# Patient Record
Sex: FEMALE | Race: WHITE | URBAN - METROPOLITAN AREA
[De-identification: names, ages, dates, MRNs, and addresses within clinical notes are randomized per-mention and may not be internally consistent; named-entity substitution may affect disease eponyms.]

---

## 2020-11-02 ENCOUNTER — INPATIENT (INPATIENT)
Facility: HOSPITAL | Age: 60
LOS: 20 days | Discharge: NURSING, REHAB OR LONG-TERM CARE | DRG: 710 | End: 2020-11-23
Attending: COLON & RECTAL SURGERY | Admitting: COLON & RECTAL SURGERY
Payer: MEDICAID

## 2020-11-02 ENCOUNTER — RESULT REVIEW (OUTPATIENT)
Age: 60
End: 2020-11-02

## 2020-11-02 VITALS
SYSTOLIC BLOOD PRESSURE: 130 MMHG | HEART RATE: 98 BPM | RESPIRATION RATE: 19 BRPM | OXYGEN SATURATION: 100 % | DIASTOLIC BLOOD PRESSURE: 58 MMHG | TEMPERATURE: 102 F

## 2020-11-02 DIAGNOSIS — D64.9 ANEMIA, UNSPECIFIED: ICD-10-CM

## 2020-11-02 LAB
ADD ON TEST-SPECIMEN IN LAB: SIGNIFICANT CHANGE UP
ALBUMIN SERPL ELPH-MCNC: 1.5 G/DL — LOW (ref 3.3–5)
ALBUMIN SERPL ELPH-MCNC: 2.2 G/DL — LOW (ref 3.3–5)
ALP SERPL-CCNC: 79 U/L — SIGNIFICANT CHANGE UP (ref 40–120)
ALP SERPL-CCNC: 97 U/L — SIGNIFICANT CHANGE UP (ref 40–120)
ALT FLD-CCNC: 52 U/L — SIGNIFICANT CHANGE UP (ref 12–78)
ALT FLD-CCNC: 74 U/L — SIGNIFICANT CHANGE UP (ref 12–78)
ANION GAP SERPL CALC-SCNC: 10 MMOL/L — SIGNIFICANT CHANGE UP (ref 5–17)
ANION GAP SERPL CALC-SCNC: 8 MMOL/L — SIGNIFICANT CHANGE UP (ref 5–17)
APPEARANCE UR: CLEAR — SIGNIFICANT CHANGE UP
APTT BLD: 26.2 SEC — LOW (ref 27.5–35.5)
AST SERPL-CCNC: 117 U/L — HIGH (ref 15–37)
AST SERPL-CCNC: 67 U/L — HIGH (ref 15–37)
BASOPHILS # BLD AUTO: 0 K/UL — SIGNIFICANT CHANGE UP (ref 0–0.2)
BASOPHILS NFR BLD AUTO: 0 % — SIGNIFICANT CHANGE UP (ref 0–2)
BILIRUB SERPL-MCNC: 0.4 MG/DL — SIGNIFICANT CHANGE UP (ref 0.2–1.2)
BILIRUB SERPL-MCNC: 0.7 MG/DL — SIGNIFICANT CHANGE UP (ref 0.2–1.2)
BILIRUB UR-MCNC: NEGATIVE — SIGNIFICANT CHANGE UP
BUN SERPL-MCNC: 26 MG/DL — HIGH (ref 7–23)
BUN SERPL-MCNC: 32 MG/DL — HIGH (ref 7–23)
CALCIUM SERPL-MCNC: 7.4 MG/DL — LOW (ref 8.5–10.1)
CALCIUM SERPL-MCNC: 8.8 MG/DL — SIGNIFICANT CHANGE UP (ref 8.5–10.1)
CHLORIDE SERPL-SCNC: 100 MMOL/L — SIGNIFICANT CHANGE UP (ref 96–108)
CHLORIDE SERPL-SCNC: 106 MMOL/L — SIGNIFICANT CHANGE UP (ref 96–108)
CO2 SERPL-SCNC: 22 MMOL/L — SIGNIFICANT CHANGE UP (ref 22–31)
CO2 SERPL-SCNC: 23 MMOL/L — SIGNIFICANT CHANGE UP (ref 22–31)
COLOR SPEC: YELLOW — SIGNIFICANT CHANGE UP
CREAT SERPL-MCNC: 0.84 MG/DL — SIGNIFICANT CHANGE UP (ref 0.5–1.3)
CREAT SERPL-MCNC: 1.2 MG/DL — SIGNIFICANT CHANGE UP (ref 0.5–1.3)
DIFF PNL FLD: ABNORMAL
EOSINOPHIL # BLD AUTO: 0 K/UL — SIGNIFICANT CHANGE UP (ref 0–0.5)
EOSINOPHIL NFR BLD AUTO: 0 % — SIGNIFICANT CHANGE UP (ref 0–6)
GLUCOSE SERPL-MCNC: 129 MG/DL — HIGH (ref 70–99)
GLUCOSE SERPL-MCNC: 146 MG/DL — HIGH (ref 70–99)
GLUCOSE UR QL: NEGATIVE MG/DL — SIGNIFICANT CHANGE UP
HCT VFR BLD CALC: 20.5 % — CRITICAL LOW (ref 34.5–45)
HCT VFR BLD CALC: 22.3 % — LOW (ref 34.5–45)
HGB BLD-MCNC: 6 G/DL — CRITICAL LOW (ref 11.5–15.5)
HGB BLD-MCNC: 6.3 G/DL — CRITICAL LOW (ref 11.5–15.5)
INR BLD: 1.3 RATIO — HIGH (ref 0.88–1.16)
KETONES UR-MCNC: NEGATIVE — SIGNIFICANT CHANGE UP
LACTATE SERPL-SCNC: 2.1 MMOL/L — HIGH (ref 0.7–2)
LEUKOCYTE ESTERASE UR-ACNC: NEGATIVE — SIGNIFICANT CHANGE UP
LYMPHOCYTES # BLD AUTO: 1.71 K/UL — SIGNIFICANT CHANGE UP (ref 1–3.3)
LYMPHOCYTES # BLD AUTO: 6 % — LOW (ref 13–44)
MCHC RBC-ENTMCNC: 15.4 PG — LOW (ref 27–34)
MCHC RBC-ENTMCNC: 17.6 PG — LOW (ref 27–34)
MCHC RBC-ENTMCNC: 28.3 GM/DL — LOW (ref 32–36)
MCHC RBC-ENTMCNC: 29.3 GM/DL — LOW (ref 32–36)
MCV RBC AUTO: 54.5 FL — LOW (ref 80–100)
MCV RBC AUTO: 60.1 FL — LOW (ref 80–100)
MONOCYTES # BLD AUTO: 2.28 K/UL — HIGH (ref 0–0.9)
MONOCYTES NFR BLD AUTO: 8 % — SIGNIFICANT CHANGE UP (ref 2–14)
NEUTROPHILS # BLD AUTO: 24.47 K/UL — HIGH (ref 1.8–7.4)
NEUTROPHILS NFR BLD AUTO: 81 % — HIGH (ref 43–77)
NITRITE UR-MCNC: NEGATIVE — SIGNIFICANT CHANGE UP
NRBC # BLD: SIGNIFICANT CHANGE UP /100 WBCS (ref 0–0)
PH UR: 5 — SIGNIFICANT CHANGE UP (ref 5–8)
PLATELET # BLD AUTO: 444 K/UL — HIGH (ref 150–400)
PLATELET # BLD AUTO: 639 K/UL — HIGH (ref 150–400)
POTASSIUM SERPL-MCNC: 3.1 MMOL/L — LOW (ref 3.5–5.3)
POTASSIUM SERPL-MCNC: 3.4 MMOL/L — LOW (ref 3.5–5.3)
POTASSIUM SERPL-SCNC: 3.1 MMOL/L — LOW (ref 3.5–5.3)
POTASSIUM SERPL-SCNC: 3.4 MMOL/L — LOW (ref 3.5–5.3)
PROT SERPL-MCNC: 5.5 GM/DL — LOW (ref 6–8.3)
PROT SERPL-MCNC: 7.6 GM/DL — SIGNIFICANT CHANGE UP (ref 6–8.3)
PROT UR-MCNC: 100 MG/DL
PROTHROM AB SERPL-ACNC: 14.9 SEC — HIGH (ref 10.6–13.6)
RBC # BLD: 3.41 M/UL — LOW (ref 3.8–5.2)
RBC # BLD: 4.09 M/UL — SIGNIFICANT CHANGE UP (ref 3.8–5.2)
RBC # FLD: 19.2 % — HIGH (ref 10.3–14.5)
RBC # FLD: 24.3 % — HIGH (ref 10.3–14.5)
SARS-COV-2 RNA SPEC QL NAA+PROBE: SIGNIFICANT CHANGE UP
SODIUM SERPL-SCNC: 133 MMOL/L — LOW (ref 135–145)
SODIUM SERPL-SCNC: 136 MMOL/L — SIGNIFICANT CHANGE UP (ref 135–145)
SP GR SPEC: 1.01 — SIGNIFICANT CHANGE UP (ref 1.01–1.02)
UROBILINOGEN FLD QL: NEGATIVE MG/DL — SIGNIFICANT CHANGE UP
WBC # BLD: 25.89 K/UL — HIGH (ref 3.8–10.5)
WBC # BLD: 28.45 K/UL — HIGH (ref 3.8–10.5)
WBC # FLD AUTO: 25.89 K/UL — HIGH (ref 3.8–10.5)
WBC # FLD AUTO: 28.45 K/UL — HIGH (ref 3.8–10.5)

## 2020-11-02 PROCEDURE — 83615 LACTATE (LD) (LDH) ENZYME: CPT

## 2020-11-02 PROCEDURE — 86901 BLOOD TYPING SEROLOGIC RH(D): CPT

## 2020-11-02 PROCEDURE — 46060 I&D ISCHIORECTAL/NTRMRL ABSC: CPT

## 2020-11-02 PROCEDURE — 36430 TRANSFUSION BLD/BLD COMPNT: CPT

## 2020-11-02 PROCEDURE — C1889: CPT

## 2020-11-02 PROCEDURE — 83010 ASSAY OF HAPTOGLOBIN QUANT: CPT

## 2020-11-02 PROCEDURE — 97530 THERAPEUTIC ACTIVITIES: CPT | Mod: GP

## 2020-11-02 PROCEDURE — 86850 RBC ANTIBODY SCREEN: CPT

## 2020-11-02 PROCEDURE — 83036 HEMOGLOBIN GLYCOSYLATED A1C: CPT

## 2020-11-02 PROCEDURE — 86320 SERUM IMMUNOELECTROPHORESIS: CPT

## 2020-11-02 PROCEDURE — 11044 DBRDMT BONE 1ST 20 SQ CM/<: CPT | Mod: 59

## 2020-11-02 PROCEDURE — 84134 ASSAY OF PREALBUMIN: CPT

## 2020-11-02 PROCEDURE — U0003: CPT

## 2020-11-02 PROCEDURE — P9016: CPT

## 2020-11-02 PROCEDURE — 80048 BASIC METABOLIC PNL TOTAL CA: CPT

## 2020-11-02 PROCEDURE — 86870 RBC ANTIBODY IDENTIFICATION: CPT

## 2020-11-02 PROCEDURE — 94640 AIRWAY INHALATION TREATMENT: CPT

## 2020-11-02 PROCEDURE — 74177 CT ABD & PELVIS W/CONTRAST: CPT | Mod: 26

## 2020-11-02 PROCEDURE — 82746 ASSAY OF FOLIC ACID SERUM: CPT

## 2020-11-02 PROCEDURE — 83550 IRON BINDING TEST: CPT

## 2020-11-02 PROCEDURE — 81025 URINE PREGNANCY TEST: CPT

## 2020-11-02 PROCEDURE — 88304 TISSUE EXAM BY PATHOLOGIST: CPT | Mod: 26

## 2020-11-02 PROCEDURE — 87075 CULTR BACTERIA EXCEPT BLOOD: CPT

## 2020-11-02 PROCEDURE — 83735 ASSAY OF MAGNESIUM: CPT

## 2020-11-02 PROCEDURE — 82728 ASSAY OF FERRITIN: CPT

## 2020-11-02 PROCEDURE — 93010 ELECTROCARDIOGRAM REPORT: CPT

## 2020-11-02 PROCEDURE — 21501 I&D DP ABSC/HMTMA SFT TS NCK: CPT

## 2020-11-02 PROCEDURE — 86803 HEPATITIS C AB TEST: CPT

## 2020-11-02 PROCEDURE — 88304 TISSUE EXAM BY PATHOLOGIST: CPT

## 2020-11-02 PROCEDURE — 83605 ASSAY OF LACTIC ACID: CPT

## 2020-11-02 PROCEDURE — C1765: CPT

## 2020-11-02 PROCEDURE — 85610 PROTHROMBIN TIME: CPT

## 2020-11-02 PROCEDURE — 83020 HEMOGLOBIN ELECTROPHORESIS: CPT

## 2020-11-02 PROCEDURE — 71045 X-RAY EXAM CHEST 1 VIEW: CPT | Mod: 26

## 2020-11-02 PROCEDURE — 85025 COMPLETE CBC W/AUTO DIFF WBC: CPT

## 2020-11-02 PROCEDURE — 82040 ASSAY OF SERUM ALBUMIN: CPT

## 2020-11-02 PROCEDURE — 82607 VITAMIN B-12: CPT

## 2020-11-02 PROCEDURE — 71260 CT THORAX DX C+: CPT | Mod: 26

## 2020-11-02 PROCEDURE — C9290: CPT

## 2020-11-02 PROCEDURE — 87186 SC STD MICRODIL/AGAR DIL: CPT

## 2020-11-02 PROCEDURE — 85014 HEMATOCRIT: CPT

## 2020-11-02 PROCEDURE — 85027 COMPLETE CBC AUTOMATED: CPT

## 2020-11-02 PROCEDURE — 85018 HEMOGLOBIN: CPT

## 2020-11-02 PROCEDURE — 86900 BLOOD TYPING SEROLOGIC ABO: CPT

## 2020-11-02 PROCEDURE — 87070 CULTURE OTHR SPECIMN AEROBIC: CPT

## 2020-11-02 PROCEDURE — 82962 GLUCOSE BLOOD TEST: CPT

## 2020-11-02 PROCEDURE — 86921 COMPATIBILITY TEST INCUBATE: CPT

## 2020-11-02 PROCEDURE — 84100 ASSAY OF PHOSPHORUS: CPT

## 2020-11-02 PROCEDURE — 97162 PT EVAL MOD COMPLEX 30 MIN: CPT | Mod: GP

## 2020-11-02 PROCEDURE — 36415 COLL VENOUS BLD VENIPUNCTURE: CPT

## 2020-11-02 PROCEDURE — 86880 COOMBS TEST DIRECT: CPT

## 2020-11-02 PROCEDURE — 83540 ASSAY OF IRON: CPT

## 2020-11-02 PROCEDURE — 86922 COMPATIBILITY TEST ANTIGLOB: CPT

## 2020-11-02 PROCEDURE — 97116 GAIT TRAINING THERAPY: CPT | Mod: GP

## 2020-11-02 PROCEDURE — 86920 COMPATIBILITY TEST SPIN: CPT

## 2020-11-02 PROCEDURE — 85730 THROMBOPLASTIN TIME PARTIAL: CPT

## 2020-11-02 PROCEDURE — 85045 AUTOMATED RETICULOCYTE COUNT: CPT

## 2020-11-02 PROCEDURE — 80053 COMPREHEN METABOLIC PANEL: CPT

## 2020-11-02 RX ORDER — VANCOMYCIN HCL 1 G
1000 VIAL (EA) INTRAVENOUS ONCE
Refills: 0 | Status: DISCONTINUED | OUTPATIENT
Start: 2020-11-02 | End: 2020-11-02

## 2020-11-02 RX ORDER — SODIUM CHLORIDE 9 MG/ML
1000 INJECTION, SOLUTION INTRAVENOUS
Refills: 0 | Status: DISCONTINUED | OUTPATIENT
Start: 2020-11-02 | End: 2020-11-05

## 2020-11-02 RX ORDER — SODIUM CHLORIDE 9 MG/ML
1000 INJECTION, SOLUTION INTRAVENOUS
Refills: 0 | Status: DISCONTINUED | OUTPATIENT
Start: 2020-11-02 | End: 2020-11-02

## 2020-11-02 RX ORDER — KETOROLAC TROMETHAMINE 30 MG/ML
15 SYRINGE (ML) INJECTION EVERY 6 HOURS
Refills: 0 | Status: DISCONTINUED | OUTPATIENT
Start: 2020-11-02 | End: 2020-11-02

## 2020-11-02 RX ORDER — SODIUM CHLORIDE 9 MG/ML
1000 INJECTION INTRAMUSCULAR; INTRAVENOUS; SUBCUTANEOUS ONCE
Refills: 0 | Status: COMPLETED | OUTPATIENT
Start: 2020-11-02 | End: 2020-11-02

## 2020-11-02 RX ORDER — HYDROMORPHONE HYDROCHLORIDE 2 MG/ML
0.5 INJECTION INTRAMUSCULAR; INTRAVENOUS; SUBCUTANEOUS
Refills: 0 | Status: DISCONTINUED | OUTPATIENT
Start: 2020-11-02 | End: 2020-11-02

## 2020-11-02 RX ORDER — FENTANYL CITRATE 50 UG/ML
50 INJECTION INTRAVENOUS
Refills: 0 | Status: DISCONTINUED | OUTPATIENT
Start: 2020-11-02 | End: 2020-11-02

## 2020-11-02 RX ORDER — ONDANSETRON 8 MG/1
4 TABLET, FILM COATED ORAL EVERY 6 HOURS
Refills: 0 | Status: DISCONTINUED | OUTPATIENT
Start: 2020-11-02 | End: 2020-11-02

## 2020-11-02 RX ORDER — PIPERACILLIN AND TAZOBACTAM 4; .5 G/20ML; G/20ML
3.38 INJECTION, POWDER, LYOPHILIZED, FOR SOLUTION INTRAVENOUS ONCE
Refills: 0 | Status: COMPLETED | OUTPATIENT
Start: 2020-11-02 | End: 2020-11-02

## 2020-11-02 RX ORDER — ONDANSETRON 8 MG/1
4 TABLET, FILM COATED ORAL ONCE
Refills: 0 | Status: DISCONTINUED | OUTPATIENT
Start: 2020-11-13 | End: 2020-11-16

## 2020-11-02 RX ORDER — VANCOMYCIN HCL 1 G
2000 VIAL (EA) INTRAVENOUS ONCE
Refills: 0 | Status: COMPLETED | OUTPATIENT
Start: 2020-11-02 | End: 2020-11-02

## 2020-11-02 RX ORDER — VANCOMYCIN HCL 1 G
1000 VIAL (EA) INTRAVENOUS EVERY 12 HOURS
Refills: 0 | Status: DISCONTINUED | OUTPATIENT
Start: 2020-11-02 | End: 2020-11-03

## 2020-11-02 RX ORDER — ACETAMINOPHEN 500 MG
1000 TABLET ORAL EVERY 6 HOURS
Refills: 0 | Status: COMPLETED | OUTPATIENT
Start: 2020-11-02 | End: 2020-11-05

## 2020-11-02 RX ORDER — ONDANSETRON 8 MG/1
4 TABLET, FILM COATED ORAL ONCE
Refills: 0 | Status: DISCONTINUED | OUTPATIENT
Start: 2020-11-02 | End: 2020-11-02

## 2020-11-02 RX ORDER — OXYCODONE HYDROCHLORIDE 5 MG/1
10 TABLET ORAL ONCE
Refills: 0 | Status: DISCONTINUED | OUTPATIENT
Start: 2020-11-02 | End: 2020-11-02

## 2020-11-02 RX ORDER — ACETAMINOPHEN 500 MG
1000 TABLET ORAL ONCE
Refills: 0 | Status: DISCONTINUED | OUTPATIENT
Start: 2020-11-02 | End: 2020-11-02

## 2020-11-02 RX ORDER — SODIUM CHLORIDE 9 MG/ML
1000 INJECTION, SOLUTION INTRAVENOUS ONCE
Refills: 0 | Status: COMPLETED | OUTPATIENT
Start: 2020-11-02 | End: 2020-11-02

## 2020-11-02 RX ORDER — POTASSIUM CHLORIDE 20 MEQ
10 PACKET (EA) ORAL
Refills: 0 | Status: COMPLETED | OUTPATIENT
Start: 2020-11-02 | End: 2020-11-02

## 2020-11-02 RX ORDER — OXYCODONE AND ACETAMINOPHEN 5; 325 MG/1; MG/1
2 TABLET ORAL EVERY 6 HOURS
Refills: 0 | Status: DISCONTINUED | OUTPATIENT
Start: 2020-11-02 | End: 2020-11-02

## 2020-11-02 RX ORDER — ACETAMINOPHEN 500 MG
1000 TABLET ORAL ONCE
Refills: 0 | Status: COMPLETED | OUTPATIENT
Start: 2020-11-02 | End: 2020-11-02

## 2020-11-02 RX ADMIN — Medication 100 MILLIEQUIVALENT(S): at 16:00

## 2020-11-02 RX ADMIN — Medication 100 MILLIGRAM(S): at 18:45

## 2020-11-02 RX ADMIN — PIPERACILLIN AND TAZOBACTAM 3.38 GRAM(S): 4; .5 INJECTION, POWDER, LYOPHILIZED, FOR SOLUTION INTRAVENOUS at 17:03

## 2020-11-02 RX ADMIN — Medication 100 MILLIEQUIVALENT(S): at 17:00

## 2020-11-02 RX ADMIN — Medication 250 MILLIGRAM(S): at 16:30

## 2020-11-02 RX ADMIN — PIPERACILLIN AND TAZOBACTAM 200 GRAM(S): 4; .5 INJECTION, POWDER, LYOPHILIZED, FOR SOLUTION INTRAVENOUS at 14:33

## 2020-11-02 RX ADMIN — Medication 10 MILLIEQUIVALENT(S): at 17:00

## 2020-11-02 RX ADMIN — SODIUM CHLORIDE 1000 MILLILITER(S): 9 INJECTION INTRAMUSCULAR; INTRAVENOUS; SUBCUTANEOUS at 14:33

## 2020-11-02 RX ADMIN — SODIUM CHLORIDE 1000 MILLILITER(S): 9 INJECTION INTRAMUSCULAR; INTRAVENOUS; SUBCUTANEOUS at 16:00

## 2020-11-02 RX ADMIN — SODIUM CHLORIDE 1000 MILLILITER(S): 9 INJECTION INTRAMUSCULAR; INTRAVENOUS; SUBCUTANEOUS at 15:33

## 2020-11-02 RX ADMIN — SODIUM CHLORIDE 1000 MILLILITER(S): 9 INJECTION INTRAMUSCULAR; INTRAVENOUS; SUBCUTANEOUS at 15:10

## 2020-11-02 RX ADMIN — SODIUM CHLORIDE 1000 MILLILITER(S): 9 INJECTION INTRAMUSCULAR; INTRAVENOUS; SUBCUTANEOUS at 17:00

## 2020-11-02 RX ADMIN — SODIUM CHLORIDE 1000 MILLILITER(S): 9 INJECTION INTRAMUSCULAR; INTRAVENOUS; SUBCUTANEOUS at 14:10

## 2020-11-02 RX ADMIN — Medication 100 MILLIEQUIVALENT(S): at 18:00

## 2020-11-02 RX ADMIN — SODIUM CHLORIDE 150 MILLILITER(S): 9 INJECTION, SOLUTION INTRAVENOUS at 22:32

## 2020-11-02 RX ADMIN — SODIUM CHLORIDE 1000 MILLILITER(S): 9 INJECTION, SOLUTION INTRAVENOUS at 22:05

## 2020-11-02 RX ADMIN — Medication 1000 MILLIGRAM(S): at 14:34

## 2020-11-02 RX ADMIN — Medication 2000 MILLIGRAM(S): at 18:30

## 2020-11-02 NOTE — ED ADULT TRIAGE NOTE - CHIEF COMPLAINT QUOTE
complains of "aches and pains" some SOB noted when walking, mucous membranes extremely dry, complains of hemorrhoids

## 2020-11-02 NOTE — ED PROVIDER NOTE - PROGRESS NOTE DETAILS
CXR neg. Elevated WBC, fever - ?urinary source. Broadly covered w/Zosyn  Also with HgB ~6.5 likely due to bleeding hemorrhoids.  Will transfuse PRBC x 2u.   TBA CXR neg. Elevated WBC, fever - ?urinary source. Broadly covered w/Zosyn/Vanco  Also with HgB ~6.5 ?etiology (guaiac neg, liquid brown stool).  Will transfuse PRBC x 2u.   CT chest/abd/pelvis ordered.  Fluids. UA  TBA Received call from radiologist. Pt has gas in the perianal soft tissue suspicious for necrotizing fasciitis. Consult with Dr. Gonzalez called. Clindamycin ordered. Mohit PIERCE Spoke to Dr. Gonzalez and reviewed case/results. He will come right down to see patient. Mohit PIERCE

## 2020-11-02 NOTE — ED PROVIDER NOTE - CONSTITUTIONAL, MLM
normal... Well appearing, awake, alert, oriented to person, place, time/situation and in no apparent distress. Ill appearing, awake, alert, oriented to person, place, time/situation and in mild-mod distress

## 2020-11-02 NOTE — ED ADULT NURSE NOTE - OBJECTIVE STATEMENT
pt is 59 yo female presents to ED for SOB, and generalized weakness since her  passed away.  +abd distension noted, +fever.

## 2020-11-02 NOTE — PROGRESS NOTE ADULT - SUBJECTIVE AND OBJECTIVE BOX
called b [facility]ia doctor around 6:30 that patient had presented with genralize malaise and found to have sepsis, elevated cbc >20K acidotic and ct findings of necrotizing faciaitis of the perineum perirectal area. patietn given braod spectrum antibiotic. IVF and emergently brought to OR for planned debridment. risk and benefits fully explained to patient including, bleeding, infections, worsening of  sepsis, dvt, pe, mi, need for serial surgeries and debridments, possible ostomy, and death

## 2020-11-02 NOTE — PROGRESS NOTE ADULT - SUBJECTIVE AND OBJECTIVE BOX
surgery showed massive amounts of ischemic/necrotic/infected soft tissue infections of both buttocks/perianal and pernieum area. extensive debridment was performed, irrigated with 3 liters of abx irrigation cultures sent. both open wounds left and right ischiorectal /perianal areas left opened and packed with kerlex and betadine.  spoke to daughter diane armando and let her know that her mother is very sick, critical and this infection has a 50%-70% mortality.will get stat ID consult for abx treatment. will need subsequent debridments. may require a colostomy for fecal diversion. i also made attempts to contact patients mother (aphrodite) but no answer at home, i left a voice mail .

## 2020-11-02 NOTE — H&P ADULT - HISTORY OF PRESENT ILLNESS
59 y/o F with PMHx of hemorrhoids presents to the ED c/o diffuse +myalgias and +SOB x2 weeks. Notes associated +weakness and +decreased PO intake as well since her  passed 10/15.  Reports inability to perform ADLs 2/2 symptoms so called EMS today. Non-drinker. Never a smoker. Allergic to cefazolin and sulfa drugs. Denies hx of DM, denies fever, chills, chest pain, n/v. Endorses diarrhea x 2weeks

## 2020-11-02 NOTE — H&P ADULT - NSHPPHYSICALEXAM_GEN_ALL_CORE
General: AAOx3, in mild distress   Cardiac: RRR   Respiratory: equal chest rise b/l, saturated well on 2L NC   Abdomen: soft, diffusely tender, distended   Skin: left gluteal necrotic ulcer with induration, erythema and warmth extended throughout gluteus.

## 2020-11-02 NOTE — ED PROVIDER NOTE - PRINCIPAL DIAGNOSIS
Returned call, she states that pt has no other symptoms other than cloudy urine. Advised that we should get a urine sample from him. She will have someone come out on Monday to collect. Order will be placed.   
visiting nurse calling stating the urine is cloudy looking. No blood. Concerned that the urine is cloudy.  Please call Salma back at [phone]        
Anemia

## 2020-11-02 NOTE — ED PROVIDER NOTE - CARE PLAN
Principal Discharge DX:	Anemia  Secondary Diagnosis:	Fever   Principal Discharge DX:	Anemia  Secondary Diagnosis:	Fever  Secondary Diagnosis:	Necrotizing fasciitis

## 2020-11-02 NOTE — H&P ADULT - NSHPLABSRESULTS_GEN_ALL_CORE
Vital Signs Last 24 Hrs  T(C): 37.1 (02 Nov 2020 17:10), Max: 38.7 (02 Nov 2020 13:38)  T(F): 98.8 (02 Nov 2020 17:10), Max: 101.6 (02 Nov 2020 13:38)  HR: 88 (02 Nov 2020 18:00) (87 - 98)  BP: 117/51 (02 Nov 2020 18:00) (98/58 - 130/58)  BP(mean): 68 (02 Nov 2020 18:00) (67 - 93)  RR: 20 (02 Nov 2020 18:00) (18 - 20)  SpO2: 100% (02 Nov 2020 18:00) (99% - 100%)                          6.3    28.45 )-----------( 639      ( 02 Nov 2020 14:14 )             22.3   11-02    133<L>  |  100  |  32<H>  ----------------------------<  129<H>  3.1<L>   |  23  |  1.20    Ca    8.8      02 Nov 2020 14:14    TPro  7.6  /  Alb  2.2<L>  /  TBili  0.4  /  DBili  x   /  AST  117<H>  /  ALT  74  /  AlkPhos  97  11-02      Imaging:   CT abd/pelvis: Extensive subcutaneous gas within the left ischioanal fossa tracking into the pelvis and retroperitoneum worrisome for gas forming infection.

## 2020-11-02 NOTE — H&P ADULT - ASSESSMENT
61 y/o female without significant medical history px to ED with signs of sepsis likely 2/2 to necrotizing fasciitis of left gluteal region.     Plan:  - Admit to surgery under Dr. Gordillo   - Patient for OR asap for excisional debridement of necrotic tissue   - IVF   - IV abx   - NPO   - c/w transfusion of pRBC      Case discussed with Dr. Gordillo

## 2020-11-02 NOTE — ED PROVIDER NOTE - GASTROINTESTINAL, MLM
Abdomen soft, non-tender, no guarding. Abdomen soft, non-tender, no guarding.  Rectal with few external hemorroids, guaiac neg Abdomen soft, non-tender, no guarding.  Rectal with few external hemorroids, guaiac neg (Lot 175, Exp Date 2/28/2021, QC pos)

## 2020-11-03 LAB
A1C WITH ESTIMATED AVERAGE GLUCOSE RESULT: 5.8 % — HIGH (ref 4–5.6)
ALBUMIN SERPL ELPH-MCNC: 1.5 G/DL — LOW (ref 3.3–5)
ALP SERPL-CCNC: 85 U/L — SIGNIFICANT CHANGE UP (ref 40–120)
ALT FLD-CCNC: 48 U/L — SIGNIFICANT CHANGE UP (ref 12–78)
ANION GAP SERPL CALC-SCNC: 9 MMOL/L — SIGNIFICANT CHANGE UP (ref 5–17)
AST SERPL-CCNC: 49 U/L — HIGH (ref 15–37)
BILIRUB SERPL-MCNC: 0.5 MG/DL — SIGNIFICANT CHANGE UP (ref 0.2–1.2)
BUN SERPL-MCNC: 25 MG/DL — HIGH (ref 7–23)
CALCIUM SERPL-MCNC: 7.6 MG/DL — LOW (ref 8.5–10.1)
CHLORIDE SERPL-SCNC: 105 MMOL/L — SIGNIFICANT CHANGE UP (ref 96–108)
CO2 SERPL-SCNC: 21 MMOL/L — LOW (ref 22–31)
CREAT SERPL-MCNC: 0.7 MG/DL — SIGNIFICANT CHANGE UP (ref 0.5–1.3)
CULTURE RESULTS: NO GROWTH — SIGNIFICANT CHANGE UP
ESTIMATED AVERAGE GLUCOSE: 120 MG/DL — HIGH (ref 68–114)
GLUCOSE SERPL-MCNC: 159 MG/DL — HIGH (ref 70–99)
HCG UR QL: NEGATIVE — SIGNIFICANT CHANGE UP
HCT VFR BLD CALC: 22.7 % — LOW (ref 34.5–45)
HCV AB S/CO SERPL IA: 0.07 S/CO — SIGNIFICANT CHANGE UP (ref 0–0.99)
HCV AB SERPL-IMP: SIGNIFICANT CHANGE UP
HGB BLD-MCNC: 6.9 G/DL — CRITICAL LOW (ref 11.5–15.5)
LACTATE SERPL-SCNC: 1.4 MMOL/L — SIGNIFICANT CHANGE UP (ref 0.7–2)
MAGNESIUM SERPL-MCNC: 2.2 MG/DL — SIGNIFICANT CHANGE UP (ref 1.6–2.6)
MCHC RBC-ENTMCNC: 19.7 PG — LOW (ref 27–34)
MCHC RBC-ENTMCNC: 30.4 GM/DL — LOW (ref 32–36)
MCV RBC AUTO: 64.7 FL — LOW (ref 80–100)
PHOSPHATE SERPL-MCNC: 3.7 MG/DL — SIGNIFICANT CHANGE UP (ref 2.5–4.5)
PLATELET # BLD AUTO: 392 K/UL — SIGNIFICANT CHANGE UP (ref 150–400)
POTASSIUM SERPL-MCNC: 3.6 MMOL/L — SIGNIFICANT CHANGE UP (ref 3.5–5.3)
POTASSIUM SERPL-SCNC: 3.6 MMOL/L — SIGNIFICANT CHANGE UP (ref 3.5–5.3)
PROT SERPL-MCNC: 5.9 GM/DL — LOW (ref 6–8.3)
RBC # BLD: 3.51 M/UL — LOW (ref 3.8–5.2)
RBC # FLD: 29.9 % — HIGH (ref 10.3–14.5)
SARS-COV-2 IGG SERPL QL IA: POSITIVE
SARS-COV-2 IGM SERPL IA-ACNC: 67.8 INDEX — HIGH
SODIUM SERPL-SCNC: 135 MMOL/L — SIGNIFICANT CHANGE UP (ref 135–145)
SPECIMEN SOURCE: SIGNIFICANT CHANGE UP
WBC # BLD: 26.69 K/UL — HIGH (ref 3.8–10.5)
WBC # FLD AUTO: 26.69 K/UL — HIGH (ref 3.8–10.5)

## 2020-11-03 PROCEDURE — 99252 IP/OBS CONSLTJ NEW/EST SF 35: CPT

## 2020-11-03 RX ORDER — ENOXAPARIN SODIUM 100 MG/ML
30 INJECTION SUBCUTANEOUS
Refills: 0 | Status: DISCONTINUED | OUTPATIENT
Start: 2020-11-03 | End: 2020-11-03

## 2020-11-03 RX ORDER — FENTANYL CITRATE 50 UG/ML
50 INJECTION INTRAVENOUS
Refills: 0 | Status: DISCONTINUED | OUTPATIENT
Start: 2020-11-03 | End: 2020-11-03

## 2020-11-03 RX ORDER — ALBUTEROL 90 UG/1
2.5 AEROSOL, METERED ORAL EVERY 6 HOURS
Refills: 0 | Status: DISCONTINUED | OUTPATIENT
Start: 2020-11-03 | End: 2020-11-03

## 2020-11-03 RX ORDER — ONDANSETRON 8 MG/1
4 TABLET, FILM COATED ORAL ONCE
Refills: 0 | Status: DISCONTINUED | OUTPATIENT
Start: 2020-11-03 | End: 2020-11-03

## 2020-11-03 RX ORDER — FONDAPARINUX SODIUM 2.5 MG/.5ML
2.5 INJECTION, SOLUTION SUBCUTANEOUS DAILY
Refills: 0 | Status: DISCONTINUED | OUTPATIENT
Start: 2020-11-03 | End: 2020-11-05

## 2020-11-03 RX ORDER — ALBUTEROL 90 UG/1
2 AEROSOL, METERED ORAL EVERY 6 HOURS
Refills: 0 | Status: DISCONTINUED | OUTPATIENT
Start: 2020-11-03 | End: 2020-11-03

## 2020-11-03 RX ORDER — PIPERACILLIN AND TAZOBACTAM 4; .5 G/20ML; G/20ML
3.38 INJECTION, POWDER, LYOPHILIZED, FOR SOLUTION INTRAVENOUS EVERY 8 HOURS
Refills: 0 | Status: DISCONTINUED | OUTPATIENT
Start: 2020-11-03 | End: 2020-11-05

## 2020-11-03 RX ORDER — ACETAMINOPHEN 500 MG
1000 TABLET ORAL ONCE
Refills: 0 | Status: DISCONTINUED | OUTPATIENT
Start: 2020-11-03 | End: 2020-11-03

## 2020-11-03 RX ORDER — SODIUM CHLORIDE 9 MG/ML
1000 INJECTION, SOLUTION INTRAVENOUS
Refills: 0 | Status: DISCONTINUED | OUTPATIENT
Start: 2020-11-03 | End: 2020-11-03

## 2020-11-03 RX ORDER — FENTANYL CITRATE 50 UG/ML
25 INJECTION INTRAVENOUS
Refills: 0 | Status: DISCONTINUED | OUTPATIENT
Start: 2020-11-03 | End: 2020-11-03

## 2020-11-03 RX ADMIN — Medication 250 MILLIGRAM(S): at 05:09

## 2020-11-03 RX ADMIN — Medication 400 MILLIGRAM(S): at 05:10

## 2020-11-03 RX ADMIN — Medication 400 MILLIGRAM(S): at 13:38

## 2020-11-03 RX ADMIN — SODIUM CHLORIDE 150 MILLILITER(S): 9 INJECTION, SOLUTION INTRAVENOUS at 05:24

## 2020-11-03 RX ADMIN — SODIUM CHLORIDE 150 MILLILITER(S): 9 INJECTION, SOLUTION INTRAVENOUS at 11:52

## 2020-11-03 RX ADMIN — SODIUM CHLORIDE 75 MILLILITER(S): 9 INJECTION, SOLUTION INTRAVENOUS at 21:55

## 2020-11-03 RX ADMIN — PIPERACILLIN AND TAZOBACTAM 25 GRAM(S): 4; .5 INJECTION, POWDER, LYOPHILIZED, FOR SOLUTION INTRAVENOUS at 14:02

## 2020-11-03 RX ADMIN — Medication 1000 MILLIGRAM(S): at 05:40

## 2020-11-03 RX ADMIN — Medication 1000 MILLIGRAM(S): at 14:08

## 2020-11-03 RX ADMIN — PIPERACILLIN AND TAZOBACTAM 25 GRAM(S): 4; .5 INJECTION, POWDER, LYOPHILIZED, FOR SOLUTION INTRAVENOUS at 23:17

## 2020-11-03 RX ADMIN — SODIUM CHLORIDE 150 MILLILITER(S): 9 INJECTION, SOLUTION INTRAVENOUS at 18:53

## 2020-11-03 RX ADMIN — Medication 100 MILLIGRAM(S): at 23:17

## 2020-11-03 RX ADMIN — Medication 100 MILLIGRAM(S): at 13:07

## 2020-11-03 RX ADMIN — Medication 100 MILLIGRAM(S): at 05:09

## 2020-11-03 NOTE — DIETITIAN INITIAL EVALUATION ADULT. - NAME AND PHONE
Helen Sanford MA, RDN, CDN, [facility]  [phone] (office number)  [phone] (pager number)

## 2020-11-03 NOTE — CHART NOTE - TREATMENT: THE FOLLOWING DIET HAS BEEN RECOMMENDED
Diet, NPO (11-02-20 @ 21:51) [Active]    severe malnutrition in social/environmental issue r/t decreased ability to consume sufficient energy/protein 2/2 depression s/p loss of loved one AEB meeting <50% of ENN x 1 mo and 8% wt loss x 1 mo    RECOMMENDATIONS:  1) as medically feasible, advance diet as tolerated to regular with ensure enlive once daily and gelatein BID to optimize PO intake   2) monitor length NPO, advancement/tolerance nutr source   3) consider checking vitamin D level   4) daily wt checks to track/trend changes   5) add MVI with minerals daily to ensure 100% RDI met

## 2020-11-03 NOTE — PRE-OP CHECKLIST - 1.
necrotizing fascitis, fornier's gangrene day1 excisional debridement of necrotic tissue buttock/ perianal

## 2020-11-03 NOTE — DIETITIAN INITIAL EVALUATION ADULT. - ORAL INTAKE PTA/DIET HISTORY
pt reports not eating since boyfriend  (10/15/20); when attempted, she would vomit, even with coffee. pt reports not eating since boyfriend  (10/15/20); when attempted, she would vomit, even with coffee.  *spoke with [facility]; pt is on list to be seen for services.

## 2020-11-03 NOTE — DIETITIAN INITIAL EVALUATION ADULT. - PERTINENT MEDS FT
MEDICATIONS  (STANDING):  clindamycin IVPB 600 milliGRAM(s) IV Intermittent every 8 hours  fondaparinux Injectable 2.5 milliGRAM(s) SubCutaneous daily  lactated ringers. 1000 milliLiter(s) (150 mL/Hr) IV Continuous <Continuous>  piperacillin/tazobactam IVPB.. 3.375 Gram(s) IV Intermittent every 8 hours    MEDICATIONS  (PRN):  acetaminophen  IVPB .. 1000 milliGRAM(s) IV Intermittent every 6 hours PRN Mild Pain (1 - 3)

## 2020-11-03 NOTE — CONSULT NOTE ADULT - SUBJECTIVE AND OBJECTIVE BOX
59 y/o F with PMHx of hemorrhoids admitted on 11/2 for evaluation of shortness of breath, weakness and muscle aches; had loose stools; is poor historian and history per medical record; the patient had imaging upon admission and was found to have bilateral buttock infection with necrotizing fasciitis and went to OR for debridement on 11/2. She cannot recall any details about the infection or how she came about having such a severe infection. She denies any allergy to penicillin or cephalosporins.     POD#1; pt denies discomfort, and recalls allergy to UFH: diagnosed when she was hospitalized for DVT years back; thinks she had difficulty breathing with UFH    PMHx: DVT  PSHx denies  Soc hx neg  Fam hx neg  Denies taking any meds at home    ROS all 10 systems negative    Vital Signs Last 24 Hrs  T(C): 36.9 (03 Nov 2020 12:08), Max: 38.7 (02 Nov 2020 13:38)  T(F): 98.5 (03 Nov 2020 12:08), Max: 101.6 (02 Nov 2020 13:38)  HR: 88 (03 Nov 2020 12:08) (75 - 98)  BP: 110/95 (03 Nov 2020 12:08) (86/64 - 130/71)  BP(mean): 99 (03 Nov 2020 12:08) (56 - 99)  RR: 22 (03 Nov 2020 12:08) (18 - 37)  SpO2: 100% (03 Nov 2020 12:08) (92% - 100%)    heent nc at [facility]  chest cta  cvs s1s2 reg no m/g/r  abd soft nt nd +bs  buttocks with dressing   extr no e/c/c  neuro non focal                          6.9    26.69 )-----------( 392      ( 03 Nov 2020 06:50 )             22.7   11-03    135  |  105  |  25<H>  ----------------------------<  159<H>  3.6   |  21<L>  |  0.70    Ca    7.6<L>      03 Nov 2020 06:50  Phos  3.7     11-03  Mg     2.2     11-03    TPro  5.9<L>  /  Alb  1.5<L>  /  TBili  0.5  /  DBili  x   /  AST  49<H>  /  ALT  48  /  AlkPhos  85  11-03      * Necrotizing fasciatis  iv abx  will return to or  prn analgesia    * h/o VTE  b/o UFH allergy start arixtra    * Anemia sec to acute blood loss  case d/w sx they would prefer AC to be started asap  transfuse

## 2020-11-03 NOTE — PROGRESS NOTE ADULT - ASSESSMENT
61 y/o female px with necrotizing fasciitis s/p excisional debridement POD 1     Plan:   - pain control prn   - Monitor VS   - transfuse additional 2 units pRBC, f/u H/H   - Diet   - IV abx: ID recommendations appreciated   - Hospitalist consult  - DVT ppx   - Possible return to OR Thursday     Discussed with colorectal surgery team

## 2020-11-03 NOTE — PROGRESS NOTE ADULT - SUBJECTIVE AND OBJECTIVE BOX
Seen and examined at bedside this morning. Feeling well, complains of pain at surgical site but alleviated by medication. VS wnl.  Patient received 2units pRBC overnight. Denies fever, chills, chest pain, sob, abdominal pain, n/v/d.     Vital Signs Last 24 Hrs  T(C): 36.4 (03 Nov 2020 10:20), Max: 38.7 (02 Nov 2020 13:38)  T(F): 97.6 (03 Nov 2020 10:20), Max: 101.6 (02 Nov 2020 13:38)  HR: 82 (03 Nov 2020 10:20) (75 - 98)  BP: 114/78 (03 Nov 2020 10:20) (86/64 - 130/71)  BP(mean): 87 (03 Nov 2020 10:20) (56 - 93)  RR: 27 (03 Nov 2020 10:20) (18 - 37)  SpO2: 99% (03 Nov 2020 10:20) (92% - 100%)    Physical exam:   Gen: AAOx3, NAD   Cardiac: RRR  Respiratory: equal chest rise b/l   Abdomen: soft, ND, NT   Surgical site: dressing c/d/i, iodine strikethrough on dressing                 6.9<LL>                135  | 21<L>| 25<H>        26.69<H> >-----------< 392     ------------------------< 159<H>                 22.7<L>                3.6  | 105  | 0.70                                                                      Ca 7.6<L> Mg 2.2   Ph 3.7      ,             6.0<LL>                136  | 22   | 26<H>        25.89<H> >-----------< 444<H>   ------------------------< 146<H>                 20.5<LL>                3.4<L>| 106  | 0.84                                                                      Ca 7.4<L> Mg x     Ph x

## 2020-11-03 NOTE — DIETITIAN INITIAL EVALUATION ADULT. - PERTINENT LABORATORY DATA
11-03    135  |  105  |  25<H>  ----------------------------<  159<H>  3.6   |  21<L>  |  0.70    Ca    7.6<L>      03 Nov 2020 06:50  Phos  3.7     11-03  Mg     2.2     11-03    TPro  5.9<L>  /  Alb  1.5<L>  /  TBili  0.5  /  DBili  x   /  AST  49<H>  /  ALT  48  /  AlkPhos  85  11-03

## 2020-11-03 NOTE — DIETITIAN INITIAL EVALUATION ADULT. - ADD RECOMMEND
1) as medically feasible, advance diet as tolerated to regular with ensure enlive once daily and gelatein BID to optimize PO intake 2) monitor length NPO, advancement/tolerance nutr source 3) consider checking vitamin D level 4) daily wt checks to track/trend changes 5) add MVI with minerals daily to ensure 100% RDI met

## 2020-11-03 NOTE — DIETITIAN INITIAL EVALUATION ADULT. - OTHER INFO
59yo female with PMH significant for hemorrhoids and DVT p/w  SOB, weakness, muscle aches, loose stools.  Pt found to have sepsis with necrotizing fascitis of Lt gluteal region.  Pt now s/p debridement of necrotizing fascitis of both buttocks on 11/2, may need colostomy per surgery.

## 2020-11-03 NOTE — CONSULT NOTE ADULT - SUBJECTIVE AND OBJECTIVE BOX
Patient is a 60y old  Female who presents with a chief complaint of Necrotizing fasciitis/dayana wallace (2020 21:09)    HPI:  59 y/o F with PMHx of hemorrhoids admitted on  for evaluation of shortness of breath, weakness and muscle aches; had loose stools; is poor historian and history per medical record; the patient had imaging upon admission and was found to have bilateral buttock infection with necrotizing fasciitis and went to OR for debridement on . She cannot recall any details about the infection or how she came about having such a severe infection. She denies any allergy to penicillin or cephalosporins.         PMH: as above  PSH: as above  Meds: per reconciliation sheet, noted below  MEDICATIONS  (STANDING):  clindamycin IVPB 600 milliGRAM(s) IV Intermittent every 8 hours  lactated ringers. 1000 milliLiter(s) (150 mL/Hr) IV Continuous <Continuous>  piperacillin/tazobactam IVPB.. 3.375 Gram(s) IV Intermittent every 8 hours    MEDICATIONS  (PRN):  acetaminophen  IVPB .. 1000 milliGRAM(s) IV Intermittent every 6 hours PRN Mild Pain (1 - 3)    Allergies    cefazolin (Other (Moderate))----- she denies this  heparin (Unknown)  sulfa drugs (Unknown)    Intolerances      Social: no smoking, no alcohol, no illegal drugs; no recent travel, no exposure to TB  FAMILY HISTORY:     ROS unable to obtain secondary to patient medical condition     Vital Signs Last 24 Hrs  T(C): 36.4 (2020 09:36), Max: 38.7 (2020 13:38)  T(F): 97.6 (2020 09:36), Max: 101.6 (2020 13:38)  HR: 81 (2020 09:00) (75 - 98)  BP: 86/64 (2020 09:00) (86/64 - 130/71)  BP(mean): 69 (2020 09:00) (56 - 93)  RR: 26 (2020 09:00) (18 - 37)  SpO2: 100% (2020 09:00) (92% - 100%)  Daily     Daily     PE:    Constitutional: frail looking  HEENT: NC/AT, EOMI, PERRLA, conjunctivae clear; ears and nose atraumatic; pharynx clear  Neck: supple; thyroid not palpable  Back: no tenderness  Respiratory: respiratory effort normal; clear to auscultation  Cardiovascular: S1S2 regular, no murmurs  Abdomen: soft, not tender, not distended, positive BS; no liver or spleen organomegaly  Genitourinary: no suprapubic tenderness  Musculoskeletal: no muscle tenderness, no joint swelling or tenderness  Neurological/ Psychiatric: AxOx3, judgement and insight normal;  moving all extremities  Skin: dressings on buttocks    Labs: all available labs reviewed                        6.9    26.69 )-----------( 392      ( 2020 06:50 )             22.7     11-    135  |  105  |  25<H>  ----------------------------<  159<H>  3.6   |  21<L>  |  0.70    Ca    7.6<L>      2020 06:50  Phos  3.7     -  Mg     2.2         TPro  5.9<L>  /  Alb  1.5<L>  /  TBili  0.5  /  DBili  x   /  AST  49<H>  /  ALT  48  /  AlkPhos  85  03     LIVER FUNCTIONS - ( 2020 06:50 )  Alb: 1.5 g/dL / Pro: 5.9 gm/dL / ALK PHOS: 85 U/L / ALT: 48 U/L / AST: 49 U/L / GGT: x           Urinalysis Basic - ( 2020 16:36 )    Color: Yellow / Appearance: Clear / S.015 / pH: x  Gluc: x / Ketone: Negative  / Bili: Negative / Urobili: Negative mg/dL   Blood: x / Protein: 100 mg/dL / Nitrite: Negative   Leuk Esterase: Negative / RBC: 3-5 /HPF / WBC 0-2   Sq Epi: x / Non Sq Epi: Occasional / Bacteria: Occasional      < from: CT Abdomen and Pelvis w/ IV Cont (20 @ 17:46) >    EXAM:  CT ABDOMEN AND PELVIS IC                          EXAM:  CT CHEST IC                            PROCEDURE DATE:  2020          INTERPRETATION:  CLINICAL INFORMATION: sepsis    COMPARISON: None.    PROCEDURE:  CT of the Chest, Abdomen and Pelvis was performed with intravenous contrast.  Intravenous contrast: 90 ml Omnipaque 350. 10 ml discarded.  Oral contrast: None.  Sagittal and coronal reformats were performed.    FINDINGS:    CHEST:    LUNGS AND LARGE AIRWAYS: Patent central airways. Azygos fissure. A few less than 5 mm nodules in the left lower lobe.  PLEURA: No pleural effusion.  VESSELS: Within normal limits.  HEART: Heart size is normal.  No pericardial effusion.  MEDIASTINUM AND BLANK: No lymphadenopathy.  CHEST WALL ANDLOWER NECK: Within normal limits.    ABDOMEN AND PELVIS:    LIVER: Within normal limits.  BILE DUCTS: Normal caliber.  GALLBLADDER: Cholelithiasis.  SPLEEN: Within normal limits.  PANCREAS: Within normal limits.  ADRENALS: Within normal limits.  KIDNEYS/URETERS: Hypodense left renal lesion too small to characterize.    BLADDER: Within normal limits.  REPRODUCTIVE ORGANS: Fibroid uterus.    BOWEL: No bowel obstruction. The appendix is normal.  PERITONEUM: No ascites.  VESSELS:  Within normal limits.  RETROPERITONEUM/LYMPH NODES: No lymphadenopathy.  ABDOMINAL WALL: There is within the visualized left ischioanal fossa and around the left gluteal muscles. Air tracks along the deep left pelvis and presacral space into the retroperitoneum. There is air along the left psoas muscle.  BONES: Within normal limits.    IMPRESSION: Extensive subcutaneous gas within the left ischioanal fossa tracking into the pelvis and retroperitoneum worrisome for gas forming infection.      < end of copied text >      Radiology: all available radiological tests reviewed    Advanced directives addressed: full resuscitation

## 2020-11-03 NOTE — DIETITIAN INITIAL EVALUATION ADULT. - MALNUTRITION
severe malnutrition in social/environmental issue severe malnutrition in social/environmental issue r/t decreased ability to consume sufficient energy/protein 2/2 depression s/p loss of loved one AEB meeting <50% of ENN x 1 mo and 8% wt loss x 1 mo

## 2020-11-04 PROBLEM — K64.9 UNSPECIFIED HEMORRHOIDS: Chronic | Status: ACTIVE | Noted: 2020-11-02

## 2020-11-04 LAB
ALBUMIN SERPL ELPH-MCNC: 1.4 G/DL — LOW (ref 3.3–5)
ALP SERPL-CCNC: 185 U/L — HIGH (ref 40–120)
ALT FLD-CCNC: 40 U/L — SIGNIFICANT CHANGE UP (ref 12–78)
ANION GAP SERPL CALC-SCNC: 7 MMOL/L — SIGNIFICANT CHANGE UP (ref 5–17)
AST SERPL-CCNC: 35 U/L — SIGNIFICANT CHANGE UP (ref 15–37)
BILIRUB SERPL-MCNC: 0.4 MG/DL — SIGNIFICANT CHANGE UP (ref 0.2–1.2)
BUN SERPL-MCNC: 19 MG/DL — SIGNIFICANT CHANGE UP (ref 7–23)
CALCIUM SERPL-MCNC: 8.1 MG/DL — LOW (ref 8.5–10.1)
CHLORIDE SERPL-SCNC: 107 MMOL/L — SIGNIFICANT CHANGE UP (ref 96–108)
CO2 SERPL-SCNC: 25 MMOL/L — SIGNIFICANT CHANGE UP (ref 22–31)
CREAT SERPL-MCNC: 0.65 MG/DL — SIGNIFICANT CHANGE UP (ref 0.5–1.3)
GLUCOSE SERPL-MCNC: 107 MG/DL — HIGH (ref 70–99)
HCT VFR BLD CALC: 27.7 % — LOW (ref 34.5–45)
HGB BLD-MCNC: 8.4 G/DL — LOW (ref 11.5–15.5)
INR BLD: 1.15 RATIO — SIGNIFICANT CHANGE UP (ref 0.88–1.16)
LACTATE SERPL-SCNC: 1.5 MMOL/L — SIGNIFICANT CHANGE UP (ref 0.7–2)
MAGNESIUM SERPL-MCNC: 2.3 MG/DL — SIGNIFICANT CHANGE UP (ref 1.6–2.6)
MCHC RBC-ENTMCNC: 21.1 PG — LOW (ref 27–34)
MCHC RBC-ENTMCNC: 30.3 GM/DL — LOW (ref 32–36)
MCV RBC AUTO: 69.4 FL — LOW (ref 80–100)
PHOSPHATE SERPL-MCNC: 4.3 MG/DL — SIGNIFICANT CHANGE UP (ref 2.5–4.5)
PLATELET # BLD AUTO: 488 K/UL — HIGH (ref 150–400)
POTASSIUM SERPL-MCNC: 3.9 MMOL/L — SIGNIFICANT CHANGE UP (ref 3.5–5.3)
POTASSIUM SERPL-SCNC: 3.9 MMOL/L — SIGNIFICANT CHANGE UP (ref 3.5–5.3)
PROT SERPL-MCNC: 5.9 GM/DL — LOW (ref 6–8.3)
PROTHROM AB SERPL-ACNC: 13.3 SEC — SIGNIFICANT CHANGE UP (ref 10.6–13.6)
RBC # BLD: 3.99 M/UL — SIGNIFICANT CHANGE UP (ref 3.8–5.2)
RBC # FLD: 30.5 % — HIGH (ref 10.3–14.5)
SODIUM SERPL-SCNC: 139 MMOL/L — SIGNIFICANT CHANGE UP (ref 135–145)
WBC # BLD: 28.55 K/UL — HIGH (ref 3.8–10.5)
WBC # FLD AUTO: 28.55 K/UL — HIGH (ref 3.8–10.5)

## 2020-11-04 PROCEDURE — 99232 SBSQ HOSP IP/OBS MODERATE 35: CPT

## 2020-11-04 RX ORDER — ALBUTEROL 90 UG/1
2 AEROSOL, METERED ORAL EVERY 6 HOURS
Refills: 0 | Status: DISCONTINUED | OUTPATIENT
Start: 2020-11-04 | End: 2020-11-05

## 2020-11-04 RX ORDER — HYDROMORPHONE HYDROCHLORIDE 2 MG/ML
2 INJECTION INTRAMUSCULAR; INTRAVENOUS; SUBCUTANEOUS ONCE
Refills: 0 | Status: DISCONTINUED | OUTPATIENT
Start: 2020-11-04 | End: 2020-11-04

## 2020-11-04 RX ORDER — NEOMYCIN SULFATE 500 MG/1
1000 TABLET ORAL
Refills: 0 | Status: COMPLETED | OUTPATIENT
Start: 2020-11-04 | End: 2020-11-04

## 2020-11-04 RX ORDER — METRONIDAZOLE 500 MG
500 TABLET ORAL
Refills: 0 | Status: COMPLETED | OUTPATIENT
Start: 2020-11-04 | End: 2020-11-04

## 2020-11-04 RX ORDER — FENTANYL CITRATE 50 UG/ML
50 INJECTION INTRAVENOUS
Refills: 0 | Status: DISCONTINUED | OUTPATIENT
Start: 2020-11-05 | End: 2020-11-05

## 2020-11-04 RX ORDER — SODIUM HYPOCHLORITE 0.125 %
1 SOLUTION, NON-ORAL MISCELLANEOUS THREE TIMES A DAY
Refills: 0 | Status: DISCONTINUED | OUTPATIENT
Start: 2020-11-04 | End: 2020-11-05

## 2020-11-04 RX ORDER — HYDROMORPHONE HYDROCHLORIDE 2 MG/ML
1 INJECTION INTRAMUSCULAR; INTRAVENOUS; SUBCUTANEOUS THREE TIMES A DAY
Refills: 0 | Status: DISCONTINUED | OUTPATIENT
Start: 2020-11-04 | End: 2020-11-05

## 2020-11-04 RX ORDER — SODIUM HYPOCHLORITE 0.125 %
1 SOLUTION, NON-ORAL MISCELLANEOUS ONCE
Refills: 0 | Status: COMPLETED | OUTPATIENT
Start: 2020-11-04 | End: 2020-11-04

## 2020-11-04 RX ORDER — ONDANSETRON 8 MG/1
4 TABLET, FILM COATED ORAL ONCE
Refills: 0 | Status: DISCONTINUED | OUTPATIENT
Start: 2020-11-05 | End: 2020-11-05

## 2020-11-04 RX ADMIN — PIPERACILLIN AND TAZOBACTAM 25 GRAM(S): 4; .5 INJECTION, POWDER, LYOPHILIZED, FOR SOLUTION INTRAVENOUS at 22:11

## 2020-11-04 RX ADMIN — Medication 100 MILLIGRAM(S): at 05:10

## 2020-11-04 RX ADMIN — ALBUTEROL 2 PUFF(S): 90 AEROSOL, METERED ORAL at 16:34

## 2020-11-04 RX ADMIN — Medication 100 MILLIGRAM(S): at 22:12

## 2020-11-04 RX ADMIN — HYDROMORPHONE HYDROCHLORIDE 2 MILLIGRAM(S): 2 INJECTION INTRAMUSCULAR; INTRAVENOUS; SUBCUTANEOUS at 10:45

## 2020-11-04 RX ADMIN — Medication 500 MILLIGRAM(S): at 15:32

## 2020-11-04 RX ADMIN — PIPERACILLIN AND TAZOBACTAM 25 GRAM(S): 4; .5 INJECTION, POWDER, LYOPHILIZED, FOR SOLUTION INTRAVENOUS at 14:21

## 2020-11-04 RX ADMIN — SODIUM CHLORIDE 150 MILLILITER(S): 9 INJECTION, SOLUTION INTRAVENOUS at 20:12

## 2020-11-04 RX ADMIN — Medication 1 APPLICATION(S): at 22:12

## 2020-11-04 RX ADMIN — Medication 500 MILLIGRAM(S): at 14:21

## 2020-11-04 RX ADMIN — NEOMYCIN SULFATE 1000 MILLIGRAM(S): 500 TABLET ORAL at 14:21

## 2020-11-04 RX ADMIN — HYDROMORPHONE HYDROCHLORIDE 2 MILLIGRAM(S): 2 INJECTION INTRAMUSCULAR; INTRAVENOUS; SUBCUTANEOUS at 22:22

## 2020-11-04 RX ADMIN — HYDROMORPHONE HYDROCHLORIDE 2 MILLIGRAM(S): 2 INJECTION INTRAMUSCULAR; INTRAVENOUS; SUBCUTANEOUS at 10:16

## 2020-11-04 RX ADMIN — Medication 1 APPLICATION(S): at 10:20

## 2020-11-04 RX ADMIN — Medication 500 MILLIGRAM(S): at 22:11

## 2020-11-04 RX ADMIN — Medication 100 MILLIGRAM(S): at 14:21

## 2020-11-04 RX ADMIN — Medication 400 MILLIGRAM(S): at 20:08

## 2020-11-04 RX ADMIN — NEOMYCIN SULFATE 1000 MILLIGRAM(S): 500 TABLET ORAL at 15:32

## 2020-11-04 RX ADMIN — NEOMYCIN SULFATE 1000 MILLIGRAM(S): 500 TABLET ORAL at 22:11

## 2020-11-04 RX ADMIN — HYDROMORPHONE HYDROCHLORIDE 2 MILLIGRAM(S): 2 INJECTION INTRAMUSCULAR; INTRAVENOUS; SUBCUTANEOUS at 20:46

## 2020-11-04 RX ADMIN — FONDAPARINUX SODIUM 2.5 MILLIGRAM(S): 2.5 INJECTION, SOLUTION SUBCUTANEOUS at 09:49

## 2020-11-04 RX ADMIN — Medication 1000 MILLIGRAM(S): at 21:22

## 2020-11-04 RX ADMIN — PIPERACILLIN AND TAZOBACTAM 25 GRAM(S): 4; .5 INJECTION, POWDER, LYOPHILIZED, FOR SOLUTION INTRAVENOUS at 05:10

## 2020-11-04 NOTE — PROGRESS NOTE ADULT - SUBJECTIVE AND OBJECTIVE BOX
61 y/o F with PMHx of hemorrhoids, recalls allergy to UFH: diagnosed when she was hospitalized for DVT years back; thinks she had difficulty breathing with UFH admitted on  for evaluation of shortness of breath, weakness and muscle aches; had loose stools; is poor historian and history per medical record; the patient had imaging upon admission and was found to have bilateral buttock infection with necrotizing fasciitis and went to OR for debridement on . She cannot recall any details about the infection or how she came about having such a severe infection. She denies any allergy to penicillin or cephalosporins.     - Hb 8.4, on IVF , s/p repeat debridement in OR 11/3    heent nc at [facility]  chest cta  cvs s1s2 reg no m/g/r  abd soft nt nd +bs  buttocks with dressing   extr no e/c/c  neuro non focal      PHYSICAL EXAM:    Daily     Daily Weight in k (2020 05:00)    ICU Vital Signs Last 24 Hrs  T(C): 36.9 (2020 05:00), Max: 37.2 (2020 12:30)  T(F): 98.4 (2020 05:00), Max: 99 (2020 16:13)  HR: 79 (2020 06:00) (79 - 103)  BP: 93/77 (2020 06:00) (86/64 - 129/66)  BP(mean): 81 (2020 06:00) (62 - 99)  ABP: --  ABP(mean): --  RR: 24 (2020 06:00) (17 - 28)  SpO2: 100% (2020 06:00) (88% - 100%)                                8.4    28.55 )-----------( 488      ( 2020 06:47 )             27.7       CBC Full  -  ( 2020 06:47 )  WBC Count : 28.55 K/uL  RBC Count : 3.99 M/uL  Hemoglobin : 8.4 g/dL  Hematocrit : 27.7 %  Platelet Count - Automated : 488 K/uL  Mean Cell Volume : 69.4 fl  Mean Cell Hemoglobin : 21.1 pg  Mean Cell Hemoglobin Concentration : 30.3 gm/dL  Auto Neutrophil # : x  Auto Lymphocyte # : x  Auto Monocyte # : x  Auto Eosinophil # : x  Auto Basophil # : x  Auto Neutrophil % : x  Auto Lymphocyte % : x  Auto Monocyte % : x  Auto Eosinophil % : x  Auto Basophil % : x          135  |  105  |  25<H>  ----------------------------<  159<H>  3.6   |  21<L>  |  0.70    Ca    7.6<L>      2020 06:50  Phos  3.7       Mg     2.2         TPro  5.9<L>  /  Alb  1.5<L>  /  TBili  0.5  /  DBili  x   /  AST  49<H>  /  ALT  48  /  AlkPhos  85        LIVER FUNCTIONS - ( 2020 06:50 )  Alb: 1.5 g/dL / Pro: 5.9 gm/dL / ALK PHOS: 85 U/L / ALT: 48 U/L / AST: 49 U/L / GGT: x             PT/INR - ( 2020 14:14 )   PT: 14.9 sec;   INR: 1.30 ratio         PTT - ( 2020 14:14 )  PTT:26.2 sec          Urinalysis Basic - ( 2020 16:36 )    Color: Yellow / Appearance: Clear / S.015 / pH: x  Gluc: x / Ketone: Negative  / Bili: Negative / Urobili: Negative mg/dL   Blood: x / Protein: 100 mg/dL / Nitrite: Negative   Leuk Esterase: Negative / RBC: 3-5 /HPF / WBC 0-2   Sq Epi: x / Non Sq Epi: Occasional / Bacteria: Occasional            MEDICATIONS  (STANDING):  clindamycin IVPB 600 milliGRAM(s) IV Intermittent every 8 hours  fondaparinux Injectable 2.5 milliGRAM(s) SubCutaneous daily  lactated ringers. 1000 milliLiter(s) (150 mL/Hr) IV Continuous <Continuous>  piperacillin/tazobactam IVPB.. 3.375 Gram(s) IV Intermittent every 8 hours

## 2020-11-04 NOTE — PROVIDER CONTACT NOTE (CRITICAL VALUE NOTIFICATION) - TEST AND RESULT REPORTED:
culture from 11/3: rare form polymorphonuclear leukocytes seen  per low power field, numerous gram postive cocci also seen per oil-power field

## 2020-11-04 NOTE — PROGRESS NOTE ADULT - ASSESSMENT
* Necrotizing fasciitis with perineal abscess s/p debridement and drainage   11/2 and 11/3  iv abx zosyn and clindamycin per ID  IVF   prn analgesia    * h/o VTE  b/o UFH allergy start arixtra    * Anemia sec to acute blood loss  case  was d/w sx they  prefered AC to be started asap  transfused

## 2020-11-04 NOTE — PROGRESS NOTE ADULT - SUBJECTIVE AND OBJECTIVE BOX
Pt was seen and examined at bedside this am. Denies any acute event overnight, s/p #2 Perineal debridement. POD#1.    Vital Signs Last 24 Hrs  T(C): 36.9 (04 Nov 2020 08:00), Max: 37.2 (03 Nov 2020 12:30)  T(F): 98.5 (04 Nov 2020 08:00), Max: 99 (03 Nov 2020 16:13)  HR: 82 (04 Nov 2020 08:00) (79 - 103)  BP: 124/98 (04 Nov 2020 08:00) (86/64 - 129/66)  BP(mean): 105 (04 Nov 2020 08:00) (62 - 105)  RR: 22 (04 Nov 2020 08:00) (17 - 28)  SpO2: 99% (04 Nov 2020 08:00) (88% - 100%)    Gen NAD, afebrile, obese  Neuro A&Ox3  Chest atraumatic  CV S1/S2  Abd soft, nontender, nondistended, large girth  Back large Medipore tape dressing mildly saturated with betadine  Ext well perfused                8.4<L>                139  | 25   | 19           28.55<H> >-----------< 488<H>   ------------------------< 107<H>                 27.7<L>                3.9  | 107  | 0.65                                                                      Ca 8.1<L> Mg 2.3   Ph 4.3      ,             9.1<L>                x    | x    | x            x     >-----------< x       ------------------------< x                     29.3<L>                x    | x    | x                                                                         Ca x     Mg x     Ph x

## 2020-11-04 NOTE — PROGRESS NOTE ADULT - SUBJECTIVE AND OBJECTIVE BOX
Date of service: 11-04-20 @ 09:43      Patient is post op second surgery for necrotizing fasciitis; is eating liquid diet  In good spirits  Afebrile, no complaints, mild achiness      ROS: no fever or chills; denies dizziness, no HA, no SOB or cough, no abdominal pain, no diarrhea or constipation; no dysuria, no urinary frequency, no legs pain, no rashes    MEDICATIONS  (STANDING):  clindamycin IVPB 600 milliGRAM(s) IV Intermittent every 8 hours  Dakins Solution - 1/4 Strength 1 Application(s) Topical once  Dakins Solution - Full Strength 1 Application(s) Topical three times a day  fondaparinux Injectable 2.5 milliGRAM(s) SubCutaneous daily  HYDROmorphone  Injectable 2 milliGRAM(s) IV Push once  lactated ringers. 1000 milliLiter(s) (150 mL/Hr) IV Continuous <Continuous>  metroNIDAZOLE    Tablet 500 milliGRAM(s) Oral <User Schedule>  neomycin 1000 milliGRAM(s) Oral <User Schedule>  piperacillin/tazobactam IVPB.. 3.375 Gram(s) IV Intermittent every 8 hours    MEDICATIONS  (PRN):  acetaminophen  IVPB .. 1000 milliGRAM(s) IV Intermittent every 6 hours PRN Mild Pain (1 - 3)  ALBUTerol    90 MICROgram(s) HFA Inhaler 2 Puff(s) Inhalation every 6 hours PRN Shortness of Breath and/or Wheezing  HYDROmorphone  Injectable 1 milliGRAM(s) IV Push three times a day PRN Moderate Pain (4 - 6)      Vital Signs Last 24 Hrs  T(C): 36.9 (04 Nov 2020 08:00), Max: 37.2 (03 Nov 2020 12:30)  T(F): 98.5 (04 Nov 2020 08:00), Max: 99 (03 Nov 2020 16:13)  HR: 82 (04 Nov 2020 08:00) (79 - 103)  BP: 124/98 (04 Nov 2020 08:00) (93/77 - 129/66)  BP(mean): 105 (04 Nov 2020 08:00) (62 - 105)  RR: 22 (04 Nov 2020 08:00) (17 - 28)  SpO2: 99% (04 Nov 2020 08:00) (88% - 100%)        Physical Exam:          Constitutional: frail looking  HEENT: NC/AT, EOMI, PERRLA, conjunctivae clear; ears and nose atraumatic; pharynx clear  Neck: supple; thyroid not palpable  Back: no tenderness  Respiratory: respiratory effort normal; clear to auscultation  Cardiovascular: S1S2 regular, no murmurs  Abdomen: soft, not tender, not distended, positive BS; no liver or spleen organomegaly  Genitourinary: no suprapubic tenderness  Musculoskeletal: no muscle tenderness, no joint swelling or tenderness  Neurological/ Psychiatric: AxOx3, judgement and insight normal;  moving all extremities  Skin: dressings on buttocks    Labs: all available labs reviewed                      Labs:                        8.4    28.55 )-----------( 488      ( 04 Nov 2020 06:47 )             27.7     11-04    139  |  107  |  19  ----------------------------<  107<H>  3.9   |  25  |  0.65    Ca    8.1<L>      04 Nov 2020 06:47  Phos  4.3     11-04  Mg     2.3     11-04    TPro  5.9<L>  /  Alb  1.4<L>  /  TBili  0.4  /  DBili  x   /  AST  35  /  ALT  40  /  AlkPhos  185<H>  11-04           Cultures:       Culture - Tissue with Gram Stain (collected 11-03-20 @ 19:37)  Source: .Tissue necrotic tissue left buttock  Gram Stain (11-04-20 @ 05:39):    Rare polymorphonuclear leukocytes seen per low power field    Numerous Gram positive cocci in pairs seen per oil power field    Moderate Gram Variable Rods seen per oil power field    Culture - Surgical Swab (collected 11-02-20 @ 19:33)  Source: .Surgical Swab 3#RAINA-RECTAL PERINEAL INFECTION LEFT #2  Preliminary Report (11-03-20 @ 23:01):    Moderate Escherichia coli    Culture - Surgical Swab (collected 11-02-20 @ 19:33)  Source: .Surgical Swab 2#RAINA-RECTAL PERINEAL INFECTION#1  Preliminary Report (11-03-20 @ 23:26):    Moderate Escherichia coli    Rare Staphylococcus aureus    Rare Streptococcus mitis/oralis group "Susceptibilities not performed"    Culture - Tissue with Gram Stain (collected 11-02-20 @ 19:33)  Source: .Tissue 1#LT RAINA-RECTAL PERINEAL TIS INFECTION  Gram Stain (11-03-20 @ 03:48):    No polymorphonuclear leukocytes seen per low power field    Numerous Gram positive cocci in pairs seen per oil power field    Numerous Gram Variable Rods seen per oil power field  Preliminary Report (11-03-20 @ 22:51):    Moderate Escherichia coli    Few Enterococcus faecalis    Rare Staphylococcus aureus    Few Streptococcus mitis/oralis group "Susceptibilities not performed"    Culture - Urine (collected 11-02-20 @ 16:36)  Source: .Urine None  Final Report (11-03-20 @ 14:18):    No growth    Culture - Blood (collected 11-02-20 @ 14:14)  Source: .Blood None  Preliminary Report (11-03-20 @ 19:01):    No growth to date.    Culture - Blood (collected 11-02-20 @ 13:57)  Source: .Blood None  Preliminary Report (11-03-20 @ 19:01):    No growth to date.      C-Reactive Protein, Serum: 38.74 mg/dL (11-02-20 @ 14:14)      < from: CT Abdomen and Pelvis w/ IV Cont (11.02.20 @ 17:46) >    EXAM:  CT ABDOMEN AND PELVIS IC                          EXAM:  CT CHEST IC                            PROCEDURE DATE:  11/02/2020          INTERPRETATION:  CLINICAL INFORMATION: sepsis    COMPARISON: None.    PROCEDURE:  CT of the Chest, Abdomen and Pelvis was performed with intravenous contrast.  Intravenous contrast: 90 ml Omnipaque 350. 10 ml discarded.  Oral contrast: None.  Sagittal and coronal reformats were performed.    FINDINGS:    CHEST:    LUNGS AND LARGE AIRWAYS: Patent central airways. Azygos fissure. A few less than 5 mm nodules in the left lower lobe.  PLEURA: No pleural effusion.  VESSELS: Within normal limits.  HEART: Heart size is normal.  No pericardial effusion.  MEDIASTINUM AND BLANK: No lymphadenopathy.  CHEST WALL ANDLOWER NECK: Within normal limits.    ABDOMEN AND PELVIS:    LIVER: Within normal limits.  BILE DUCTS: Normal caliber.  GALLBLADDER: Cholelithiasis.  SPLEEN: Within normal limits.  PANCREAS: Within normal limits.  ADRENALS: Within normal limits.  KIDNEYS/URETERS: Hypodense left renal lesion too small to characterize.    BLADDER: Within normal limits.  REPRODUCTIVE ORGANS: Fibroid uterus.    BOWEL: No bowel obstruction. The appendix is normal.  PERITONEUM: No ascites.  VESSELS:  Within normal limits.  RETROPERITONEUM/LYMPH NODES: No lymphadenopathy.  ABDOMINAL WALL: There is within the visualized left ischioanal fossa and around the left gluteal muscles. Air tracks along the deep left pelvis and presacral space into the retroperitoneum. There is air along the left psoas muscle.  BONES: Within normal limits.    IMPRESSION: Extensive subcutaneous gas within the left ischioanal fossa tracking into the pelvis and retroperitoneum worrisome for gas forming infection.      < end of copied text >      Radiology: all available radiological tests reviewed    Advanced directives addressed: full resuscitation

## 2020-11-04 NOTE — PROGRESS NOTE ADULT - ASSESSMENT
59 y/o F with PMHx of hemorrhoids admitted on 11/2 for evaluation of shortness of breath, weakness and muscle aches; had loose stools; is poor historian and history per medical record; the patient had imaging upon admission and was found to have bilateral buttock infection with necrotizing fasciitis and went to OR for debridement on 11/2. She cannot recall any details about the infection or how she came about having such a severe infection. She denies any allergy to penicillin or cephalosporins.     1. Patient admitted with necrotizing fasciitis of the buttocks; unclear origin; also noted with leukocytosis most likely reactive to infection  - follow up cultures   - iv hydration and supportive care   - wound care per surgery  - will need further debridement  - probably will need diverting colostomy for full wound care and healing  - serial cbc and monitor temperature   - reviewed prior medical records to evaluate for resistant or atypical pathogens   - day #2 zosyn and clindamycin, the latter for toxin production  - tolerating antibiotics without rashes or side effects   - will stop vancomycin given potential nephrotoxicity with zosyn; should MRSA grow in culture will reconsider  Will follow

## 2020-11-04 NOTE — PROGRESS NOTE ADULT - ASSESSMENT
60 F presents with necrotizing fasciitis of perineal area, s/p extensive perineal debridement x 2. POD#1/2. Hospital course complicated with acute anemia    monitor vitals  trend H/H  monitor dressing, will be changed in OR tomorrow  diet CLD then NPO @ [facility]  bowel prep today  IV abx per ID recs  appreciated hospitalist recs  preop tonight for colostomy creation am  stoma markings done  DVT ppx/SCD    Plan discussed with colorectal group

## 2020-11-05 ENCOUNTER — APPOINTMENT (OUTPATIENT)
Dept: COLORECTAL SURGERY | Facility: HOSPITAL | Age: 60
End: 2020-11-05
Payer: MEDICAID

## 2020-11-05 LAB
ALBUMIN SERPL ELPH-MCNC: 1.5 G/DL — LOW (ref 3.3–5)
ALP SERPL-CCNC: 100 U/L — SIGNIFICANT CHANGE UP (ref 40–120)
ALT FLD-CCNC: 34 U/L — SIGNIFICANT CHANGE UP (ref 12–78)
ANION GAP SERPL CALC-SCNC: 7 MMOL/L — SIGNIFICANT CHANGE UP (ref 5–17)
AST SERPL-CCNC: 20 U/L — SIGNIFICANT CHANGE UP (ref 15–37)
BASOPHILS # BLD AUTO: 0 K/UL — SIGNIFICANT CHANGE UP (ref 0–0.2)
BASOPHILS NFR BLD AUTO: 0 % — SIGNIFICANT CHANGE UP (ref 0–2)
BILIRUB SERPL-MCNC: 0.4 MG/DL — SIGNIFICANT CHANGE UP (ref 0.2–1.2)
BUN SERPL-MCNC: 14 MG/DL — SIGNIFICANT CHANGE UP (ref 7–23)
CALCIUM SERPL-MCNC: 8.2 MG/DL — LOW (ref 8.5–10.1)
CHLORIDE SERPL-SCNC: 105 MMOL/L — SIGNIFICANT CHANGE UP (ref 96–108)
CO2 SERPL-SCNC: 25 MMOL/L — SIGNIFICANT CHANGE UP (ref 22–31)
CREAT SERPL-MCNC: 0.69 MG/DL — SIGNIFICANT CHANGE UP (ref 0.5–1.3)
EOSINOPHIL # BLD AUTO: 0.25 K/UL — SIGNIFICANT CHANGE UP (ref 0–0.5)
EOSINOPHIL NFR BLD AUTO: 1 % — SIGNIFICANT CHANGE UP (ref 0–6)
GLUCOSE SERPL-MCNC: 107 MG/DL — HIGH (ref 70–99)
HCT VFR BLD CALC: 28 % — LOW (ref 34.5–45)
HGB BLD-MCNC: 8.4 G/DL — LOW (ref 11.5–15.5)
LYMPHOCYTES # BLD AUTO: 1.77 K/UL — SIGNIFICANT CHANGE UP (ref 1–3.3)
LYMPHOCYTES # BLD AUTO: 7 % — LOW (ref 13–44)
MCHC RBC-ENTMCNC: 20.9 PG — LOW (ref 27–34)
MCHC RBC-ENTMCNC: 30 GM/DL — LOW (ref 32–36)
MCV RBC AUTO: 69.8 FL — LOW (ref 80–100)
MONOCYTES # BLD AUTO: 1.51 K/UL — HIGH (ref 0–0.9)
MONOCYTES NFR BLD AUTO: 6 % — SIGNIFICANT CHANGE UP (ref 2–14)
NEUTROPHILS # BLD AUTO: 21.71 K/UL — HIGH (ref 1.8–7.4)
NEUTROPHILS NFR BLD AUTO: 86 % — HIGH (ref 43–77)
NRBC # BLD: SIGNIFICANT CHANGE UP /100 WBCS (ref 0–0)
PLATELET # BLD AUTO: 567 K/UL — HIGH (ref 150–400)
POTASSIUM SERPL-MCNC: 3.6 MMOL/L — SIGNIFICANT CHANGE UP (ref 3.5–5.3)
POTASSIUM SERPL-SCNC: 3.6 MMOL/L — SIGNIFICANT CHANGE UP (ref 3.5–5.3)
PROT SERPL-MCNC: 6.2 GM/DL — SIGNIFICANT CHANGE UP (ref 6–8.3)
RBC # BLD: 4.01 M/UL — SIGNIFICANT CHANGE UP (ref 3.8–5.2)
RBC # FLD: 31.1 % — HIGH (ref 10.3–14.5)
SODIUM SERPL-SCNC: 137 MMOL/L — SIGNIFICANT CHANGE UP (ref 135–145)
WBC # BLD: 25.24 K/UL — HIGH (ref 3.8–10.5)
WBC # FLD AUTO: 25.24 K/UL — HIGH (ref 3.8–10.5)

## 2020-11-05 PROCEDURE — 99233 SBSQ HOSP IP/OBS HIGH 50: CPT

## 2020-11-05 PROCEDURE — 58740 ADHESIOLYSIS TUBE OVARY: CPT | Mod: AS

## 2020-11-05 PROCEDURE — 58740 ADHESIOLYSIS TUBE OVARY: CPT | Mod: 80

## 2020-11-05 PROCEDURE — 44143 PARTIAL REMOVAL OF COLON: CPT | Mod: 78

## 2020-11-05 PROCEDURE — 58740 ADHESIOLYSIS TUBE OVARY: CPT | Mod: 78

## 2020-11-05 PROCEDURE — 44143 PARTIAL REMOVAL OF COLON: CPT | Mod: AS

## 2020-11-05 PROCEDURE — 44143 PARTIAL REMOVAL OF COLON: CPT | Mod: 80

## 2020-11-05 PROCEDURE — 11042 DBRDMT SUBQ TIS 1ST 20SQCM/<: CPT | Mod: 59,78

## 2020-11-05 RX ORDER — OXYCODONE HYDROCHLORIDE 5 MG/1
10 TABLET ORAL EVERY 4 HOURS
Refills: 0 | Status: DISCONTINUED | OUTPATIENT
Start: 2020-11-05 | End: 2020-11-12

## 2020-11-05 RX ORDER — DEXTROSE MONOHYDRATE, SODIUM CHLORIDE, AND POTASSIUM CHLORIDE 50; .745; 4.5 G/1000ML; G/1000ML; G/1000ML
1000 INJECTION, SOLUTION INTRAVENOUS
Refills: 0 | Status: DISCONTINUED | OUTPATIENT
Start: 2020-11-05 | End: 2020-11-16

## 2020-11-05 RX ORDER — SODIUM CHLORIDE 9 MG/ML
1000 INJECTION, SOLUTION INTRAVENOUS
Refills: 0 | Status: DISCONTINUED | OUTPATIENT
Start: 2020-11-05 | End: 2020-11-05

## 2020-11-05 RX ORDER — ALBUTEROL 90 UG/1
2 AEROSOL, METERED ORAL EVERY 6 HOURS
Refills: 0 | Status: DISCONTINUED | OUTPATIENT
Start: 2020-11-05 | End: 2020-11-18

## 2020-11-05 RX ORDER — SODIUM HYPOCHLORITE 0.125 %
1 SOLUTION, NON-ORAL MISCELLANEOUS THREE TIMES A DAY
Refills: 0 | Status: DISCONTINUED | OUTPATIENT
Start: 2020-11-05 | End: 2020-11-06

## 2020-11-05 RX ORDER — PIPERACILLIN AND TAZOBACTAM 4; .5 G/20ML; G/20ML
3.38 INJECTION, POWDER, LYOPHILIZED, FOR SOLUTION INTRAVENOUS EVERY 8 HOURS
Refills: 0 | Status: DISCONTINUED | OUTPATIENT
Start: 2020-11-05 | End: 2020-11-18

## 2020-11-05 RX ORDER — HYDROMORPHONE HYDROCHLORIDE 2 MG/ML
0.5 INJECTION INTRAMUSCULAR; INTRAVENOUS; SUBCUTANEOUS
Refills: 0 | Status: DISCONTINUED | OUTPATIENT
Start: 2020-11-05 | End: 2020-11-05

## 2020-11-05 RX ORDER — HYDROMORPHONE HYDROCHLORIDE 2 MG/ML
1 INJECTION INTRAMUSCULAR; INTRAVENOUS; SUBCUTANEOUS THREE TIMES A DAY
Refills: 0 | Status: DISCONTINUED | OUTPATIENT
Start: 2020-11-05 | End: 2020-11-12

## 2020-11-05 RX ORDER — OXYCODONE HYDROCHLORIDE 5 MG/1
5 TABLET ORAL EVERY 4 HOURS
Refills: 0 | Status: DISCONTINUED | OUTPATIENT
Start: 2020-11-05 | End: 2020-11-08

## 2020-11-05 RX ORDER — OXYCODONE HYDROCHLORIDE 5 MG/1
10 TABLET ORAL ONCE
Refills: 0 | Status: DISCONTINUED | OUTPATIENT
Start: 2020-11-05 | End: 2020-11-05

## 2020-11-05 RX ORDER — ACETAMINOPHEN 500 MG
1000 TABLET ORAL EVERY 6 HOURS
Refills: 0 | Status: COMPLETED | OUTPATIENT
Start: 2020-11-05 | End: 2020-11-09

## 2020-11-05 RX ORDER — FONDAPARINUX SODIUM 2.5 MG/.5ML
2.5 INJECTION, SOLUTION SUBCUTANEOUS DAILY
Refills: 0 | Status: DISCONTINUED | OUTPATIENT
Start: 2020-11-05 | End: 2020-11-18

## 2020-11-05 RX ADMIN — HYDROMORPHONE HYDROCHLORIDE 0.5 MILLIGRAM(S): 2 INJECTION INTRAMUSCULAR; INTRAVENOUS; SUBCUTANEOUS at 13:21

## 2020-11-05 RX ADMIN — PIPERACILLIN AND TAZOBACTAM 25 GRAM(S): 4; .5 INJECTION, POWDER, LYOPHILIZED, FOR SOLUTION INTRAVENOUS at 06:12

## 2020-11-05 RX ADMIN — Medication 400 MILLIGRAM(S): at 09:06

## 2020-11-05 RX ADMIN — HYDROMORPHONE HYDROCHLORIDE 0.5 MILLIGRAM(S): 2 INJECTION INTRAMUSCULAR; INTRAVENOUS; SUBCUTANEOUS at 13:38

## 2020-11-05 RX ADMIN — HYDROMORPHONE HYDROCHLORIDE 0.5 MILLIGRAM(S): 2 INJECTION INTRAMUSCULAR; INTRAVENOUS; SUBCUTANEOUS at 13:25

## 2020-11-05 RX ADMIN — DEXTROSE MONOHYDRATE, SODIUM CHLORIDE, AND POTASSIUM CHLORIDE 100 MILLILITER(S): 50; .745; 4.5 INJECTION, SOLUTION INTRAVENOUS at 14:41

## 2020-11-05 RX ADMIN — FENTANYL CITRATE 50 MICROGRAM(S): 50 INJECTION INTRAVENOUS at 13:04

## 2020-11-05 RX ADMIN — HYDROMORPHONE HYDROCHLORIDE 0.5 MILLIGRAM(S): 2 INJECTION INTRAMUSCULAR; INTRAVENOUS; SUBCUTANEOUS at 13:30

## 2020-11-05 RX ADMIN — HYDROMORPHONE HYDROCHLORIDE 0.5 MILLIGRAM(S): 2 INJECTION INTRAMUSCULAR; INTRAVENOUS; SUBCUTANEOUS at 13:40

## 2020-11-05 RX ADMIN — PIPERACILLIN AND TAZOBACTAM 25 GRAM(S): 4; .5 INJECTION, POWDER, LYOPHILIZED, FOR SOLUTION INTRAVENOUS at 22:19

## 2020-11-05 RX ADMIN — FONDAPARINUX SODIUM 2.5 MILLIGRAM(S): 2.5 INJECTION, SOLUTION SUBCUTANEOUS at 14:43

## 2020-11-05 RX ADMIN — Medication 100 MILLIGRAM(S): at 22:19

## 2020-11-05 RX ADMIN — Medication 100 MILLIGRAM(S): at 14:41

## 2020-11-05 RX ADMIN — HYDROMORPHONE HYDROCHLORIDE 0.5 MILLIGRAM(S): 2 INJECTION INTRAMUSCULAR; INTRAVENOUS; SUBCUTANEOUS at 13:20

## 2020-11-05 RX ADMIN — FENTANYL CITRATE 50 MICROGRAM(S): 50 INJECTION INTRAVENOUS at 12:58

## 2020-11-05 RX ADMIN — OXYCODONE HYDROCHLORIDE 10 MILLIGRAM(S): 5 TABLET ORAL at 12:58

## 2020-11-05 RX ADMIN — Medication 100 MILLIGRAM(S): at 06:13

## 2020-11-05 RX ADMIN — Medication 1 APPLICATION(S): at 22:19

## 2020-11-05 RX ADMIN — HYDROMORPHONE HYDROCHLORIDE 0.5 MILLIGRAM(S): 2 INJECTION INTRAMUSCULAR; INTRAVENOUS; SUBCUTANEOUS at 13:41

## 2020-11-05 RX ADMIN — PIPERACILLIN AND TAZOBACTAM 25 GRAM(S): 4; .5 INJECTION, POWDER, LYOPHILIZED, FOR SOLUTION INTRAVENOUS at 15:42

## 2020-11-05 NOTE — PROGRESS NOTE ADULT - ASSESSMENT
60 F presents with necrotizing fasciitis of perineal area, s/p extensive perineal debridement x 2. POD#2/3. Hospital course complicated with acute anemia    monitor vitals  monitor H/H  monitor dressing, will be changed in OR today; c/w dressing changes TID  IV abx per ID recs  appreciated hospitalist recs  stoma markings done  DVT ppx/SCD  Pt for diverting ostomy today  POC tonight    Plan discussed with colorectal group 60 F presents with necrotizing fasciitis of perineal area, s/p extensive perineal debridement x 2. POD#2/3. Hospital course complicated with acute anemia    monitor vitals  monitor H/H  monitor dressing, will be changed in OR today; c/w dressing changes TID  IV abx per ID recs  f/u final surgical cx results  appreciated hospitalist recs  stoma markings done  DVT ppx/SCD  Pt for diverting ostomy today  POC tonight    Plan discussed with colorectal group

## 2020-11-05 NOTE — PROGRESS NOTE ADULT - SUBJECTIVE AND OBJECTIVE BOX
Pt was seen and examined at bedside this am. Denies any acute event overnight, c/o of pain at surgical site, otherwise offers no new complaints. Pt NPO for OR today.     Vital Signs Last 24 Hrs  T(C): 36.8 (05 Nov 2020 05:00), Max: 38.1 (04 Nov 2020 20:58)  T(F): 98.2 (05 Nov 2020 05:00), Max: 100.5 (04 Nov 2020 20:58)  HR: 81 (05 Nov 2020 06:00) (79 - 98)  BP: 112/85 (05 Nov 2020 06:00) (93/38 - 126/48)  BP(mean): 92 (05 Nov 2020 06:00) (48 - 105)  RR: 21 (05 Nov 2020 06:00) (12 - 26)  SpO2: 99% (05 Nov 2020 06:00) (92% - 100%)    Gen NAD, afebrile, obese  Neuro A&Ox3  Chest atraumatic  CV S1/S2  Abd soft, nontender, nondistended, large girth  Dressing c/d/i  Ext well perfused                8.4<L>                x    | x    | x            25.24<H> >-----------< 567<H>   ------------------------< x                     28.0<L>                x    | x    | x                                                                         Ca x     Mg x     Ph x        ,             8.4<L>                139  | 25   | 19           28.55<H> >-----------< 488<H>   ------------------------< 107<H>                 27.7<L>                3.9  | 107  | 0.65                                                                      Ca 8.1<L> Mg 2.3   Ph 4.3

## 2020-11-05 NOTE — PROGRESS NOTE ADULT - SUBJECTIVE AND OBJECTIVE BOX
Date of service: 11-05-20 @ 09:54      Patient lying in bed; low grade fever, no complaints  Awaiting surgery for diverting colostomy      ROS: no fever or chills; denies dizziness, no HA, no SOB or cough, no abdominal pain, no diarrhea or constipation; no dysuria, no urinary frequency, no legs pain, no rashes    MEDICATIONS  (STANDING):  clindamycin IVPB 600 milliGRAM(s) IV Intermittent every 8 hours  Dakins Solution - Full Strength 1 Application(s) Topical three times a day  fondaparinux Injectable 2.5 milliGRAM(s) SubCutaneous daily  lactated ringers. 1000 milliLiter(s) (150 mL/Hr) IV Continuous <Continuous>  piperacillin/tazobactam IVPB.. 3.375 Gram(s) IV Intermittent every 8 hours    MEDICATIONS  (PRN):  ALBUTerol    90 MICROgram(s) HFA Inhaler 2 Puff(s) Inhalation every 6 hours PRN Shortness of Breath and/or Wheezing  HYDROmorphone  Injectable 1 milliGRAM(s) IV Push three times a day PRN Moderate Pain (4 - 6)      Vital Signs Last 24 Hrs  T(C): 37.1 (05 Nov 2020 09:32), Max: 38.1 (04 Nov 2020 20:58)  T(F): 98.7 (05 Nov 2020 09:32), Max: 100.5 (04 Nov 2020 20:58)  HR: 89 (05 Nov 2020 08:40) (79 - 98)  BP: 143/77 (05 Nov 2020 08:40) (93/38 - 172/66)  BP(mean): 92 (05 Nov 2020 08:40) (48 - 92)  RR: 26 (05 Nov 2020 08:40) (12 - 26)  SpO2: 95% (05 Nov 2020 08:40) (92% - 100%)        Physical Exam:          Constitutional: frail looking  HEENT: NC/AT, EOMI, PERRLA, conjunctivae clear; ears and nose atraumatic; pharynx clear  Neck: supple; thyroid not palpable  Back: no tenderness  Respiratory: respiratory effort normal; clear to auscultation  Cardiovascular: S1S2 regular, no murmurs  Abdomen: soft, not tender, not distended, positive BS; no liver or spleen organomegaly  Genitourinary: no suprapubic tenderness  Musculoskeletal: no muscle tenderness, no joint swelling or tenderness  Neurological/ Psychiatric: AxOx3, judgement and insight normal;  moving all extremities  Skin: dressings on buttocks    Labs: all available labs reviewed                  Labs:                        8.4    25.24 )-----------( 567      ( 05 Nov 2020 06:28 )             28.0     11-05    137  |  105  |  14  ----------------------------<  107<H>  3.6   |  25  |  0.69    Ca    8.2<L>      05 Nov 2020 06:28  Phos  4.3     11-04  Mg     2.3     11-04    TPro  6.2  /  Alb  1.5<L>  /  TBili  0.4  /  DBili  x   /  AST  20  /  ALT  34  /  AlkPhos  100  11-05           Cultures:       Culture - Tissue with Gram Stain (collected 11-03-20 @ 19:37)  Source: .Tissue necrotic tissue left buttock  Gram Stain (11-04-20 @ 05:39):    Rare polymorphonuclear leukocytes seen per low power field    Numerous Gram positive cocci in pairs seen per oil power field    Moderate Gram Variable Rods seen per oil power field  Preliminary Report (11-04-20 @ 19:37):    Moderate Escherichia coli    Culture - Surgical Swab (collected 11-02-20 @ 19:33)  Source: .Surgical Swab 3#RAINA-RECTAL PERINEAL INFECTION LEFT #2  Final Report (11-04-20 @ 23:24):    Moderate Escherichia coli    Rare Staphylococcus aureus    Few Alpha hemolytic strep "Susceptibilities not performed"    Numerous Peptostreptococcus anaerobius "Susceptibilities not performed"  Organism: Escherichia coli (11-04-20 @ 23:24)  Organism: Escherichia coli (11-04-20 @ 23:24)      -  Amikacin: S <=16      -  Amoxicillin/Clavulanic Acid: S <=8/4      -  Ampicillin: R >16 These ampicillin results predict results for amoxicillin      -  Ampicillin/Sulbactam: I 16/8 Enterobacter, Citrobacter, and Serratia may develop resistance during prolonged therapy (3-4 days)      -  Aztreonam: S <=4      -  Cefazolin: I 4 Enterobacter, Citrobacter, and Serratia may develop resistance during prolonged therapy (3-4 days)      -  Cefepime: S <=2      -  Cefoxitin: S <=8      -  Ceftriaxone: S <=1 Enterobacter, Citrobacter, and Serratia may develop resistance during prolonged therapy      -  Ciprofloxacin: I 0.5      -  Ertapenem: S <=0.5      -  Gentamicin: S <=2      -  Imipenem: S <=1      -  Levofloxacin: S <=0.5      -  Meropenem: S <=1      -  Piperacillin/Tazobactam: S <=8      -  Tobramycin: S <=2      -  Trimethoprim/Sulfamethoxazole: S <=0.5/9.5      Method Type: JEFE    Culture - Surgical Swab (collected 11-02-20 @ 19:33)  Source: .Surgical Swab 2#RAINA-RECTAL PERINEAL INFECTION#1  Final Report (11-04-20 @ 23:08):    Moderate Escherichia coli See previous culture  # [id number]    Rare Staphylococcus aureus    Rare Streptococcus mitis/oralis group "Susceptibilities not performed"    Moderate Peptostreptococcus anaerobius "Susceptibilities not performed"  Organism: Staphylococcus aureus (11-04-20 @ 23:08)  Organism: Staphylococcus aureus (11-04-20 @ 23:08)      -  Ampicillin/Sulbactam: S <=8/4      -  Cefazolin: S <=4      -  Clindamycin: S <=0.25      -  Erythromycin: S <=0.25      -  Gentamicin: S 2 Should not be used as monotherapy      -  Oxacillin: S <=0.25      -  RIF- Rifampin: S <=1 Should not be used as monotherapy      -  Tetra/Doxy: S <=1      -  Trimethoprim/Sulfamethoxazole: S <=0.5/9.5      -  Vancomycin: S 2      Method Type: JEFE    Culture - Tissue with Gram Stain (collected 11-02-20 @ 19:33)  Source: .Tissue 1#LT RAINA-RECTAL PERINEAL TIS INFECTION  Gram Stain (11-03-20 @ 03:48):    No polymorphonuclear leukocytes seen per low power field    Numerous Gram positive cocci in pairs seen per oil power field    Numerous Gram Variable Rods seen per oil power field  Final Report (11-04-20 @ 23:11):    Moderate Escherichia coli    Few Enterococcus faecalis    Rare Staphylococcus aureus    Few Streptococcus mitis/oralis group "Susceptibilities not performed"    Numerous Peptostreptococcus anaerobius "Susceptibilities not performed"  Organism: Escherichia coli  Enterococcus faecalis  Staphylococcus aureus (11-04-20 @ 23:11)  Organism: Staphylococcus aureus (11-04-20 @ 23:11)      -  Ampicillin/Sulbactam: S <=8/4      -  Cefazolin: S <=4      -  Clindamycin: S <=0.25      -  Erythromycin: S <=0.25      -  Gentamicin: S <=1 Should not be used as monotherapy      -  Oxacillin: S <=0.25      -  RIF- Rifampin: S <=1 Should not be used as monotherapy      -  Tetra/Doxy: S <=1      -  Trimethoprim/Sulfamethoxazole: S <=0.5/9.5      -  Vancomycin: S 2      Method Type: JEFE  Organism: Enterococcus faecalis (11-04-20 @ 23:11)      -  Ampicillin: S <=2 Predicts results to ampicillin/sulbactam, amoxacillin-clavulanate and  piperacillin-tazobactam.      -  Tetra/Doxy: R >8      -  Vancomycin: S 2      Method Type: JEFE  Organism: Escherichia coli (11-04-20 @ 23:11)      -  Amikacin: S <=16      -  Amoxicillin/Clavulanic Acid: S <=8/4      -  Ampicillin: R >16 These ampicillin results predict results for amoxicillin      -  Ampicillin/Sulbactam: I 16/8 Enterobacter, Citrobacter, and Serratia may develop resistance during prolonged therapy (3-4 days)      -  Aztreonam: S <=4      -  Cefazolin: I 4 Enterobacter, Citrobacter, and Serratia may develop resistance during prolonged therapy (3-4 days)      -  Cefepime: S <=2      -  Cefoxitin: S <=8      -  Ceftriaxone: S <=1 Enterobacter, Citrobacter, and Serratia may develop resistance during prolonged therapy      -  Ciprofloxacin: S <=0.25      -  Ertapenem: S <=0.5      -  Gentamicin: S <=2      -  Imipenem: S <=1      -  Levofloxacin: S <=0.5      -  Meropenem: S <=1      -  Piperacillin/Tazobactam: S <=8      -  Tobramycin: S <=2      -  Trimethoprim/Sulfamethoxazole: S <=0.5/9.5      Method Type: JEFE    Culture - Urine (collected 11-02-20 @ 16:36)  Source: .Urine None  Final Report (11-03-20 @ 14:18):    No growth    Culture - Blood (collected 11-02-20 @ 14:14)  Source: .Blood None  Preliminary Report (11-03-20 @ 19:01):    No growth to date.    Culture - Blood (collected 11-02-20 @ 13:57)  Source: .Blood None  Preliminary Report (11-03-20 @ 19:01):    No growth to date.      C-Reactive Protein, Serum: 38.74 mg/dL (11-02-20 @ 14:14)      C-Reactive Protein, Serum: 38.74 mg/dL (11-02-20 @ 14:14)      < from: CT Abdomen and Pelvis w/ IV Cont (11.02.20 @ 17:46) >    EXAM:  CT ABDOMEN AND PELVIS IC                          EXAM:  CT CHEST IC                            PROCEDURE DATE:  11/02/2020          INTERPRETATION:  CLINICAL INFORMATION: sepsis    COMPARISON: None.    PROCEDURE:  CT of the Chest, Abdomen and Pelvis was performed with intravenous contrast.  Intravenous contrast: 90 ml Omnipaque 350. 10 ml discarded.  Oral contrast: None.  Sagittal and coronal reformats were performed.    FINDINGS:    CHEST:    LUNGS AND LARGE AIRWAYS: Patent central airways. Azygos fissure. A few less than 5 mm nodules in the left lower lobe.  PLEURA: No pleural effusion.  VESSELS: Within normal limits.  HEART: Heart size is normal.  No pericardial effusion.  MEDIASTINUM AND BLANK: No lymphadenopathy.  CHEST WALL ANDLOWER NECK: Within normal limits.    ABDOMEN AND PELVIS:    LIVER: Within normal limits.  BILE DUCTS: Normal caliber.  GALLBLADDER: Cholelithiasis.  SPLEEN: Within normal limits.  PANCREAS: Within normal limits.  ADRENALS: Within normal limits.  KIDNEYS/URETERS: Hypodense left renal lesion too small to characterize.    BLADDER: Within normal limits.  REPRODUCTIVE ORGANS: Fibroid uterus.    BOWEL: No bowel obstruction. The appendix is normal.  PERITONEUM: No ascites.  VESSELS:  Within normal limits.  RETROPERITONEUM/LYMPH NODES: No lymphadenopathy.  ABDOMINAL WALL: There is within the visualized left ischioanal fossa and around the left gluteal muscles. Air tracks along the deep left pelvis and presacral space into the retroperitoneum. There is air along the left psoas muscle.  BONES: Within normal limits.    IMPRESSION: Extensive subcutaneous gas within the left ischioanal fossa tracking into the pelvis and retroperitoneum worrisome for gas forming infection.      < end of copied text >      Radiology: all available radiological tests reviewed    Advanced directives addressed: full resuscitation

## 2020-11-05 NOTE — PROGRESS NOTE ADULT - ASSESSMENT
59 y/o F with PMHx of hemorrhoids admitted on 11/2 for evaluation of shortness of breath, weakness and muscle aches; had loose stools; is poor historian and history per medical record; the patient had imaging upon admission and was found to have bilateral buttock infection with necrotizing fasciitis and went to OR for debridement on 11/2. She cannot recall any details about the infection or how she came about having such a severe infection. She denies any allergy to penicillin or cephalosporins.     1. Patient admitted with necrotizing fasciitis of the buttocks; unclear origin; also noted with leukocytosis most likely reactive to infection  - follow up cultures   - iv hydration and supportive care   - wound care per surgery  - will need further debridement  - probably will need diverting colostomy for full wound care and healing  - serial cbc and monitor temperature   - reviewed prior medical records to evaluate for resistant or atypical pathogens   - day #3 zosyn and clindamycin, the latter for toxin production  - tolerating antibiotics without rashes or side effects   - all organisms are sensitive; NO ISOLATION IS NEEDED  - okay from id standpoint to proceed with planned surgery  Will follow

## 2020-11-05 NOTE — PROGRESS NOTE ADULT - SUBJECTIVE AND OBJECTIVE BOX
59 y/o F with PMHx of hemorrhoids, recalls allergy to UFH: diagnosed when she was hospitalized for DVT years back; thinks she had difficulty breathing with UFH admitted on 11/2 for evaluation of shortness of breath, weakness and muscle aches; had loose stools; is poor historian and history per medical record; the patient had imaging upon admission and was found to have bilateral buttock infection with necrotizing fasciitis and went to OR for debridement on 11/2. She cannot recall any details about the infection or how she came about having such a severe infection. She denies any allergy to penicillin or cephalosporins.     11/4- Hb 8.4, on IVF , s/p repeat debridement in OR 11/3    heent nc at [facility]  chest cta  cvs s1s2 reg no m/g/r  abd soft nt nd +bs  buttocks with dressing   extr no e/c/c  neuro non focal

## 2020-11-05 NOTE — PROGRESS NOTE ADULT - ASSESSMENT
* Necrotizing fasciitis with perineal abscess s/p debridement and drainage   11/2 and 11/3  iv abx zosyn and clindamycin per ID  IVF   prn analgesia  diverting colostomy today    * h/o VTE  b/o UFH allergy start arixtra    * Anemia sec to acute blood loss  stable hh

## 2020-11-06 PROBLEM — Z00.00 ENCOUNTER FOR PREVENTIVE HEALTH EXAMINATION: Status: ACTIVE | Noted: 2020-11-06

## 2020-11-06 LAB
ANION GAP SERPL CALC-SCNC: 6 MMOL/L — SIGNIFICANT CHANGE UP (ref 5–17)
BUN SERPL-MCNC: 14 MG/DL — SIGNIFICANT CHANGE UP (ref 7–23)
CALCIUM SERPL-MCNC: 8.3 MG/DL — LOW (ref 8.5–10.1)
CHLORIDE SERPL-SCNC: 106 MMOL/L — SIGNIFICANT CHANGE UP (ref 96–108)
CO2 SERPL-SCNC: 26 MMOL/L — SIGNIFICANT CHANGE UP (ref 22–31)
CREAT SERPL-MCNC: 0.61 MG/DL — SIGNIFICANT CHANGE UP (ref 0.5–1.3)
GLUCOSE SERPL-MCNC: 123 MG/DL — HIGH (ref 70–99)
HCT VFR BLD CALC: 28.1 % — LOW (ref 34.5–45)
HGB BLD-MCNC: 8.4 G/DL — LOW (ref 11.5–15.5)
MAGNESIUM SERPL-MCNC: 2.2 MG/DL — SIGNIFICANT CHANGE UP (ref 1.6–2.6)
MCHC RBC-ENTMCNC: 21.3 PG — LOW (ref 27–34)
MCHC RBC-ENTMCNC: 29.9 GM/DL — LOW (ref 32–36)
MCV RBC AUTO: 71.3 FL — LOW (ref 80–100)
PHOSPHATE SERPL-MCNC: 3.2 MG/DL — SIGNIFICANT CHANGE UP (ref 2.5–4.5)
PLATELET # BLD AUTO: 682 K/UL — HIGH (ref 150–400)
POTASSIUM SERPL-MCNC: 3.9 MMOL/L — SIGNIFICANT CHANGE UP (ref 3.5–5.3)
POTASSIUM SERPL-SCNC: 3.9 MMOL/L — SIGNIFICANT CHANGE UP (ref 3.5–5.3)
RBC # BLD: 3.94 M/UL — SIGNIFICANT CHANGE UP (ref 3.8–5.2)
RBC # FLD: SIGNIFICANT CHANGE UP (ref 10.3–14.5)
SODIUM SERPL-SCNC: 138 MMOL/L — SIGNIFICANT CHANGE UP (ref 135–145)
WBC # BLD: 21.66 K/UL — HIGH (ref 3.8–10.5)
WBC # FLD AUTO: 21.66 K/UL — HIGH (ref 3.8–10.5)

## 2020-11-06 PROCEDURE — 99221 1ST HOSP IP/OBS SF/LOW 40: CPT

## 2020-11-06 PROCEDURE — 99233 SBSQ HOSP IP/OBS HIGH 50: CPT

## 2020-11-06 RX ORDER — SODIUM HYPOCHLORITE 0.125 %
1 SOLUTION, NON-ORAL MISCELLANEOUS
Refills: 0 | Status: DISCONTINUED | OUTPATIENT
Start: 2020-11-06 | End: 2020-11-13

## 2020-11-06 RX ADMIN — FONDAPARINUX SODIUM 2.5 MILLIGRAM(S): 2.5 INJECTION, SOLUTION SUBCUTANEOUS at 08:18

## 2020-11-06 RX ADMIN — Medication 100 MILLIGRAM(S): at 21:23

## 2020-11-06 RX ADMIN — Medication 1000 MILLIGRAM(S): at 12:30

## 2020-11-06 RX ADMIN — Medication 100 MILLIGRAM(S): at 13:48

## 2020-11-06 RX ADMIN — Medication 1 APPLICATION(S): at 12:09

## 2020-11-06 RX ADMIN — OXYCODONE HYDROCHLORIDE 5 MILLIGRAM(S): 5 TABLET ORAL at 09:00

## 2020-11-06 RX ADMIN — PIPERACILLIN AND TAZOBACTAM 25 GRAM(S): 4; .5 INJECTION, POWDER, LYOPHILIZED, FOR SOLUTION INTRAVENOUS at 14:35

## 2020-11-06 RX ADMIN — Medication 400 MILLIGRAM(S): at 12:09

## 2020-11-06 RX ADMIN — DEXTROSE MONOHYDRATE, SODIUM CHLORIDE, AND POTASSIUM CHLORIDE 100 MILLILITER(S): 50; .745; 4.5 INJECTION, SOLUTION INTRAVENOUS at 02:00

## 2020-11-06 RX ADMIN — PIPERACILLIN AND TAZOBACTAM 25 GRAM(S): 4; .5 INJECTION, POWDER, LYOPHILIZED, FOR SOLUTION INTRAVENOUS at 21:24

## 2020-11-06 RX ADMIN — Medication 400 MILLIGRAM(S): at 18:56

## 2020-11-06 RX ADMIN — PIPERACILLIN AND TAZOBACTAM 25 GRAM(S): 4; .5 INJECTION, POWDER, LYOPHILIZED, FOR SOLUTION INTRAVENOUS at 05:16

## 2020-11-06 RX ADMIN — Medication 100 MILLIGRAM(S): at 05:16

## 2020-11-06 RX ADMIN — Medication 1 APPLICATION(S): at 21:23

## 2020-11-06 RX ADMIN — DEXTROSE MONOHYDRATE, SODIUM CHLORIDE, AND POTASSIUM CHLORIDE 100 MILLILITER(S): 50; .745; 4.5 INJECTION, SOLUTION INTRAVENOUS at 13:47

## 2020-11-06 RX ADMIN — OXYCODONE HYDROCHLORIDE 5 MILLIGRAM(S): 5 TABLET ORAL at 08:17

## 2020-11-06 NOTE — PROGRESS NOTE ADULT - SUBJECTIVE AND OBJECTIVE BOX
SURGERY DAILY PROGRESS NOTE:     Subjective:  Patient seen and examined at bedside during am rounds. Vitals reviewed and AVSS. Denies any fevers, chills, n/v/d, chest pain or shortness of breath. Tolerating full liquid. Voiding via trujillo. Minimal sweat and gas in the ostomy bag.      Objective:    MEDICATIONS  (STANDING):  clindamycin IVPB 600 milliGRAM(s) IV Intermittent every 8 hours  Dakins Solution - Full Strength 1 Application(s) Topical three times a day  dextrose 5% + sodium chloride 0.9% with potassium chloride 20 mEq/L 1000 milliLiter(s) (100 mL/Hr) IV Continuous <Continuous>  fondaparinux Injectable 2.5 milliGRAM(s) SubCutaneous daily  piperacillin/tazobactam IVPB.. 3.375 Gram(s) IV Intermittent every 8 hours    MEDICATIONS  (PRN):  acetaminophen  IVPB .. 1000 milliGRAM(s) IV Intermittent every 6 hours PRN Mild Pain (1 - 3)  ALBUTerol    90 MICROgram(s) HFA Inhaler 2 Puff(s) Inhalation every 6 hours PRN Shortness of Breath and/or Wheezing  HYDROmorphone  Injectable 1 milliGRAM(s) IV Push three times a day PRN Moderate Pain (4 - 6)  oxyCODONE    IR 5 milliGRAM(s) Oral every 4 hours PRN Moderate Pain (4 - 6)  oxyCODONE    IR 10 milliGRAM(s) Oral every 4 hours PRN Severe Pain (7 - 10)      Vital Signs Last 24 Hrs  T(C): 36.4 (2020 05:17), Max: 36.7 (2020 16:45)  T(F): 97.5 (2020 05:17), Max: 98 (2020 16:45)  HR: 82 (2020 08:00) (75 - 90)  BP: 149/62 (2020 06:00) (113/59 - 153/76)  BP(mean): 82 (2020 06:00) (73 - 113)  RR: 16 (2020 08:00) (11 - 27)  SpO2: 98% (2020 06:00) (72% - 100%)      PHYSICAL EXAM   Gen: well-appearing, in no acute distress  CV: pulse regularly present   Resp: airway patent, non-labored breathing  Abd: soft, NTND; no rebound or guarding. Incision c/d/i' ostomy pink and patent with gas and sweat in bag.      I&O's Detail    2020 07:01  -  2020 07:00  --------------------------------------------------------  IN:    Other (mL): 1000 mL  Total IN: 1000 mL    OUT:    Colostomy (mL): 50 mL    Indwelling Catheter - Urethral (mL): 1400 mL  Total OUT: 1450 mL    Total NET: -450 mL      Daily Weight in k.4 (2020 21:04)    LABS:                        8.4    21.66 )-----------( 682      ( 2020 06:26 )             28.1     11-    138  |  106  |  14  ----------------------------<  123<H>  3.9   |  26  |  0.61    Ca    8.3<L>      2020 06:26  Phos  3.2     -  Mg     2.2     -    TPro  6.2  /  Alb  1.5<L>  /  TBili  0.4  /  DBili  x   /  AST  20  /  ALT  34  /  AlkPhos  100  11-05

## 2020-11-06 NOTE — PROGRESS NOTE ADULT - ASSESSMENT
59 y/o F with PMHx of hemorrhoids admitted on 11/2 for evaluation of shortness of breath, weakness and muscle aches; had loose stools; is poor historian and history per medical record; the patient had imaging upon admission and was found to have bilateral buttock infection with necrotizing fasciitis and went to OR for debridement on 11/2. She cannot recall any details about the infection or how she came about having such a severe infection. She denies any allergy to penicillin or cephalosporins.     1. Patient admitted with necrotizing fasciitis of the buttocks; unclear origin; also noted with leukocytosis most likely reactive to infection  - follow up cultures   - iv hydration and supportive care   - wound care per surgery  - will need further debridement  - probably will need diverting colostomy for full wound care and healing---- and this surgery was done on 11/5  - serial cbc and monitor temperature   - reviewed prior medical records to evaluate for resistant or atypical pathogens   - day #4 zosyn and clindamycin, the latter for toxin production  - tolerating antibiotics without rashes or side effects   - all organisms are sensitive; NO ISOLATION IS NEEDED  - diet per surgery  Will follow

## 2020-11-06 NOTE — PROGRESS NOTE ADULT - SUBJECTIVE AND OBJECTIVE BOX
61 y/o F with PMHx of hemorrhoids, recalls allergy to UFH: diagnosed when she was hospitalized for DVT years back; thinks she had difficulty breathing with UFH admitted on 11/2 for evaluation of shortness of breath, weakness and muscle aches; had loose stools; is poor historian and history per medical record; the patient had imaging upon admission and was found to have bilateral buttock infection with necrotizing fasciitis and went to OR for debridement on 11/2. She cannot recall any details about the infection or how she came about having such a severe infection. She denies any allergy to penicillin or cephalosporins.      s/p repeat debridement in OR 11/3 and diverting colostomy 11/5; feels sore and she is sitting in the chair    Vital Signs Last 24 Hrs  T(C): 36.4 (06 Nov 2020 05:17), Max: 36.7 (05 Nov 2020 16:45)  T(F): 97.5 (06 Nov 2020 05:17), Max: 98 (05 Nov 2020 16:45)  HR: 82 (06 Nov 2020 08:00) (75 - 90)  BP: 149/62 (06 Nov 2020 06:00) (113/59 - 153/76)  BP(mean): 82 (06 Nov 2020 06:00) (73 - 113)  RR: 16 (06 Nov 2020 08:00) (11 - 27)  SpO2: 98% (06 Nov 2020 06:00) (72% - 100%)    heent nc at [facility]  chest cta  cvs s1s2 reg no m/g/r  abd soft distended, small amount of liquid in ostomy  buttocks with dressing   extr no e/c/c  neuro non focal                          8.4    21.66 )-----------( 682      ( 06 Nov 2020 06:26 )             28.1   11-06    138  |  106  |  14  ----------------------------<  123<H>  3.9   |  26  |  0.61    Ca    8.3<L>      06 Nov 2020 06:26  Phos  3.2     11-06  Mg     2.2     11-06    TPro  6.2  /  Alb  1.5<L>  /  TBili  0.4  /  DBili  x   /  AST  20  /  ALT  34  /  AlkPhos  100  11-05

## 2020-11-06 NOTE — PROGRESS NOTE ADULT - ASSESSMENT
* Necrotizing fasciitis with perineal abscess s/p debridement and drainage   11/2 and 11/3, s/p diverting colostomy  iv abx zosyn and clindamycin per ID  IVF   prn analgesia      * h/o VTE  b/o UFH allergy started on arixtra    * Anemia sec to acute blood loss  stable hh

## 2020-11-06 NOTE — PROGRESS NOTE ADULT - SUBJECTIVE AND OBJECTIVE BOX
Date of service: 11-06-20 @ 10:11      Patient sitting in chair; s/p ostomy  Afebrile, in good spirits      ROS: no fever or chills; denies dizziness, no HA, no SOB or cough, no abdominal pain, no diarrhea or constipation; no dysuria, no urinary frequency, no legs pain, no rashes    MEDICATIONS  (STANDING):  clindamycin IVPB 600 milliGRAM(s) IV Intermittent every 8 hours  Dakins Solution - Full Strength 1 Application(s) Topical three times a day  dextrose 5% + sodium chloride 0.9% with potassium chloride 20 mEq/L 1000 milliLiter(s) (100 mL/Hr) IV Continuous <Continuous>  fondaparinux Injectable 2.5 milliGRAM(s) SubCutaneous daily  piperacillin/tazobactam IVPB.. 3.375 Gram(s) IV Intermittent every 8 hours    MEDICATIONS  (PRN):  acetaminophen  IVPB .. 1000 milliGRAM(s) IV Intermittent every 6 hours PRN Mild Pain (1 - 3)  ALBUTerol    90 MICROgram(s) HFA Inhaler 2 Puff(s) Inhalation every 6 hours PRN Shortness of Breath and/or Wheezing  HYDROmorphone  Injectable 1 milliGRAM(s) IV Push three times a day PRN Moderate Pain (4 - 6)  oxyCODONE    IR 5 milliGRAM(s) Oral every 4 hours PRN Moderate Pain (4 - 6)  oxyCODONE    IR 10 milliGRAM(s) Oral every 4 hours PRN Severe Pain (7 - 10)      Vital Signs Last 24 Hrs  T(C): 36.4 (06 Nov 2020 05:17), Max: 36.7 (05 Nov 2020 16:45)  T(F): 97.5 (06 Nov 2020 05:17), Max: 98 (05 Nov 2020 16:45)  HR: 82 (06 Nov 2020 08:00) (75 - 90)  BP: 149/62 (06 Nov 2020 06:00) (113/59 - 153/76)  BP(mean): 82 (06 Nov 2020 06:00) (73 - 113)  RR: 16 (06 Nov 2020 08:00) (11 - 27)  SpO2: 98% (06 Nov 2020 06:00) (72% - 100%)        Physical Exam:        Constitutional: frail looking  HEENT: NC/AT, EOMI, PERRLA, conjunctivae clear; ears and nose atraumatic; pharynx clear  Neck: supple; thyroid not palpable  Back: no tenderness  Respiratory: respiratory effort normal; clear to auscultation  Cardiovascular: S1S2 regular, no murmurs  Abdomen: soft, not tender, not distended, positive BS; no liver or spleen organomegaly; ostomy  Genitourinary: no suprapubic tenderness  Musculoskeletal: no muscle tenderness, no joint swelling or tenderness  Neurological/ Psychiatric: AxOx3, judgement and insight normal;  moving all extremities  Skin: dressings on buttocks    Labs: all available labs reviewed                  Labs:               Labs:                        8.4    21.66 )-----------( 682      ( 06 Nov 2020 06:26 )             28.1     11-06    138  |  106  |  14  ----------------------------<  123<H>  3.9   |  26  |  0.61    Ca    8.3<L>      06 Nov 2020 06:26  Phos  3.2     11-06  Mg     2.2     11-06    TPro  6.2  /  Alb  1.5<L>  /  TBili  0.4  /  DBili  x   /  AST  20  /  ALT  34  /  AlkPhos  100  11-05           Cultures:       Culture - Tissue with Gram Stain (collected 11-03-20 @ 19:37)  Source: .Tissue necrotic tissue left buttock  Gram Stain (11-04-20 @ 05:39):    Rare polymorphonuclear leukocytes seen per low power field    Numerous Gram positive cocci in pairs seen per oil power field    Moderate Gram Variable Rods seen per oil power field  Final Report (11-05-20 @ 19:25):    Moderate Escherichia coli    Numerous Peptostreptococcus anaerobius "Susceptibilities not performed"    Rare Alpha hemolytic strep "Susceptibilities not performed"  Organism: Escherichia coli (11-05-20 @ 19:25)  Organism: Escherichia coli (11-05-20 @ 19:25)      -  Amikacin: S <=16      -  Amoxicillin/Clavulanic Acid: S <=8/4      -  Ampicillin: R >16 These ampicillin results predict results for amoxicillin      -  Ampicillin/Sulbactam: S 8/4 Enterobacter, Citrobacter, and Serratia may develop resistance during prolonged therapy (3-4 days)      -  Aztreonam: S <=4      -  Cefazolin: S <=2 Enterobacter, Citrobacter, and Serratia may develop resistance during prolonged therapy (3-4 days)      -  Cefepime: S <=2      -  Cefoxitin: S <=8      -  Ceftriaxone: S <=1 Enterobacter, Citrobacter, and Serratia may develop resistance during prolonged therapy      -  Ciprofloxacin: I 0.5      -  Ertapenem: S <=0.5      -  Gentamicin: S <=2      -  Imipenem: S <=1      -  Levofloxacin: S <=0.5      -  Meropenem: S <=1      -  Piperacillin/Tazobactam: S <=8      -  Tobramycin: S <=2      -  Trimethoprim/Sulfamethoxazole: S <=0.5/9.5      Method Type: JEFE    Culture - Surgical Swab (collected 11-02-20 @ 19:33)  Source: .Surgical Swab 3#RAINA-RECTAL PERINEAL INFECTION LEFT #2  Final Report (11-04-20 @ 23:24):    Moderate Escherichia coli    Rare Staphylococcus aureus    Few Alpha hemolytic strep "Susceptibilities not performed"    Numerous Peptostreptococcus anaerobius "Susceptibilities not performed"  Organism: Escherichia coli (11-04-20 @ 23:24)  Organism: Escherichia coli (11-04-20 @ 23:24)      -  Amikacin: S <=16      -  Amoxicillin/Clavulanic Acid: S <=8/4      -  Ampicillin: R >16 These ampicillin results predict results for amoxicillin      -  Ampicillin/Sulbactam: I 16/8 Enterobacter, Citrobacter, and Serratia may develop resistance during prolonged therapy (3-4 days)      -  Aztreonam: S <=4      -  Cefazolin: I 4 Enterobacter, Citrobacter, and Serratia may develop resistance during prolonged therapy (3-4 days)      -  Cefepime: S <=2      -  Cefoxitin: S <=8      -  Ceftriaxone: S <=1 Enterobacter, Citrobacter, and Serratia may develop resistance during prolonged therapy      -  Ciprofloxacin: I 0.5      -  Ertapenem: S <=0.5      -  Gentamicin: S <=2      -  Imipenem: S <=1      -  Levofloxacin: S <=0.5      -  Meropenem: S <=1      -  Piperacillin/Tazobactam: S <=8      -  Tobramycin: S <=2      -  Trimethoprim/Sulfamethoxazole: S <=0.5/9.5      Method Type: JEFE    Culture - Surgical Swab (collected 11-02-20 @ 19:33)  Source: .Surgical Swab 2#RAINA-RECTAL PERINEAL INFECTION#1  Final Report (11-04-20 @ 23:08):    Moderate Escherichia coli See previous culture  # [id number]    Rare Staphylococcus aureus    Rare Streptococcus mitis/oralis group "Susceptibilities not performed"    Moderate Peptostreptococcus anaerobius "Susceptibilities not performed"  Organism: Staphylococcus aureus (11-04-20 @ 23:08)  Organism: Staphylococcus aureus (11-04-20 @ 23:08)      -  Ampicillin/Sulbactam: S <=8/4      -  Cefazolin: S <=4      -  Clindamycin: S <=0.25      -  Erythromycin: S <=0.25      -  Gentamicin: S 2 Should not be used as monotherapy      -  Oxacillin: S <=0.25      -  RIF- Rifampin: S <=1 Should not be used as monotherapy      -  Tetra/Doxy: S <=1      -  Trimethoprim/Sulfamethoxazole: S <=0.5/9.5      -  Vancomycin: S 2      Method Type: JEFE    Culture - Tissue with Gram Stain (collected 11-02-20 @ 19:33)  Source: .Tissue 1#LT RAINA-RECTAL PERINEAL TIS INFECTION  Gram Stain (11-03-20 @ 03:48):    No polymorphonuclear leukocytes seen per low power field    Numerous Gram positive cocci in pairs seen per oil power field    Numerous Gram Variable Rods seen per oil power field  Final Report (11-04-20 @ 23:11):    Moderate Escherichia coli    Few Enterococcus faecalis    Rare Staphylococcus aureus    Few Streptococcus mitis/oralis group "Susceptibilities not performed"    Numerous Peptostreptococcus anaerobius "Susceptibilities not performed"  Organism: Escherichia coli  Enterococcus faecalis  Staphylococcus aureus (11-04-20 @ 23:11)  Organism: Staphylococcus aureus (11-04-20 @ 23:11)      -  Ampicillin/Sulbactam: S <=8/4      -  Cefazolin: S <=4      -  Clindamycin: S <=0.25      -  Erythromycin: S <=0.25      -  Gentamicin: S <=1 Should not be used as monotherapy      -  Oxacillin: S <=0.25      -  RIF- Rifampin: S <=1 Should not be used as monotherapy      -  Tetra/Doxy: S <=1      -  Trimethoprim/Sulfamethoxazole: S <=0.5/9.5      -  Vancomycin: S 2      Method Type: JEFE  Organism: Enterococcus faecalis (11-04-20 @ 23:11)      -  Ampicillin: S <=2 Predicts results to ampicillin/sulbactam, amoxacillin-clavulanate and  piperacillin-tazobactam.      -  Tetra/Doxy: R >8      -  Vancomycin: S 2      Method Type: JEFE  Organism: Escherichia coli (11-04-20 @ 23:11)      -  Amikacin: S <=16      -  Amoxicillin/Clavulanic Acid: S <=8/4      -  Ampicillin: R >16 These ampicillin results predict results for amoxicillin      -  Ampicillin/Sulbactam: I 16/8 Enterobacter, Citrobacter, and Serratia may develop resistance during prolonged therapy (3-4 days)      -  Aztreonam: S <=4      -  Cefazolin: I 4 Enterobacter, Citrobacter, and Serratia may develop resistance during prolonged therapy (3-4 days)      -  Cefepime: S <=2      -  Cefoxitin: S <=8      -  Ceftriaxone: S <=1 Enterobacter, Citrobacter, and Serratia may develop resistance during prolonged therapy      -  Ciprofloxacin: S <=0.25      -  Ertapenem: S <=0.5      -  Gentamicin: S <=2      -  Imipenem: S <=1      -  Levofloxacin: S <=0.5      -  Meropenem: S <=1      -  Piperacillin/Tazobactam: S <=8      -  Tobramycin: S <=2      -  Trimethoprim/Sulfamethoxazole: S <=0.5/9.5      Method Type: JEFE    Culture - Urine (collected 11-02-20 @ 16:36)  Source: .Urine None  Final Report (11-03-20 @ 14:18):    No growth    Culture - Blood (collected 11-02-20 @ 14:14)  Source: .Blood None  Preliminary Report (11-03-20 @ 19:01):    No growth to date.    Culture - Blood (collected 11-02-20 @ 13:57)  Source: .Blood None  Preliminary Report (11-03-20 @ 19:01):    No growth to date.      C-Reactive Protein, Serum: 38.74 mg/dL (11-02-20 @ 14:14)            < from: CT Abdomen and Pelvis w/ IV Cont (11.02.20 @ 17:46) >    EXAM:  CT ABDOMEN AND PELVIS IC                          EXAM:  CT CHEST IC                            PROCEDURE DATE:  11/02/2020          INTERPRETATION:  CLINICAL INFORMATION: sepsis    COMPARISON: None.    PROCEDURE:  CT of the Chest, Abdomen and Pelvis was performed with intravenous contrast.  Intravenous contrast: 90 ml Omnipaque 350. 10 ml discarded.  Oral contrast: None.  Sagittal and coronal reformats were performed.    FINDINGS:    CHEST:    LUNGS AND LARGE AIRWAYS: Patent central airways. Azygos fissure. A few less than 5 mm nodules in the left lower lobe.  PLEURA: No pleural effusion.  VESSELS: Within normal limits.  HEART: Heart size is normal.  No pericardial effusion.  MEDIASTINUM AND BLANK: No lymphadenopathy.  CHEST WALL ANDLOWER NECK: Within normal limits.    ABDOMEN AND PELVIS:    LIVER: Within normal limits.  BILE DUCTS: Normal caliber.  GALLBLADDER: Cholelithiasis.  SPLEEN: Within normal limits.  PANCREAS: Within normal limits.  ADRENALS: Within normal limits.  KIDNEYS/URETERS: Hypodense left renal lesion too small to characterize.    BLADDER: Within normal limits.  REPRODUCTIVE ORGANS: Fibroid uterus.    BOWEL: No bowel obstruction. The appendix is normal.  PERITONEUM: No ascites.  VESSELS:  Within normal limits.  RETROPERITONEUM/LYMPH NODES: No lymphadenopathy.  ABDOMINAL WALL: There is within the visualized left ischioanal fossa and around the left gluteal muscles. Air tracks along the deep left pelvis and presacral space into the retroperitoneum. There is air along the left psoas muscle.  BONES: Within normal limits.    IMPRESSION: Extensive subcutaneous gas within the left ischioanal fossa tracking into the pelvis and retroperitoneum worrisome for gas forming infection.      < end of copied text >      Radiology: all available radiological tests reviewed    Advanced directives addressed: full resuscitation

## 2020-11-06 NOTE — PROGRESS NOTE ADULT - ASSESSMENT
ASSESSMENT/PLAN: 59 y/o F presents with necrotizing fasciitis of perineal area, s/p extensive perineal debridement x 2, and end colostomy creation POD#3/4/1. Hospital course complicated with acute anemia    Adv diet low residue diet; NPO at [facility]  monitor vitals  monitor H/H  C/w dressing changes BID; OR in AM for dressing change  IV abx per ID recs  Surgical cx results: E.coli  Appreciated hospitalist recs  Monitor stoma and ostomy output  DVT ppx/SCD      Plan discussed with colorectal group and Dr. Carroll ASSESSMENT/PLAN: 59 y/o F presents with necrotizing fasciitis of perineal area, s/p extensive perineal debridement x 2, and end colostomy creation POD#3/4/1. Hospital course complicated with acute anemia    Adv diet low residue diet; NPO at [facility]  monitor vitals  monitor H/H  C/w dressing changes BID; OR in AM for dressing change  IV abx per ID recs  Surgical cx results: E.coli  Appreciated hospitalist recs  Monitor stoma and ostomy output  Plastic surgery consult  DVT ppx/SCD      Plan discussed with colorectal group and Dr. Carroll

## 2020-11-07 LAB
ANION GAP SERPL CALC-SCNC: 5 MMOL/L — SIGNIFICANT CHANGE UP (ref 5–17)
APTT BLD: 26.8 SEC — LOW (ref 27.5–35.5)
BLD GP AB SCN SERPL QL: SIGNIFICANT CHANGE UP
BUN SERPL-MCNC: 9 MG/DL — SIGNIFICANT CHANGE UP (ref 7–23)
CALCIUM SERPL-MCNC: 8 MG/DL — LOW (ref 8.5–10.1)
CHLORIDE SERPL-SCNC: 105 MMOL/L — SIGNIFICANT CHANGE UP (ref 96–108)
CO2 SERPL-SCNC: 27 MMOL/L — SIGNIFICANT CHANGE UP (ref 22–31)
CREAT SERPL-MCNC: 0.54 MG/DL — SIGNIFICANT CHANGE UP (ref 0.5–1.3)
CULTURE RESULTS: SIGNIFICANT CHANGE UP
CULTURE RESULTS: SIGNIFICANT CHANGE UP
GLUCOSE SERPL-MCNC: 100 MG/DL — HIGH (ref 70–99)
HCT VFR BLD CALC: 28.5 % — LOW (ref 34.5–45)
HGB BLD-MCNC: 8.5 G/DL — LOW (ref 11.5–15.5)
INR BLD: 1.21 RATIO — HIGH (ref 0.88–1.16)
MAGNESIUM SERPL-MCNC: 2 MG/DL — SIGNIFICANT CHANGE UP (ref 1.6–2.6)
MCHC RBC-ENTMCNC: 21.1 PG — LOW (ref 27–34)
MCHC RBC-ENTMCNC: 29.8 GM/DL — LOW (ref 32–36)
MCV RBC AUTO: 70.7 FL — LOW (ref 80–100)
PHOSPHATE SERPL-MCNC: 2.8 MG/DL — SIGNIFICANT CHANGE UP (ref 2.5–4.5)
PLATELET # BLD AUTO: 665 K/UL — HIGH (ref 150–400)
POTASSIUM SERPL-MCNC: 3.9 MMOL/L — SIGNIFICANT CHANGE UP (ref 3.5–5.3)
POTASSIUM SERPL-SCNC: 3.9 MMOL/L — SIGNIFICANT CHANGE UP (ref 3.5–5.3)
PROTHROM AB SERPL-ACNC: 14 SEC — HIGH (ref 10.6–13.6)
RBC # BLD: 4.03 M/UL — SIGNIFICANT CHANGE UP (ref 3.8–5.2)
RBC # FLD: SIGNIFICANT CHANGE UP (ref 10.3–14.5)
SODIUM SERPL-SCNC: 137 MMOL/L — SIGNIFICANT CHANGE UP (ref 135–145)
SPECIMEN SOURCE: SIGNIFICANT CHANGE UP
SPECIMEN SOURCE: SIGNIFICANT CHANGE UP
WBC # BLD: 18.76 K/UL — HIGH (ref 3.8–10.5)
WBC # FLD AUTO: 18.76 K/UL — HIGH (ref 3.8–10.5)

## 2020-11-07 PROCEDURE — 99233 SBSQ HOSP IP/OBS HIGH 50: CPT

## 2020-11-07 RX ORDER — OXYCODONE HYDROCHLORIDE 5 MG/1
5 TABLET ORAL ONCE
Refills: 0 | Status: DISCONTINUED | OUTPATIENT
Start: 2020-11-07 | End: 2020-11-07

## 2020-11-07 RX ORDER — FENTANYL CITRATE 50 UG/ML
25 INJECTION INTRAVENOUS
Refills: 0 | Status: DISCONTINUED | OUTPATIENT
Start: 2020-11-07 | End: 2020-11-07

## 2020-11-07 RX ORDER — FENTANYL CITRATE 50 UG/ML
50 INJECTION INTRAVENOUS
Refills: 0 | Status: DISCONTINUED | OUTPATIENT
Start: 2020-11-07 | End: 2020-11-07

## 2020-11-07 RX ORDER — SODIUM CHLORIDE 9 MG/ML
1000 INJECTION, SOLUTION INTRAVENOUS
Refills: 0 | Status: DISCONTINUED | OUTPATIENT
Start: 2020-11-07 | End: 2020-11-07

## 2020-11-07 RX ORDER — ALPRAZOLAM 0.25 MG
0.25 TABLET ORAL ONCE
Refills: 0 | Status: DISCONTINUED | OUTPATIENT
Start: 2020-11-07 | End: 2020-11-07

## 2020-11-07 RX ORDER — AMLODIPINE BESYLATE 2.5 MG/1
5 TABLET ORAL DAILY
Refills: 0 | Status: DISCONTINUED | OUTPATIENT
Start: 2020-11-07 | End: 2020-11-18

## 2020-11-07 RX ORDER — ONDANSETRON 8 MG/1
4 TABLET, FILM COATED ORAL ONCE
Refills: 0 | Status: DISCONTINUED | OUTPATIENT
Start: 2020-11-07 | End: 2020-11-07

## 2020-11-07 RX ADMIN — Medication 0.25 MILLIGRAM(S): at 01:03

## 2020-11-07 RX ADMIN — FENTANYL CITRATE 50 MICROGRAM(S): 50 INJECTION INTRAVENOUS at 09:55

## 2020-11-07 RX ADMIN — PIPERACILLIN AND TAZOBACTAM 25 GRAM(S): 4; .5 INJECTION, POWDER, LYOPHILIZED, FOR SOLUTION INTRAVENOUS at 05:04

## 2020-11-07 RX ADMIN — AMLODIPINE BESYLATE 5 MILLIGRAM(S): 2.5 TABLET ORAL at 12:07

## 2020-11-07 RX ADMIN — FENTANYL CITRATE 50 MICROGRAM(S): 50 INJECTION INTRAVENOUS at 10:15

## 2020-11-07 RX ADMIN — FONDAPARINUX SODIUM 2.5 MILLIGRAM(S): 2.5 INJECTION, SOLUTION SUBCUTANEOUS at 12:07

## 2020-11-07 RX ADMIN — DEXTROSE MONOHYDRATE, SODIUM CHLORIDE, AND POTASSIUM CHLORIDE 100 MILLILITER(S): 50; .745; 4.5 INJECTION, SOLUTION INTRAVENOUS at 22:41

## 2020-11-07 RX ADMIN — Medication 100 MILLIGRAM(S): at 13:34

## 2020-11-07 RX ADMIN — DEXTROSE MONOHYDRATE, SODIUM CHLORIDE, AND POTASSIUM CHLORIDE 100 MILLILITER(S): 50; .745; 4.5 INJECTION, SOLUTION INTRAVENOUS at 00:24

## 2020-11-07 RX ADMIN — PIPERACILLIN AND TAZOBACTAM 25 GRAM(S): 4; .5 INJECTION, POWDER, LYOPHILIZED, FOR SOLUTION INTRAVENOUS at 13:34

## 2020-11-07 RX ADMIN — OXYCODONE HYDROCHLORIDE 10 MILLIGRAM(S): 5 TABLET ORAL at 23:10

## 2020-11-07 RX ADMIN — OXYCODONE HYDROCHLORIDE 10 MILLIGRAM(S): 5 TABLET ORAL at 17:06

## 2020-11-07 RX ADMIN — Medication 100 MILLIGRAM(S): at 05:04

## 2020-11-07 RX ADMIN — OXYCODONE HYDROCHLORIDE 10 MILLIGRAM(S): 5 TABLET ORAL at 17:48

## 2020-11-07 RX ADMIN — PIPERACILLIN AND TAZOBACTAM 25 GRAM(S): 4; .5 INJECTION, POWDER, LYOPHILIZED, FOR SOLUTION INTRAVENOUS at 22:41

## 2020-11-07 RX ADMIN — Medication 100 MILLIGRAM(S): at 22:41

## 2020-11-07 RX ADMIN — OXYCODONE HYDROCHLORIDE 10 MILLIGRAM(S): 5 TABLET ORAL at 22:40

## 2020-11-07 RX ADMIN — DEXTROSE MONOHYDRATE, SODIUM CHLORIDE, AND POTASSIUM CHLORIDE 100 MILLILITER(S): 50; .745; 4.5 INJECTION, SOLUTION INTRAVENOUS at 12:25

## 2020-11-07 NOTE — PROGRESS NOTE ADULT - SUBJECTIVE AND OBJECTIVE BOX
59 y/o F with PMHx of hemorrhoids, recalls allergy to UFH: diagnosed when she was hospitalized for DVT years back; thinks she had difficulty breathing with UFH admitted on 11/2 for evaluation of shortness of breath, weakness and muscle aches; had loose stools; is poor historian and history per medical record; the patient had imaging upon admission and was found to have bilateral buttock infection with necrotizing fasciitis and went to OR for debridement on 11/2. She cannot recall any details about the infection or how she came about having such a severe infection. She denies any allergy to penicillin or cephalosporins.      s/p repeat debridement in OR 11/3 and diverting colostomy 11/5; or today for further debridement    Vital Signs Last 24 Hrs  T(C): 36.2 (07 Nov 2020 10:25), Max: 37.2 (06 Nov 2020 20:00)  T(F): 97.1 (07 Nov 2020 10:25), Max: 98.9 (06 Nov 2020 20:00)  HR: 85 (07 Nov 2020 13:00) (71 - 95)  BP: 117/99 (07 Nov 2020 13:00) (103/43 - 179/82)  BP(mean): 104 (07 Nov 2020 13:00) (74 - 105)  RR: 20 (07 Nov 2020 13:00) (15 - 29)  SpO2: 100% (07 Nov 2020 12:00) (92% - 100%)    heent nc at [facility]  chest cta  cvs s1s2 reg no m/g/r  abd soft distended, small amount of liquid in ostomy  buttocks with dressing   extr no e/c/c  neuro non focal                          8.4    21.66 )-----------( 682      ( 06 Nov 2020 06:26 )             28.1   11-06    138  |  106  |  14  ----------------------------<  123<H>  3.9   |  26  |  0.61    Ca    8.3<L>      06 Nov 2020 06:26  Phos  3.2     11-06  Mg     2.2     11-06    TPro  6.2  /  Alb  1.5<L>  /  TBili  0.4  /  DBili  x   /  AST  20  /  ALT  34  /  AlkPhos  100  11-05

## 2020-11-07 NOTE — PROGRESS NOTE ADULT - ASSESSMENT
59 y/o F presents with necrotizing fasciitis of perineal area, s/p extensive perineal debridement x 2, and end colostomy creation POD#4/3/2/0. Hospital course complicated with acute anemia    Adv diet low residue diet  monitor vitals  monitor H/H  C/w dressing changes BID;   IV abx per ID recs  Surgical cx results: E.coli  Appreciated hospitalist recs  Monitor stoma and ostomy output  Plastic surgery consult  DVT ppx/SCD    Case discussed with Dr. Carroll    59 y/o F presents with necrotizing fasciitis of perineal area, s/p extensive perineal debridement x 2, and end colostomy creation POD#5/4/2/0. Hospital course complicated with acute anemia    Adv diet low residue diet  monitor vitals  monitor H/H  C/w dressing changes BID;   IV abx per ID recs  Surgical cx results: E.coli  Appreciated hospitalist recs  Monitor stoma and ostomy output  Plastic surgery consult  DVT ppx/SCD    Case discussed with Dr. Carroll

## 2020-11-07 NOTE — PROGRESS NOTE ADULT - SUBJECTIVE AND OBJECTIVE BOX
SURGERY DAILY PROGRESS NOTE:     Subjective:  Patient seen and examined at bedside during am rounds. Vitals reviewed and AVSS. Denies any fevers, chills, n/v/d, chest pain or shortness of breath. Tolerating full liquid. Voiding via trujillo. stool in ostomy bag.  Patient underwent wash out and packing in OR this am.       Objective:    Vital Signs Last 24 Hrs  T(C): 36.3 (07 Nov 2020 09:20), Max: 37.2 (06 Nov 2020 20:00)  T(F): 97.4 (07 Nov 2020 09:20), Max: 98.9 (06 Nov 2020 20:00)  HR: 79 (07 Nov 2020 10:15) (71 - 95)  BP: 103/43 (07 Nov 2020 10:15) (103/43 - 179/82)  BP(mean): 94 (07 Nov 2020 07:19) (81 - 105)  RR: 18 (07 Nov 2020 10:15) (12 - 31)  SpO2: 99% (07 Nov 2020 10:15) (92% - 100%)    PHYSICAL EXAM   Gen: well-appearing, in no acute distress  CV: pulse regularly present   Resp: airway patent, non-labored breathing  Abd: soft, NTND; no rebound or guarding. Incision c/d/i' ostomy pink and patent with stool in bag. Proveena in place midline                 8.5<L>                137  | 27   | 9            18.76<H> >-----------< 665<H>   ------------------------< 100<H>                 28.5<L>                3.9  | 105  | 0.54                                                                      Ca 8.0<L> Mg 2.0   Ph 2.8      ,             8.4<L>                138  | 26   | 14           21.66<H> >-----------< 682<H>   ------------------------< 123<H>                 28.1<L>                3.9  | 106  | 0.61                                                                      Ca 8.3<L> Mg 2.2   Ph 3.2

## 2020-11-08 LAB
ALBUMIN SERPL ELPH-MCNC: 1.5 G/DL — LOW (ref 3.3–5)
ALP SERPL-CCNC: 81 U/L — SIGNIFICANT CHANGE UP (ref 40–120)
ALT FLD-CCNC: 17 U/L — SIGNIFICANT CHANGE UP (ref 12–78)
ANION GAP SERPL CALC-SCNC: 4 MMOL/L — LOW (ref 5–17)
AST SERPL-CCNC: 12 U/L — LOW (ref 15–37)
BILIRUB SERPL-MCNC: 0.2 MG/DL — SIGNIFICANT CHANGE UP (ref 0.2–1.2)
BUN SERPL-MCNC: 6 MG/DL — LOW (ref 7–23)
CALCIUM SERPL-MCNC: 8.2 MG/DL — LOW (ref 8.5–10.1)
CHLORIDE SERPL-SCNC: 102 MMOL/L — SIGNIFICANT CHANGE UP (ref 96–108)
CO2 SERPL-SCNC: 29 MMOL/L — SIGNIFICANT CHANGE UP (ref 22–31)
CREAT SERPL-MCNC: 0.57 MG/DL — SIGNIFICANT CHANGE UP (ref 0.5–1.3)
GLUCOSE SERPL-MCNC: 108 MG/DL — HIGH (ref 70–99)
HCT VFR BLD CALC: 29 % — LOW (ref 34.5–45)
HGB BLD-MCNC: 8.5 G/DL — LOW (ref 11.5–15.5)
MAGNESIUM SERPL-MCNC: 1.9 MG/DL — SIGNIFICANT CHANGE UP (ref 1.6–2.6)
MCHC RBC-ENTMCNC: 20.9 PG — LOW (ref 27–34)
MCHC RBC-ENTMCNC: 29.3 GM/DL — LOW (ref 32–36)
MCV RBC AUTO: 71.4 FL — LOW (ref 80–100)
PHOSPHATE SERPL-MCNC: 3.1 MG/DL — SIGNIFICANT CHANGE UP (ref 2.5–4.5)
PLATELET # BLD AUTO: 673 K/UL — HIGH (ref 150–400)
POTASSIUM SERPL-MCNC: 4 MMOL/L — SIGNIFICANT CHANGE UP (ref 3.5–5.3)
POTASSIUM SERPL-SCNC: 4 MMOL/L — SIGNIFICANT CHANGE UP (ref 3.5–5.3)
PROT SERPL-MCNC: 6.3 GM/DL — SIGNIFICANT CHANGE UP (ref 6–8.3)
RBC # BLD: 4.06 M/UL — SIGNIFICANT CHANGE UP (ref 3.8–5.2)
RBC # FLD: SIGNIFICANT CHANGE UP (ref 10.3–14.5)
SODIUM SERPL-SCNC: 135 MMOL/L — SIGNIFICANT CHANGE UP (ref 135–145)
WBC # BLD: 17.91 K/UL — HIGH (ref 3.8–10.5)
WBC # FLD AUTO: 17.91 K/UL — HIGH (ref 3.8–10.5)

## 2020-11-08 PROCEDURE — 99233 SBSQ HOSP IP/OBS HIGH 50: CPT

## 2020-11-08 RX ADMIN — OXYCODONE HYDROCHLORIDE 10 MILLIGRAM(S): 5 TABLET ORAL at 06:33

## 2020-11-08 RX ADMIN — PIPERACILLIN AND TAZOBACTAM 25 GRAM(S): 4; .5 INJECTION, POWDER, LYOPHILIZED, FOR SOLUTION INTRAVENOUS at 21:20

## 2020-11-08 RX ADMIN — OXYCODONE HYDROCHLORIDE 10 MILLIGRAM(S): 5 TABLET ORAL at 07:00

## 2020-11-08 RX ADMIN — PIPERACILLIN AND TAZOBACTAM 25 GRAM(S): 4; .5 INJECTION, POWDER, LYOPHILIZED, FOR SOLUTION INTRAVENOUS at 14:52

## 2020-11-08 RX ADMIN — OXYCODONE HYDROCHLORIDE 5 MILLIGRAM(S): 5 TABLET ORAL at 21:45

## 2020-11-08 RX ADMIN — DEXTROSE MONOHYDRATE, SODIUM CHLORIDE, AND POTASSIUM CHLORIDE 100 MILLILITER(S): 50; .745; 4.5 INJECTION, SOLUTION INTRAVENOUS at 19:54

## 2020-11-08 RX ADMIN — Medication 100 MILLIGRAM(S): at 13:50

## 2020-11-08 RX ADMIN — Medication 1 APPLICATION(S): at 10:31

## 2020-11-08 RX ADMIN — Medication 100 MILLIGRAM(S): at 06:05

## 2020-11-08 RX ADMIN — OXYCODONE HYDROCHLORIDE 10 MILLIGRAM(S): 5 TABLET ORAL at 11:32

## 2020-11-08 RX ADMIN — PIPERACILLIN AND TAZOBACTAM 25 GRAM(S): 4; .5 INJECTION, POWDER, LYOPHILIZED, FOR SOLUTION INTRAVENOUS at 06:05

## 2020-11-08 RX ADMIN — OXYCODONE HYDROCHLORIDE 5 MILLIGRAM(S): 5 TABLET ORAL at 21:19

## 2020-11-08 RX ADMIN — OXYCODONE HYDROCHLORIDE 10 MILLIGRAM(S): 5 TABLET ORAL at 18:55

## 2020-11-08 RX ADMIN — OXYCODONE HYDROCHLORIDE 10 MILLIGRAM(S): 5 TABLET ORAL at 10:32

## 2020-11-08 RX ADMIN — Medication 100 MILLIGRAM(S): at 21:20

## 2020-11-08 RX ADMIN — OXYCODONE HYDROCHLORIDE 10 MILLIGRAM(S): 5 TABLET ORAL at 18:18

## 2020-11-08 RX ADMIN — AMLODIPINE BESYLATE 5 MILLIGRAM(S): 2.5 TABLET ORAL at 10:32

## 2020-11-08 RX ADMIN — FONDAPARINUX SODIUM 2.5 MILLIGRAM(S): 2.5 INJECTION, SOLUTION SUBCUTANEOUS at 10:31

## 2020-11-08 NOTE — PROGRESS NOTE ADULT - SUBJECTIVE AND OBJECTIVE BOX
SURGERY DAILY PROGRESS NOTE:     Subjective:  Patient seen and examined at bedside during am rounds. Vitals reviewed and AVSS. Denies any fevers, chills, n/v/d, chest pain or shortness of breath. Tolerating low fiber liquid. Voiding via trujillo. Liquid stool in ostomy bag.  Patient underwent wash out and packing in OR yesterday.       Objective:    Vital Signs Last 24 Hrs  T(C): 37.3 (08 Nov 2020 06:00), Max: 37.3 (08 Nov 2020 06:00)  T(F): 99.2 (08 Nov 2020 06:00), Max: 99.2 (08 Nov 2020 06:00)  HR: 79 (08 Nov 2020 08:00) (73 - 93)  BP: 130/62 (08 Nov 2020 08:00) (103/43 - 163/73)  BP(mean): 77 (08 Nov 2020 08:00) (68 - 104)  RR: 17 (08 Nov 2020 08:00) (11 - 29)  SpO2: 95% (08 Nov 2020 08:00) (95% - 100%)    PHYSICAL EXAM   Gen: well-appearing, in no acute distress  CV: pulse regularly present   Resp: airway patent, non-labored breathing  Abd: soft, NTND; no rebound or guarding. Incision c/d/i' ostomy pink and patent with stool in bag. Proveena in place midline                 8.5<L>                135  | 29   | 6<L>         17.91<H> >-----------< 673<H>   ------------------------< 108<H>                 29.0<L>                4.0  | 102  | 0.57                                                                      Ca 8.2<L> Mg 1.9   Ph 3.1      ,             8.5<L>                137  | 27   | 9            18.76<H> >-----------< 665<H>   ------------------------< 100<H>                 28.5<L>                3.9  | 105  | 0.54                                                                      Ca 8.0<L> Mg 2.0   Ph 2.8        ,             8.4<L>                138  | 26   | 14           21.66<H> >-----------< 682<H>   ------------------------< 123<H>                 28.1<L>                3.9  | 106  | 0.61                                                                      Ca 8.3<L> Mg 2.2   Ph 3.2

## 2020-11-08 NOTE — PROGRESS NOTE ADULT - SUBJECTIVE AND OBJECTIVE BOX
59 y/o F with PMHx of hemorrhoids, recalls allergy to UFH: diagnosed when she was hospitalized for DVT years back; thinks she had difficulty breathing with UFH admitted on 11/2 for evaluation of shortness of breath, weakness and muscle aches; had loose stools; is poor historian and history per medical record; the patient had imaging upon admission and was found to have bilateral buttock infection with necrotizing fasciitis and went to OR for debridement on 11/2. She cannot recall any details about the infection or how she came about having such a severe infection. She denies any allergy to penicillin or cephalosporins.      s/p repeat debridement in OR 11/3,11/8 and diverting colostomy 11/5;  no events overnight     Vital Signs Last 24 Hrs  T(C): 37 (08 Nov 2020 08:51), Max: 37.3 (08 Nov 2020 06:00)  T(F): 98.6 (08 Nov 2020 08:51), Max: 99.2 (08 Nov 2020 06:00)  HR: 82 (08 Nov 2020 11:00) (77 - 93)  BP: 160/69 (08 Nov 2020 11:00) (117/99 - 160/69)  BP(mean): 89 (08 Nov 2020 11:00) (68 - 105)  RR: 20 (08 Nov 2020 11:00) (11 - 29)  SpO2: 95% (08 Nov 2020 11:00) (95% - 100%)    heent nc at [facility]  chest cta  cvs s1s2 reg no m/g/r  abd soft distended, small amount of liquid in ostomy  buttocks with dressing   extr no e/c/c  neuro non focal                                         8.5    17.91 )-----------( 673      ( 08 Nov 2020 07:41 )             29.0   11-08    135  |  102  |  6<L>  ----------------------------<  108<H>  4.0   |  29  |  0.57    Ca    8.2<L>      08 Nov 2020 07:41  Phos  3.1     11-08  Mg     1.9     11-08    TPro  6.3  /  Alb  1.5<L>  /  TBili  0.2  /  DBili  x   /  AST  12<L>  /  ALT  17  /  AlkPhos  81  11-08

## 2020-11-08 NOTE — PROGRESS NOTE ADULT - ASSESSMENT
* Necrotizing fasciitis with perineal abscess s/p debridement and drainage   11/2 and 11/3, s/p diverting colostomy  iv abx zosyn and clindamycin per ID  IVF   prn analgesia  plastic consult pending      * h/o VTE  b/o UFH allergy started on arixtra    * Anemia sec to acute blood loss  stable hh

## 2020-11-08 NOTE — PROGRESS NOTE ADULT - ASSESSMENT
61 y/o F presents with necrotizing fasciitis of perineal area, s/p extensive perineal debridement x 2, and end colostomy creation POD#6/5/3/1. Hospital course complicated with acute anemia    On low residue diet  monitor vitals  monitor H/H  C/w dressing changes BID;   IV abx per ID recs, Zosyn, Clinda  Surgical cx results: E.coli  Appreciated hospitalist recs  Monitor stoma and ostomy output  Plastic surgery consult pending  Wound care consult pending  DVT ppx/SCD    Case discussed with Dr. Carroll

## 2020-11-09 LAB
ALBUMIN SERPL ELPH-MCNC: 1.6 G/DL — LOW (ref 3.3–5)
ALP SERPL-CCNC: 85 U/L — SIGNIFICANT CHANGE UP (ref 40–120)
ALT FLD-CCNC: 14 U/L — SIGNIFICANT CHANGE UP (ref 12–78)
ANION GAP SERPL CALC-SCNC: 6 MMOL/L — SIGNIFICANT CHANGE UP (ref 5–17)
AST SERPL-CCNC: 10 U/L — LOW (ref 15–37)
BILIRUB SERPL-MCNC: 0.3 MG/DL — SIGNIFICANT CHANGE UP (ref 0.2–1.2)
BUN SERPL-MCNC: 5 MG/DL — LOW (ref 7–23)
CALCIUM SERPL-MCNC: 8.4 MG/DL — LOW (ref 8.5–10.1)
CHLORIDE SERPL-SCNC: 101 MMOL/L — SIGNIFICANT CHANGE UP (ref 96–108)
CO2 SERPL-SCNC: 29 MMOL/L — SIGNIFICANT CHANGE UP (ref 22–31)
CREAT SERPL-MCNC: 0.56 MG/DL — SIGNIFICANT CHANGE UP (ref 0.5–1.3)
GLUCOSE SERPL-MCNC: 110 MG/DL — HIGH (ref 70–99)
HCT VFR BLD CALC: 30.6 % — LOW (ref 34.5–45)
HGB BLD-MCNC: 9.1 G/DL — LOW (ref 11.5–15.5)
MAGNESIUM SERPL-MCNC: 2 MG/DL — SIGNIFICANT CHANGE UP (ref 1.6–2.6)
MCHC RBC-ENTMCNC: 21 PG — LOW (ref 27–34)
MCHC RBC-ENTMCNC: 29.7 GM/DL — LOW (ref 32–36)
MCV RBC AUTO: 70.5 FL — LOW (ref 80–100)
PHOSPHATE SERPL-MCNC: 3.3 MG/DL — SIGNIFICANT CHANGE UP (ref 2.5–4.5)
PLATELET # BLD AUTO: 708 K/UL — HIGH (ref 150–400)
POTASSIUM SERPL-MCNC: 4 MMOL/L — SIGNIFICANT CHANGE UP (ref 3.5–5.3)
POTASSIUM SERPL-SCNC: 4 MMOL/L — SIGNIFICANT CHANGE UP (ref 3.5–5.3)
PROT SERPL-MCNC: 6.8 GM/DL — SIGNIFICANT CHANGE UP (ref 6–8.3)
RBC # BLD: 4.34 M/UL — SIGNIFICANT CHANGE UP (ref 3.8–5.2)
RBC # FLD: SIGNIFICANT CHANGE UP (ref 10.3–14.5)
SODIUM SERPL-SCNC: 136 MMOL/L — SIGNIFICANT CHANGE UP (ref 135–145)
WBC # BLD: 17.46 K/UL — HIGH (ref 3.8–10.5)
WBC # FLD AUTO: 17.46 K/UL — HIGH (ref 3.8–10.5)

## 2020-11-09 PROCEDURE — 99233 SBSQ HOSP IP/OBS HIGH 50: CPT

## 2020-11-09 RX ADMIN — Medication 400 MILLIGRAM(S): at 00:01

## 2020-11-09 RX ADMIN — OXYCODONE HYDROCHLORIDE 10 MILLIGRAM(S): 5 TABLET ORAL at 09:41

## 2020-11-09 RX ADMIN — OXYCODONE HYDROCHLORIDE 10 MILLIGRAM(S): 5 TABLET ORAL at 14:30

## 2020-11-09 RX ADMIN — PIPERACILLIN AND TAZOBACTAM 25 GRAM(S): 4; .5 INJECTION, POWDER, LYOPHILIZED, FOR SOLUTION INTRAVENOUS at 21:01

## 2020-11-09 RX ADMIN — PIPERACILLIN AND TAZOBACTAM 25 GRAM(S): 4; .5 INJECTION, POWDER, LYOPHILIZED, FOR SOLUTION INTRAVENOUS at 05:16

## 2020-11-09 RX ADMIN — Medication 1 APPLICATION(S): at 00:00

## 2020-11-09 RX ADMIN — Medication 1 APPLICATION(S): at 21:01

## 2020-11-09 RX ADMIN — FONDAPARINUX SODIUM 2.5 MILLIGRAM(S): 2.5 INJECTION, SOLUTION SUBCUTANEOUS at 09:41

## 2020-11-09 RX ADMIN — Medication 1 APPLICATION(S): at 09:41

## 2020-11-09 RX ADMIN — PIPERACILLIN AND TAZOBACTAM 25 GRAM(S): 4; .5 INJECTION, POWDER, LYOPHILIZED, FOR SOLUTION INTRAVENOUS at 15:06

## 2020-11-09 RX ADMIN — OXYCODONE HYDROCHLORIDE 10 MILLIGRAM(S): 5 TABLET ORAL at 22:00

## 2020-11-09 RX ADMIN — Medication 100 MILLIGRAM(S): at 05:16

## 2020-11-09 RX ADMIN — AMLODIPINE BESYLATE 5 MILLIGRAM(S): 2.5 TABLET ORAL at 09:41

## 2020-11-09 RX ADMIN — Medication 400 MILLIGRAM(S): at 16:48

## 2020-11-09 RX ADMIN — OXYCODONE HYDROCHLORIDE 10 MILLIGRAM(S): 5 TABLET ORAL at 13:36

## 2020-11-09 RX ADMIN — OXYCODONE HYDROCHLORIDE 10 MILLIGRAM(S): 5 TABLET ORAL at 21:04

## 2020-11-09 RX ADMIN — Medication 1000 MILLIGRAM(S): at 00:37

## 2020-11-09 RX ADMIN — OXYCODONE HYDROCHLORIDE 10 MILLIGRAM(S): 5 TABLET ORAL at 10:40

## 2020-11-09 NOTE — PROGRESS NOTE ADULT - ASSESSMENT
59 y/o F with PMHx of hemorrhoids admitted on 11/2 for evaluation of shortness of breath, weakness and muscle aches; had loose stools; is poor historian and history per medical record; the patient had imaging upon admission and was found to have bilateral buttock infection with necrotizing fasciitis and went to OR for debridement on 11/2. She cannot recall any details about the infection or how she came about having such a severe infection. She denies any allergy to penicillin or cephalosporins.     1. Patient admitted with necrotizing fasciitis of the buttocks; unclear origin; also noted with leukocytosis most likely reactive to infection  - follow up cultures   - iv hydration and supportive care   - wound care per surgery  - will need further debridement  - probably will need diverting colostomy for full wound care and healing---- and this surgery was done on 11/5  - serial cbc and monitor temperature   - reviewed prior medical records to evaluate for resistant or atypical pathogens   - day #7 zosyn and clindamycin, the latter for toxin production  - will stop clindamycin today  - tolerating antibiotics without rashes or side effects   - all organisms are sensitive; NO ISOLATION IS NEEDED  - diet per surgery  Will follow

## 2020-11-09 NOTE — PROGRESS NOTE ADULT - ASSESSMENT
* Necrotizing fasciitis with perineal abscess s/p debridement and drainage on 11/2 and 11/3 + s/p diverting colostomy   - continue IV abx zosyn and clindamycin per ID   - IVF    - prn analgesia   - plastic consult pending      * h/o VTE with UFH allergy - started on arixtra prophylactic dose    * Anemia due to acute blood loss   - monitor HH    * Elevated BP - amlodipine

## 2020-11-09 NOTE — PROGRESS NOTE ADULT - SUBJECTIVE AND OBJECTIVE BOX
61 y/o Female with PMHx of hemorrhoids, recalls allergy to UFH: diagnosed when she was hospitalized for DVT years back; thinks she had difficulty breathing with UFH admitted on 11/2 for evaluation of shortness of breath, weakness and muscle aches; had loose stools; is poor historian and history per medical record; the patient had imaging upon admission and was found to have bilateral buttock infection with necrotizing fasciitis and went to OR for debridement on 11/2. She cannot recall any details about the infection or how she came about having such a severe infection. She denies any allergy to penicillin or cephalosporins.     INTERVAL HPI: s/p repeat debridement in OR 11/3,11/8 and diverting colostomy 11/5  Pt c/o pain - controlled with current meds, awaiting plastic evaluation   Other ROS reviewed and neg     Vital Signs Last 24 Hrs  T(C): 37.1 (09 Nov 2020 08:49), Max: 37.3 (09 Nov 2020 05:00)  T(F): 98.7 (09 Nov 2020 08:49), Max: 99.2 (09 Nov 2020 05:00)  HR: 79 (09 Nov 2020 07:00) (74 - 88)  BP: 159/76 (09 Nov 2020 07:00) (135/68 - 160/69)  BP(mean): 97 (09 Nov 2020 07:00) (80 - 97)  RR: 19 (09 Nov 2020 07:00) (15 - 22)  SpO2: 98% (09 Nov 2020 07:00) (93% - 99%)  I&O's Detail    08 Nov 2020 07:01  -  09 Nov 2020 07:00  --------------------------------------------------------  IN:  Total IN: 0 mL    OUT:    Colostomy (mL): 120 mL    Indwelling Catheter - Urethral (mL): 3500 mL  Total OUT: 3620 mL    Total NET: -3620 mL                       9.1    17.46 )-----------( 708      ( 09 Nov 2020 06:44 )             30.6     09 Nov 2020 06:44    136    |  101    |  5      ----------------------------<  110    4.0     |  29     |  0.56     Ca    8.4        09 Nov 2020 06:44  Phos  3.3       09 Nov 2020 06:44  Mg     2.0       09 Nov 2020 06:44    TPro  6.8    /  Alb  1.6    /  TBili  0.3    /  DBili  x      /  AST  10     /  ALT  14     /  AlkPhos  85     09 Nov 2020 06:44    LIVER FUNCTIONS - ( 09 Nov 2020 06:44 )  Alb: 1.6 g/dL / Pro: 6.8 gm/dL / ALK PHOS: 85 U/L / ALT: 14 U/L / AST: 10 U/L / GGT: x           MEDICATIONS  (STANDING):  amLODIPine   Tablet 5 milliGRAM(s) Oral daily  clindamycin IVPB 600 milliGRAM(s) IV Intermittent every 8 hours  Dakins Solution - Full Strength 1 Application(s) Topical two times a day  dextrose 5% + sodium chloride 0.9% with potassium chloride 20 mEq/L 1000 milliLiter(s) (100 mL/Hr) IV Continuous <Continuous>  fondaparinux Injectable 2.5 milliGRAM(s) SubCutaneous daily  piperacillin/tazobactam IVPB.. 3.375 Gram(s) IV Intermittent every 8 hours    RADIOLOGY: personally visualized    PHYSICAL EXAM:    General: obese female in no acute visible distress  Eyes: PERRLA, EOMI; conjunctiva and sclera clear  Head: Normocephalic; atraumatic  ENMT: No nasal discharge; airway clear  Neck: Supple; non tender; no masses  Respiratory: No wheezes, rales or rhonchi  Cardiovascular: S1, S2 reg  Gastrointestinal: Soft mildly distended abdomen with LLQ colostomy in place and perveena woundvac with + bowel sounds, rectal packing  Genitourinary: No costovertebral angle tenderness  Extremities: No clubbing, cyanosis or edema  Vascular: Peripheral pulses palpable 2+ bilaterally  Neurological: Alert and oriented x4  Skin: Warm and dry. Pale.  Musculoskeletal: Normal tone, without deformities  Psychiatric: Cooperative and appropriate

## 2020-11-09 NOTE — PROGRESS NOTE ADULT - SUBJECTIVE AND OBJECTIVE BOX
SURGERY DAILY PROGRESS NOTE:     Subjective:  Patient seen and examined at bedside during am rounds. Vitals reviewed and AVSS. Denies any fevers, chills, n/v/d, chest pain or shortness of breath. Tolerating low fiber liquid. Voiding via trujillo. Liquid stool in ostomy bag.  Patient underwent wash out and packing in OR on 11/7. POD#2. No complaint at this time.      Objective:    Vital Signs Last 24 Hrs  T(C): 37.3 (09 Nov 2020 05:00), Max: 37.3 (09 Nov 2020 05:00)  T(F): 99.2 (09 Nov 2020 05:00), Max: 99.2 (09 Nov 2020 05:00)  HR: 79 (09 Nov 2020 07:00) (74 - 88)  BP: 159/76 (09 Nov 2020 07:00) (130/62 - 160/69)  BP(mean): 97 (09 Nov 2020 07:00) (77 - 105)  RR: 19 (09 Nov 2020 07:00) (15 - 22)  SpO2: 98% (09 Nov 2020 07:00) (93% - 99%)    PHYSICAL EXAM   Gen: well-appearing, in no acute distress  CV: pulse regularly present   Resp: airway patent, non-labored breathing  Abd: soft, NTND; no rebound or guarding. Incision c/d/i' ostomy pink and patent with stool in bag. Provena in place midline                 9.1<L>                136  | 29   | 5<L>         17.46<H> >-----------< 708<H>   ------------------------< 110<H>                 30.6<L>                4.0  | 101  | 0.56                                                                      Ca 8.4<L> Mg x     Ph x        ,             8.5<L>                135  | 29   | 6<L>         17.91<H> >-----------< 673<H>   ------------------------< 108<H>                 29.0<L>                4.0  | 102  | 0.57                                                                      Ca 8.2<L> Mg 1.9   Ph 3.1                    8.5<L>                135  | 29   | 6<L>         17.91<H> >-----------< 673<H>   ------------------------< 108<H>                 29.0<L>                4.0  | 102  | 0.57                                                                      Ca 8.2<L> Mg 1.9   Ph 3.1      ,             8.5<L>                137  | 27   | 9            18.76<H> >-----------< 665<H>   ------------------------< 100<H>                 28.5<L>                3.9  | 105  | 0.54                                                                      Ca 8.0<L> Mg 2.0   Ph 2.8        ,             8.4<L>                138  | 26   | 14           21.66<H> >-----------< 682<H>   ------------------------< 123<H>                 28.1<L>                3.9  | 106  | 0.61                                                                      Ca 8.3<L> Mg 2.2   Ph 3.2            08 Nov 2020 07:01  -  09 Nov 2020 07:00  --------------------------------------------------------  IN:  Total IN: 0 mL    OUT:    Colostomy (mL): 120 mL    Indwelling Catheter - Urethral (mL): 3500 mL  Total OUT: 3620 mL    Total NET: -3620 mL

## 2020-11-09 NOTE — PROGRESS NOTE ADULT - ASSESSMENT
59 y/o F presents with necrotizing fasciitis of perineal area, s/p extensive perineal debridement x 2, and end colostomy creation POD#7/6/4/2. Hospital course complicated with acute anemia    On low residue diet  monitor vitals  monitor H/H  C/w dressing changes BID;   IV abx per ID recs, Zosyn, Clinda  Surgical cx results: E.coli  Appreciated hospitalist recs  Monitor stoma and ostomy output  Plastic surgery consult pending  Wound care consult pending  DVT ppx/SCD    Case discussed with CRS team and Dr Gordillo

## 2020-11-09 NOTE — PROGRESS NOTE ADULT - SUBJECTIVE AND OBJECTIVE BOX
Date of service: 11-09-20 @ 12:01    Patient lying in bed; has abdominal cramping        ROS: no fever or chills; denies dizziness, no HA, no SOB or cough, no abdominal pain, no diarrhea or constipation; no dysuria, no urinary frequency, no legs pain, no rashes    MEDICATIONS  (STANDING):  amLODIPine   Tablet 5 milliGRAM(s) Oral daily  Dakins Solution - Full Strength 1 Application(s) Topical two times a day  dextrose 5% + sodium chloride 0.9% with potassium chloride 20 mEq/L 1000 milliLiter(s) (100 mL/Hr) IV Continuous <Continuous>  fondaparinux Injectable 2.5 milliGRAM(s) SubCutaneous daily  piperacillin/tazobactam IVPB.. 3.375 Gram(s) IV Intermittent every 8 hours    MEDICATIONS  (PRN):  acetaminophen  IVPB .. 1000 milliGRAM(s) IV Intermittent every 6 hours PRN Mild Pain (1 - 3)  ALBUTerol    90 MICROgram(s) HFA Inhaler 2 Puff(s) Inhalation every 6 hours PRN Shortness of Breath and/or Wheezing  HYDROmorphone  Injectable 1 milliGRAM(s) IV Push three times a day PRN Moderate Pain (4 - 6)  oxyCODONE    IR 5 milliGRAM(s) Oral every 4 hours PRN Moderate Pain (4 - 6)  oxyCODONE    IR 10 milliGRAM(s) Oral every 4 hours PRN Severe Pain (7 - 10)      Vital Signs Last 24 Hrs  T(C): 37.1 (09 Nov 2020 08:49), Max: 37.3 (09 Nov 2020 05:00)  T(F): 98.7 (09 Nov 2020 08:49), Max: 99.2 (09 Nov 2020 05:00)  HR: 79 (09 Nov 2020 11:00) (74 - 88)  BP: 164/63 (09 Nov 2020 11:00) (135/68 - 167/74)  BP(mean): 91 (09 Nov 2020 11:00) (80 - 97)  RR: 17 (09 Nov 2020 11:00) (15 - 22)  SpO2: 98% (09 Nov 2020 11:00) (93% - 99%)        Physical Exam:        Constitutional: frail looking  HEENT: NC/AT, EOMI, PERRLA, conjunctivae clear; ears and nose atraumatic; pharynx clear  Neck: supple; thyroid not palpable  Back: no tenderness  Respiratory: respiratory effort normal; clear to auscultation  Cardiovascular: S1S2 regular, no murmurs  Abdomen: soft, not tender, not distended, positive BS; no liver or spleen organomegaly; ostomy  Genitourinary: no suprapubic tenderness  Musculoskeletal: no muscle tenderness, no joint swelling or tenderness  Neurological/ Psychiatric: AxOx3, judgement and insight normal;  moving all extremities  Skin: dressings on buttocks    Labs: all available labs reviewed                  Labs:               Labs:           Labs:                        9.1    17.46 )-----------( 708      ( 09 Nov 2020 06:44 )             30.6     11-09    136  |  101  |  5<L>  ----------------------------<  110<H>  4.0   |  29  |  0.56    Ca    8.4<L>      09 Nov 2020 06:44  Phos  3.3     11-09  Mg     2.0     11-09    TPro  6.8  /  Alb  1.6<L>  /  TBili  0.3  /  DBili  x   /  AST  10<L>  /  ALT  14  /  AlkPhos  85  11-09           Cultures:       Culture - Tissue with Gram Stain (collected 11-03-20 @ 19:37)  Source: .Tissue necrotic tissue left buttock  Gram Stain (11-04-20 @ 05:39):    Rare polymorphonuclear leukocytes seen per low power field    Numerous Gram positive cocci in pairs seen per oil power field    Moderate Gram Variable Rods seen per oil power field  Final Report (11-05-20 @ 19:25):    Moderate Escherichia coli    Numerous Peptostreptococcus anaerobius "Susceptibilities not performed"    Rare Alpha hemolytic strep "Susceptibilities not performed"  Organism: Escherichia coli (11-05-20 @ 19:25)  Organism: Escherichia coli (11-05-20 @ 19:25)      -  Amikacin: S <=16      -  Amoxicillin/Clavulanic Acid: S <=8/4      -  Ampicillin: R >16 These ampicillin results predict results for amoxicillin      -  Ampicillin/Sulbactam: S 8/4 Enterobacter, Citrobacter, and Serratia may develop resistance during prolonged therapy (3-4 days)      -  Aztreonam: S <=4      -  Cefazolin: S <=2 Enterobacter, Citrobacter, and Serratia may develop resistance during prolonged therapy (3-4 days)      -  Cefepime: S <=2      -  Cefoxitin: S <=8      -  Ceftriaxone: S <=1 Enterobacter, Citrobacter, and Serratia may develop resistance during prolonged therapy      -  Ciprofloxacin: I 0.5      -  Ertapenem: S <=0.5      -  Gentamicin: S <=2      -  Imipenem: S <=1      -  Levofloxacin: S <=0.5      -  Meropenem: S <=1      -  Piperacillin/Tazobactam: S <=8      -  Tobramycin: S <=2      -  Trimethoprim/Sulfamethoxazole: S <=0.5/9.5      Method Type: JEFE    Culture - Surgical Swab (collected 11-02-20 @ 19:33)  Source: .Surgical Swab 3#RAINA-RECTAL PERINEAL INFECTION LEFT #2  Final Report (11-04-20 @ 23:24):    Moderate Escherichia coli    Rare Staphylococcus aureus    Few Alpha hemolytic strep "Susceptibilities not performed"    Numerous Peptostreptococcus anaerobius "Susceptibilities not performed"  Organism: Escherichia coli (11-04-20 @ 23:24)  Organism: Escherichia coli (11-04-20 @ 23:24)      -  Amikacin: S <=16      -  Amoxicillin/Clavulanic Acid: S <=8/4      -  Ampicillin: R >16 These ampicillin results predict results for amoxicillin      -  Ampicillin/Sulbactam: I 16/8 Enterobacter, Citrobacter, and Serratia may develop resistance during prolonged therapy (3-4 days)      -  Aztreonam: S <=4      -  Cefazolin: I 4 Enterobacter, Citrobacter, and Serratia may develop resistance during prolonged therapy (3-4 days)      -  Cefepime: S <=2      -  Cefoxitin: S <=8      -  Ceftriaxone: S <=1 Enterobacter, Citrobacter, and Serratia may develop resistance during prolonged therapy      -  Ciprofloxacin: I 0.5      -  Ertapenem: S <=0.5      -  Gentamicin: S <=2      -  Imipenem: S <=1      -  Levofloxacin: S <=0.5      -  Meropenem: S <=1      -  Piperacillin/Tazobactam: S <=8      -  Tobramycin: S <=2      -  Trimethoprim/Sulfamethoxazole: S <=0.5/9.5      Method Type: JEFE    Culture - Surgical Swab (collected 11-02-20 @ 19:33)  Source: .Surgical Swab 2#RAINA-RECTAL PERINEAL INFECTION#1  Final Report (11-04-20 @ 23:08):    Moderate Escherichia coli See previous culture  # [id number]    Rare Staphylococcus aureus    Rare Streptococcus mitis/oralis group "Susceptibilities not performed"    Moderate Peptostreptococcus anaerobius "Susceptibilities not performed"  Organism: Staphylococcus aureus (11-04-20 @ 23:08)  Organism: Staphylococcus aureus (11-04-20 @ 23:08)      -  Ampicillin/Sulbactam: S <=8/4      -  Cefazolin: S <=4      -  Clindamycin: S <=0.25      -  Erythromycin: S <=0.25      -  Gentamicin: S 2 Should not be used as monotherapy      -  Oxacillin: S <=0.25      -  RIF- Rifampin: S <=1 Should not be used as monotherapy      -  Tetra/Doxy: S <=1      -  Trimethoprim/Sulfamethoxazole: S <=0.5/9.5      -  Vancomycin: S 2      Method Type: JEFE    Culture - Tissue with Gram Stain (collected 11-02-20 @ 19:33)  Source: .Tissue 1#LT RAINA-RECTAL PERINEAL TIS INFECTION  Gram Stain (11-03-20 @ 03:48):    No polymorphonuclear leukocytes seen per low power field    Numerous Gram positive cocci in pairs seen per oil power field    Numerous Gram Variable Rods seen per oil power field  Final Report (11-04-20 @ 23:11):    Moderate Escherichia coli    Few Enterococcus faecalis    Rare Staphylococcus aureus    Few Streptococcus mitis/oralis group "Susceptibilities not performed"    Numerous Peptostreptococcus anaerobius "Susceptibilities not performed"  Organism: Escherichia coli  Enterococcus faecalis  Staphylococcus aureus (11-04-20 @ 23:11)  Organism: Staphylococcus aureus (11-04-20 @ 23:11)      -  Ampicillin/Sulbactam: S <=8/4      -  Cefazolin: S <=4      -  Clindamycin: S <=0.25      -  Erythromycin: S <=0.25      -  Gentamicin: S <=1 Should not be used as monotherapy      -  Oxacillin: S <=0.25      -  RIF- Rifampin: S <=1 Should not be used as monotherapy      -  Tetra/Doxy: S <=1      -  Trimethoprim/Sulfamethoxazole: S <=0.5/9.5      -  Vancomycin: S 2      Method Type: JEFE  Organism: Enterococcus faecalis (11-04-20 @ 23:11)      -  Ampicillin: S <=2 Predicts results to ampicillin/sulbactam, amoxacillin-clavulanate and  piperacillin-tazobactam.      -  Tetra/Doxy: R >8      -  Vancomycin: S 2      Method Type: JEFE  Organism: Escherichia coli (11-04-20 @ 23:11)      -  Amikacin: S <=16      -  Amoxicillin/Clavulanic Acid: S <=8/4      -  Ampicillin: R >16 These ampicillin results predict results for amoxicillin      -  Ampicillin/Sulbactam: I 16/8 Enterobacter, Citrobacter, and Serratia may develop resistance during prolonged therapy (3-4 days)      -  Aztreonam: S <=4      -  Cefazolin: I 4 Enterobacter, Citrobacter, and Serratia may develop resistance during prolonged therapy (3-4 days)      -  Cefepime: S <=2      -  Cefoxitin: S <=8      -  Ceftriaxone: S <=1 Enterobacter, Citrobacter, and Serratia may develop resistance during prolonged therapy      -  Ciprofloxacin: S <=0.25      -  Ertapenem: S <=0.5      -  Gentamicin: S <=2      -  Imipenem: S <=1      -  Levofloxacin: S <=0.5      -  Meropenem: S <=1      -  Piperacillin/Tazobactam: S <=8      -  Tobramycin: S <=2      -  Trimethoprim/Sulfamethoxazole: S <=0.5/9.5      Method Type: JEFE    Culture - Urine (collected 11-02-20 @ 16:36)  Source: .Urine None  Final Report (11-03-20 @ 14:18):    No growth    Culture - Blood (collected 11-02-20 @ 14:14)  Source: .Blood None  Final Report (11-07-20 @ 19:00):    No Growth Final    Culture - Blood (collected 11-02-20 @ 13:57)  Source: .Blood None  Final Report (11-07-20 @ 19:00):    No Growth Final      C-Reactive Protein, Serum: 38.74 mg/dL (11-02-20 @ 14:14)          < from: CT Abdomen and Pelvis w/ IV Cont (11.02.20 @ 17:46) >    EXAM:  CT ABDOMEN AND PELVIS IC                          EXAM:  CT CHEST IC                            PROCEDURE DATE:  11/02/2020          INTERPRETATION:  CLINICAL INFORMATION: sepsis    COMPARISON: None.    PROCEDURE:  CT of the Chest, Abdomen and Pelvis was performed with intravenous contrast.  Intravenous contrast: 90 ml Omnipaque 350. 10 ml discarded.  Oral contrast: None.  Sagittal and coronal reformats were performed.    FINDINGS:    CHEST:    LUNGS AND LARGE AIRWAYS: Patent central airways. Azygos fissure. A few less than 5 mm nodules in the left lower lobe.  PLEURA: No pleural effusion.  VESSELS: Within normal limits.  HEART: Heart size is normal.  No pericardial effusion.  MEDIASTINUM AND BLANK: No lymphadenopathy.  CHEST WALL ANDLOWER NECK: Within normal limits.    ABDOMEN AND PELVIS:    LIVER: Within normal limits.  BILE DUCTS: Normal caliber.  GALLBLADDER: Cholelithiasis.  SPLEEN: Within normal limits.  PANCREAS: Within normal limits.  ADRENALS: Within normal limits.  KIDNEYS/URETERS: Hypodense left renal lesion too small to characterize.    BLADDER: Within normal limits.  REPRODUCTIVE ORGANS: Fibroid uterus.    BOWEL: No bowel obstruction. The appendix is normal.  PERITONEUM: No ascites.  VESSELS:  Within normal limits.  RETROPERITONEUM/LYMPH NODES: No lymphadenopathy.  ABDOMINAL WALL: There is within the visualized left ischioanal fossa and around the left gluteal muscles. Air tracks along the deep left pelvis and presacral space into the retroperitoneum. There is air along the left psoas muscle.  BONES: Within normal limits.    IMPRESSION: Extensive subcutaneous gas within the left ischioanal fossa tracking into the pelvis and retroperitoneum worrisome for gas forming infection.      < end of copied text >      Radiology: all available radiological tests reviewed    Advanced directives addressed: full resuscitation

## 2020-11-10 DIAGNOSIS — M72.6 NECROTIZING FASCIITIS: ICD-10-CM

## 2020-11-10 LAB
ANION GAP SERPL CALC-SCNC: 7 MMOL/L — SIGNIFICANT CHANGE UP (ref 5–17)
BUN SERPL-MCNC: 6 MG/DL — LOW (ref 7–23)
CALCIUM SERPL-MCNC: 8.4 MG/DL — LOW (ref 8.5–10.1)
CHLORIDE SERPL-SCNC: 102 MMOL/L — SIGNIFICANT CHANGE UP (ref 96–108)
CO2 SERPL-SCNC: 27 MMOL/L — SIGNIFICANT CHANGE UP (ref 22–31)
CREAT SERPL-MCNC: 0.59 MG/DL — SIGNIFICANT CHANGE UP (ref 0.5–1.3)
GLUCOSE SERPL-MCNC: 111 MG/DL — HIGH (ref 70–99)
HCT VFR BLD CALC: 29.2 % — LOW (ref 34.5–45)
HGB BLD-MCNC: 8.8 G/DL — LOW (ref 11.5–15.5)
MAGNESIUM SERPL-MCNC: 2.2 MG/DL — SIGNIFICANT CHANGE UP (ref 1.6–2.6)
MCHC RBC-ENTMCNC: 21.3 PG — LOW (ref 27–34)
MCHC RBC-ENTMCNC: 30.1 GM/DL — LOW (ref 32–36)
MCV RBC AUTO: 70.7 FL — LOW (ref 80–100)
PHOSPHATE SERPL-MCNC: 3.6 MG/DL — SIGNIFICANT CHANGE UP (ref 2.5–4.5)
PLATELET # BLD AUTO: 685 K/UL — HIGH (ref 150–400)
POTASSIUM SERPL-MCNC: 4.1 MMOL/L — SIGNIFICANT CHANGE UP (ref 3.5–5.3)
POTASSIUM SERPL-SCNC: 4.1 MMOL/L — SIGNIFICANT CHANGE UP (ref 3.5–5.3)
PREALB SERPL-MCNC: 17 MG/DL — LOW (ref 20–40)
RBC # BLD: 4.13 M/UL — SIGNIFICANT CHANGE UP (ref 3.8–5.2)
RBC # FLD: SIGNIFICANT CHANGE UP (ref 10.3–14.5)
SODIUM SERPL-SCNC: 136 MMOL/L — SIGNIFICANT CHANGE UP (ref 135–145)
WBC # BLD: 18.03 K/UL — HIGH (ref 3.8–10.5)
WBC # FLD AUTO: 18.03 K/UL — HIGH (ref 3.8–10.5)

## 2020-11-10 PROCEDURE — 99233 SBSQ HOSP IP/OBS HIGH 50: CPT

## 2020-11-10 RX ORDER — ALPRAZOLAM 0.25 MG
0.25 TABLET ORAL EVERY 8 HOURS
Refills: 0 | Status: DISCONTINUED | OUTPATIENT
Start: 2020-11-10 | End: 2020-11-13

## 2020-11-10 RX ADMIN — DEXTROSE MONOHYDRATE, SODIUM CHLORIDE, AND POTASSIUM CHLORIDE 100 MILLILITER(S): 50; .745; 4.5 INJECTION, SOLUTION INTRAVENOUS at 08:00

## 2020-11-10 RX ADMIN — Medication 1 APPLICATION(S): at 12:49

## 2020-11-10 RX ADMIN — DEXTROSE MONOHYDRATE, SODIUM CHLORIDE, AND POTASSIUM CHLORIDE 100 MILLILITER(S): 50; .745; 4.5 INJECTION, SOLUTION INTRAVENOUS at 18:34

## 2020-11-10 RX ADMIN — PIPERACILLIN AND TAZOBACTAM 25 GRAM(S): 4; .5 INJECTION, POWDER, LYOPHILIZED, FOR SOLUTION INTRAVENOUS at 14:53

## 2020-11-10 RX ADMIN — FONDAPARINUX SODIUM 2.5 MILLIGRAM(S): 2.5 INJECTION, SOLUTION SUBCUTANEOUS at 12:49

## 2020-11-10 RX ADMIN — OXYCODONE HYDROCHLORIDE 10 MILLIGRAM(S): 5 TABLET ORAL at 21:43

## 2020-11-10 RX ADMIN — OXYCODONE HYDROCHLORIDE 10 MILLIGRAM(S): 5 TABLET ORAL at 05:30

## 2020-11-10 RX ADMIN — OXYCODONE HYDROCHLORIDE 10 MILLIGRAM(S): 5 TABLET ORAL at 04:40

## 2020-11-10 RX ADMIN — PIPERACILLIN AND TAZOBACTAM 25 GRAM(S): 4; .5 INJECTION, POWDER, LYOPHILIZED, FOR SOLUTION INTRAVENOUS at 21:08

## 2020-11-10 RX ADMIN — Medication 0.25 MILLIGRAM(S): at 17:51

## 2020-11-10 RX ADMIN — Medication 1 APPLICATION(S): at 21:08

## 2020-11-10 RX ADMIN — AMLODIPINE BESYLATE 5 MILLIGRAM(S): 2.5 TABLET ORAL at 12:49

## 2020-11-10 RX ADMIN — PIPERACILLIN AND TAZOBACTAM 25 GRAM(S): 4; .5 INJECTION, POWDER, LYOPHILIZED, FOR SOLUTION INTRAVENOUS at 05:08

## 2020-11-10 RX ADMIN — OXYCODONE HYDROCHLORIDE 10 MILLIGRAM(S): 5 TABLET ORAL at 23:00

## 2020-11-10 NOTE — PROGRESS NOTE ADULT - SUBJECTIVE AND OBJECTIVE BOX
Date of service: 11-10-20 @ 09:54      Patient lying in bed; notes she is able to start eating again, tolerating diet  No further abdominal cramping        ROS: no fever or chills; denies dizziness, no HA, no SOB or cough, no abdominal pain, no diarrhea or constipation; no dysuria, no urinary frequency, no legs pain, no rashes    MEDICATIONS  (STANDING):  amLODIPine   Tablet 5 milliGRAM(s) Oral daily  Dakins Solution - Full Strength 1 Application(s) Topical two times a day  dextrose 5% + sodium chloride 0.9% with potassium chloride 20 mEq/L 1000 milliLiter(s) (100 mL/Hr) IV Continuous <Continuous>  fondaparinux Injectable 2.5 milliGRAM(s) SubCutaneous daily  piperacillin/tazobactam IVPB.. 3.375 Gram(s) IV Intermittent every 8 hours    MEDICATIONS  (PRN):  ALBUTerol    90 MICROgram(s) HFA Inhaler 2 Puff(s) Inhalation every 6 hours PRN Shortness of Breath and/or Wheezing  HYDROmorphone  Injectable 1 milliGRAM(s) IV Push three times a day PRN Moderate Pain (4 - 6)  oxyCODONE    IR 5 milliGRAM(s) Oral every 4 hours PRN Moderate Pain (4 - 6)  oxyCODONE    IR 10 milliGRAM(s) Oral every 4 hours PRN Severe Pain (7 - 10)      Vital Signs Last 24 Hrs  T(C): 37 (09 Nov 2020 21:37), Max: 37.1 (09 Nov 2020 14:26)  T(F): 98.6 (09 Nov 2020 21:37), Max: 98.8 (09 Nov 2020 14:26)  HR: 85 (10 Nov 2020 08:00) (71 - 92)  BP: 141/116 (10 Nov 2020 08:00) (141/116 - 171/70)  BP(mean): 122 (10 Nov 2020 08:00) (80 - 122)  RR: 19 (10 Nov 2020 08:00) (15 - 23)  SpO2: 98% (10 Nov 2020 08:00) (95% - 98%)        Physical Exam:        Constitutional: frail looking  HEENT: NC/AT, EOMI, PERRLA, conjunctivae clear; ears and nose atraumatic; pharynx clear  Neck: supple; thyroid not palpable  Back: no tenderness  Respiratory: respiratory effort normal; clear to auscultation  Cardiovascular: S1S2 regular, no murmurs  Abdomen: soft, not tender, not distended, positive BS; no liver or spleen organomegaly; ostomy  Genitourinary: no suprapubic tenderness  Musculoskeletal: no muscle tenderness, no joint swelling or tenderness  Neurological/ Psychiatric: AxOx3, judgement and insight normal;  moving all extremities  Skin: dressings on buttocks    Labs: all available labs reviewed                  Labs:               Labs:           Labs:             Labs:                        8.8    18.03 )-----------( 685      ( 10 Nov 2020 06:53 )             29.2     11-10    136  |  102  |  6<L>  ----------------------------<  111<H>  4.1   |  27  |  0.59    Ca    8.4<L>      10 Nov 2020 06:53  Phos  3.6     11-10  Mg     2.2     11-10    TPro  x   /  Alb  1.8<L>  /  TBili  x   /  DBili  x   /  AST  x   /  ALT  x   /  AlkPhos  x   11-10           Cultures:       Culture - Tissue with Gram Stain (collected 11-03-20 @ 19:37)  Source: .Tissue necrotic tissue left buttock  Gram Stain (11-04-20 @ 05:39):    Rare polymorphonuclear leukocytes seen per low power field    Numerous Gram positive cocci in pairs seen per oil power field    Moderate Gram Variable Rods seen per oil power field  Final Report (11-05-20 @ 19:25):    Moderate Escherichia coli    Numerous Peptostreptococcus anaerobius "Susceptibilities not performed"    Rare Alpha hemolytic strep "Susceptibilities not performed"  Organism: Escherichia coli (11-05-20 @ 19:25)  Organism: Escherichia coli (11-05-20 @ 19:25)      -  Amikacin: S <=16      -  Amoxicillin/Clavulanic Acid: S <=8/4      -  Ampicillin: R >16 These ampicillin results predict results for amoxicillin      -  Ampicillin/Sulbactam: S 8/4 Enterobacter, Citrobacter, and Serratia may develop resistance during prolonged therapy (3-4 days)      -  Aztreonam: S <=4      -  Cefazolin: S <=2 Enterobacter, Citrobacter, and Serratia may develop resistance during prolonged therapy (3-4 days)      -  Cefepime: S <=2      -  Cefoxitin: S <=8      -  Ceftriaxone: S <=1 Enterobacter, Citrobacter, and Serratia may develop resistance during prolonged therapy      -  Ciprofloxacin: I 0.5      -  Ertapenem: S <=0.5      -  Gentamicin: S <=2      -  Imipenem: S <=1      -  Levofloxacin: S <=0.5      -  Meropenem: S <=1      -  Piperacillin/Tazobactam: S <=8      -  Tobramycin: S <=2      -  Trimethoprim/Sulfamethoxazole: S <=0.5/9.5      Method Type: JEFE      C-Reactive Protein, Serum: 38.74 mg/dL (11-02-20 @ 14:14)        < from: CT Abdomen and Pelvis w/ IV Cont (11.02.20 @ 17:46) >    EXAM:  CT ABDOMEN AND PELVIS IC                          EXAM:  CT CHEST IC                            PROCEDURE DATE:  11/02/2020          INTERPRETATION:  CLINICAL INFORMATION: sepsis    COMPARISON: None.    PROCEDURE:  CT of the Chest, Abdomen and Pelvis was performed with intravenous contrast.  Intravenous contrast: 90 ml Omnipaque 350. 10 ml discarded.  Oral contrast: None.  Sagittal and coronal reformats were performed.    FINDINGS:    CHEST:    LUNGS AND LARGE AIRWAYS: Patent central airways. Azygos fissure. A few less than 5 mm nodules in the left lower lobe.  PLEURA: No pleural effusion.  VESSELS: Within normal limits.  HEART: Heart size is normal.  No pericardial effusion.  MEDIASTINUM AND BLANK: No lymphadenopathy.  CHEST WALL ANDLOWER NECK: Within normal limits.    ABDOMEN AND PELVIS:    LIVER: Within normal limits.  BILE DUCTS: Normal caliber.  GALLBLADDER: Cholelithiasis.  SPLEEN: Within normal limits.  PANCREAS: Within normal limits.  ADRENALS: Within normal limits.  KIDNEYS/URETERS: Hypodense left renal lesion too small to characterize.    BLADDER: Within normal limits.  REPRODUCTIVE ORGANS: Fibroid uterus.    BOWEL: No bowel obstruction. The appendix is normal.  PERITONEUM: No ascites.  VESSELS:  Within normal limits.  RETROPERITONEUM/LYMPH NODES: No lymphadenopathy.  ABDOMINAL WALL: There is within the visualized left ischioanal fossa and around the left gluteal muscles. Air tracks along the deep left pelvis and presacral space into the retroperitoneum. There is air along the left psoas muscle.  BONES: Within normal limits.    IMPRESSION: Extensive subcutaneous gas within the left ischioanal fossa tracking into the pelvis and retroperitoneum worrisome for gas forming infection.      < end of copied text >      Radiology: all available radiological tests reviewed    Advanced directives addressed: full resuscitation

## 2020-11-10 NOTE — CONSULT NOTE ADULT - SUBJECTIVE AND OBJECTIVE BOX
59 y/o F who is a poor historian and according to chart has PMHx of hemorrhoids. She states that she recently lost her  and has not been eating well. She says for the past week she has not been feeling well, subjective fevers , muscle aches, shortness of breath and loose stools. She had pain in her perineal area for the past week and on 11/2 she called and ambulance and was brought to the ED. The patient was worked up and on imaging was found to have bilateral buttock  necrotizing fasciitis and went to the OR for debridement on 11/2. The patient has had multiple debridements since (11/3, 11/5, 11/7) and a diverting Colostomy on 11/5. The wound is currently being dressed with Full strength Dakins BID. Cultures have grown out E.Coli.      PE: Gen- NAD, Obese  HEENT- EOMI  CVS- RRR  CHest- Equal Chest Expansion B/L  And- Soft, NT, Provena on midline incision, Diverting Colostomy, Functioning.  Ext- FROM x4    Focused Exam: Back- B/L Buttock/aric-rectal wounds, Left [id number], Right [id number], Both tracking superiorly,         Labs              8.8    18.03 )-----------( 685      ( 10 Nov 2020 06:53 )             29.2     10 Nov 2020 06:53    136    |  102    |  6      ----------------------------<  111    4.1     |  27     |  0.59     Ca    8.4        10 Nov 2020 06:53  Phos  3.6       10 Nov 2020 06:53  Mg     2.2       10 Nov 2020 06:53    TPro  x      /  Alb  1.8    /  TBili  x      /  DBili  x      /  AST  x      /  ALT  x      /  AlkPhos  x      10 Nov 2020 06:53      LIVER FUNCTIONS - ( 10 Nov 2020 06:53 )  Alb: 1.8 g/dL / Pro: x     / ALK PHOS: x     / ALT: x     / AST: x     / GGT: x       Cultures:   Culture - Tissue with Gram Stain (collected 11-03-20 @ 19:37)  Source: .Tissue necrotic tissue left buttock  Gram Stain (11-04-20 @ 05:39):    Rare polymorphonuclear leukocytes seen per low power field    Numerous Gram positive cocci in pairs seen per oil power field    Moderate Gram Variable Rods seen per oil power field  Final Report (11-05-20 @ 19:25):    Moderate Escherichia coli    Numerous Peptostreptococcus anaerobius "Susceptibilities not performed"    Rare Alpha hemolytic strep "Susceptibilities not performed"  Organism: Escherichia coli (11-05-20 @ 19:25)  Organism: Escherichia coli (11-05-20 @ 19:25)      -  Amikacin: S <=16      -  Amoxicillin/Clavulanic Acid: S <=8/4      -  Ampicillin: R >16 These ampicillin results predict results for amoxicillin      -  Ampicillin/Sulbactam: S 8/4 Enterobacter, Citrobacter, and Serratia may develop resistance during prolonged therapy (3-4 days)      -  Aztreonam: S <=4      -  Cefazolin: S <=2 Enterobacter, Citrobacter, and Serratia may develop resistance during prolonged therapy (3-4 days)      -  Cefepime: S <=2      -  Cefoxitin: S <=8      -  Ceftriaxone: S <=1 Enterobacter, Citrobacter, and Serratia may develop resistance during prolonged therapy      -  Ciprofloxacin: I 0.5      -  Ertapenem: S <=0.5      -  Gentamicin: S <=2      -  Imipenem: S <=1      -  Levofloxacin: S <=0.5      -  Meropenem: S <=1      -  Piperacillin/Tazobactam: S <=8      -  Tobramycin: S <=2      -  Trimethoprim/Sulfamethoxazole: S <=0.5/9.5          CT C/A/P 11/2/2020:   IMPRESSION: Extensive subcutaneous gas within the left ischioanal fossa tracking into the pelvis and retroperitoneum worrisome for gas forming infection.   61 y/o F who is a poor historian and according to chart has PMHx of hemorrhoids. She states that she recently lost her  and has not been eating well. She says for the past week she has not been feeling well, subjective fevers , muscle aches, shortness of breath and loose stools. She had pain in her perineal area for the past week and on 11/2 she called and ambulance and was brought to the ED. The patient was worked up and on imaging was found to have bilateral buttock  necrotizing fasciitis and went to the OR for debridement on 11/2. The patient has had multiple debridements since (11/3, 11/5, 11/7) and a diverting Colostomy on 11/5. The wound is currently being dressed with Full strength Dakins BID. Cultures have grown out E.Coli.      PE: Gen- NAD, Obese  HEENT- EOMI  CVS- RRR  Chest- Equal Chest Expansion B/L  And- Soft, NT, Provena on midline incision, Diverting Colostomy, Functioning.  Ext- FROM x4    Focused Exam: Back- B/L Buttock/aric-rectal wounds, Left [id number], Right [id number], Both tracking superiorly,   Left wound-superior edge of wound with necrotic fat. some turbid fluid drainage with removal of packing from left wound.        Labs              8.8    18.03 )-----------( 685      ( 10 Nov 2020 06:53 )             29.2     10 Nov 2020 06:53    136    |  102    |  6      ----------------------------<  111    4.1     |  27     |  0.59     Ca    8.4        10 Nov 2020 06:53  Phos  3.6       10 Nov 2020 06:53  Mg     2.2       10 Nov 2020 06:53    TPro  x      /  Alb  1.8    /  TBili  x      /  DBili  x      /  AST  x      /  ALT  x      /  AlkPhos  x      10 Nov 2020 06:53      LIVER FUNCTIONS - ( 10 Nov 2020 06:53 )  Alb: 1.8 g/dL / Pro: x     / ALK PHOS: x     / ALT: x     / AST: x     / GGT: x       Cultures:   Culture - Tissue with Gram Stain (collected 11-03-20 @ 19:37)  Source: .Tissue necrotic tissue left buttock  Gram Stain (11-04-20 @ 05:39):    Rare polymorphonuclear leukocytes seen per low power field    Numerous Gram positive cocci in pairs seen per oil power field    Moderate Gram Variable Rods seen per oil power field  Final Report (11-05-20 @ 19:25):    Moderate Escherichia coli    Numerous Peptostreptococcus anaerobius "Susceptibilities not performed"    Rare Alpha hemolytic strep "Susceptibilities not performed"  Organism: Escherichia coli (11-05-20 @ 19:25)  Organism: Escherichia coli (11-05-20 @ 19:25)      -  Amikacin: S <=16      -  Amoxicillin/Clavulanic Acid: S <=8/4      -  Ampicillin: R >16 These ampicillin results predict results for amoxicillin      -  Ampicillin/Sulbactam: S 8/4 Enterobacter, Citrobacter, and Serratia may develop resistance during prolonged therapy (3-4 days)      -  Aztreonam: S <=4      -  Cefazolin: S <=2 Enterobacter, Citrobacter, and Serratia may develop resistance during prolonged therapy (3-4 days)      -  Cefepime: S <=2      -  Cefoxitin: S <=8      -  Ceftriaxone: S <=1 Enterobacter, Citrobacter, and Serratia may develop resistance during prolonged therapy      -  Ciprofloxacin: I 0.5      -  Ertapenem: S <=0.5      -  Gentamicin: S <=2      -  Imipenem: S <=1      -  Levofloxacin: S <=0.5      -  Meropenem: S <=1      -  Piperacillin/Tazobactam: S <=8      -  Tobramycin: S <=2      -  Trimethoprim/Sulfamethoxazole: S <=0.5/9.5          CT C/A/P 11/2/2020:   IMPRESSION: Extensive subcutaneous gas within the left ischioanal fossa tracking into the pelvis and retroperitoneum worrisome for gas forming infection.

## 2020-11-10 NOTE — PHYSICAL THERAPY INITIAL EVALUATION ADULT - GENERAL OBSERVATIONS, REHAB EVAL
Pt rec'd supine in bed, +IV, +trujillo, +WV, mildly apprehensive about getting OOB, but ultimately cooperative with PT, no c/o pain.

## 2020-11-10 NOTE — PROGRESS NOTE ADULT - SUBJECTIVE AND OBJECTIVE BOX
61 y/o Female with PMHx of hemorrhoids, recalls allergy to UFH: diagnosed when she was hospitalized for DVT years back; thinks she had difficulty breathing with UFH admitted on 11/2 for evaluation of shortness of breath, weakness and muscle aches; had loose stools; is poor historian and history per medical record; the patient had imaging upon admission and was found to have bilateral buttock infection with necrotizing fasciitis and went to OR for debridement on 11/2. She cannot recall any details about the infection or how she came about having such a severe infection. She denies any allergy to penicillin or cephalosporins.     INTERVAL HPI: s/p repeat debridement in OR 11/3,11/8 and diverting colostomy 11/5  Pain is controlled with current meds   Other ROS reviewed and neg     Vital Signs Last 24 Hrs  T(C): 37 (09 Nov 2020 21:37), Max: 37.1 (09 Nov 2020 14:26)  T(F): 98.6 (09 Nov 2020 21:37), Max: 98.8 (09 Nov 2020 14:26)  HR: 85 (10 Nov 2020 08:00) (71 - 92)  BP: 141/116 (10 Nov 2020 08:00) (141/116 - 171/70)  BP(mean): 122 (10 Nov 2020 08:00) (80 - 122)  RR: 19 (10 Nov 2020 08:00) (15 - 23)  SpO2: 98% (10 Nov 2020 08:00) (95% - 98%)  I&O's Detail    09 Nov 2020 07:01  -  10 Nov 2020 07:00  --------------------------------------------------------  IN:    dextrose 5% + sodium chloride 0.9% w/ Additives: 1120 mL    IV PiggyBack: 200 mL  Total IN: 1320 mL    OUT:    Colostomy (mL): 250 mL    Indwelling Catheter - Urethral (mL): 4550 mL  Total OUT: 4800 mL    Total NET: -3480 mL                       8.8    18.03 )-----------( 685      ( 10 Nov 2020 06:53 )             29.2     10 Nov 2020 06:53    136    |  102    |  6      ----------------------------<  111    4.1     |  27     |  0.59     Ca    8.4        10 Nov 2020 06:53  Phos  3.6       10 Nov 2020 06:53  Mg     2.2       10 Nov 2020 06:53    TPro  x      /  Alb  1.8    /  TBili  x      /  DBili  x      /  AST  x      /  ALT  x      /  AlkPhos  x      10 Nov 2020 06:53    CAPILLARY BLOOD GLUCOSE    LIVER FUNCTIONS - ( 10 Nov 2020 06:53 )  Alb: 1.8 g/dL / Pro: x     / ALK PHOS: x     / ALT: x     / AST: x     / GGT: x           MEDICATIONS  (STANDING):  amLODIPine   Tablet 5 milliGRAM(s) Oral daily  Dakins Solution - Full Strength 1 Application(s) Topical two times a day  dextrose 5% + sodium chloride 0.9% with potassium chloride 20 mEq/L 1000 milliLiter(s) (100 mL/Hr) IV Continuous <Continuous>  fondaparinux Injectable 2.5 milliGRAM(s) SubCutaneous daily  piperacillin/tazobactam IVPB.. 3.375 Gram(s) IV Intermittent every 8 hours    MEDICATIONS  (PRN):  ALBUTerol    90 MICROgram(s) HFA Inhaler 2 Puff(s) Inhalation every 6 hours PRN Shortness of Breath and/or Wheezing  HYDROmorphone  Injectable 1 milliGRAM(s) IV Push three times a day PRN Moderate Pain (4 - 6)  oxyCODONE    IR 5 milliGRAM(s) Oral every 4 hours PRN Moderate Pain (4 - 6)  oxyCODONE    IR 10 milliGRAM(s) Oral every 4 hours PRN Severe Pain (7 - 10)    RADIOLOGY: personally visualized    PHYSICAL EXAM:    General: obese female in no acute visible distress  Eyes: PERRLA, EOMI; conjunctiva and sclera clear  Head: Normocephalic; atraumatic  ENMT: No nasal discharge; airway clear  Neck: Supple; non tender; no masses  Respiratory: No wheezes, rales or rhonchi  Cardiovascular: S1, S2 reg  Gastrointestinal: Soft mildly distended abdomen with LLQ colostomy in place and perveena woundvac with + bowel sounds, rectal packing  Genitourinary: No costovertebral angle tenderness, FQ in place  Extremities: No clubbing, cyanosis or edema  Vascular: Peripheral pulses palpable 2+ bilaterally  Neurological: Alert and oriented x4  Skin: Warm and dry. Pale.  Musculoskeletal: Normal tone, without deformities  Psychiatric: Cooperative and appropriate

## 2020-11-10 NOTE — PROGRESS NOTE ADULT - SUBJECTIVE AND OBJECTIVE BOX
SURGERY DAILY PROGRESS NOTE:     Subjective:  Patient seen and examined at bedside during am rounds. Vitals reviewed and AVSS. Denies any fevers, chills, n/v/d, chest pain or shortness of breath. Tolerating low fiber liquid. Voiding via trujillo. Liquid stool in ostomy bag.  Patient underwent wash out and packing in OR on 11/7. POD#3. No complaint at this time.      Objective:    Vital Signs (24 Hrs):  T(C): 37.1 (11-10-20 @ 10:13), Max: 37.1 (11-09-20 @ 14:26)  HR: 88 (11-10-20 @ 12:12) (71 - 92)  BP: 152/60 (11-10-20 @ 12:12) (141/116 - 171/70)  RR: 16 (11-10-20 @ 12:12) (15 - 23)  SpO2: 98% (11-10-20 @ 12:00) (95% - 98%)  Wt(kg): --  Daily     Daily     I&O's Summary    09 Nov 2020 07:01  -  10 Nov 2020 07:00  --------------------------------------------------------  IN: 1320 mL / OUT: 4800 mL / NET: -3480 mL    10 Nov 2020 07:01  -  10 Nov 2020 13:09  --------------------------------------------------------  IN: 0 mL / OUT: 1200 mL / NET: -1200 mL        PHYSICAL EXAM   Gen: well-appearing, in no acute distress  CV: pulse regularly present   Resp: airway patent, non-labored breathing  Abd: soft, NTND; no rebound or guarding. Incision c/d/i' ostomy pink and patent with stool in bag. Provena in place midline                 8.8<L>                136  | 27   | 6<L>         18.03<H> >-----------< 685<H>   ------------------------< 111<H>                 29.2<L>                4.1  | 102  | 0.59                                                                      Ca 8.4<L> Mg 2.2   Ph 3.6      ,             9.1<L>                136  | 29   | 5<L>         17.46<H> >-----------< 708<H>   ------------------------< 110<H>                 30.6<L>                4.0  | 101  | 0.56                                                                      Ca 8.4<L> Mg 2.0   Ph 3.3          09 Nov 2020 07:01  -  10 Nov 2020 07:00  --------------------------------------------------------  IN:    dextrose 5% + sodium chloride 0.9% w/ Additives: 1120 mL    IV PiggyBack: 200 mL  Total IN: 1320 mL    OUT:    Colostomy (mL): 250 mL    Indwelling Catheter - Urethral (mL): 4550 mL  Total OUT: 4800 mL    Total NET: -3480 mL      10 Nov 2020 07:01  -  10 Nov 2020 13:09  --------------------------------------------------------  IN:  Total IN: 0 mL    OUT:    Indwelling Catheter - Urethral (mL): 1200 mL  Total OUT: 1200 mL    Total NET: -1200 mL

## 2020-11-10 NOTE — PROGRESS NOTE ADULT - ASSESSMENT
* Necrotizing fasciitis with perineal abscess s/p debridement and drainage on 11/2 and 11/3 + s/p diverting colostomy and FQ   - continue IV abx zosyn per ID   - IVF    - prn analgesia   - plastic consult pending   - medically stable for Med-surg bed    * h/o VTE with UFH allergy - started on arixtra prophylactic dose    * Anemia due to acute blood loss   - monitor HH - stable    * Elevated BP - amlodipine

## 2020-11-10 NOTE — PROGRESS NOTE ADULT - ASSESSMENT
61 y/o F presents with necrotizing fasciitis of perineal area, s/p extensive perineal debridement x 2, and end colostomy creation POD#8/7/5/3. Hospital course complicated with acute anemia    On low residue diet  monitor vitals  monitor H/H  C/w dressing changes BID;   IV abx per ID recs, Zosyn, Clinda  Surgical cx results: E.coli, peptostreptococcus, strep species   Appreciated hospitalist recs  Monitor stoma and ostomy output  Plastic surgery consult appreciated   Wound care consult  DVT ppx/SCD    Case discussed with CRS team

## 2020-11-10 NOTE — PHYSICAL THERAPY INITIAL EVALUATION ADULT - DIAGNOSIS, PT EVAL
Necrotizing fasciitis with perineal abscess s/p debridement and drainage on 11/2 and 11/3 + s/p diverting colostomy

## 2020-11-10 NOTE — CONSULT NOTE ADULT - PROBLEM SELECTOR RECOMMENDATION 9
- appreciate the consult, care as per primary team  - recommend continuing wound care, patient may need one more debridement prior to closure  - recommend Nutrition consult to optimize nutrition  -ID consulted- E.Coli growing from surgical cultures, on Zosyn, completed 7 days of Clindamycin. Patient has leukocytosis although down trending  from presentation (today WBC is 18 from 17.5 <- 21 <-28)  - will contact surgical team to discuss the plan - appreciate the consult, care as per primary team  - recommend continuing wound care  - would recommend further debridement, and improvement of leukocytosis prior to closure.   - recommend Nutrition consult to optimize nutrition, Albumin = 1.8  -ID consulted- E.Coli growing from surgical cultures, on Zosyn, completed 7 days of Clindamycin. Patient has leukocytosis although down trending  from presentation (today WBC is 18 from 17.5 <- 21 <-28)  - will contact surgical team to discuss the plan

## 2020-11-10 NOTE — CHART NOTE - NSCHARTNOTEFT_GEN_A_CORE
Assessment:   *61yo female admitted with necrotizing fascitis of perineal area s/p extensive perineal debridement x 2 (last one on 11/7) and end colostomy creation on 11/5.  Now seen by plastics.    *labs reviewed; low albumin and prealbumin, however, given pt's extensive inpatient stay with surgery albumin is not a good indicator of nutrition.    *(+2) generalized edema.  (+) liquid BM on 11/9 via colostomy (250mL).  Urine output: 4550mL.    *pt reports consuming 1/3 of meals.  d/w pt important of protein intake for wound healing and encourage protein intake during meals and high protein snacks between meals.  Will add gelatein (protein jello) to optimize protein intake.    *attempted to discuss low fiber diet with patient, patient was not open to hearing about low fiber diet.    *pt continues to meet criteria for severe malnutrition*    Recommendations:  1) add gelatein TID to optimize protein intake  2) add MVI with minerals daily to ensure 100% RDI met  3) consider checking vitamin D level  4) obtain new wt when feasible to track/trend changes      Diet Prescription: Diet, Regular:   Fiber/Residue Restricted (LOWFIBER) (11-06-20 @ 10:13)      Wt Hx:  no new wt since admission  Weight (kg): 127.006 (11-02-20 @ 15:46)      11-09-20 @ 07:01  -  11-10-20 @ 07:00  --------------------------------------------------------  IN: 1320 mL / OUT: 4800 mL / NET: -3480 mL    11-10-20 @ 07:01  -  11-10-20 @ 13:33  --------------------------------------------------------  IN: 0 mL / OUT: 1200 mL / NET: -1200 mL        Estimated Needs:    Estimated Energy Needs:  · Weight (lbs)	171.9 lb  · Weight (kg)	78 kg  · Enter From (monica/kg)	25 · Enter To (monica/kg)	30  · Calculated From (monica/kg)	1950 · Calculated To (monica/kg)	2340     Estimated Protein Needs:  · Weight (lbs)	134.4 lb  · Weight (kg)	61 kg  · Enter From (g/kg)	1.8 · Enter To (g/kg)	2  · Calculated From (g/kg)	109.8 · Calculated To (g/kg)	122     Estimated Fluid Needs:  · Weight (lbs)	171.9 lb  · Weight (kg)	78 kg  · Enter From (ml/kg)	25 · Enter To (ml/kg)	30  · Calculated From (ml/kg)	1950 · Calculated To (ml/kg)	2340          Pertinent Labs: 11-10    136  |  102  |  6<L>  ----------------------------<  111<H>  4.1   |  27  |  0.59    Ca    8.4<L>      10 Nov 2020 06:53  Phos  3.6     11-10  Mg     2.2     11-10    TPro  x   /  Alb  1.8<L>  /  TBili  x   /  DBili  x   /  AST  x   /  ALT  x   /  AlkPhos  x   11-10      Skin: yao score = 17  no PU documented    Monitoring and Evaluation:   [x] PO intake/Nutr support infusion [ x ] Tolerance to Nutr [ x ] weights [ x ] labs[ x ] follow up per protocol  [ ] other:

## 2020-11-10 NOTE — PHYSICAL THERAPY INITIAL EVALUATION ADULT - PERTINENT HX OF CURRENT PROBLEM, REHAB EVAL
59 y/o Female with PMHx of hemorrhoids, recalls allergy to UFH: diagnosed when she was hospitalized for DVT years back; thinks she had difficulty breathing with UFH admitted on 11/2 for evaluation of shortness of breath, weakness and muscle aches; had loose stools; is poor historian and history per medical record; the patient had imaging upon admission and was found to have bilateral buttock infection with necrotizing fasciitis and went to OR for debridement on 11/2

## 2020-11-10 NOTE — PROGRESS NOTE ADULT - ASSESSMENT
61 y/o F with PMHx of hemorrhoids admitted on 11/2 for evaluation of shortness of breath, weakness and muscle aches; had loose stools; is poor historian and history per medical record; the patient had imaging upon admission and was found to have bilateral buttock infection with necrotizing fasciitis and went to OR for debridement on 11/2. She cannot recall any details about the infection or how she came about having such a severe infection. She denies any allergy to penicillin or cephalosporins.     1. Patient admitted with necrotizing fasciitis of the buttocks; unclear origin; also noted with leukocytosis most likely reactive to infection  - follow up cultures   - iv hydration and supportive care   - wound care per surgery  - will need further debridement  - probably will need diverting colostomy for full wound care and healing---- and this surgery was done on 11/5  - serial cbc and monitor temperature   - reviewed prior medical records to evaluate for resistant or atypical pathogens   - day #8 zosyn  - completed 7 days clindamycin  - tolerating antibiotics without rashes or side effects   - all organisms are sensitive; NO ISOLATION IS NEEDED  - diet per surgery  - possibly to have plastic surgery; will need rehab; will have physical therapy support  Will follow

## 2020-11-11 LAB
ANION GAP SERPL CALC-SCNC: 6 MMOL/L — SIGNIFICANT CHANGE UP (ref 5–17)
BUN SERPL-MCNC: 5 MG/DL — LOW (ref 7–23)
CALCIUM SERPL-MCNC: 8.7 MG/DL — SIGNIFICANT CHANGE UP (ref 8.5–10.1)
CHLORIDE SERPL-SCNC: 103 MMOL/L — SIGNIFICANT CHANGE UP (ref 96–108)
CO2 SERPL-SCNC: 26 MMOL/L — SIGNIFICANT CHANGE UP (ref 22–31)
CREAT SERPL-MCNC: 0.64 MG/DL — SIGNIFICANT CHANGE UP (ref 0.5–1.3)
GLUCOSE SERPL-MCNC: 108 MG/DL — HIGH (ref 70–99)
HCT VFR BLD CALC: 29.4 % — LOW (ref 34.5–45)
HGB BLD-MCNC: 8.8 G/DL — LOW (ref 11.5–15.5)
MAGNESIUM SERPL-MCNC: 2.3 MG/DL — SIGNIFICANT CHANGE UP (ref 1.6–2.6)
MCHC RBC-ENTMCNC: 21 PG — LOW (ref 27–34)
MCHC RBC-ENTMCNC: 29.9 GM/DL — LOW (ref 32–36)
MCV RBC AUTO: 70 FL — LOW (ref 80–100)
PHOSPHATE SERPL-MCNC: 3.5 MG/DL — SIGNIFICANT CHANGE UP (ref 2.5–4.5)
PLATELET # BLD AUTO: 668 K/UL — HIGH (ref 150–400)
POTASSIUM SERPL-MCNC: 4 MMOL/L — SIGNIFICANT CHANGE UP (ref 3.5–5.3)
POTASSIUM SERPL-SCNC: 4 MMOL/L — SIGNIFICANT CHANGE UP (ref 3.5–5.3)
RBC # BLD: 4.2 M/UL — SIGNIFICANT CHANGE UP (ref 3.8–5.2)
RBC # FLD: SIGNIFICANT CHANGE UP (ref 10.3–14.5)
SODIUM SERPL-SCNC: 135 MMOL/L — SIGNIFICANT CHANGE UP (ref 135–145)
WBC # BLD: 16.49 K/UL — HIGH (ref 3.8–10.5)
WBC # FLD AUTO: 16.49 K/UL — HIGH (ref 3.8–10.5)

## 2020-11-11 PROCEDURE — 99231 SBSQ HOSP IP/OBS SF/LOW 25: CPT

## 2020-11-11 PROCEDURE — 99232 SBSQ HOSP IP/OBS MODERATE 35: CPT

## 2020-11-11 RX ADMIN — FONDAPARINUX SODIUM 2.5 MILLIGRAM(S): 2.5 INJECTION, SOLUTION SUBCUTANEOUS at 14:59

## 2020-11-11 RX ADMIN — AMLODIPINE BESYLATE 5 MILLIGRAM(S): 2.5 TABLET ORAL at 10:10

## 2020-11-11 RX ADMIN — PIPERACILLIN AND TAZOBACTAM 25 GRAM(S): 4; .5 INJECTION, POWDER, LYOPHILIZED, FOR SOLUTION INTRAVENOUS at 06:01

## 2020-11-11 RX ADMIN — OXYCODONE HYDROCHLORIDE 10 MILLIGRAM(S): 5 TABLET ORAL at 14:15

## 2020-11-11 RX ADMIN — Medication 1 APPLICATION(S): at 10:11

## 2020-11-11 RX ADMIN — PIPERACILLIN AND TAZOBACTAM 25 GRAM(S): 4; .5 INJECTION, POWDER, LYOPHILIZED, FOR SOLUTION INTRAVENOUS at 21:25

## 2020-11-11 RX ADMIN — DEXTROSE MONOHYDRATE, SODIUM CHLORIDE, AND POTASSIUM CHLORIDE 100 MILLILITER(S): 50; .745; 4.5 INJECTION, SOLUTION INTRAVENOUS at 06:01

## 2020-11-11 RX ADMIN — DEXTROSE MONOHYDRATE, SODIUM CHLORIDE, AND POTASSIUM CHLORIDE 100 MILLILITER(S): 50; .745; 4.5 INJECTION, SOLUTION INTRAVENOUS at 23:06

## 2020-11-11 RX ADMIN — Medication 1 APPLICATION(S): at 22:20

## 2020-11-11 RX ADMIN — OXYCODONE HYDROCHLORIDE 10 MILLIGRAM(S): 5 TABLET ORAL at 20:01

## 2020-11-11 RX ADMIN — PIPERACILLIN AND TAZOBACTAM 25 GRAM(S): 4; .5 INJECTION, POWDER, LYOPHILIZED, FOR SOLUTION INTRAVENOUS at 14:58

## 2020-11-11 RX ADMIN — OXYCODONE HYDROCHLORIDE 10 MILLIGRAM(S): 5 TABLET ORAL at 12:14

## 2020-11-11 NOTE — PROGRESS NOTE ADULT - SUBJECTIVE AND OBJECTIVE BOX
SURGERY DAILY PROGRESS NOTE:     Subjective:  Patient seen and examined at bedside during am rounds. No acute events overnight. Patient evaluated by plastic surgery team. Denies any fevers, chills, n/v/d, chest pain or shortness of breath.  +stool and gas in ostomy bag.      Objective:    MEDICATIONS  (STANDING):  amLODIPine   Tablet 5 milliGRAM(s) Oral daily  Dakins Solution - Full Strength 1 Application(s) Topical two times a day  dextrose 5% + sodium chloride 0.9% with potassium chloride 20 mEq/L 1000 milliLiter(s) (100 mL/Hr) IV Continuous <Continuous>  fondaparinux Injectable 2.5 milliGRAM(s) SubCutaneous daily  piperacillin/tazobactam IVPB.. 3.375 Gram(s) IV Intermittent every 8 hours    MEDICATIONS  (PRN):  ALBUTerol    90 MICROgram(s) HFA Inhaler 2 Puff(s) Inhalation every 6 hours PRN Shortness of Breath and/or Wheezing  ALPRAZolam 0.25 milliGRAM(s) Oral every 8 hours PRN anxiety  HYDROmorphone  Injectable 1 milliGRAM(s) IV Push three times a day PRN Moderate Pain (4 - 6)  oxyCODONE    IR 5 milliGRAM(s) Oral every 4 hours PRN Moderate Pain (4 - 6)  oxyCODONE    IR 10 milliGRAM(s) Oral every 4 hours PRN Severe Pain (7 - 10)      Vital Signs Last 24 Hrs  T(C): 37 (10 Nov 2020 23:36), Max: 37.4 (10 Nov 2020 20:15)  T(F): 98.6 (10 Nov 2020 23:36), Max: 99.3 (10 Nov 2020 20:15)  HR: 86 (10 Nov 2020 23:36) (85 - 96)  BP: 140/71 (10 Nov 2020 23:36) (123/100 - 161/68)  BP(mean): 85 (10 Nov 2020 20:00) (85 - 105)  RR: 20 (10 Nov 2020 20:15) (16 - 23)  SpO2: 97% (10 Nov 2020 23:36) (96% - 99%)      PHYSICAL EXAM   Gen: well-appearing, in no acute distress  CV: pulse regularly present   Resp: airway patent, non-labored breathing  Abd: soft, NTND; no rebound or guarding. Incision c/d/i      I&O's Detail    10 Nov 2020 07:01  -  11 Nov 2020 07:00  --------------------------------------------------------  IN:    dextrose 5% + sodium chloride 0.9% w/ Additives: 2109 mL    IV PiggyBack: 200 mL    Oral Fluid: 120 mL  Total IN: 2429 mL    OUT:    Colostomy (mL): 100 mL    Indwelling Catheter - Urethral (mL): 4800 mL  Total OUT: 4900 mL    Total NET: -2471 mL    LABS:                        8.8    18.03 )-----------( 685      ( 10 Nov 2020 06:53 )             29.2     11-10    136  |  102  |  6<L>  ----------------------------<  111<H>  4.1   |  27  |  0.59    Ca    8.4<L>      10 Nov 2020 06:53  Phos  3.6     11-10  Mg     2.2     11-10    TPro  x   /  Alb  1.8<L>  /  TBili  x   /  DBili  x   /  AST  x   /  ALT  x   /  AlkPhos  x   11-10          RADIOLOGY & ADDITIONAL STUDIES:

## 2020-11-11 NOTE — PROGRESS NOTE ADULT - ASSESSMENT
59 y/o F with PMHx of hemorrhoids admitted on 11/2 for evaluation of shortness of breath, weakness and muscle aches; had loose stools; is poor historian and history per medical record; the patient had imaging upon admission and was found to have bilateral buttock infection with necrotizing fasciitis and went to OR for debridement on 11/2. She cannot recall any details about the infection or how she came about having such a severe infection. She denies any allergy to penicillin or cephalosporins.     1. Patient admitted with necrotizing fasciitis of the buttocks; unclear origin; also noted with leukocytosis most likely reactive to infection  - follow up cultures   - iv hydration and supportive care   - wound care per surgery  - will need further debridement  - probably will need diverting colostomy for full wound care and healing---- and this surgery was done on 11/5  - serial cbc and monitor temperature   - reviewed prior medical records to evaluate for resistant or atypical pathogens   - day #9 zosyn  - completed 7 days clindamycin  - tolerating antibiotics without rashes or side effects   - all organisms are sensitive; NO ISOLATION IS NEEDED  - diet per surgery  - possibly to have plastic surgery; will need rehab; will have physical therapy support  Will follow

## 2020-11-11 NOTE — PROGRESS NOTE ADULT - ASSESSMENT
* Necrotizing fasciitis with perineal abscess s/p debridement and drainage on 11/2 and 11/3 + s/p diverting colostomy and FQ   - continue IV abx zosyn per ID   - IVF    - prn analgesia   - plastics    * h/o VTE with UFH allergy - started on arixtra prophylactic dose    * Anemia due to acute blood loss   - monitor HH - stable    * Elevated BP - amlodipine

## 2020-11-11 NOTE — ADVANCED PRACTICE NURSE CONSULT - ASSESSMENT
HPI:  HPI: This is a 60 year old that was admitted to the hospital for necrotizing fasciitis on 11/2/2020. Patient with diverting colostomy on 11/5/2020. PMH- hemorrhoids presents to the ED c/o diffuse +myalgias and +SOB x2 weeks. Notes associated +weakness and +decreased PO intake as well since her  passed 10/15.  Reports inability to perform ADLs 2/2 symptoms so called EMS today. Non-drinker. Never a smoker. Allergic to cefazolin and sulfa drugs. Denies hx of DM, denies fever, chills, chest pain, n/v. Endorses diarrhea x 2weeks (02 Nov 2020 18:26)  In to continue to educate patient on colostomy. Patient presents sitting in bed. Reviewed the creation and function of the ostomy. Reviewed diet, bathing and supplies.  Patient receptive. Stoma located on the LLQ and is red, moist, viable and nicely protruded. Wafer and pouch intact. Stool and flatus noted.   PAST MEDICAL & SURGICAL HISTORY:  Hemorrhoids  No significant past surgical history  REVIEW OF SYSTEMS  General: reports tenderness to buttock area from surgery  Skin/Breast:  Ophthalmologic:  ENMT:	  Respiratory and Thorax: Denies shortness of breath  Cardiovascular:	Denies chest pain  Gastrointestinal:	  Genitourinary:	  Musculoskeletal:	  Neurological:	  Psychiatric:	  Hematology/Lymphatics:	  Endocrine:	  Allergic/Immunologic:	  MEDICATIONS  (STANDING):  amLODIPine   Tablet 5 milliGRAM(s) Oral daily  Dakins Solution - Full Strength 1 Application(s) Topical two times a day  dextrose 5% + sodium chloride 0.9% with potassium chloride 20 mEq/L 1000 milliLiter(s) (100 mL/Hr) IV Continuous <Continuous>  fondaparinux Injectable 2.5 milliGRAM(s) SubCutaneous daily  piperacillin/tazobactam IVPB.. 3.375 Gram(s) IV Intermittent every 8 hours  Allergies  cefazolin (Other (Moderate))  heparin (Unknown)  sulfa drugs (Unknown)  Intolerances  SOCIAL HISTORY:  FAMILY HISTORY:  Vital Signs Last 24 Hrs  T(C): 37.1 (11 Nov 2020 08:12), Max: 37.4 (10 Nov 2020 20:15)  T(F): 98.7 (11 Nov 2020 08:12), Max: 99.3 (10 Nov 2020 20:15)  HR: 86 (11 Nov 2020 08:12) (85 - 96)  BP: 174/76 (11 Nov 2020 08:12) (123/100 - 174/76)  BP(mean): 85 (10 Nov 2020 20:00) (85 - 105)  RR: 20 (11 Nov 2020 08:12) (17 - 23)  SpO2: 95% (11 Nov 2020 08:12) (95% - 99%)  PHYSICAL EXAM:  Constitutional:  Eyes: conjunctiva and sclera clear  ENMT: Moist mucous membranes  Neck:  Breasts:  Back:  Respiratory: Respirations even and unlabored   Cardiovascular:  Gastrointestinal: Stoma to LLQ with stool  Genitourinary:  Rectal:  Extremities:  Vascular:  Neurological: Alert and oriented x 4  Skin: Rashid to midline abdominal incision  Lymph Nodes:  Musculoskeletal:  Psychiatric:  LABS:                        8.8    16.49 )-----------( 668      ( 11 Nov 2020 07:47 )             29.4

## 2020-11-11 NOTE — PROGRESS NOTE ADULT - SUBJECTIVE AND OBJECTIVE BOX
Date of service: 11-11-20 @ 10:04      Patient lying in bed  Is comfortable, tolerating diet  Afebrie      ROS: no fever or chills; denies dizziness, no HA, no SOB or cough, no abdominal pain, no diarrhea or constipation; no dysuria, no urinary frequency, no legs pain, no rashes    MEDICATIONS  (STANDING):  amLODIPine   Tablet 5 milliGRAM(s) Oral daily  Dakins Solution - Full Strength 1 Application(s) Topical two times a day  dextrose 5% + sodium chloride 0.9% with potassium chloride 20 mEq/L 1000 milliLiter(s) (100 mL/Hr) IV Continuous <Continuous>  fondaparinux Injectable 2.5 milliGRAM(s) SubCutaneous daily  piperacillin/tazobactam IVPB.. 3.375 Gram(s) IV Intermittent every 8 hours    MEDICATIONS  (PRN):  ALBUTerol    90 MICROgram(s) HFA Inhaler 2 Puff(s) Inhalation every 6 hours PRN Shortness of Breath and/or Wheezing  ALPRAZolam 0.25 milliGRAM(s) Oral every 8 hours PRN anxiety  HYDROmorphone  Injectable 1 milliGRAM(s) IV Push three times a day PRN Moderate Pain (4 - 6)  oxyCODONE    IR 5 milliGRAM(s) Oral every 4 hours PRN Moderate Pain (4 - 6)  oxyCODONE    IR 10 milliGRAM(s) Oral every 4 hours PRN Severe Pain (7 - 10)      Vital Signs Last 24 Hrs  T(C): 37.1 (11 Nov 2020 08:12), Max: 37.4 (10 Nov 2020 20:15)  T(F): 98.7 (11 Nov 2020 08:12), Max: 99.3 (10 Nov 2020 20:15)  HR: 86 (11 Nov 2020 08:12) (85 - 96)  BP: 174/76 (11 Nov 2020 08:12) (123/100 - 174/76)  BP(mean): 85 (10 Nov 2020 20:00) (85 - 105)  RR: 20 (11 Nov 2020 08:12) (16 - 23)  SpO2: 95% (11 Nov 2020 08:12) (95% - 99%)        Physical Exam:        Constitutional: frail looking  HEENT: NC/AT, EOMI, PERRLA, conjunctivae clear; ears and nose atraumatic; pharynx clear  Neck: supple; thyroid not palpable  Back: no tenderness  Respiratory: respiratory effort normal; clear to auscultation  Cardiovascular: S1S2 regular, no murmurs  Abdomen: soft, not tender, not distended, positive BS; no liver or spleen organomegaly; ostomy  Genitourinary: no suprapubic tenderness  Musculoskeletal: no muscle tenderness, no joint swelling or tenderness  Neurological/ Psychiatric: AxOx3, judgement and insight normal;  moving all extremities  Skin: dressings on buttocks    Labs: all available labs reviewed                  Labs:               Labs:                        8.8    16.49 )-----------( 668      ( 11 Nov 2020 07:47 )             29.4     11-11    135  |  103  |  5<L>  ----------------------------<  108<H>  4.0   |  26  |  0.64    Ca    8.7      11 Nov 2020 07:47  Phos  3.5     11-11  Mg     2.3     11-11    TPro  x   /  Alb  1.8<L>  /  TBili  x   /  DBili  x   /  AST  x   /  ALT  x   /  AlkPhos  x   11-10           Cultures:       C-Reactive Protein, Serum: 38.74 mg/dL (11-02-20 @ 14:14)            < from: CT Abdomen and Pelvis w/ IV Cont (11.02.20 @ 17:46) >    EXAM:  CT ABDOMEN AND PELVIS IC                          EXAM:  CT CHEST IC                            PROCEDURE DATE:  11/02/2020          INTERPRETATION:  CLINICAL INFORMATION: sepsis    COMPARISON: None.    PROCEDURE:  CT of the Chest, Abdomen and Pelvis was performed with intravenous contrast.  Intravenous contrast: 90 ml Omnipaque 350. 10 ml discarded.  Oral contrast: None.  Sagittal and coronal reformats were performed.    FINDINGS:    CHEST:    LUNGS AND LARGE AIRWAYS: Patent central airways. Azygos fissure. A few less than 5 mm nodules in the left lower lobe.  PLEURA: No pleural effusion.  VESSELS: Within normal limits.  HEART: Heart size is normal.  No pericardial effusion.  MEDIASTINUM AND BLANK: No lymphadenopathy.  CHEST WALL ANDLOWER NECK: Within normal limits.    ABDOMEN AND PELVIS:    LIVER: Within normal limits.  BILE DUCTS: Normal caliber.  GALLBLADDER: Cholelithiasis.  SPLEEN: Within normal limits.  PANCREAS: Within normal limits.  ADRENALS: Within normal limits.  KIDNEYS/URETERS: Hypodense left renal lesion too small to characterize.    BLADDER: Within normal limits.  REPRODUCTIVE ORGANS: Fibroid uterus.    BOWEL: No bowel obstruction. The appendix is normal.  PERITONEUM: No ascites.  VESSELS:  Within normal limits.  RETROPERITONEUM/LYMPH NODES: No lymphadenopathy.  ABDOMINAL WALL: There is within the visualized left ischioanal fossa and around the left gluteal muscles. Air tracks along the deep left pelvis and presacral space into the retroperitoneum. There is air along the left psoas muscle.  BONES: Within normal limits.    IMPRESSION: Extensive subcutaneous gas within the left ischioanal fossa tracking into the pelvis and retroperitoneum worrisome for gas forming infection.      < end of copied text >      Radiology: all available radiological tests reviewed    Advanced directives addressed: full resuscitation

## 2020-11-11 NOTE — PROGRESS NOTE ADULT - SUBJECTIVE AND OBJECTIVE BOX
CC:  Patient is a 60y old  Female who presents with a chief complaint of Necrotizing fasciitis (11 Nov 2020 10:04)    SUBJECTIVE:     -no new complaints or issues at current time.    ROS:  all other review of systems are negative unless indicated above.    ALBUTerol    90 MICROgram(s) HFA Inhaler 2 Puff(s) Inhalation every 6 hours PRN  ALPRAZolam 0.25 milliGRAM(s) Oral every 8 hours PRN  amLODIPine   Tablet 5 milliGRAM(s) Oral daily  Dakins Solution - Full Strength 1 Application(s) Topical two times a day  dextrose 5% + sodium chloride 0.9% with potassium chloride 20 mEq/L 1000 milliLiter(s) IV Continuous <Continuous>  fondaparinux Injectable 2.5 milliGRAM(s) SubCutaneous daily  HYDROmorphone  Injectable 1 milliGRAM(s) IV Push three times a day PRN  oxyCODONE    IR 5 milliGRAM(s) Oral every 4 hours PRN  oxyCODONE    IR 10 milliGRAM(s) Oral every 4 hours PRN  piperacillin/tazobactam IVPB.. 3.375 Gram(s) IV Intermittent every 8 hours    T(C): 37.1 (11-11-20 @ 08:12), Max: 37.4 (11-10-20 @ 20:15)  HR: 86 (11-11-20 @ 08:12) (85 - 95)  BP: 174/76 (11-11-20 @ 08:12) (140/71 - 174/76)  RR: 20 (11-11-20 @ 08:12) (17 - 23)  SpO2: 95% (11-11-20 @ 08:12) (95% - 99%)    Constitutional: NAD.   HEENT: PERRL, EOMI, MMM.  Neck: Soft and supple, No carotid bruit, No JVD  Respiratory: Breath sounds are clear bilaterally, No wheezing, rales or rhonchi  Cardiovascular: S1 and S2, regular rate and rhythm, no murmur, rub or gallop.  Gastrointestinal: Bowel Sounds present, soft, nontender, nondistended, no guarding, no rebound, no mass.  Extremities: No peripheral edema  Vascular: 2+ peripheral pulses  Neurological: A/O x 3, no focal deficits  Musculoskeletal: 5/5 strength b/l upper and lower extremities  Skin:  no visible rashes.                         8.8    16.49 )-----------( 668      ( 11 Nov 2020 07:47 )             29.4       11-11    135  |  103  |  5<L>  ----------------------------<  108<H>  4.0   |  26  |  0.64    Ca    8.7      11 Nov 2020 07:47  Phos  3.5     11-11  Mg     2.3     11-11    TPro  x   /  Alb  1.8<L>  /  TBili  x   /  DBili  x   /  AST  x   /  ALT  x   /  AlkPhos  x   11-10

## 2020-11-11 NOTE — PROGRESS NOTE ADULT - ASSESSMENT
ASSESSMENT/PLAN: 59 y/o F presents with necrotizing fasciitis of perineal area, s/p extensive perineal debridement x 2, and end colostomy creation POD#9/8/5/4. Hospital course complicated with acute anemia    C/W low residue diet  monitor vitals  monitor H/H and leukocytosis  C/w dressing changes BID  C/W IV abx per ID recs, Zosyn, Clinda  Surgical cx results: E.coli, peptostreptococcus, strep species   Appreciated hospitalist recs  Monitor stoma and ostomy output  Plastic surgery consult appreciated; patient will likely need additional debridement prior to closure  Wound care consult  DVT ppx/SCD    Case discussed with Colorectal surgery team

## 2020-11-11 NOTE — CONSULT NOTE ADULT - SUBJECTIVE AND OBJECTIVE BOX
59 yo morbidly obese woman with no significant past medical history, presented to the [facility] emergency department on 11/02/20 and found to have necrotizing fasciitis of the perineal/buttock area.  She was taken to the operating room where she was debrided.  Additionally she underwent diverting colostomy.  Per the patient she has been to the operating room for debridement four times, and she is scheduled for OR debridement 11/13/20.  I have been consulted by the primary service to evaluate for wound care/closure.

## 2020-11-12 DIAGNOSIS — F43.21 ADJUSTMENT DISORDER WITH DEPRESSED MOOD: ICD-10-CM

## 2020-11-12 LAB
ANION GAP SERPL CALC-SCNC: 9 MMOL/L — SIGNIFICANT CHANGE UP (ref 5–17)
BUN SERPL-MCNC: 8 MG/DL — SIGNIFICANT CHANGE UP (ref 7–23)
CALCIUM SERPL-MCNC: 8.7 MG/DL — SIGNIFICANT CHANGE UP (ref 8.5–10.1)
CHLORIDE SERPL-SCNC: 102 MMOL/L — SIGNIFICANT CHANGE UP (ref 96–108)
CO2 SERPL-SCNC: 25 MMOL/L — SIGNIFICANT CHANGE UP (ref 22–31)
CREAT SERPL-MCNC: 0.68 MG/DL — SIGNIFICANT CHANGE UP (ref 0.5–1.3)
GLUCOSE SERPL-MCNC: 105 MG/DL — HIGH (ref 70–99)
HCT VFR BLD CALC: 29.3 % — LOW (ref 34.5–45)
HGB BLD-MCNC: 8.8 G/DL — LOW (ref 11.5–15.5)
MAGNESIUM SERPL-MCNC: 2.4 MG/DL — SIGNIFICANT CHANGE UP (ref 1.6–2.6)
MCHC RBC-ENTMCNC: 21.2 PG — LOW (ref 27–34)
MCHC RBC-ENTMCNC: 30 GM/DL — LOW (ref 32–36)
MCV RBC AUTO: 70.6 FL — LOW (ref 80–100)
PHOSPHATE SERPL-MCNC: 3.8 MG/DL — SIGNIFICANT CHANGE UP (ref 2.5–4.5)
PLATELET # BLD AUTO: 656 K/UL — HIGH (ref 150–400)
POTASSIUM SERPL-MCNC: 4.1 MMOL/L — SIGNIFICANT CHANGE UP (ref 3.5–5.3)
POTASSIUM SERPL-SCNC: 4.1 MMOL/L — SIGNIFICANT CHANGE UP (ref 3.5–5.3)
RBC # BLD: 4.15 M/UL — SIGNIFICANT CHANGE UP (ref 3.8–5.2)
RBC # FLD: SIGNIFICANT CHANGE UP (ref 10.3–14.5)
SODIUM SERPL-SCNC: 136 MMOL/L — SIGNIFICANT CHANGE UP (ref 135–145)
WBC # BLD: 16.72 K/UL — HIGH (ref 3.8–10.5)
WBC # FLD AUTO: 16.72 K/UL — HIGH (ref 3.8–10.5)

## 2020-11-12 PROCEDURE — 99232 SBSQ HOSP IP/OBS MODERATE 35: CPT

## 2020-11-12 PROCEDURE — 99233 SBSQ HOSP IP/OBS HIGH 50: CPT

## 2020-11-12 RX ADMIN — PIPERACILLIN AND TAZOBACTAM 25 GRAM(S): 4; .5 INJECTION, POWDER, LYOPHILIZED, FOR SOLUTION INTRAVENOUS at 05:36

## 2020-11-12 RX ADMIN — PIPERACILLIN AND TAZOBACTAM 25 GRAM(S): 4; .5 INJECTION, POWDER, LYOPHILIZED, FOR SOLUTION INTRAVENOUS at 21:02

## 2020-11-12 RX ADMIN — OXYCODONE HYDROCHLORIDE 10 MILLIGRAM(S): 5 TABLET ORAL at 19:15

## 2020-11-12 RX ADMIN — OXYCODONE HYDROCHLORIDE 10 MILLIGRAM(S): 5 TABLET ORAL at 23:26

## 2020-11-12 RX ADMIN — OXYCODONE HYDROCHLORIDE 10 MILLIGRAM(S): 5 TABLET ORAL at 08:08

## 2020-11-12 RX ADMIN — FONDAPARINUX SODIUM 2.5 MILLIGRAM(S): 2.5 INJECTION, SOLUTION SUBCUTANEOUS at 10:01

## 2020-11-12 RX ADMIN — Medication 1 APPLICATION(S): at 21:02

## 2020-11-12 RX ADMIN — OXYCODONE HYDROCHLORIDE 10 MILLIGRAM(S): 5 TABLET ORAL at 18:46

## 2020-11-12 RX ADMIN — PIPERACILLIN AND TAZOBACTAM 25 GRAM(S): 4; .5 INJECTION, POWDER, LYOPHILIZED, FOR SOLUTION INTRAVENOUS at 13:02

## 2020-11-12 RX ADMIN — OXYCODONE HYDROCHLORIDE 10 MILLIGRAM(S): 5 TABLET ORAL at 00:36

## 2020-11-12 RX ADMIN — OXYCODONE HYDROCHLORIDE 10 MILLIGRAM(S): 5 TABLET ORAL at 08:38

## 2020-11-12 RX ADMIN — DEXTROSE MONOHYDRATE, SODIUM CHLORIDE, AND POTASSIUM CHLORIDE 100 MILLILITER(S): 50; .745; 4.5 INJECTION, SOLUTION INTRAVENOUS at 21:01

## 2020-11-12 RX ADMIN — Medication 1 APPLICATION(S): at 11:37

## 2020-11-12 RX ADMIN — AMLODIPINE BESYLATE 5 MILLIGRAM(S): 2.5 TABLET ORAL at 10:01

## 2020-11-12 NOTE — PROGRESS NOTE ADULT - SUBJECTIVE AND OBJECTIVE BOX
Date of service: 11-12-20 @ 09:44      Patient lying in bed; afebrile, has mild abdominal cramping      ROS unable to obtain secondary to patient medical condition     MEDICATIONS  (STANDING):  amLODIPine   Tablet 5 milliGRAM(s) Oral daily  Dakins Solution - Full Strength 1 Application(s) Topical two times a day  dextrose 5% + sodium chloride 0.9% with potassium chloride 20 mEq/L 1000 milliLiter(s) (100 mL/Hr) IV Continuous <Continuous>  fondaparinux Injectable 2.5 milliGRAM(s) SubCutaneous daily  piperacillin/tazobactam IVPB.. 3.375 Gram(s) IV Intermittent every 8 hours    MEDICATIONS  (PRN):  ALBUTerol    90 MICROgram(s) HFA Inhaler 2 Puff(s) Inhalation every 6 hours PRN Shortness of Breath and/or Wheezing  ALPRAZolam 0.25 milliGRAM(s) Oral every 8 hours PRN anxiety  HYDROmorphone  Injectable 1 milliGRAM(s) IV Push three times a day PRN Moderate Pain (4 - 6)  oxyCODONE    IR 10 milliGRAM(s) Oral every 4 hours PRN Severe Pain (7 - 10)  oxyCODONE    IR 5 milliGRAM(s) Oral every 4 hours PRN Moderate Pain (4 - 6)      Vital Signs Last 24 Hrs  T(C): 36.8 (12 Nov 2020 07:52), Max: 37.2 (11 Nov 2020 15:10)  T(F): 98.3 (12 Nov 2020 07:52), Max: 99 (11 Nov 2020 15:10)  HR: 80 (12 Nov 2020 07:52) (80 - 99)  BP: 149/73 (12 Nov 2020 07:52) (136/88 - 162/95)  BP(mean): --  RR: 18 (12 Nov 2020 07:52) (18 - 19)  SpO2: 95% (12 Nov 2020 07:52) (95% - 97%)        Physical Exam:        Constitutional: frail looking  HEENT: NC/AT, EOMI, PERRLA, conjunctivae clear; ears and nose atraumatic; pharynx clear  Neck: supple; thyroid not palpable  Back: no tenderness  Respiratory: respiratory effort normal; clear to auscultation  Cardiovascular: S1S2 regular, no murmurs  Abdomen: soft, not tender, not distended, positive BS; no liver or spleen organomegaly; ostomy  Genitourinary: no suprapubic tenderness  Musculoskeletal: no muscle tenderness, no joint swelling or tenderness  Neurological/ Psychiatric: AxOx3, judgement and insight normal;  moving all extremities  Skin: dressings on buttocks    Labs: all available labs reviewed         Labs:                        8.8    16.72 )-----------( 656      ( 12 Nov 2020 06:23 )             29.3     11-12    136  |  102  |  8   ----------------------------<  105<H>  4.1   |  25  |  0.68    Ca    8.7      12 Nov 2020 06:23  Phos  3.8     11-12  Mg     2.4     11-12             Cultures:       C-Reactive Protein, Serum: 38.74 mg/dL (11-02-20 @ 14:14)        < from: CT Abdomen and Pelvis w/ IV Cont (11.02.20 @ 17:46) >    EXAM:  CT ABDOMEN AND PELVIS IC                          EXAM:  CT CHEST IC                            PROCEDURE DATE:  11/02/2020          INTERPRETATION:  CLINICAL INFORMATION: sepsis    COMPARISON: None.    PROCEDURE:  CT of the Chest, Abdomen and Pelvis was performed with intravenous contrast.  Intravenous contrast: 90 ml Omnipaque 350. 10 ml discarded.  Oral contrast: None.  Sagittal and coronal reformats were performed.    FINDINGS:    CHEST:    LUNGS AND LARGE AIRWAYS: Patent central airways. Azygos fissure. A few less than 5 mm nodules in the left lower lobe.  PLEURA: No pleural effusion.  VESSELS: Within normal limits.  HEART: Heart size is normal.  No pericardial effusion.  MEDIASTINUM AND BLANK: No lymphadenopathy.  CHEST WALL ANDLOWER NECK: Within normal limits.    ABDOMEN AND PELVIS:    LIVER: Within normal limits.  BILE DUCTS: Normal caliber.  GALLBLADDER: Cholelithiasis.  SPLEEN: Within normal limits.  PANCREAS: Within normal limits.  ADRENALS: Within normal limits.  KIDNEYS/URETERS: Hypodense left renal lesion too small to characterize.    BLADDER: Within normal limits.  REPRODUCTIVE ORGANS: Fibroid uterus.    BOWEL: No bowel obstruction. The appendix is normal.  PERITONEUM: No ascites.  VESSELS:  Within normal limits.  RETROPERITONEUM/LYMPH NODES: No lymphadenopathy.  ABDOMINAL WALL: There is within the visualized left ischioanal fossa and around the left gluteal muscles. Air tracks along the deep left pelvis and presacral space into the retroperitoneum. There is air along the left psoas muscle.  BONES: Within normal limits.    IMPRESSION: Extensive subcutaneous gas within the left ischioanal fossa tracking into the pelvis and retroperitoneum worrisome for gas forming infection.      < end of copied text >      Radiology: all available radiological tests reviewed    Advanced directives addressed: full resuscitation

## 2020-11-12 NOTE — BEHAVIORAL HEALTH ASSESSMENT NOTE - NSBHCHARTREVIEWVS_PSY_A_CORE FT
Vital Signs Last 24 Hrs  T(C): 36.8 (12 Nov 2020 07:52), Max: 37.1 (11 Nov 2020 23:41)  T(F): 98.3 (12 Nov 2020 07:52), Max: 98.8 (11 Nov 2020 23:41)  HR: 80 (12 Nov 2020 07:52) (80 - 87)  BP: 149/73 (12 Nov 2020 07:52) (149/73 - 162/95)  BP(mean): --  RR: 18 (12 Nov 2020 07:52) (18 - 18)  SpO2: 95% (12 Nov 2020 07:52) (95% - 95%)

## 2020-11-12 NOTE — PROGRESS NOTE ADULT - SUBJECTIVE AND OBJECTIVE BOX
SURGERY DAILY PROGRESS NOTE:     Subjective:  Patient seen and examined at bedside during am rounds. Vital signs and AVSS. Patient resting comfortably and denies any fevers, chills, n/v/d, chest pain or shortness of breath.      Objective:    MEDICATIONS  (STANDING):  amLODIPine   Tablet 5 milliGRAM(s) Oral daily  Dakins Solution - Full Strength 1 Application(s) Topical two times a day  dextrose 5% + sodium chloride 0.9% with potassium chloride 20 mEq/L 1000 milliLiter(s) (100 mL/Hr) IV Continuous <Continuous>  fondaparinux Injectable 2.5 milliGRAM(s) SubCutaneous daily  piperacillin/tazobactam IVPB.. 3.375 Gram(s) IV Intermittent every 8 hours    MEDICATIONS  (PRN):  ALBUTerol    90 MICROgram(s) HFA Inhaler 2 Puff(s) Inhalation every 6 hours PRN Shortness of Breath and/or Wheezing  ALPRAZolam 0.25 milliGRAM(s) Oral every 8 hours PRN anxiety  HYDROmorphone  Injectable 1 milliGRAM(s) IV Push three times a day PRN Moderate Pain (4 - 6)  oxyCODONE    IR 10 milliGRAM(s) Oral every 4 hours PRN Severe Pain (7 - 10)  oxyCODONE    IR 5 milliGRAM(s) Oral every 4 hours PRN Moderate Pain (4 - 6)      Vital Signs Last 24 Hrs  T(C): 36.8 (12 Nov 2020 07:52), Max: 37.2 (11 Nov 2020 15:10)  T(F): 98.3 (12 Nov 2020 07:52), Max: 99 (11 Nov 2020 15:10)  HR: 80 (12 Nov 2020 07:52) (80 - 99)  BP: 149/73 (12 Nov 2020 07:52) (136/88 - 174/76)  BP(mean): --  RR: 18 (12 Nov 2020 07:52) (18 - 20)  SpO2: 95% (12 Nov 2020 07:52) (95% - 97%)      PHYSICAL EXAM   Gen: well-appearing, in no acute distress  CV: pulse regularly present   Resp: airway patent, non-labored breathing  Abd: soft, NTND; no rebound or guarding. groin incision c/d/i; ostomy site pink and patient +stool    I&O's Detail    11 Nov 2020 07:01  -  12 Nov 2020 07:00  --------------------------------------------------------  IN:  Total IN: 0 mL    OUT:    Colostomy (mL): 70 mL    Voided (mL): 2250 mL  Total OUT: 2320 mL    Total NET: -2320 mL       LABS:                        8.8    16.72 )-----------( 656      ( 12 Nov 2020 06:23 )             29.3     11-12    136  |  102  |  8   ----------------------------<  105<H>  4.1   |  25  |  0.68    Ca    8.7      12 Nov 2020 06:23  Phos  3.8     11-12  Mg     2.4     11-12

## 2020-11-12 NOTE — BEHAVIORAL HEALTH ASSESSMENT NOTE - HPI (INCLUDE ILLNESS QUALITY, SEVERITY, DURATION, TIMING, CONTEXT, MODIFYING FACTORS, ASSOCIATED SIGNS AND SYMPTOMS)
Patient is a 60 year old  female with no formal psychiatric history, no prior or current suicidal thoughts, no prior inpatient admissions, his consult it for depression in the setting of multiple psychosocial stressors including grief.    Patient presented calm and cooperative, linear thinking, with no thought order. patient presenting with euthymic affect, is appropriately reactive. Patient reports intermittent sad mood/sorrow in the setting of grief after  passed away October 18, 2020. Reports additional emotional stressors due to interpersonal conflict with husbands family in the setting of his estate. Otherwise patient denies persistent depressed mood or hopelessness or suicidal thinking denies manic or psychotic symptoms. Patient reports having social supports, and therapeutic relationships. Patient denies need for psychiatric treatment at this time. Patient remains future oriented.

## 2020-11-12 NOTE — PROGRESS NOTE ADULT - SUBJECTIVE AND OBJECTIVE BOX
CC:  Patient is a 60y old  Female who presents with a chief complaint of Necrotizing fasciitis (12 Nov 2020 09:43)    SUBJECTIVE:     -no new complaints or issues at current time.    ROS:  all other review of systems are negative unless indicated above.    ALBUTerol    90 MICROgram(s) HFA Inhaler 2 Puff(s) Inhalation every 6 hours PRN  ALPRAZolam 0.25 milliGRAM(s) Oral every 8 hours PRN  amLODIPine   Tablet 5 milliGRAM(s) Oral daily  Dakins Solution - Full Strength 1 Application(s) Topical two times a day  dextrose 5% + sodium chloride 0.9% with potassium chloride 20 mEq/L 1000 milliLiter(s) IV Continuous <Continuous>  fondaparinux Injectable 2.5 milliGRAM(s) SubCutaneous daily  HYDROmorphone  Injectable 1 milliGRAM(s) IV Push three times a day PRN  oxyCODONE    IR 5 milliGRAM(s) Oral every 4 hours PRN  oxyCODONE    IR 10 milliGRAM(s) Oral every 4 hours PRN  piperacillin/tazobactam IVPB.. 3.375 Gram(s) IV Intermittent every 8 hours    T(C): 36.8 (11-12-20 @ 07:52), Max: 37.2 (11-11-20 @ 15:10)  HR: 80 (11-12-20 @ 07:52) (80 - 99)  BP: 149/73 (11-12-20 @ 07:52) (136/88 - 162/95)  RR: 18 (11-12-20 @ 07:52) (18 - 19)  SpO2: 95% (11-12-20 @ 07:52) (95% - 97%)    Constitutional: NAD.   HEENT: PERRL, EOMI, MMM.  Neck: Soft and supple, No carotid bruit, No JVD  Respiratory: Breath sounds are clear bilaterally, No wheezing, rales or rhonchi  Cardiovascular: S1 and S2, regular rate and rhythm, no murmur, rub or gallop.  Gastrointestinal: Bowel Sounds present, soft, nontender, nondistended, no guarding, no rebound, no mass.  Extremities: No peripheral edema  Vascular: 2+ peripheral pulses  Neurological: A/O x 3, no focal deficits  Musculoskeletal: 5/5 strength b/l upper and lower extremities  Skin:  no visible rashes.                         8.8    16.72 )-----------( 656      ( 12 Nov 2020 06:23 )             29.3       11-12    136  |  102  |  8   ----------------------------<  105<H>  4.1   |  25  |  0.68    Ca    8.7      12 Nov 2020 06:23  Phos  3.8     11-12  Mg     2.4     11-12

## 2020-11-12 NOTE — PROGRESS NOTE ADULT - ASSESSMENT
59 y/o F with PMHx of hemorrhoids admitted on 11/2 for evaluation of shortness of breath, weakness and muscle aches; had loose stools; is poor historian and history per medical record; the patient had imaging upon admission and was found to have bilateral buttock infection with necrotizing fasciitis and went to OR for debridement on 11/2. She cannot recall any details about the infection or how she came about having such a severe infection. She denies any allergy to penicillin or cephalosporins.     1. Patient admitted with necrotizing fasciitis of the buttocks; unclear origin; also noted with leukocytosis most likely reactive to infection  - follow up cultures   - iv hydration and supportive care   - wound care per surgery  - will need further debridement  - probably will need diverting colostomy for full wound care and healing---- and this surgery was done on 11/5  - serial cbc and monitor temperature   - reviewed prior medical records to evaluate for resistant or atypical pathogens   - day #10 zosyn  - completed 7 days clindamycin  - tolerating antibiotics without rashes or side effects   - all organisms are sensitive; NO ISOLATION IS NEEDED  - diet per surgery  - possibly to have plastic surgery; will need rehab; will have physical therapy support  - planned for further surgery, possible wound vac on 11/13  Will follow

## 2020-11-12 NOTE — BEHAVIORAL HEALTH ASSESSMENT NOTE - SUMMARY
Patient is a 60 year old  female with no formal psychiatric history, no prior or current suicidal thoughts, no prior inpatient admissions, his consult it for depression in the setting of multiple psychosocial stressors including grief.    Patient presented calm and cooperative, linear thinking, with no thought order. patient presenting with euthymic affect, is appropriately reactive. Patient reports intermittent sad mood/sorrow in the setting of grief after  passed away October 18, 2020. Reports additional emotional stressors due to interpersonal conflict with husbands family in the setting of his estate. Otherwise patient denies persistent depressed mood or hopelessness or suicidal thinking denies manic or psychotic symptoms. Patient reports having social supports, and therapeutic relationships. Patient denies need for psychiatric treatment at this time. Patient remains future oriented.    Patient presenting with grief. Patient however does not meet criteria for MDD. Patient has no manic or psychotic symptoms. Patient has no suicidal thinking. Patient is not an imminent risk for harm to self, does not warrant inpatient psychiatric admission. Patient however to benefit from our patients with bereavement counseling.

## 2020-11-12 NOTE — PROGRESS NOTE ADULT - ASSESSMENT
60F.  admitted on 11/02/20.  presented to ED c/o weakness and muscle aches.  imaging in ED revieled b/l buttock infection w/ necrotizing fasciitis.  patient went to OR for debridement on the same day.    necrotizing fasciitis of the buttocks.  - follow cultures.     - further debridement as directed by surgery.  - 11/05 diverting colostomy performed for full wound care healing.  - Zosyn (d10).  - completed clindamycin (d7).  - plastic surgery for wound VAC on 11/13.  - pain management.  - DVT prophylaxis.    leukocytosis.  - reactive due to debridements and infection.    anemia.  - HH stable.  - Fe studies.  - B12/folic acid.  - consider starting Fe + vitamin C pending above.    hx VTE w/ UFH allerty.  - fondaparinux.    hx HTN.  - amlodipine.    DVT prophylaxis.  - fondaparinux.    disposition.  - [facility], 2S.    communication.  - RN. 60F.  admitted on 11/02/20.  presented to ED c/o weakness and muscle aches.  imaging in ED revieled b/l buttock infection w/ necrotizing fasciitis.  patient went to OR for debridement on the same day.    necrotizing fasciitis of the buttocks.  - follow cultures.     - further debridement as directed by surgery.  - 11/05 diverting colostomy performed for full wound care healing.  - Zosyn (d10).  - completed clindamycin (d7).  - plastic surgery for wound VAC on 11/13.  - pain management.  - DVT prophylaxis.    leukocytosis.  - reactive due to debridements and infection.    microcytic anemia.  - HH stable.  - Fe studies.  - B12/folic acid.  - consider starting Fe + vitamin C pending above.    hx VTE w/ UFH allerty.  - fondaparinux.    hx HTN.  - amlodipine.    DVT prophylaxis.  - fondaparinux.    disposition.  - [facility], 2S.    communication.  - RN.

## 2020-11-12 NOTE — CHART NOTE - NSCHARTNOTEFT_GEN_A_CORE
Attempted to see patient, however she was being bathed at the time. Will reattempt at a different time.

## 2020-11-12 NOTE — PROGRESS NOTE ADULT - ASSESSMENT
ASSESSMENT/PLAN:61 y/o F presents with necrotizing fasciitis of perineal area, s/p extensive perineal debridement x 2, and end colostomy creation POD#10/9/6/5. Hospital course complicated with acute anemia    C/W low residue diet  monitor vitals  Leukocytosis Trending down slowly; H/H stable  C/w dressing changes BID  C/W IV abx per ID recs, Zosyn, Clinda  Surgical cx results: E.coli, peptostreptococcus, strep species   C/W Monitor stoma and ostomy output  Appreciated hospitalist recs  Plastic surgery consult appreciated; patient will likely need additional debridement prior to closure  Plan for additional debridment in OR in the AM tomorrow  DVT ppx/SCD    Case discussed with Colorectal surgery team

## 2020-11-13 LAB
ALBUMIN SERPL ELPH-MCNC: 1.9 G/DL — LOW (ref 3.3–5)
ALP SERPL-CCNC: 104 U/L — SIGNIFICANT CHANGE UP (ref 40–120)
ALT FLD-CCNC: 16 U/L — SIGNIFICANT CHANGE UP (ref 12–78)
ANION GAP SERPL CALC-SCNC: 8 MMOL/L — SIGNIFICANT CHANGE UP (ref 5–17)
APTT BLD: 27.1 SEC — LOW (ref 27.5–35.5)
AST SERPL-CCNC: 17 U/L — SIGNIFICANT CHANGE UP (ref 15–37)
BILIRUB SERPL-MCNC: 0.3 MG/DL — SIGNIFICANT CHANGE UP (ref 0.2–1.2)
BUN SERPL-MCNC: 9 MG/DL — SIGNIFICANT CHANGE UP (ref 7–23)
CALCIUM SERPL-MCNC: 8.7 MG/DL — SIGNIFICANT CHANGE UP (ref 8.5–10.1)
CHLORIDE SERPL-SCNC: 105 MMOL/L — SIGNIFICANT CHANGE UP (ref 96–108)
CO2 SERPL-SCNC: 25 MMOL/L — SIGNIFICANT CHANGE UP (ref 22–31)
CREAT SERPL-MCNC: 0.68 MG/DL — SIGNIFICANT CHANGE UP (ref 0.5–1.3)
FERRITIN SERPL-MCNC: 57 NG/ML — SIGNIFICANT CHANGE UP (ref 15–150)
FOLATE SERPL-MCNC: 6.9 NG/ML — SIGNIFICANT CHANGE UP
GLUCOSE SERPL-MCNC: 101 MG/DL — HIGH (ref 70–99)
HCT VFR BLD CALC: 29.3 % — LOW (ref 34.5–45)
HGB BLD-MCNC: 8.4 G/DL — LOW (ref 11.5–15.5)
INR BLD: 1.09 RATIO — SIGNIFICANT CHANGE UP (ref 0.88–1.16)
IRON SATN MFR SERPL: 20 UG/DL — LOW (ref 30–160)
IRON SATN MFR SERPL: 7 % — LOW (ref 14–50)
MAGNESIUM SERPL-MCNC: 2.5 MG/DL — SIGNIFICANT CHANGE UP (ref 1.6–2.6)
MCHC RBC-ENTMCNC: 20.5 PG — LOW (ref 27–34)
MCHC RBC-ENTMCNC: 28.7 GM/DL — LOW (ref 32–36)
MCV RBC AUTO: 71.6 FL — LOW (ref 80–100)
PHOSPHATE SERPL-MCNC: 3.6 MG/DL — SIGNIFICANT CHANGE UP (ref 2.5–4.5)
PLATELET # BLD AUTO: 604 K/UL — HIGH (ref 150–400)
POTASSIUM SERPL-MCNC: 4 MMOL/L — SIGNIFICANT CHANGE UP (ref 3.5–5.3)
POTASSIUM SERPL-SCNC: 4 MMOL/L — SIGNIFICANT CHANGE UP (ref 3.5–5.3)
PROT SERPL-MCNC: 7 GM/DL — SIGNIFICANT CHANGE UP (ref 6–8.3)
PROTHROM AB SERPL-ACNC: 12.7 SEC — SIGNIFICANT CHANGE UP (ref 10.6–13.6)
RBC # BLD: 4.09 M/UL — SIGNIFICANT CHANGE UP (ref 3.8–5.2)
RBC # FLD: SIGNIFICANT CHANGE UP (ref 10.3–14.5)
SODIUM SERPL-SCNC: 138 MMOL/L — SIGNIFICANT CHANGE UP (ref 135–145)
TIBC SERPL-MCNC: 304 UG/DL — SIGNIFICANT CHANGE UP (ref 220–430)
UIBC SERPL-MCNC: 284 UG/DL — SIGNIFICANT CHANGE UP (ref 110–370)
VIT B12 SERPL-MCNC: 603 PG/ML — SIGNIFICANT CHANGE UP (ref 232–1245)
WBC # BLD: 15.93 K/UL — HIGH (ref 3.8–10.5)
WBC # FLD AUTO: 15.93 K/UL — HIGH (ref 3.8–10.5)

## 2020-11-13 PROCEDURE — 86077 PHYS BLOOD BANK SERV XMATCH: CPT

## 2020-11-13 PROCEDURE — 99232 SBSQ HOSP IP/OBS MODERATE 35: CPT

## 2020-11-13 PROCEDURE — 99254 IP/OBS CNSLTJ NEW/EST MOD 60: CPT

## 2020-11-13 RX ORDER — OXYCODONE HYDROCHLORIDE 5 MG/1
10 TABLET ORAL ONCE
Refills: 0 | Status: DISCONTINUED | OUTPATIENT
Start: 2020-11-13 | End: 2020-11-14

## 2020-11-13 RX ORDER — FENTANYL CITRATE 50 UG/ML
50 INJECTION INTRAVENOUS
Refills: 0 | Status: DISCONTINUED | OUTPATIENT
Start: 2020-11-13 | End: 2020-11-13

## 2020-11-13 RX ORDER — OXYCODONE HYDROCHLORIDE 5 MG/1
10 TABLET ORAL ONCE
Refills: 0 | Status: DISCONTINUED | OUTPATIENT
Start: 2020-11-13 | End: 2020-11-13

## 2020-11-13 RX ORDER — OXYCODONE HYDROCHLORIDE 5 MG/1
5 TABLET ORAL ONCE
Refills: 0 | Status: DISCONTINUED | OUTPATIENT
Start: 2020-11-13 | End: 2020-11-13

## 2020-11-13 RX ORDER — HYDROMORPHONE HYDROCHLORIDE 2 MG/ML
0.5 INJECTION INTRAMUSCULAR; INTRAVENOUS; SUBCUTANEOUS
Refills: 0 | Status: DISCONTINUED | OUTPATIENT
Start: 2020-11-13 | End: 2020-11-13

## 2020-11-13 RX ORDER — OXYCODONE HYDROCHLORIDE 5 MG/1
5 TABLET ORAL ONCE
Refills: 0 | Status: DISCONTINUED | OUTPATIENT
Start: 2020-11-13 | End: 2020-11-14

## 2020-11-13 RX ORDER — SODIUM HYPOCHLORITE 0.125 %
1 SOLUTION, NON-ORAL MISCELLANEOUS
Refills: 0 | Status: DISCONTINUED | OUTPATIENT
Start: 2020-11-13 | End: 2020-11-18

## 2020-11-13 RX ORDER — SODIUM CHLORIDE 9 MG/ML
1000 INJECTION, SOLUTION INTRAVENOUS
Refills: 0 | Status: DISCONTINUED | OUTPATIENT
Start: 2020-11-13 | End: 2020-11-13

## 2020-11-13 RX ORDER — FLUCONAZOLE 150 MG/1
150 TABLET ORAL ONCE
Refills: 0 | Status: COMPLETED | OUTPATIENT
Start: 2020-11-13 | End: 2020-11-13

## 2020-11-13 RX ORDER — ACETAMINOPHEN 500 MG
650 TABLET ORAL EVERY 6 HOURS
Refills: 0 | Status: DISCONTINUED | OUTPATIENT
Start: 2020-11-13 | End: 2020-11-18

## 2020-11-13 RX ADMIN — OXYCODONE HYDROCHLORIDE 10 MILLIGRAM(S): 5 TABLET ORAL at 18:42

## 2020-11-13 RX ADMIN — FENTANYL CITRATE 50 MICROGRAM(S): 50 INJECTION INTRAVENOUS at 15:15

## 2020-11-13 RX ADMIN — DEXTROSE MONOHYDRATE, SODIUM CHLORIDE, AND POTASSIUM CHLORIDE 100 MILLILITER(S): 50; .745; 4.5 INJECTION, SOLUTION INTRAVENOUS at 05:47

## 2020-11-13 RX ADMIN — FENTANYL CITRATE 50 MICROGRAM(S): 50 INJECTION INTRAVENOUS at 14:54

## 2020-11-13 RX ADMIN — FLUCONAZOLE 150 MILLIGRAM(S): 150 TABLET ORAL at 21:43

## 2020-11-13 RX ADMIN — PIPERACILLIN AND TAZOBACTAM 25 GRAM(S): 4; .5 INJECTION, POWDER, LYOPHILIZED, FOR SOLUTION INTRAVENOUS at 21:43

## 2020-11-13 RX ADMIN — PIPERACILLIN AND TAZOBACTAM 25 GRAM(S): 4; .5 INJECTION, POWDER, LYOPHILIZED, FOR SOLUTION INTRAVENOUS at 05:47

## 2020-11-13 RX ADMIN — FENTANYL CITRATE 50 MICROGRAM(S): 50 INJECTION INTRAVENOUS at 15:33

## 2020-11-13 RX ADMIN — PIPERACILLIN AND TAZOBACTAM 25 GRAM(S): 4; .5 INJECTION, POWDER, LYOPHILIZED, FOR SOLUTION INTRAVENOUS at 15:30

## 2020-11-13 RX ADMIN — OXYCODONE HYDROCHLORIDE 10 MILLIGRAM(S): 5 TABLET ORAL at 00:30

## 2020-11-13 RX ADMIN — Medication 0.25 MILLIGRAM(S): at 02:31

## 2020-11-13 RX ADMIN — FENTANYL CITRATE 50 MICROGRAM(S): 50 INJECTION INTRAVENOUS at 15:14

## 2020-11-13 RX ADMIN — DEXTROSE MONOHYDRATE, SODIUM CHLORIDE, AND POTASSIUM CHLORIDE 100 MILLILITER(S): 50; .745; 4.5 INJECTION, SOLUTION INTRAVENOUS at 21:43

## 2020-11-13 NOTE — CONSULT NOTE ADULT - ASSESSMENT
61 y/o F with PMHx of hemorrhoids admitted on 11/2 for evaluation of shortness of breath, weakness and muscle aches; had loose stools; is poor historian and history per medical record; the patient had imaging upon admission and was found to have bilateral buttock infection with necrotizing fasciitis and went to OR for debridement on 11/2. She cannot recall any details about the infection or how she came about having such a severe infection. She denies any allergy to penicillin or cephalosporins.     1. Patient admitted with necrotizing fasciitis of the buttocks; unclear origin; also noted with leukocytosis most likely reactive to infection  - follow up cultures   - iv hydration and supportive care   - wound care per surgery  - will need further debridement  - probably will need diverting colostomy for full wound care and healing  - serial cbc and monitor temperature   - reviewed prior medical records to evaluate for resistant or atypical pathogens   - will optimize antibiotics to zosyn to cover anaerobes, gram negative rods and Pseudomonas  - continue clindamycin for toxin production of bacteria  - will stop vancomycin given potential nephrotoxicity with zosyn; should MRSA grow in culture will reconsider  Will follow
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
59 y/o woman  no significant PMHx admitted with necrotizing fascitis of buttock, s/p surgical debridement x 2  s/p diverting colostomy, on antibiotics.  She has microcytic anemia and elevated platelets, leukocytosis / neutrophilia.  Leukocytosis- due to infection- improving.  Elevated platelets likely reactive in setting of infection/ inflammation/ necrotizing fascitis. Underlying primary myeloproliferative disorder is much less likley. Mild iron deficiency also can contribute to elevated plts. Monitor trend.    Anemia- microcytic. Component of anemia of chronic disease in setting of inflammation. Component of iron deficiency ( low iron sat, ferritin also should be much higher in setting of inflammation.) Will benefit form iron supplementation. Would hold off with IV iron for now due to active infection but lower dose oral for now should be ok She is afraid of constipation from iron- can start FeSO4 with vit C daily, increase to twice daily if tolerating.  Reason for iron deficiency : years of menstrual blood loss ( still has  regular menses at 60 !) but need to rule out GI blood loss- will need GI w/up after  fascitis treated.  She had mild microcytic anemia few years ago as well ( she was not aware of that).  She also might have thal trait contributing to microcytosis.  Hypoalbuminemia- ? due to poor nutrition  for last few weeks-  unusual. For completeness will obtain immunoelectrophoresis.    D/w patient.   Thank you. Will follow up on Monday . Dr Frankel covering weekend if needed.
61 yo morbidly obese woman s/p debridement of perineal/buttock necrotizing fasciitis, now with resultant wound.  I have requested that the primary team obtain photographs of the wound in the operating room on 11/13/20.    Given the patient's morbid obesity, location of the wound, I recommend wound care consisting of negative pressure wound therapy if possible or local wound care.  This will allow the wound to contract and potentially be a candidate for reconstruction at a later date.      An adjunct treatment that would help keep this difficult area clean wound be whirlpool treatment in a rehabilitation facility or on an outpatient basis.    I have communicated these recommendations to Dr. Adolfo Gordillo, consulting physician.

## 2020-11-13 NOTE — PROGRESS NOTE ADULT - SUBJECTIVE AND OBJECTIVE BOX
CC:  Patient is a 60y old  Female who presents with a chief complaint of Necrotizing fasciitis (12 Nov 2020 09:43)    SUBJECTIVE:   Pt is very pleasant, has no current c/o and no new o/n events.  planned for OR today.       ALBUTerol    90 MICROgram(s) HFA Inhaler 2 Puff(s) Inhalation every 6 hours PRN  ALPRAZolam 0.25 milliGRAM(s) Oral every 8 hours PRN  amLODIPine   Tablet 5 milliGRAM(s) Oral daily  Dakins Solution - Full Strength 1 Application(s) Topical two times a day  dextrose 5% + sodium chloride 0.9% with potassium chloride 20 mEq/L 1000 milliLiter(s) IV Continuous <Continuous>  fondaparinux Injectable 2.5 milliGRAM(s) SubCutaneous daily  HYDROmorphone  Injectable 1 milliGRAM(s) IV Push three times a day PRN  oxyCODONE    IR 5 milliGRAM(s) Oral every 4 hours PRN  oxyCODONE    IR 10 milliGRAM(s) Oral every 4 hours PRN  piperacillin/tazobactam IVPB.. 3.375 Gram(s) IV Intermittent every 8 hours    Vital Signs Last 24 Hrs  T(C): 36.9 (13 Nov 2020 07:56), Max: 37.4 (12 Nov 2020 15:35)  T(F): 98.4 (13 Nov 2020 07:56), Max: 99.4 (12 Nov 2020 15:35)  HR: 82 (13 Nov 2020 07:56) (82 - 90)  BP: 123/59 (13 Nov 2020 07:56) (123/59 - 144/64)  BP(mean): --  RR: 18 (13 Nov 2020 07:56) (18 - 18)  SpO2: 99% (13 Nov 2020 07:56) (96% - 99%)    Constitutional: NAD, morbidly obese   HEENT: PERRL, EOMI, MMM.  Neck: Soft and supple, No carotid bruit, No JVD  Respiratory: Breath sounds are clear bilaterally, No wheezing, rales or rhonchi, resp unlabored  Cardiovascular: S1 and S2, regular rate and rhythm, no murmur, rub or gallop.  Gastrointestinal: Bowel Sounds present, soft, nontender, nondistended, no guarding, +ostomy   Extremities: No peripheral edema  Vascular: 2+ peripheral pulses  Neurological: A/O x 3, no focal deficits, sensory and cn2-12 intact  Skin:  no visible rashes.                       Labs:  Labs personally reviewed.                          8.8    16.72 )-----------( 656      ( 12 Nov 2020 06:23 )             29.3       11-13    138  |  105  |  9   ----------------------------<  101<H>  4.0   |  25  |  0.68    Ca    8.7      13 Nov 2020 07:54  Phos  3.6     11-13  Mg     2.5     11-13    TPro  7.0  /  Alb  1.9<L>  /  TBili  0.3  /  DBili  x   /  AST  17  /  ALT  16  /  AlkPhos  104  11-13  PT/INR - ( 13 Nov 2020 07:54 )   PT: 12.7 sec;   INR: 1.09 ratio         PTT - ( 13 Nov 2020 07:54 )  PTT:27.1 sec    Culture Results:   Moderate Escherichia coli  Numerous Peptostreptococcus anaerobius "Susceptibilities not performed"  Rare Alpha hemolytic strep "Susceptibilities not performed" (11-03 @ 19:37)  Culture Results:   Moderate Escherichia coli  Few Enterococcus faecalis  Rare Staphylococcus aureus  Few Streptococcus mitis/oralis group "Susceptibilities not performed"  Numerous Peptostreptococcus anaerobius "Susceptibilities not performed" (11-02 @ 19:33)  Culture Results:   Moderate Escherichia coli See previous culture  # [id number]    Rare Staphylococcus aureus  Rare Streptococcus mitis/oralis group "Susceptibilities not performed"  Moderate Peptostreptococcus anaerobius "Susceptibilities not performed" (11-02 @ 19:33)  Culture Results:   Moderate Escherichia coli  Rare Staphylococcus aureus  Few Alpha hemolytic strep "Susceptibilities not performed"  Numerous Peptostreptococcus anaerobius "Susceptibilities not performed" (11-02 @ 19:33)  Culture Results:   No growth (11-02 @ 16:36)  Culture Results:   No Growth Final (11-02 @ 14:14)  Culture Results:   No Growth Final (11-02 @ 13:57)

## 2020-11-13 NOTE — CONSULT NOTE ADULT - SUBJECTIVE AND OBJECTIVE BOX
60 y/po woman  with no significant PMHx  presented to  ED 11/2/20  with c/o malaise , not feeling well, found to have necrotizing fasciitis of left gluteal region and signs of sepsis. S/p iv antibiotics, s/p surgical debridement  on 11/2 and needed to be taken back to OR for drainage on 11/13.      Consult was requested to evaluate anemia.     On admission Hgb 6.3 MCV 71 , transfused, now stable ~ 8.5 .  reviewed old labs : in 2016  Hgb was 10  MCV was 69.     PAST MEDICAL & SURGICAL HISTORY:  Hemorrhoids    No significant past surgical history      Social History:  no etoh, no tobacco      FAMILY HISTORY:    ROS :       MEDICATIONS  (STANDING):  amLODIPine   Tablet 5 milliGRAM(s) Oral daily  Dakins Solution - Full Strength 1 Application(s) Topical two times a day  dextrose 5% + sodium chloride 0.9% with potassium chloride 20 mEq/L 1000 milliLiter(s) (100 mL/Hr) IV Continuous <Continuous>  fluconAZOLE   Tablet 150 milliGRAM(s) Oral once  fondaparinux Injectable 2.5 milliGRAM(s) SubCutaneous daily  piperacillin/tazobactam IVPB.. 3.375 Gram(s) IV Intermittent every 8 hours    MEDICATIONS  (PRN):  ALBUTerol    90 MICROgram(s) HFA Inhaler 2 Puff(s) Inhalation every 6 hours PRN Shortness of Breath and/or Wheezing  ALPRAZolam 0.25 milliGRAM(s) Oral every 8 hours PRN anxiety    Vital Signs Last 24 Hrs  T(C): 36.8 (13 Nov 2020 15:45), Max: 36.9 (13 Nov 2020 07:56)  T(F): 98.2 (13 Nov 2020 15:45), Max: 98.4 (13 Nov 2020 07:56)  HR: 86 (13 Nov 2020 15:45) (82 - 87)  BP: 142/69 (13 Nov 2020 15:45) (123/59 - 163/61)  BP(mean): --  RR: 16 (13 Nov 2020 15:45) (16 - 19)  SpO2: 98% (13 Nov 2020 15:45) (96% - 99%)              labs :                         8.4    15.93 )-----------( 604      ( 13 Nov 2020 07:54 )             29.3   MCV 71       Ferritin 57  iron sat 7 %   Alb 1.9  B12/ folate normal  creat normal   bilin normal   prealb 17     60 y/po woman  with no significant PMHx  presented to  ED 11/2/20  with c/o malaise , not feeling well, found to have necrotizing fasciitis of left gluteal region and signs of sepsis. S/p iv antibiotics, s/p surgical debridement  on 11/2   s/p diverting colostomy 11/5 , second debridement 11/13.      Consult was requested to evaluate anemia  and elevated platelets.     On admission Hgb 6.3 MCV 71 , transfused, now stable ~ 8.5 .  reviewed old labs : in 2016  Hgb was 10  MCV was 69.     PAST MEDICAL & SURGICAL HISTORY:  Hemorrhoids    No significant past surgical history      Social History:  no etoh, no tobacco      FAMILY HISTORY:  family in [facility], hx of thalassemia in family     ROS : PTA she had intermittent constipation. She lost her  10/15/ 20 and she was grieving and  eating poorly for past few weeks.   She still has regular menses       MEDICATIONS  (STANDING):  amLODIPine   Tablet 5 milliGRAM(s) Oral daily  Dakins Solution - Full Strength 1 Application(s) Topical two times a day  dextrose 5% + sodium chloride 0.9% with potassium chloride 20 mEq/L 1000 milliLiter(s) (100 mL/Hr) IV Continuous <Continuous>  fluconAZOLE   Tablet 150 milliGRAM(s) Oral once  fondaparinux Injectable 2.5 milliGRAM(s) SubCutaneous daily  piperacillin/tazobactam IVPB.. 3.375 Gram(s) IV Intermittent every 8 hours    MEDICATIONS  (PRN):  ALBUTerol    90 MICROgram(s) HFA Inhaler 2 Puff(s) Inhalation every 6 hours PRN Shortness of Breath and/or Wheezing  ALPRAZolam 0.25 milliGRAM(s) Oral every 8 hours PRN anxiety    Vital Signs Last 24 Hrs  T(C): 36.8 (13 Nov 2020 15:45), Max: 36.9 (13 Nov 2020 07:56)  T(F): 98.2 (13 Nov 2020 15:45), Max: 98.4 (13 Nov 2020 07:56)  HR: 86 (13 Nov 2020 15:45) (82 - 87)  BP: 142/69 (13 Nov 2020 15:45) (123/59 - 163/61)  BP(mean): --  RR: 16 (13 Nov 2020 15:45) (16 - 19)  SpO2: 98% (13 Nov 2020 15:45) (96% - 99%)    On exam pleasant obese female NAD. Pale. Not icteric . No LEE Lungs clear Hear RRR Abd soft , diverting colostomy with liquid stool. Buttock wound- with wound vac in place. Extrem-  trace ankle edema. Neuro : non focal Psych  normal     labs :                         8.4    15.93 )-----------( 604      ( 13 Nov 2020 07:54 )             29.3   MCV 71     Diff neutrophilia   CRP 38 elevated       Ferritin 57  iron sat 7 %   Alb 1.9  B12/ folate normal  creat normal   bilin normal   prealb 17      CT chest abd pelvis- revealed necrotizing fascitis , no suspicious adenopathy or masses. no Hepatosplenomegaly.

## 2020-11-13 NOTE — CONSULT NOTE ADULT - REASON FOR ADMISSION
Necrotizing fasciitis

## 2020-11-13 NOTE — PROGRESS NOTE ADULT - ASSESSMENT
ASSESSMENT/PLAN:61 y/o F presents with necrotizing fasciitis of perineal area, s/p extensive perineal debridement x 2, and end colostomy creation POD#15/14/12/10. Hospital course complicated with acute anemia    NPO for OR today (further debridement and wound vac placement)  monitor vitals  Leukocytosis Trending down slowly; H/H stable  C/W IV abx per ID recs, Zosyn, Clinda  Surgical cx results: E.coli, peptostreptococcus, strep species   C/W Monitor stoma and ostomy output  Appreciated hospitalist recs  Plastic surgery consult appreciated; patient will likely need additional debridement prior to closure  DVT ppx/SCD    Case discussed with Colorectal surgery team

## 2020-11-13 NOTE — PROGRESS NOTE ADULT - ASSESSMENT
60F.  admitted on 11/02/20.  presented to ED c/o weakness and muscle aches.  imaging in ED revieled b/l buttock infection w/ necrotizing fasciitis.  patient went to OR for debridement on the same day.    --leukocytosis.  pt afebrile, no other e/o sepsis  possibly reactive  given thrombocytosis, anemia- would recommend further work up and hem/onc consult for input   monitor labs.     microcytic anemia.  - HH stable, continue to trend and transfuse as needed.   - Fe studies, B12/folate levels- pending results  - consider starting Fe + vitamin C pending above.    necrotizing fasciitis of the buttocks.  - follow cultures.     - further debridement as directed by surgery today   - 11/05 diverting colostomy performed for full wound care healing.  - Zosyn (d10).  - completed clindamycin (d7).  - plastic surgery for wound VAC on 11/13.  - pain management.    hx VTE w/ UFH allerty.  - fondaparinux.    hx HTN.  - amlodipine.    DVT prophylaxis.  - fondaparinux.

## 2020-11-13 NOTE — PROGRESS NOTE ADULT - SUBJECTIVE AND OBJECTIVE BOX
Subjective:  Patient seen and examined at bedside during am rounds. Vital signs and AVSS. Patient resting comfortably and denies any fevers, chills, n/v/d, chest pain or shortness of breath.      Objective:    MEDICATIONS  (STANDING):  amLODIPine   Tablet 5 milliGRAM(s) Oral daily  Dakins Solution - Full Strength 1 Application(s) Topical two times a day  dextrose 5% + sodium chloride 0.9% with potassium chloride 20 mEq/L 1000 milliLiter(s) (100 mL/Hr) IV Continuous <Continuous>  fondaparinux Injectable 2.5 milliGRAM(s) SubCutaneous daily  piperacillin/tazobactam IVPB.. 3.375 Gram(s) IV Intermittent every 8 hours    MEDICATIONS  (PRN):  ALBUTerol    90 MICROgram(s) HFA Inhaler 2 Puff(s) Inhalation every 6 hours PRN Shortness of Breath and/or Wheezing  ALPRAZolam 0.25 milliGRAM(s) Oral every 8 hours PRN anxiety  HYDROmorphone  Injectable 1 milliGRAM(s) IV Push three times a day PRN Moderate Pain (4 - 6)  oxyCODONE    IR 10 milliGRAM(s) Oral every 4 hours PRN Severe Pain (7 - 10)  oxyCODONE    IR 5 milliGRAM(s) Oral every 4 hours PRN Moderate Pain (4 - 6)      Vital Signs Last 24 Hrs  T(C): 36.9 (13 Nov 2020 07:56), Max: 37.4 (12 Nov 2020 15:35)  T(F): 98.4 (13 Nov 2020 07:56), Max: 99.4 (12 Nov 2020 15:35)  HR: 82 (13 Nov 2020 07:56) (82 - 90)  BP: 123/59 (13 Nov 2020 07:56) (123/59 - 144/64)  BP(mean): --  RR: 18 (13 Nov 2020 07:56) (18 - 18)  SpO2: 99% (13 Nov 2020 07:56) (96% - 99%)    PHYSICAL EXAM   Gen: well-appearing, in no acute distress  CV: pulse regularly present   Resp: airway patent, non-labored breathing  Abd: soft, NTND; no rebound or guarding. groin incision c/d/i; ostomy site pink and patient +stool       LABS:                         8.8<L>                136  | 25   | 8            16.72<H> >-----------< 656<H>   ------------------------< 105<H>                 29.3<L>                4.1  | 102  | 0.68                                                                      Ca 8.7   Mg 2.4   Ph 3.8

## 2020-11-14 LAB
HCT VFR BLD CALC: 29.1 % — LOW (ref 34.5–45)
HGB BLD-MCNC: 8.7 G/DL — LOW (ref 11.5–15.5)
MAGNESIUM SERPL-MCNC: 2.3 MG/DL — SIGNIFICANT CHANGE UP (ref 1.6–2.6)
MCHC RBC-ENTMCNC: 21.6 PG — LOW (ref 27–34)
MCHC RBC-ENTMCNC: 29.9 GM/DL — LOW (ref 32–36)
MCV RBC AUTO: 72.2 FL — LOW (ref 80–100)
PHOSPHATE SERPL-MCNC: 3 MG/DL — SIGNIFICANT CHANGE UP (ref 2.5–4.5)
PLATELET # BLD AUTO: 643 K/UL — HIGH (ref 150–400)
RBC # BLD: 4.03 M/UL — SIGNIFICANT CHANGE UP (ref 3.8–5.2)
RBC # FLD: SIGNIFICANT CHANGE UP (ref 10.3–14.5)
WBC # BLD: 19.38 K/UL — HIGH (ref 3.8–10.5)
WBC # FLD AUTO: 19.38 K/UL — HIGH (ref 3.8–10.5)

## 2020-11-14 PROCEDURE — 83020 HEMOGLOBIN ELECTROPHORESIS: CPT | Mod: 26

## 2020-11-14 PROCEDURE — 99232 SBSQ HOSP IP/OBS MODERATE 35: CPT

## 2020-11-14 RX ORDER — OXYCODONE HYDROCHLORIDE 5 MG/1
10 TABLET ORAL EVERY 6 HOURS
Refills: 0 | Status: DISCONTINUED | OUTPATIENT
Start: 2020-11-14 | End: 2020-11-18

## 2020-11-14 RX ORDER — OXYCODONE HYDROCHLORIDE 5 MG/1
5 TABLET ORAL EVERY 6 HOURS
Refills: 0 | Status: DISCONTINUED | OUTPATIENT
Start: 2020-11-14 | End: 2020-11-18

## 2020-11-14 RX ORDER — OXYCODONE HYDROCHLORIDE 5 MG/1
10 TABLET ORAL ONCE
Refills: 0 | Status: DISCONTINUED | OUTPATIENT
Start: 2020-11-14 | End: 2020-11-14

## 2020-11-14 RX ORDER — HYDROMORPHONE HYDROCHLORIDE 2 MG/ML
0.5 INJECTION INTRAMUSCULAR; INTRAVENOUS; SUBCUTANEOUS EVERY 4 HOURS
Refills: 0 | Status: DISCONTINUED | OUTPATIENT
Start: 2020-11-14 | End: 2020-11-18

## 2020-11-14 RX ADMIN — OXYCODONE HYDROCHLORIDE 10 MILLIGRAM(S): 5 TABLET ORAL at 22:32

## 2020-11-14 RX ADMIN — OXYCODONE HYDROCHLORIDE 10 MILLIGRAM(S): 5 TABLET ORAL at 12:53

## 2020-11-14 RX ADMIN — OXYCODONE HYDROCHLORIDE 5 MILLIGRAM(S): 5 TABLET ORAL at 17:37

## 2020-11-14 RX ADMIN — OXYCODONE HYDROCHLORIDE 5 MILLIGRAM(S): 5 TABLET ORAL at 09:09

## 2020-11-14 RX ADMIN — FONDAPARINUX SODIUM 2.5 MILLIGRAM(S): 2.5 INJECTION, SOLUTION SUBCUTANEOUS at 09:09

## 2020-11-14 RX ADMIN — PIPERACILLIN AND TAZOBACTAM 25 GRAM(S): 4; .5 INJECTION, POWDER, LYOPHILIZED, FOR SOLUTION INTRAVENOUS at 21:14

## 2020-11-14 RX ADMIN — OXYCODONE HYDROCHLORIDE 5 MILLIGRAM(S): 5 TABLET ORAL at 09:49

## 2020-11-14 RX ADMIN — DEXTROSE MONOHYDRATE, SODIUM CHLORIDE, AND POTASSIUM CHLORIDE 100 MILLILITER(S): 50; .745; 4.5 INJECTION, SOLUTION INTRAVENOUS at 15:49

## 2020-11-14 RX ADMIN — DEXTROSE MONOHYDRATE, SODIUM CHLORIDE, AND POTASSIUM CHLORIDE 100 MILLILITER(S): 50; .745; 4.5 INJECTION, SOLUTION INTRAVENOUS at 05:51

## 2020-11-14 RX ADMIN — PIPERACILLIN AND TAZOBACTAM 25 GRAM(S): 4; .5 INJECTION, POWDER, LYOPHILIZED, FOR SOLUTION INTRAVENOUS at 14:06

## 2020-11-14 RX ADMIN — AMLODIPINE BESYLATE 5 MILLIGRAM(S): 2.5 TABLET ORAL at 09:09

## 2020-11-14 RX ADMIN — OXYCODONE HYDROCHLORIDE 10 MILLIGRAM(S): 5 TABLET ORAL at 12:13

## 2020-11-14 RX ADMIN — OXYCODONE HYDROCHLORIDE 5 MILLIGRAM(S): 5 TABLET ORAL at 16:57

## 2020-11-14 RX ADMIN — HYDROMORPHONE HYDROCHLORIDE 0.5 MILLIGRAM(S): 2 INJECTION INTRAMUSCULAR; INTRAVENOUS; SUBCUTANEOUS at 19:58

## 2020-11-14 RX ADMIN — Medication 1 APPLICATION(S): at 16:57

## 2020-11-14 RX ADMIN — PIPERACILLIN AND TAZOBACTAM 25 GRAM(S): 4; .5 INJECTION, POWDER, LYOPHILIZED, FOR SOLUTION INTRAVENOUS at 05:51

## 2020-11-14 RX ADMIN — OXYCODONE HYDROCHLORIDE 10 MILLIGRAM(S): 5 TABLET ORAL at 06:22

## 2020-11-14 NOTE — PROGRESS NOTE ADULT - ASSESSMENT
ASSESSMENT/PLAN:61 y/o F presents with necrotizing fasciitis of perineal area, s/p extensive perineal debridement x 2, and end colostomy creation POD#16/15/13/11/1. Hospital course complicated with acute anemia      monitor vitals  Leukocytosis, H/H stable  C/W IV abx per ID recs, Elpidion, Clinda  Surgical cx results: E.coli, peptostreptococcus, strep species   C/W Monitor stoma and ostomy output  Appreciated hospitalist recs  Plastic surgery consult appreciated; patient will likely need additional debridement prior to closure  DVT ppx/SCD    Case discussed with Colorectal surgery team

## 2020-11-14 NOTE — PROGRESS NOTE ADULT - SUBJECTIVE AND OBJECTIVE BOX
Subjective:  Patient seen and examined at bedside during am rounds. Vital signs and AVSS. Patient resting comfortably and denies any fevers, chills, n/v/d, chest pain or shortness of breath.      Objective:    MEDICATIONS  (STANDING):  amLODIPine   Tablet 5 milliGRAM(s) Oral daily  Dakins Solution - Full Strength 1 Application(s) Topical two times a day  dextrose 5% + sodium chloride 0.9% with potassium chloride 20 mEq/L 1000 milliLiter(s) (100 mL/Hr) IV Continuous <Continuous>  fondaparinux Injectable 2.5 milliGRAM(s) SubCutaneous daily  piperacillin/tazobactam IVPB.. 3.375 Gram(s) IV Intermittent every 8 hours    MEDICATIONS  (PRN):  ALBUTerol    90 MICROgram(s) HFA Inhaler 2 Puff(s) Inhalation every 6 hours PRN Shortness of Breath and/or Wheezing  ALPRAZolam 0.25 milliGRAM(s) Oral every 8 hours PRN anxiety  HYDROmorphone  Injectable 1 milliGRAM(s) IV Push three times a day PRN Moderate Pain (4 - 6)  oxyCODONE    IR 10 milliGRAM(s) Oral every 4 hours PRN Severe Pain (7 - 10)  oxyCODONE    IR 5 milliGRAM(s) Oral every 4 hours PRN Moderate Pain (4 - 6)      Vital Signs (24 Hrs):  T(C): 36.7 (11-14-20 @ 07:00), Max: 37 (11-13-20 @ 23:48)  HR: 84 (11-14-20 @ 07:00) (84 - 87)  BP: 132/65 (11-14-20 @ 07:00) (132/61 - 163/61)  RR: 16 (11-14-20 @ 07:00) (16 - 19)  SpO2: 97% (11-14-20 @ 07:00) (97% - 99%)  Wt(kg): --  Daily     Daily     I&O's Summary    13 Nov 2020 07:01  -  14 Nov 2020 07:00  --------------------------------------------------------  IN: 1860 mL / OUT: 1200 mL / NET: 660 mL        PHYSICAL EXAM   Gen: well-appearing, in no acute distress  CV: pulse regularly present   Resp: airway patent, non-labored breathing  Abd: soft, NTND; no rebound or guarding. groin incision c/d/i; ostomy site pink and patient +stool       LABS:              8.7<L>                x    | x    | x            19.38<H> >-----------< 643<H>   ------------------------< x                     29.1<L>                x    | x    | x                                                                         Ca x     Mg 2.3   Ph 3.0      ,             8.4<L>                138  | 25   | 9            15.93<H> >-----------< 604<H>   ------------------------< 101<H>                 29.3<L>                4.0  | 105  | 0.68                                                                      Ca 8.7   Mg 2.5   Ph 3.6                             Subjective:  Patient seen and examined at bedside during am rounds. Vital signs and AVSS. Failed trial of void yesterday x2, trujillo replaced. Patient resting comfortably and denies any fevers, chills, n/v/d, chest pain or shortness of breath.      Objective:    MEDICATIONS  (STANDING):  amLODIPine   Tablet 5 milliGRAM(s) Oral daily  Dakins Solution - Full Strength 1 Application(s) Topical two times a day  dextrose 5% + sodium chloride 0.9% with potassium chloride 20 mEq/L 1000 milliLiter(s) (100 mL/Hr) IV Continuous <Continuous>  fondaparinux Injectable 2.5 milliGRAM(s) SubCutaneous daily  piperacillin/tazobactam IVPB.. 3.375 Gram(s) IV Intermittent every 8 hours    MEDICATIONS  (PRN):  ALBUTerol    90 MICROgram(s) HFA Inhaler 2 Puff(s) Inhalation every 6 hours PRN Shortness of Breath and/or Wheezing  ALPRAZolam 0.25 milliGRAM(s) Oral every 8 hours PRN anxiety  HYDROmorphone  Injectable 1 milliGRAM(s) IV Push three times a day PRN Moderate Pain (4 - 6)  oxyCODONE    IR 10 milliGRAM(s) Oral every 4 hours PRN Severe Pain (7 - 10)  oxyCODONE    IR 5 milliGRAM(s) Oral every 4 hours PRN Moderate Pain (4 - 6)      Vital Signs (24 Hrs):  T(C): 36.7 (11-14-20 @ 07:00), Max: 37 (11-13-20 @ 23:48)  HR: 84 (11-14-20 @ 07:00) (84 - 87)  BP: 132/65 (11-14-20 @ 07:00) (132/61 - 163/61)  RR: 16 (11-14-20 @ 07:00) (16 - 19)  SpO2: 97% (11-14-20 @ 07:00) (97% - 99%)  Wt(kg): --  Daily     Daily     I&O's Summary    13 Nov 2020 07:01  -  14 Nov 2020 07:00  --------------------------------------------------------  IN: 1860 mL / OUT: 1200 mL / NET: 660 mL        PHYSICAL EXAM   Gen: well-appearing, in no acute distress  CV: pulse regularly present   Resp: airway patent, non-labored breathing  Abd: soft, NTND; no rebound or guarding. groin incision c/d/i; ostomy site pink and patient +stool       LABS:              8.7<L>                x    | x    | x            19.38<H> >-----------< 643<H>   ------------------------< x                     29.1<L>                x    | x    | x                                                                         Ca x     Mg 2.3   Ph 3.0      ,             8.4<L>                138  | 25   | 9            15.93<H> >-----------< 604<H>   ------------------------< 101<H>                 29.3<L>                4.0  | 105  | 0.68                                                                      Ca 8.7   Mg 2.5   Ph 3.6

## 2020-11-14 NOTE — PROGRESS NOTE ADULT - ASSESSMENT
60y old  Female with PMHX of obesity and HTN who presented to the ER with a chief complaint of myalgia/ weakness/ anorexia/ SOB x2 weeks.  Of note, patient's  just passed away suddenly 10/15/20.  Patient was found to have sepsis due to necrotizing fasciitis and admitted by Colorectal surgery and taken to the OR.  Medicine was consulted for medical management.      #Sepsis secondary to Necrotizing Fascitis:    S/p multiple OR debridements with colorectal surgery.    OR cultures with:  e.coli/ enterococcus/ strep mitis/ staph aureus/ peptostreptococcus.    Cont IV Zosyn as per ID.    Completed 7 days clinda.    S/p diverting colostomy to help with wound healing.    Cont trujillo as per colorectal.    Wound VAC.    Monitor temps/ WBC.    Blood cx negative to date.    Pain control.    Further management as per colorectal.      #Thrombocytosis:    Reactive.  Trend.      #Anemia:  Acute on Chronic.  Microcytic with normal RBC.  Check hemoglobin electrophoresis.  ? Thalassemia.    Appreciate heme eval.    S/p transfusions on admission.    Stable ~8.    Oral iron as able.      #hx VTE w/ UFH allergy:    - fondaparinux.    #HTN.  - amlodipine.    DVT prophylaxis.  - fondaparinux.

## 2020-11-14 NOTE — PROGRESS NOTE ADULT - SUBJECTIVE AND OBJECTIVE BOX
60y old  Female with PMHX of obesity and HTN who presented to the ER with a chief complaint of myalgia/ weakness/ anorexia/ SOB x2 weeks.  Of note, patient's  just passed away suddenly 10/15/20.  Patient was found to have sepsis due to necrotizing fasciitis and admitted by Colorectal surgery and taken to the OR.  Medicine was consulted for medical management.      11/13:  Pt is very pleasant, has no current c/o and no new o/n events.  planned for OR today.   11/14:  Pt seen.  Weak.  Hasn't gotten OOB.  S/p OR yesterday.      ROS:   All 10 systems reviewed and found to be negative with the exception of what has been described above.    Vital Signs Last 24 Hrs  T(C): 36.7 (14 Nov 2020 07:00), Max: 37 (13 Nov 2020 23:48)  T(F): 98.1 (14 Nov 2020 07:00), Max: 98.6 (13 Nov 2020 23:48)  HR: 84 (14 Nov 2020 07:00) (84 - 87)  BP: 132/65 (14 Nov 2020 07:00) (132/61 - 163/61)  BP(mean): --  RR: 16 (14 Nov 2020 07:00) (16 - 19)  SpO2: 97% (14 Nov 2020 07:00) (97% - 99%)    Constitutional: NAD, morbidly obese   HEENT: PERRL, EOMI, MMM.  Neck: Soft and supple, No carotid bruit, No JVD  Respiratory: Breath sounds are clear bilaterally, No wheezing, rales or rhonchi, resp unlabored  Cardiovascular: S1 and S2, regular rate and rhythm, no murmur, rub or gallop.  Gastrointestinal: Bowel Sounds present, soft, nontender, nondistended, no guarding, +ostomy   Extremities: No peripheral edema  Vascular: 2+ peripheral pulses  Neurological: A/O x 3, no focal deficits, sensory and cn2-12 intact  Skin: wounds dressed s/p debridement buttock area.  wound VAC.                        Labs:  11-13    138  |  105  |  9   ----------------------------<  101<H>  4.0   |  25  |  0.68    Ca    8.7      13 Nov 2020 07:54  Phos  3.0     11-14  Mg     2.3     11-14    TPro  7.0  /  Alb  1.9<L>  /  TBili  0.3  /  DBili  x   /  AST  17  /  ALT  16  /  AlkPhos  104  11-13                        8.7    19.38 )-----------( 643      ( 14 Nov 2020 08:54 )             29.1     LIVER FUNCTIONS - ( 13 Nov 2020 07:54 )  Alb: 1.9 g/dL / Pro: 7.0 gm/dL / ALK PHOS: 104 U/L / ALT: 16 U/L / AST: 17 U/L / GGT: x           PT/INR - ( 13 Nov 2020 07:54 )   PT: 12.7 sec;   INR: 1.09 ratio    PTT - ( 13 Nov 2020 07:54 )  PTT:27.1 sec      Culture Results:   Moderate Escherichia coli  Numerous Peptostreptococcus anaerobius "Susceptibilities not performed"  Rare Alpha hemolytic strep "Susceptibilities not performed" (11-03 @ 19:37)    Culture Results:   Moderate Escherichia coli  Few Enterococcus faecalis  Rare Staphylococcus aureus  Few Streptococcus mitis/oralis group "Susceptibilities not performed"  Numerous Peptostreptococcus anaerobius "Susceptibilities not performed" (11-02 @ 19:33)  Culture Results:   Moderate Escherichia coli See previous culture  # [id number]    Rare Staphylococcus aureus  Rare Streptococcus mitis/oralis group "Susceptibilities not performed"  Moderate Peptostreptococcus anaerobius "Susceptibilities not performed" (11-02 @ 19:33)  Culture Results:   Moderate Escherichia coli  Rare Staphylococcus aureus  Few Alpha hemolytic strep "Susceptibilities not performed"  Numerous Peptostreptococcus anaerobius "Susceptibilities not performed" (11-02 @ 19:33)    MEDICATIONS  (STANDING):  amLODIPine   Tablet 5 milliGRAM(s) Oral daily  Dakins Solution - Full Strength 1 Application(s) Topical two times a day  dextrose 5% + sodium chloride 0.9% with potassium chloride 20 mEq/L 1000 milliLiter(s) (100 mL/Hr) IV Continuous <Continuous>  fondaparinux Injectable 2.5 milliGRAM(s) SubCutaneous daily  piperacillin/tazobactam IVPB.. 3.375 Gram(s) IV Intermittent every 8 hours    MEDICATIONS  (PRN):  acetaminophen   Tablet .. 650 milliGRAM(s) Oral every 6 hours PRN Mild Pain (1 - 3)  ALBUTerol    90 MICROgram(s) HFA Inhaler 2 Puff(s) Inhalation every 6 hours PRN Shortness of Breath and/or Wheezing  ALPRAZolam 0.25 milliGRAM(s) Oral every 8 hours PRN anxiety  HYDROmorphone  Injectable 0.5 milliGRAM(s) IV Push every 4 hours PRN Severe Pain (7 - 10)  ondansetron Injectable 4 milliGRAM(s) IV Push once PRN Nausea and/or Vomiting  oxyCODONE    IR 10 milliGRAM(s) Oral every 6 hours PRN Severe Pain (7 - 10)  oxyCODONE    IR 5 milliGRAM(s) Oral every 6 hours PRN Moderate Pain (4 - 6)

## 2020-11-15 LAB
ALBUMIN SERPL ELPH-MCNC: 1.9 G/DL — LOW (ref 3.3–5)
ALP SERPL-CCNC: 121 U/L — HIGH (ref 40–120)
ALT FLD-CCNC: 18 U/L — SIGNIFICANT CHANGE UP (ref 12–78)
ANION GAP SERPL CALC-SCNC: 8 MMOL/L — SIGNIFICANT CHANGE UP (ref 5–17)
AST SERPL-CCNC: 21 U/L — SIGNIFICANT CHANGE UP (ref 15–37)
BILIRUB SERPL-MCNC: 0.3 MG/DL — SIGNIFICANT CHANGE UP (ref 0.2–1.2)
BUN SERPL-MCNC: 11 MG/DL — SIGNIFICANT CHANGE UP (ref 7–23)
CALCIUM SERPL-MCNC: 8.6 MG/DL — SIGNIFICANT CHANGE UP (ref 8.5–10.1)
CHLORIDE SERPL-SCNC: 102 MMOL/L — SIGNIFICANT CHANGE UP (ref 96–108)
CO2 SERPL-SCNC: 25 MMOL/L — SIGNIFICANT CHANGE UP (ref 22–31)
CREAT SERPL-MCNC: 0.68 MG/DL — SIGNIFICANT CHANGE UP (ref 0.5–1.3)
GLUCOSE SERPL-MCNC: 96 MG/DL — SIGNIFICANT CHANGE UP (ref 70–99)
HAPTOGLOB SERPL-MCNC: 454 MG/DL — HIGH (ref 34–200)
HCT VFR BLD CALC: 28.7 % — LOW (ref 34.5–45)
HGB BLD-MCNC: 8.5 G/DL — LOW (ref 11.5–15.5)
LDH SERPL L TO P-CCNC: 236 U/L — SIGNIFICANT CHANGE UP (ref 84–241)
MAGNESIUM SERPL-MCNC: 2.3 MG/DL — SIGNIFICANT CHANGE UP (ref 1.6–2.6)
MCHC RBC-ENTMCNC: 21.3 PG — LOW (ref 27–34)
MCHC RBC-ENTMCNC: 29.6 GM/DL — LOW (ref 32–36)
MCV RBC AUTO: 71.9 FL — LOW (ref 80–100)
PHOSPHATE SERPL-MCNC: 3.1 MG/DL — SIGNIFICANT CHANGE UP (ref 2.5–4.5)
PLATELET # BLD AUTO: 629 K/UL — HIGH (ref 150–400)
POTASSIUM SERPL-MCNC: 4 MMOL/L — SIGNIFICANT CHANGE UP (ref 3.5–5.3)
POTASSIUM SERPL-SCNC: 4 MMOL/L — SIGNIFICANT CHANGE UP (ref 3.5–5.3)
PROT SERPL-MCNC: 7.1 GM/DL — SIGNIFICANT CHANGE UP (ref 6–8.3)
RBC # BLD: 3.99 M/UL — SIGNIFICANT CHANGE UP (ref 3.8–5.2)
RBC # BLD: 3.99 M/UL — SIGNIFICANT CHANGE UP (ref 3.8–5.2)
RBC # FLD: SIGNIFICANT CHANGE UP (ref 10.3–14.5)
RETICS #: 51.6 K/UL — SIGNIFICANT CHANGE UP (ref 25–125)
RETICS/RBC NFR: 1.3 % — SIGNIFICANT CHANGE UP (ref 0.5–2.5)
SODIUM SERPL-SCNC: 135 MMOL/L — SIGNIFICANT CHANGE UP (ref 135–145)
WBC # BLD: 15.84 K/UL — HIGH (ref 3.8–10.5)
WBC # FLD AUTO: 15.84 K/UL — HIGH (ref 3.8–10.5)

## 2020-11-15 PROCEDURE — 99232 SBSQ HOSP IP/OBS MODERATE 35: CPT

## 2020-11-15 RX ADMIN — OXYCODONE HYDROCHLORIDE 5 MILLIGRAM(S): 5 TABLET ORAL at 22:17

## 2020-11-15 RX ADMIN — Medication 1 APPLICATION(S): at 12:14

## 2020-11-15 RX ADMIN — HYDROMORPHONE HYDROCHLORIDE 0.5 MILLIGRAM(S): 2 INJECTION INTRAMUSCULAR; INTRAVENOUS; SUBCUTANEOUS at 16:05

## 2020-11-15 RX ADMIN — FONDAPARINUX SODIUM 2.5 MILLIGRAM(S): 2.5 INJECTION, SOLUTION SUBCUTANEOUS at 09:29

## 2020-11-15 RX ADMIN — HYDROMORPHONE HYDROCHLORIDE 0.5 MILLIGRAM(S): 2 INJECTION INTRAMUSCULAR; INTRAVENOUS; SUBCUTANEOUS at 12:15

## 2020-11-15 RX ADMIN — HYDROMORPHONE HYDROCHLORIDE 0.5 MILLIGRAM(S): 2 INJECTION INTRAMUSCULAR; INTRAVENOUS; SUBCUTANEOUS at 21:38

## 2020-11-15 RX ADMIN — PIPERACILLIN AND TAZOBACTAM 25 GRAM(S): 4; .5 INJECTION, POWDER, LYOPHILIZED, FOR SOLUTION INTRAVENOUS at 13:18

## 2020-11-15 RX ADMIN — Medication 1 APPLICATION(S): at 22:17

## 2020-11-15 RX ADMIN — OXYCODONE HYDROCHLORIDE 10 MILLIGRAM(S): 5 TABLET ORAL at 10:00

## 2020-11-15 RX ADMIN — DEXTROSE MONOHYDRATE, SODIUM CHLORIDE, AND POTASSIUM CHLORIDE 100 MILLILITER(S): 50; .745; 4.5 INJECTION, SOLUTION INTRAVENOUS at 05:55

## 2020-11-15 RX ADMIN — OXYCODONE HYDROCHLORIDE 10 MILLIGRAM(S): 5 TABLET ORAL at 16:04

## 2020-11-15 RX ADMIN — PIPERACILLIN AND TAZOBACTAM 25 GRAM(S): 4; .5 INJECTION, POWDER, LYOPHILIZED, FOR SOLUTION INTRAVENOUS at 21:38

## 2020-11-15 RX ADMIN — OXYCODONE HYDROCHLORIDE 10 MILLIGRAM(S): 5 TABLET ORAL at 15:32

## 2020-11-15 RX ADMIN — HYDROMORPHONE HYDROCHLORIDE 0.5 MILLIGRAM(S): 2 INJECTION INTRAMUSCULAR; INTRAVENOUS; SUBCUTANEOUS at 11:20

## 2020-11-15 RX ADMIN — OXYCODONE HYDROCHLORIDE 10 MILLIGRAM(S): 5 TABLET ORAL at 09:27

## 2020-11-15 RX ADMIN — PIPERACILLIN AND TAZOBACTAM 25 GRAM(S): 4; .5 INJECTION, POWDER, LYOPHILIZED, FOR SOLUTION INTRAVENOUS at 05:51

## 2020-11-15 RX ADMIN — HYDROMORPHONE HYDROCHLORIDE 0.5 MILLIGRAM(S): 2 INJECTION INTRAMUSCULAR; INTRAVENOUS; SUBCUTANEOUS at 16:45

## 2020-11-15 RX ADMIN — AMLODIPINE BESYLATE 5 MILLIGRAM(S): 2.5 TABLET ORAL at 09:29

## 2020-11-15 NOTE — PROGRESS NOTE ADULT - SUBJECTIVE AND OBJECTIVE BOX
Date of service: 11-15-20 @ 10:42    Patient lying in bed; had another debridement on 11/14  Afebrile, is tolerating diet        ROS: no fever or chills; denies dizziness, no HA, no SOB or cough, no abdominal pain, no diarrhea or constipation; no dysuria, no urinary frequency, no legs pain, no rashes    MEDICATIONS  (STANDING):  amLODIPine   Tablet 5 milliGRAM(s) Oral daily  Dakins Solution - Full Strength 1 Application(s) Topical two times a day  dextrose 5% + sodium chloride 0.9% with potassium chloride 20 mEq/L 1000 milliLiter(s) (100 mL/Hr) IV Continuous <Continuous>  fondaparinux Injectable 2.5 milliGRAM(s) SubCutaneous daily  piperacillin/tazobactam IVPB.. 3.375 Gram(s) IV Intermittent every 8 hours    MEDICATIONS  (PRN):  acetaminophen   Tablet .. 650 milliGRAM(s) Oral every 6 hours PRN Mild Pain (1 - 3)  ALBUTerol    90 MICROgram(s) HFA Inhaler 2 Puff(s) Inhalation every 6 hours PRN Shortness of Breath and/or Wheezing  ALPRAZolam 0.25 milliGRAM(s) Oral every 8 hours PRN anxiety  HYDROmorphone  Injectable 0.5 milliGRAM(s) IV Push every 4 hours PRN Severe Pain (7 - 10)  ondansetron Injectable 4 milliGRAM(s) IV Push once PRN Nausea and/or Vomiting  oxyCODONE    IR 10 milliGRAM(s) Oral every 6 hours PRN Severe Pain (7 - 10)  oxyCODONE    IR 5 milliGRAM(s) Oral every 6 hours PRN Moderate Pain (4 - 6)      Vital Signs Last 24 Hrs  T(C): 37.1 (15 Nov 2020 08:09), Max: 37.1 (15 Nov 2020 08:09)  T(F): 98.7 (15 Nov 2020 08:09), Max: 98.7 (15 Nov 2020 08:09)  HR: 82 (15 Nov 2020 08:09) (82 - 87)  BP: 147/74 (15 Nov 2020 08:09) (131/61 - 147/74)  BP(mean): --  RR: 18 (15 Nov 2020 08:09) (17 - 18)  SpO2: 100% (15 Nov 2020 08:09) (96% - 100%)        Physical Exam:        Constitutional: frail looking  HEENT: NC/AT, EOMI, PERRLA, conjunctivae clear; ears and nose atraumatic; pharynx clear  Neck: supple; thyroid not palpable  Back: no tenderness  Respiratory: respiratory effort normal; clear to auscultation  Cardiovascular: S1S2 regular, no murmurs  Abdomen: soft, not tender, not distended, positive BS; no liver or spleen organomegaly; ostomy  Genitourinary: no suprapubic tenderness  Musculoskeletal: no muscle tenderness, no joint swelling or tenderness  Neurological/ Psychiatric: AxOx3, judgement and insight normal;  moving all extremities  Skin: dressings on buttocks    Labs: all available labs reviewed     Labs:                        8.5    15.84 )-----------( 629      ( 15 Nov 2020 07:54 )             28.7     11-15    135  |  102  |  11  ----------------------------<  96  4.0   |  25  |  0.68    Ca    8.6      15 Nov 2020 07:54  Phos  3.1     11-15  Mg     2.3     11-15    TPro  7.1  /  Alb  1.9<L>  /  TBili  0.3  /  DBili  x   /  AST  21  /  ALT  18  /  AlkPhos  121<H>  11-15           Cultures:       Ferritin, Serum: 57 ng/mL (11-13-20 @ 07:54)        < from: CT Abdomen and Pelvis w/ IV Cont (11.02.20 @ 17:46) >    EXAM:  CT ABDOMEN AND PELVIS IC                          EXAM:  CT CHEST IC                            PROCEDURE DATE:  11/02/2020          INTERPRETATION:  CLINICAL INFORMATION: sepsis    COMPARISON: None.    PROCEDURE:  CT of the Chest, Abdomen and Pelvis was performed with intravenous contrast.  Intravenous contrast: 90 ml Omnipaque 350. 10 ml discarded.  Oral contrast: None.  Sagittal and coronal reformats were performed.    FINDINGS:    CHEST:    LUNGS AND LARGE AIRWAYS: Patent central airways. Azygos fissure. A few less than 5 mm nodules in the left lower lobe.  PLEURA: No pleural effusion.  VESSELS: Within normal limits.  HEART: Heart size is normal.  No pericardial effusion.  MEDIASTINUM AND BLANK: No lymphadenopathy.  CHEST WALL ANDLOWER NECK: Within normal limits.    ABDOMEN AND PELVIS:    LIVER: Within normal limits.  BILE DUCTS: Normal caliber.  GALLBLADDER: Cholelithiasis.  SPLEEN: Within normal limits.  PANCREAS: Within normal limits.  ADRENALS: Within normal limits.  KIDNEYS/URETERS: Hypodense left renal lesion too small to characterize.    BLADDER: Within normal limits.  REPRODUCTIVE ORGANS: Fibroid uterus.    BOWEL: No bowel obstruction. The appendix is normal.  PERITONEUM: No ascites.  VESSELS:  Within normal limits.  RETROPERITONEUM/LYMPH NODES: No lymphadenopathy.  ABDOMINAL WALL: There is within the visualized left ischioanal fossa and around the left gluteal muscles. Air tracks along the deep left pelvis and presacral space into the retroperitoneum. There is air along the left psoas muscle.  BONES: Within normal limits.    IMPRESSION: Extensive subcutaneous gas within the left ischioanal fossa tracking into the pelvis and retroperitoneum worrisome for gas forming infection.      < end of copied text >      Radiology: all available radiological tests reviewed    Advanced directives addressed: full resuscitation

## 2020-11-15 NOTE — PROGRESS NOTE ADULT - ASSESSMENT
ASSESSMENT/PLAN:  61 y/o F presents with necrotizing fasciitis of perineal area, s/p extensive perineal debridement x 2, and end colostomy creation POD#17/16/14/12/2. Hospital course complicated with acute anemia      NPO at [facility] for OR debridement in AM  monitor vitals  Leukocytosis, H/H stable  C/W IV abx per ID recs, Zosyn, Clinda  Surgical cx results: E.coli, peptostreptococcus, strep species   C/W Monitor stoma and ostomy output  Appreciated hospitalist recs  Plastic surgery consult appreciated; patient will likely need additional debridement prior to closure  DVT ppx/SCD    Case discussed with Colorectal surgery team

## 2020-11-15 NOTE — PROGRESS NOTE ADULT - ASSESSMENT
61 y/o F with PMHx of hemorrhoids admitted on 11/2 for evaluation of shortness of breath, weakness and muscle aches; had loose stools; is poor historian and history per medical record; the patient had imaging upon admission and was found to have bilateral buttock infection with necrotizing fasciitis and went to OR for debridement on 11/2. She cannot recall any details about the infection or how she came about having such a severe infection. She denies any allergy to penicillin or cephalosporins.     1. Patient admitted with necrotizing fasciitis of the buttocks; unclear origin; also noted with leukocytosis most likely reactive to infection  - follow up cultures   - iv hydration and supportive care   - wound care per surgery  - will need further debridement  - probably will need diverting colostomy for full wound care and healing---- and this surgery was done on 11/5  - serial cbc and monitor temperature   - reviewed prior medical records to evaluate for resistant or atypical pathogens   - day #12 zosyn  - completed 7 days clindamycin  - tolerating antibiotics without rashes or side effects   - all organisms are sensitive; NO ISOLATION IS NEEDED  - diet per surgery  Will follow

## 2020-11-15 NOTE — PROGRESS NOTE ADULT - SUBJECTIVE AND OBJECTIVE BOX
SURGERY DAILY PROGRESS NOTE:     Subjective:  Patient seen and examined at bedside during am rounds. Vital signs and AVSS. Patient resting comfortably and denies any fevers, chills, n/v/d, chest pain or shortness of breath.      Objective:    MEDICATIONS  (STANDING):  amLODIPine   Tablet 5 milliGRAM(s) Oral daily  Dakins Solution - Full Strength 1 Application(s) Topical two times a day  dextrose 5% + sodium chloride 0.9% with potassium chloride 20 mEq/L 1000 milliLiter(s) (100 mL/Hr) IV Continuous <Continuous>  fondaparinux Injectable 2.5 milliGRAM(s) SubCutaneous daily  piperacillin/tazobactam IVPB.. 3.375 Gram(s) IV Intermittent every 8 hours    MEDICATIONS  (PRN):  acetaminophen   Tablet .. 650 milliGRAM(s) Oral every 6 hours PRN Mild Pain (1 - 3)  ALBUTerol    90 MICROgram(s) HFA Inhaler 2 Puff(s) Inhalation every 6 hours PRN Shortness of Breath and/or Wheezing  ALPRAZolam 0.25 milliGRAM(s) Oral every 8 hours PRN anxiety  HYDROmorphone  Injectable 0.5 milliGRAM(s) IV Push every 4 hours PRN Severe Pain (7 - 10)  ondansetron Injectable 4 milliGRAM(s) IV Push once PRN Nausea and/or Vomiting  oxyCODONE    IR 10 milliGRAM(s) Oral every 6 hours PRN Severe Pain (7 - 10)  oxyCODONE    IR 5 milliGRAM(s) Oral every 6 hours PRN Moderate Pain (4 - 6)      Vital Signs Last 24 Hrs  T(C): 37.1 (15 Nov 2020 08:09), Max: 37.1 (15 Nov 2020 08:09)  T(F): 98.7 (15 Nov 2020 08:09), Max: 98.7 (15 Nov 2020 08:09)  HR: 82 (15 Nov 2020 08:09) (82 - 87)  BP: 147/74 (15 Nov 2020 08:09) (131/61 - 147/74)  BP(mean): --  RR: 18 (15 Nov 2020 08:09) (17 - 18)  SpO2: 100% (15 Nov 2020 08:09) (96% - 100%)      PHYSICAL EXAM   Gen: well-appearing, in no acute distress  CV: pulse regularly present   Resp: airway patent, non-labored breathing  Abd: soft, NTND; no rebound or guarding. Incision c/d/i, ostomy functioning+stool      I&O's Detail    14 Nov 2020 07:01  -  15 Nov 2020 07:00  --------------------------------------------------------  IN:    dextrose 5% + sodium chloride 0.9% w/ Additives: 900 mL    IV PiggyBack: 200 mL  Total IN: 1100 mL    OUT:    Indwelling Catheter - Urethral (mL): 1000 mL  Total OUT: 1000 mL    Total NET: 100 mL    Daily     LABS:                        8.5    15.84 )-----------( 629      ( 15 Nov 2020 07:54 )             28.7     11-15    135  |  102  |  11  ----------------------------<  96  4.0   |  25  |  0.68    Ca    8.6      15 Nov 2020 07:54  Phos  3.1     11-15  Mg     2.3     11-15    TPro  7.1  /  Alb  1.9<L>  /  TBili  0.3  /  DBili  x   /  AST  21  /  ALT  18  /  AlkPhos  121<H>  11-15

## 2020-11-15 NOTE — PROGRESS NOTE ADULT - ASSESSMENT
60y old  Female with PMHX of obesity and HTN who presented to the ER with a chief complaint of myalgia/ weakness/ anorexia/ SOB x2 weeks.  Of note, patient's  just passed away suddenly 10/15/20.  Patient was found to have sepsis due to necrotizing fasciitis and admitted by Colorectal surgery and taken to the OR.  Medicine was consulted for medical management.      #Sepsis secondary to Necrotizing Fascitis:    S/p multiple OR debridements with colorectal surgery.    OR cultures with:  e.coli/ enterococcus/ strep mitis/ staph aureus/ peptostreptococcus.    As per surgery-- plan repeat OR debridement 11/16.    Cont IV Zosyn as per ID.    Completed 7 days clinda.    S/p diverting colostomy to help with wound healing.    Cont trujillo as per colorectal.    Wound VAC.    Monitor temps/ WBC.    Blood cx negative to date.    Pain control.      #Thrombocytosis:    Reactive.  Trend.      #Anemia:  Acute on Chronic.  Microcytic with normal RBC.  Check hemoglobin electrophoresis.  ? Thalassemia.    Appreciate heme eval.    S/p transfusions on admission.    Stable ~8.    T/c IV iron when infection improved.      #hx VTE w/ UFH allergy:    - fondaparinux.    #HTN.  - amlodipine.    DVT prophylaxis.  - fondaparinux.

## 2020-11-15 NOTE — PROGRESS NOTE ADULT - SUBJECTIVE AND OBJECTIVE BOX
60y old  Female with PMHX of obesity and HTN who presented to the ER with a chief complaint of myalgia/ weakness/ anorexia/ SOB x2 weeks.  Of note, patient's  just passed away suddenly 10/15/20.  Patient was found to have sepsis due to necrotizing fasciitis and admitted by Colorectal surgery and taken to the OR.  Medicine was consulted for medical management.      11/13:  Pt is very pleasant, has no current c/o and no new o/n events.  planned for OR today.   11/14:  Pt seen.  Weak.  Hasn't gotten OOB.  S/p OR yesterday.    11/15:  Pt seen.  Has a lot of pressure in buttock area.  Otherwise okay.  Hasn't gotten OOB-- feels weak.      ROS:   All 10 systems reviewed and found to be negative with the exception of what has been described above.    Vital Signs Last 24 Hrs  T(C): 37.2 (15 Nov 2020 15:15), Max: 37.2 (15 Nov 2020 15:15)  T(F): 99 (15 Nov 2020 15:15), Max: 99 (15 Nov 2020 15:15)  HR: 91 (15 Nov 2020 15:15) (82 - 91)  BP: 166/73 (15 Nov 2020 15:15) (131/61 - 166/73)  BP(mean): --  RR: 18 (15 Nov 2020 15:15) (17 - 18)  SpO2: 96% (15 Nov 2020 15:15) (96% - 100%)    Constitutional: NAD, morbidly obese   HEENT: PERRL, EOMI, MMM.  Neck: Soft and supple, No carotid bruit, No JVD  Respiratory: Breath sounds are clear bilaterally, No wheezing, rales or rhonchi, resp unlabored  Cardiovascular: S1 and S2, regular rate and rhythm, no murmur, rub or gallop.  Gastrointestinal: Bowel Sounds present, soft, nontender, nondistended, no guarding, +ostomy   Extremities: No peripheral edema  Vascular: 2+ peripheral pulses  Neurological: A/O x 3, no focal deficits, sensory and cn2-12 intact  Skin: wounds dressed s/p debridement buttock area.  wound VAC.               11-15  135  |  102  |  11  ----------------------------<  96  4.0   |  25  |  0.68    Ca    8.6      15 Nov 2020 07:54  Phos  3.1     11-15  Mg     2.3     11-15    TPro  7.1  /  Alb  1.9<L>  /  TBili  0.3  /  DBili  x   /  AST  21  /  ALT  18  /  AlkPhos  121<H>  11-15                        8.5    15.84 )-----------( 629      ( 15 Nov 2020 07:54 )             28.7     LIVER FUNCTIONS - ( 15 Nov 2020 07:54 )  Alb: 1.9 g/dL / Pro: 7.1 gm/dL / ALK PHOS: 121 U/L / ALT: 18 U/L / AST: 21 U/L / GGT: x           Culture Results:   Moderate Escherichia coli  Numerous Peptostreptococcus anaerobius "Susceptibilities not performed"  Rare Alpha hemolytic strep "Susceptibilities not performed" (11-03 @ 19:37)    Culture Results:   Moderate Escherichia coli  Few Enterococcus faecalis  Rare Staphylococcus aureus  Few Streptococcus mitis/oralis group "Susceptibilities not performed"  Numerous Peptostreptococcus anaerobius "Susceptibilities not performed" (11-02 @ 19:33)  Culture Results:   Moderate Escherichia coli See previous culture  # [id number]    Rare Staphylococcus aureus  Rare Streptococcus mitis/oralis group "Susceptibilities not performed"  Moderate Peptostreptococcus anaerobius "Susceptibilities not performed" (11-02 @ 19:33)  Culture Results:   Moderate Escherichia coli  Rare Staphylococcus aureus  Few Alpha hemolytic strep "Susceptibilities not performed"  Numerous Peptostreptococcus anaerobius "Susceptibilities not performed" (11-02 @ 19:33)      MEDICATIONS  (STANDING):  amLODIPine   Tablet 5 milliGRAM(s) Oral daily  Dakins Solution - Full Strength 1 Application(s) Topical two times a day  dextrose 5% + sodium chloride 0.9% with potassium chloride 20 mEq/L 1000 milliLiter(s) (100 mL/Hr) IV Continuous <Continuous>  fondaparinux Injectable 2.5 milliGRAM(s) SubCutaneous daily  piperacillin/tazobactam IVPB.. 3.375 Gram(s) IV Intermittent every 8 hours    MEDICATIONS  (PRN):  acetaminophen   Tablet .. 650 milliGRAM(s) Oral every 6 hours PRN Mild Pain (1 - 3)  ALBUTerol    90 MICROgram(s) HFA Inhaler 2 Puff(s) Inhalation every 6 hours PRN Shortness of Breath and/or Wheezing  ALPRAZolam 0.25 milliGRAM(s) Oral every 8 hours PRN anxiety  HYDROmorphone  Injectable 0.5 milliGRAM(s) IV Push every 4 hours PRN Severe Pain (7 - 10)  ondansetron Injectable 4 milliGRAM(s) IV Push once PRN Nausea and/or Vomiting  oxyCODONE    IR 10 milliGRAM(s) Oral every 6 hours PRN Severe Pain (7 - 10)  oxyCODONE    IR 5 milliGRAM(s) Oral every 6 hours PRN Moderate Pain (4 - 6)

## 2020-11-16 DIAGNOSIS — D47.3 ESSENTIAL (HEMORRHAGIC) THROMBOCYTHEMIA: ICD-10-CM

## 2020-11-16 DIAGNOSIS — D64.9 ANEMIA, UNSPECIFIED: ICD-10-CM

## 2020-11-16 PROCEDURE — 99231 SBSQ HOSP IP/OBS SF/LOW 25: CPT

## 2020-11-16 PROCEDURE — 99232 SBSQ HOSP IP/OBS MODERATE 35: CPT

## 2020-11-16 RX ORDER — HYDROMORPHONE HYDROCHLORIDE 2 MG/ML
1 INJECTION INTRAMUSCULAR; INTRAVENOUS; SUBCUTANEOUS
Refills: 0 | Status: DISCONTINUED | OUTPATIENT
Start: 2020-11-16 | End: 2020-11-16

## 2020-11-16 RX ORDER — ONDANSETRON 8 MG/1
4 TABLET, FILM COATED ORAL ONCE
Refills: 0 | Status: DISCONTINUED | OUTPATIENT
Start: 2020-11-16 | End: 2020-11-16

## 2020-11-16 RX ORDER — SODIUM CHLORIDE 9 MG/ML
1000 INJECTION, SOLUTION INTRAVENOUS
Refills: 0 | Status: DISCONTINUED | OUTPATIENT
Start: 2020-11-16 | End: 2020-11-16

## 2020-11-16 RX ORDER — HYDROMORPHONE HYDROCHLORIDE 2 MG/ML
0.5 INJECTION INTRAMUSCULAR; INTRAVENOUS; SUBCUTANEOUS
Refills: 0 | Status: DISCONTINUED | OUTPATIENT
Start: 2020-11-16 | End: 2020-11-16

## 2020-11-16 RX ORDER — ACETAMINOPHEN 500 MG
1000 TABLET ORAL ONCE
Refills: 0 | Status: COMPLETED | OUTPATIENT
Start: 2020-11-16 | End: 2020-11-16

## 2020-11-16 RX ADMIN — HYDROMORPHONE HYDROCHLORIDE 0.5 MILLIGRAM(S): 2 INJECTION INTRAMUSCULAR; INTRAVENOUS; SUBCUTANEOUS at 14:45

## 2020-11-16 RX ADMIN — HYDROMORPHONE HYDROCHLORIDE 0.5 MILLIGRAM(S): 2 INJECTION INTRAMUSCULAR; INTRAVENOUS; SUBCUTANEOUS at 11:04

## 2020-11-16 RX ADMIN — PIPERACILLIN AND TAZOBACTAM 25 GRAM(S): 4; .5 INJECTION, POWDER, LYOPHILIZED, FOR SOLUTION INTRAVENOUS at 21:24

## 2020-11-16 RX ADMIN — PIPERACILLIN AND TAZOBACTAM 25 GRAM(S): 4; .5 INJECTION, POWDER, LYOPHILIZED, FOR SOLUTION INTRAVENOUS at 14:45

## 2020-11-16 RX ADMIN — HYDROMORPHONE HYDROCHLORIDE 1 MILLIGRAM(S): 2 INJECTION INTRAMUSCULAR; INTRAVENOUS; SUBCUTANEOUS at 18:00

## 2020-11-16 RX ADMIN — Medication 400 MILLIGRAM(S): at 17:45

## 2020-11-16 RX ADMIN — OXYCODONE HYDROCHLORIDE 10 MILLIGRAM(S): 5 TABLET ORAL at 08:34

## 2020-11-16 RX ADMIN — AMLODIPINE BESYLATE 5 MILLIGRAM(S): 2.5 TABLET ORAL at 09:18

## 2020-11-16 RX ADMIN — HYDROMORPHONE HYDROCHLORIDE 1 MILLIGRAM(S): 2 INJECTION INTRAMUSCULAR; INTRAVENOUS; SUBCUTANEOUS at 18:42

## 2020-11-16 RX ADMIN — OXYCODONE HYDROCHLORIDE 10 MILLIGRAM(S): 5 TABLET ORAL at 17:46

## 2020-11-16 RX ADMIN — OXYCODONE HYDROCHLORIDE 10 MILLIGRAM(S): 5 TABLET ORAL at 09:15

## 2020-11-16 RX ADMIN — HYDROMORPHONE HYDROCHLORIDE 1 MILLIGRAM(S): 2 INJECTION INTRAMUSCULAR; INTRAVENOUS; SUBCUTANEOUS at 15:50

## 2020-11-16 RX ADMIN — PIPERACILLIN AND TAZOBACTAM 25 GRAM(S): 4; .5 INJECTION, POWDER, LYOPHILIZED, FOR SOLUTION INTRAVENOUS at 06:21

## 2020-11-16 RX ADMIN — HYDROMORPHONE HYDROCHLORIDE 0.5 MILLIGRAM(S): 2 INJECTION INTRAMUSCULAR; INTRAVENOUS; SUBCUTANEOUS at 13:49

## 2020-11-16 RX ADMIN — HYDROMORPHONE HYDROCHLORIDE 0.5 MILLIGRAM(S): 2 INJECTION INTRAMUSCULAR; INTRAVENOUS; SUBCUTANEOUS at 09:17

## 2020-11-16 NOTE — ADVANCED PRACTICE NURSE CONSULT - ASSESSMENT
HPI: This is a 60 year old that was admitted to the hospital for necrotizing fasciitis on 11/2/2020. Patient with diverting colostomy on 11/5/2020. PMH- hemorrhoids presents to the ED c/o diffuse +myalgias and +SOB x2 weeks. Notes associated +weakness and +decreased PO intake as well since her  passed 10/15.  Reports inability to perform ADLs 2/2 symptoms so called EMS today. Non-drinker. Never a smoker. Allergic to cefazolin and sulfa drugs. Denies hx of DM, denies fever, chills, chest pain, n/v. Endorses diarrhea x 2weeks (02 Nov 2020 18:26)  In to continue to educate patient on colostomy. Patient presents sitting in bed. Reviewed the creation and function of the ostomy. Reviewed diet, bathing and supplies.  Wafer noted to be leaking. Wafer and pouch removed. Erythema noted to peristomal skin and along superior staple line. Chlroprep applied to staple line to clean. 2 ¾" cut off-center to prevent leaking. Stoma is red, moist, viable and protruding. Mushy stool noted. Wafer and pouch secured.   PAST MEDICAL & SURGICAL HISTORY:  Hemorrhoids  No significant past surgical history  REVIEW OF SYSTEMS  General: reports tenderness to buttock area from surgery  Skin/Breast:  Ophthalmologic:  ENMT:	  Respiratory and Thorax: Denies shortness of breath  Cardiovascular:	Denies chest pain  Gastrointestinal:	  Genitourinary:	  Musculoskeletal:	  Neurological:	  Psychiatric:	  Hematology/Lymphatics:	  Endocrine:	  Allergic/Immunologic:	  MEDICATIONS  (STANDING):  amLODIPine   Tablet 5 milliGRAM(s) Oral daily  Dakins Solution - Full Strength 1 Application(s) Topical two times a day  dextrose 5% + sodium chloride 0.9% with potassium chloride 20 mEq/L 1000 milliLiter(s) (100 mL/Hr) IV Continuous <Continuous>  fondaparinux Injectable 2.5 milliGRAM(s) SubCutaneous daily  piperacillin/tazobactam IVPB.. 3.375 Gram(s) IV Intermittent every 8 hours  Allergies  cefazolin (Other (Moderate))  heparin (Unknown)  sulfa drugs (Unknown)  Intolerances  SOCIAL HISTORY:  FAMILY HISTORY:  Vital Signs Last 24 Hrs  T(C): 36.8 (16 Nov 2020 08:10), Max: 37.3 (15 Nov 2020 17:36)  T(F): 98.3 (16 Nov 2020 08:10), Max: 99.1 (15 Nov 2020 17:36)  HR: 80 (16 Nov 2020 08:10) (80 - 91)  BP: 154/83 (16 Nov 2020 08:10) (148/58 - 166/73)  RR: 18 (16 Nov 2020 08:10) (18 - 18)  SpO2: 97% (16 Nov 2020 08:10) (96% - 100%)  PHYSICAL EXAM:  Constitutional:  Eyes: conjunctiva and sclera clear  ENMT: Moist mucous membranes  Neck:  Breasts:  Back:  Respiratory: Respirations even and unlabored   Cardiovascular:  Gastrointestinal: Stoma to LLQ with stool  Genitourinary:  Rectal:  Extremities:  Vascular:  Neurological: Alert and oriented x 4  Skin: Rashid to midline abdominal incision  Lymph Nodes:  Musculoskeletal:  Psychiatric:  LABS:                        8.5    15.84 )-----------( 629      ( 15 Nov 2020 07:54 )             28.7

## 2020-11-16 NOTE — PROGRESS NOTE ADULT - SUBJECTIVE AND OBJECTIVE BOX
SURGERY DAILY PROGRESS NOTE:     Subjective:  Patient seen and examined at bedside during am rounds. AVSS. Denies any fevers, chills, n/v/d, chest pain or shortness of breath. NPO    Objective:    MEDICATIONS  (STANDING):  amLODIPine   Tablet 5 milliGRAM(s) Oral daily  Dakins Solution - Full Strength 1 Application(s) Topical two times a day  dextrose 5% + sodium chloride 0.9% with potassium chloride 20 mEq/L 1000 milliLiter(s) (100 mL/Hr) IV Continuous <Continuous>  fondaparinux Injectable 2.5 milliGRAM(s) SubCutaneous daily  piperacillin/tazobactam IVPB.. 3.375 Gram(s) IV Intermittent every 8 hours    MEDICATIONS  (PRN):  acetaminophen   Tablet .. 650 milliGRAM(s) Oral every 6 hours PRN Mild Pain (1 - 3)  ALBUTerol    90 MICROgram(s) HFA Inhaler 2 Puff(s) Inhalation every 6 hours PRN Shortness of Breath and/or Wheezing  ALPRAZolam 0.25 milliGRAM(s) Oral every 8 hours PRN anxiety  HYDROmorphone  Injectable 0.5 milliGRAM(s) IV Push every 4 hours PRN Severe Pain (7 - 10)  ondansetron Injectable 4 milliGRAM(s) IV Push once PRN Nausea and/or Vomiting  oxyCODONE    IR 10 milliGRAM(s) Oral every 6 hours PRN Severe Pain (7 - 10)  oxyCODONE    IR 5 milliGRAM(s) Oral every 6 hours PRN Moderate Pain (4 - 6)      Vital Signs Last 24 Hrs  T(C): 36.8 (16 Nov 2020 08:10), Max: 37.3 (15 Nov 2020 17:36)  T(F): 98.3 (16 Nov 2020 08:10), Max: 99.1 (15 Nov 2020 17:36)  HR: 80 (16 Nov 2020 08:10) (80 - 91)  BP: 154/83 (16 Nov 2020 08:10) (148/58 - 166/73)  BP(mean): --  RR: 18 (16 Nov 2020 08:10) (18 - 18)  SpO2: 97% (16 Nov 2020 08:10) (96% - 100%)      PHYSICAL EXAM   Gen: well-appearing, in no acute distress  CV: pulse regularly present   Resp: airway patent, non-labored breathing  Abd: soft, NTND; no rebound or guarding. Incision c/d/i; ostomy pink and patient stool in bag      I&O's Detail    15 Nov 2020 07:01  -  16 Nov 2020 07:00  --------------------------------------------------------  IN:    dextrose 5% + sodium chloride 0.9% w/ Additives: 1000 mL    IV PiggyBack: 200 mL  Total IN: 1200 mL    OUT:    Indwelling Catheter - Urethral (mL): 2800 mL  Total OUT: 2800 mL    Total NET: -1600 mL    LABS:                        8.5    15.84 )-----------( 629      ( 15 Nov 2020 07:54 )             28.7     11-15    135  |  102  |  11  ----------------------------<  96  4.0   |  25  |  0.68    Ca    8.6      15 Nov 2020 07:54  Phos  3.1     11-15  Mg     2.3     11-15    TPro  7.1  /  Alb  1.9<L>  /  TBili  0.3  /  DBili  x   /  AST  21  /  ALT  18  /  AlkPhos  121<H>  11-15      RADIOLOGY & ADDITIONAL STUDIES:

## 2020-11-16 NOTE — PROGRESS NOTE ADULT - SUBJECTIVE AND OBJECTIVE BOX
Date of service: 11-16-20 @ 10:51      Patient got up out of bed, is walking with physical therapy and is in good spirits      ROS: no fever or chills; denies dizziness, no HA, no SOB or cough, no abdominal pain, no diarrhea or constipation; no dysuria, no urinary frequency, no legs pain, no rashes    MEDICATIONS  (STANDING):  amLODIPine   Tablet 5 milliGRAM(s) Oral daily  Dakins Solution - Full Strength 1 Application(s) Topical two times a day  dextrose 5% + sodium chloride 0.9% with potassium chloride 20 mEq/L 1000 milliLiter(s) (100 mL/Hr) IV Continuous <Continuous>  fondaparinux Injectable 2.5 milliGRAM(s) SubCutaneous daily  piperacillin/tazobactam IVPB.. 3.375 Gram(s) IV Intermittent every 8 hours    MEDICATIONS  (PRN):  acetaminophen   Tablet .. 650 milliGRAM(s) Oral every 6 hours PRN Mild Pain (1 - 3)  ALBUTerol    90 MICROgram(s) HFA Inhaler 2 Puff(s) Inhalation every 6 hours PRN Shortness of Breath and/or Wheezing  ALPRAZolam 0.25 milliGRAM(s) Oral every 8 hours PRN anxiety  HYDROmorphone  Injectable 0.5 milliGRAM(s) IV Push every 4 hours PRN Severe Pain (7 - 10)  ondansetron Injectable 4 milliGRAM(s) IV Push once PRN Nausea and/or Vomiting  oxyCODONE    IR 10 milliGRAM(s) Oral every 6 hours PRN Severe Pain (7 - 10)  oxyCODONE    IR 5 milliGRAM(s) Oral every 6 hours PRN Moderate Pain (4 - 6)      Vital Signs Last 24 Hrs  T(C): 36.8 (16 Nov 2020 08:10), Max: 37.3 (15 Nov 2020 17:36)  T(F): 98.3 (16 Nov 2020 08:10), Max: 99.1 (15 Nov 2020 17:36)  HR: 80 (16 Nov 2020 08:10) (80 - 91)  BP: 154/83 (16 Nov 2020 08:10) (148/58 - 166/73)  BP(mean): --  RR: 18 (16 Nov 2020 08:10) (18 - 18)  SpO2: 97% (16 Nov 2020 08:10) (96% - 100%)        Physical Exam:      Constitutional: frail looking  HEENT: NC/AT, EOMI, PERRLA, conjunctivae clear; ears and nose atraumatic; pharynx clear  Neck: supple; thyroid not palpable  Back: no tenderness  Respiratory: respiratory effort normal; clear to auscultation  Cardiovascular: S1S2 regular, no murmurs  Abdomen: soft, not tender, not distended, positive BS; no liver or spleen organomegaly; ostomy  Genitourinary: no suprapubic tenderness  Musculoskeletal: no muscle tenderness, no joint swelling or tenderness  Neurological/ Psychiatric: AxOx3, judgement and insight normal;  moving all extremities  Skin: dressings on buttocks    Labs: all available labs reviewed     Labs:                       Labs:                        8.5    15.84 )-----------( 629      ( 15 Nov 2020 07:54 )             28.7     11-15    135  |  102  |  11  ----------------------------<  96  4.0   |  25  |  0.68    Ca    8.6      15 Nov 2020 07:54  Phos  3.1     11-15  Mg     2.3     11-15    TPro  7.1  /  Alb  1.9<L>  /  TBili  0.3  /  DBili  x   /  AST  21  /  ALT  18  /  AlkPhos  121<H>  11-15           Cultures:       Ferritin, Serum: 57 ng/mL (11-13-20 @ 07:54)        < from: CT Abdomen and Pelvis w/ IV Cont (11.02.20 @ 17:46) >    EXAM:  CT ABDOMEN AND PELVIS IC                          EXAM:  CT CHEST IC                            PROCEDURE DATE:  11/02/2020          INTERPRETATION:  CLINICAL INFORMATION: sepsis    COMPARISON: None.    PROCEDURE:  CT of the Chest, Abdomen and Pelvis was performed with intravenous contrast.  Intravenous contrast: 90 ml Omnipaque 350. 10 ml discarded.  Oral contrast: None.  Sagittal and coronal reformats were performed.    FINDINGS:    CHEST:    LUNGS AND LARGE AIRWAYS: Patent central airways. Azygos fissure. A few less than 5 mm nodules in the left lower lobe.  PLEURA: No pleural effusion.  VESSELS: Within normal limits.  HEART: Heart size is normal.  No pericardial effusion.  MEDIASTINUM AND BLANK: No lymphadenopathy.  CHEST WALL ANDLOWER NECK: Within normal limits.    ABDOMEN AND PELVIS:    LIVER: Within normal limits.  BILE DUCTS: Normal caliber.  GALLBLADDER: Cholelithiasis.  SPLEEN: Within normal limits.  PANCREAS: Within normal limits.  ADRENALS: Within normal limits.  KIDNEYS/URETERS: Hypodense left renal lesion too small to characterize.    BLADDER: Within normal limits.  REPRODUCTIVE ORGANS: Fibroid uterus.    BOWEL: No bowel obstruction. The appendix is normal.  PERITONEUM: No ascites.  VESSELS:  Within normal limits.  RETROPERITONEUM/LYMPH NODES: No lymphadenopathy.  ABDOMINAL WALL: There is within the visualized left ischioanal fossa and around the left gluteal muscles. Air tracks along the deep left pelvis and presacral space into the retroperitoneum. There is air along the left psoas muscle.  BONES: Within normal limits.    IMPRESSION: Extensive subcutaneous gas within the left ischioanal fossa tracking into the pelvis and retroperitoneum worrisome for gas forming infection.      < end of copied text >      Radiology: all available radiological tests reviewed    Advanced directives addressed: full resuscitation

## 2020-11-16 NOTE — PROGRESS NOTE ADULT - SUBJECTIVE AND OBJECTIVE BOX
Past 24 hrs: No acute events overnight. Patient has been to the OR several times after I have seen her last. She was debrided and found to have an abscess in the left wound which was drained and a [place] drain was placed. Patient has been doing well otherwise denies any F/C      HPI: 59 y/o F who is a poor historian and according to chart has PMHx of hemorrhoids. She states that she recently lost her  and has not been eating well. She says for the past week she has not been feeling well, subjective fevers , muscle aches, shortness of breath and loose stools. She had pain in her perineal area for the past week and on 11/2 she called and ambulance and was brought to the ED. The patient was worked up and on imaging was found to have bilateral buttock  necrotizing fasciitis and went to the OR for debridement on 11/2. The patient has had multiple debridements since (11/3, 11/5, 11/7) and a diverting Colostomy on 11/5. The wound is currently being dressed with Full strength Dakins BID. Cultures have grown out E.Coli.      PE: Gen- NAD, Obese  HEENT- EOMI  CVS- RRR  Chest- Equal Chest Expansion B/L  And- Soft, NT, Diverting Colostomy, Functioning.  Ext- FROM x4    Focused Exam: Back- B/L Buttock/aric-rectal wounds, Left [id number], Right [id number], Both tracking superiorly,   Left wound-superior edge of wound with necrotic fat, Malenkot drain in place with purulent/sanguinous drainage in bag.      11/15- WBC- 15.84 <--19.38

## 2020-11-16 NOTE — PROGRESS NOTE ADULT - ASSESSMENT
60y old  Female with PMHX of obesity and HTN who presented to the ER with a chief complaint of myalgia/ weakness/ anorexia/ SOB x2 weeks.  Of note, patient's  just passed away suddenly 10/15/20.  Patient was found to have sepsis due to necrotizing fasciitis and admitted by Colorectal surgery and taken to the OR.  Medicine was consulted for medical management.      #Sepsis secondary to Necrotizing Fascitis:    S/p multiple OR debridements with colorectal surgery.  Last 11/16.    OR cultures with:  e.coli/ enterococcus/ strep mitis/ staph aureus/ peptostreptococcus.    Cont IV Zosyn as per ID.    Completed 7 days clinda.    S/p diverting colostomy to help with wound healing.    Cont trujillo as per colorectal.    Wound VAC.    Monitor temps/ WBC.    Blood cx negative to date.    Pain control.      #Thrombocytosis:    Reactive.  Trend.  Last 629.      #Anemia:  Acute on Chronic.  Microcytic with normal RBC.  Check hemoglobin electrophoresis.  ? Thalassemia.    Appreciate heme eval.    S/p transfusions on admission.    Stable ~8.    T/c IV iron when infection improved.      #HTN.  Cont amlodipine.    #hx VTE w/ UFH allergy:    Cont Fondaparinux.

## 2020-11-16 NOTE — PROGRESS NOTE ADULT - ASSESSMENT
60F with no significant past medical history, admitted with necrotizing fasciitis of the buttocks, unclear origin, anemia, reactive leukocytosis.  Went to the OR for debridement on 11/2/2020.  Has been on numerous antibiotic regimens (completed 7 days clindamycin, 12 days of Zosyn, etc. On 11/5 patient had diverting colostomy.  Second wound debridement was performed 11/13/2020.  Hematologic consult requested for anemia (hemoglobin 8.4 g/dL) and leuko– thrombocytosis (WBC 15.9, platelets 604,000).

## 2020-11-16 NOTE — PROGRESS NOTE ADULT - SUBJECTIVE AND OBJECTIVE BOX
MORGAN BEATTY, 60y Female  MRN: [id number]  ATTENDING: Adolfo Gordillo    HPI:  60F with no significant past medical history, admitted with necrotizing fasciitis of the buttocks, unclear origin, anemia, reactive leukocytosis.  Went to the OR for debridement on 11/2/2020.  Has been on numerous antibiotic regimens (completed 7 days clindamycin, 12 days of Zosyn, etc. On 11/5 patient had diverting colostomy.  Second wound debridement was performed 11/13/2020.  Hematologic consult requested for anemia (hemoglobin 8.4 g/dL) and leuko– thrombocytosis (WBC 15.9, platelets 604,000).  MEDICATIONS:  acetaminophen   Tablet .. 650 milliGRAM(s) Oral every 6 hours PRN  ALBUTerol    90 MICROgram(s) HFA Inhaler 2 Puff(s) Inhalation every 6 hours PRN  ALPRAZolam 0.25 milliGRAM(s) Oral every 8 hours PRN  amLODIPine   Tablet 5 milliGRAM(s) Oral daily  Dakins Solution - Full Strength 1 Application(s) Topical two times a day  dextrose 5% + sodium chloride 0.9% with potassium chloride 20 mEq/L 1000 milliLiter(s) IV Continuous <Continuous>  fondaparinux Injectable 2.5 milliGRAM(s) SubCutaneous daily  HYDROmorphone  Injectable 0.5 milliGRAM(s) IV Push every 4 hours PRN  ondansetron Injectable 4 milliGRAM(s) IV Push once PRN  oxyCODONE    IR 10 milliGRAM(s) Oral every 6 hours PRN  oxyCODONE    IR 5 milliGRAM(s) Oral every 6 hours PRN  piperacillin/tazobactam IVPB.. 3.375 Gram(s) IV Intermittent every 8 hours    All other medications reviewed.    SUBJECTIVE:  Status post diverting colostomy with liquid stool.    Denies chest pain, palpitation.  Wound VAC in place.  For another wound debridement today    VITALS:  T(C): 36.8 (11-16-20 @ 08:10), Max: 37.3 (11-15-20 @ 17:36)  T(F): 98.3 (11-16-20 @ 08:10), Max: 99.1 (11-15-20 @ 17:36)  HR: 80 (11-16-20 @ 08:10) (80 - 91)  BP: 154/83 (11-16-20 @ 08:10) (148/58 - 166/73)    PHYSICAL EXAM:  Constitutional: alert, well developed  HEENT: normocephalic, anicteric sclerae, no mucositis or thrush  Respiratory: bilateral clear to auscultation anteriorly  Cardiovascular : S1, S2 regular, rhythmic, no murmurs, gallops or rubs  Abdomen: soft, distended, + normoactive BS, no palpable HS- megaly  Extremities: no tenderness;  -c/c/e, pulses equal bilaterally  Skin- wound VAC in place left buttock    LABS:  (11-15) WBC: 15.84 K/uL,Hemoglobin: 8.5 g/dL, Hematocrit: 28.7 %,  Platelet: 629 K/uL  Platelet Count - Automated: 629 K/uL (11-15-20 @ 07:54)  Platelet Count - Automated: 643 K/uL (11-14-20 @ 08:54)  Platelet Count - Automated: 604 K/uL (11-13-20 @ 07:54)  Hemoglobin: 8.5 g/dL (11-15-20 @ 07:54)  Hemoglobin: 8.7 g/dL (11-14-20 @ 08:54)  Hemoglobin: 8.4 g/dL (11-13-20 @ 07:54)    RADIOLOGY:  EXAM:  CT ABDOMEN AND PELVIS IC                        EXAM:  CT CHEST IC                        PROCEDURE DATE:  11/02/2020    INTERPRETATION:  CLINICAL INFORMATION: sepsis  COMPARISON: None.    PROCEDURE:  CT of the Chest, Abdomen and Pelvis was performed with intravenous contrast.  Intravenous contrast: 90 ml Omnipaque 350. 10 ml discarded.  Oral contrast: None.  Sagittal and coronal reformats were performed.    FINDINGS:    CHEST:    LUNGS AND LARGE AIRWAYS: Patent central airways. Azygos fissure. A few less than 5 mm nodules in the left lower lobe.  PLEURA: No pleural effusion.  VESSELS: Within normal limits.  HEART: Heart size is normal.  No pericardial effusion.  MEDIASTINUM AND BLANK: No lymphadenopathy.  CHEST WALL ANDLOWER NECK: Within normal limits.    ABDOMEN AND PELVIS:    LIVER: Within normal limits.  BILE DUCTS: Normal caliber.  GALLBLADDER: Cholelithiasis.  SPLEEN: Within normal limits.  PANCREAS: Within normal limits.  ADRENALS: Within normal limits.  KIDNEYS/URETERS: Hypodense left renal lesion too small to characterize.    BLADDER: Within normal limits.  REPRODUCTIVE ORGANS: Fibroid uterus.    BOWEL: No bowel obstruction. The appendix is normal.  PERITONEUM: No ascites.  VESSELS:  Within normal limits.  RETROPERITONEUM/LYMPH NODES: No lymphadenopathy.  ABDOMINAL WALL: There is within the visualized left ischioanal fossa and around the left gluteal muscles. Air tracks along the deep left pelvis and presacral space into the retroperitoneum. There is air along the left psoas muscle.  BONES: Within normal limits.    IMPRESSION: Extensive subcutaneous gas within the left ischioanal fossa tracking into the pelvis and retroperitoneum worrisome for gas forming infection.

## 2020-11-16 NOTE — PROGRESS NOTE ADULT - ASSESSMENT
59 y/o F with PMHx of hemorrhoids admitted on 11/2 for evaluation of shortness of breath, weakness and muscle aches; had loose stools; is poor historian and history per medical record; the patient had imaging upon admission and was found to have bilateral buttock infection with necrotizing fasciitis and went to OR for debridement on 11/2. She cannot recall any details about the infection or how she came about having such a severe infection. She denies any allergy to penicillin or cephalosporins.     1. Patient admitted with necrotizing fasciitis of the buttocks; unclear origin; also noted with leukocytosis most likely reactive to infection  - follow up cultures   - iv hydration and supportive care   - wound care per surgery  - will need further debridement  - probably will need diverting colostomy for full wound care and healing---- and this surgery was done on 11/5  - serial cbc and monitor temperature   - reviewed prior medical records to evaluate for resistant or atypical pathogens   - day #13 zosyn  - completed 7 days clindamycin  - tolerating antibiotics without rashes or side effects   - all organisms are sensitive; NO ISOLATION IS NEEDED  - diet per surgery  Will follow

## 2020-11-16 NOTE — BRIEF OPERATIVE NOTE - NSEVIDNCEINFORABSCESSFT_GEN_ALL_CORE
fibrinous purulent tissue
Necrotic tissue, purulence 2/2 necrotizing soft tissue infection.
As above
necrotizing fasciitis of perianal area
perineal necrotizing fasciitis

## 2020-11-16 NOTE — PROGRESS NOTE ADULT - ASSESSMENT
ASSESSMENT/PLAN:  61 y/o F presents with necrotizing fasciitis of perineal area, s/p extensive perineal debridement x 2, and end colostomy creation. Hospital course complicated with acute anemia      NPO at [facility] for OR debridement in AM  monitor vitals  Leukocytosis, H/H stable  C/W IV abx per ID recs, Zosyn, Clinda  Surgical cx results: E.coli, peptostreptococcus, strep species   C/W Monitor stoma and ostomy output  Appreciated hospitalist recs  Plastic surgery consult appreciated; patient will likely need additional debridement prior to closure  DVT ppx/SCD    Case discussed with Colorectal surgery team

## 2020-11-16 NOTE — PROGRESS NOTE ADULT - PROBLEM SELECTOR PLAN 2
Thrombocytosis–frequently seen in postoperative period as well as with iron deficiency anemia,  likely reactive process.  Will continue monitoring CBC. DVT prophylaxis

## 2020-11-16 NOTE — PROGRESS NOTE ADULT - PROBLEM SELECTOR PLAN 1
- appreciate the consult, care as per primary team  - Pt is planned for OR today for debridement  - will plan for OR for closure once wound is clean and leukocytosis (WBC yesterday 15)  has resolved  - d/w Surgical Resident

## 2020-11-16 NOTE — PROGRESS NOTE ADULT - ASSESSMENT
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    {[id number]}

## 2020-11-16 NOTE — PROGRESS NOTE ADULT - SUBJECTIVE AND OBJECTIVE BOX
60y old  Female with PMHX of obesity and HTN who presented to the ER with a chief complaint of myalgia/ weakness/ anorexia/ SOB x2 weeks.  Of note, patient's  just passed away suddenly 10/15/20.  Patient was found to have sepsis due to necrotizing fasciitis and admitted by Colorectal surgery and taken to the OR.  Medicine was consulted for medical management.      11/13:  Pt is very pleasant, has no current c/o and no new o/n events.  planned for OR today.   11/14:  Pt seen.  Weak.  Hasn't gotten OOB.  S/p OR yesterday.    11/15:  Pt seen.  Has a lot of pressure in buttock area.  Otherwise okay.  Hasn't gotten OOB-- feels weak.    11/16:  Pt seen.  Pain controlled.  Going back to OR today for another debridement.  Hasn't gotten OOB since admission.      ROS:   All 10 systems reviewed and found to be negative with the exception of what has been described above.    Vital Signs Last 24 Hrs  T(C): 37.3 (16 Nov 2020 20:47), Max: 37.3 (16 Nov 2020 20:47)  T(F): 99.1 (16 Nov 2020 20:47), Max: 99.1 (16 Nov 2020 20:47)  HR: 86 (16 Nov 2020 20:47) (80 - 87)  BP: 124/61 (16 Nov 2020 20:47) (123/72 - 157/69)  BP(mean): --  RR: 16 (16 Nov 2020 20:47) (13 - 18)  SpO2: 98% (16 Nov 2020 20:47) (95% - 100%)    Constitutional: NAD, morbidly obese   HEENT: PERRL, EOMI, MMM.  Neck: Soft and supple, No carotid bruit, No JVD  Respiratory: Breath sounds are clear bilaterally, No wheezing, rales or rhonchi, resp unlabored  Cardiovascular: S1 and S2, regular rate and rhythm, no murmur, rub or gallop.  Gastrointestinal: Bowel Sounds present, soft, nontender, nondistended, no guarding, +ostomy   Extremities: No peripheral edema  Vascular: 2+ peripheral pulses  Neurological: A/O x 3, no focal deficits, sensory and cn2-12 intact  Skin: wounds dressed s/p debridement buttock area.  wound VAC.               11-15    135  |  102  |  11  ----------------------------<  96  4.0   |  25  |  0.68    Ca    8.6      15 Nov 2020 07:54  Phos  3.1     11-15  Mg     2.3     11-15    TPro  7.1  /  Alb  1.9<L>  /  TBili  0.3  /  DBili  x   /  AST  21  /  ALT  18  /  AlkPhos  121<H>  11-15                       8.5    15.84 )-----------( 629      ( 15 Nov 2020 07:54 )             28.7     LIVER FUNCTIONS - ( 15 Nov 2020 07:54 )  Alb: 1.9 g/dL / Pro: 7.1 gm/dL / ALK PHOS: 121 U/L / ALT: 18 U/L / AST: 21 U/L / GGT: x           Culture Results:   Moderate Escherichia coli  Numerous Peptostreptococcus anaerobius "Susceptibilities not performed"  Rare Alpha hemolytic strep "Susceptibilities not performed" (11-03 @ 19:37)    Culture Results:   Moderate Escherichia coli  Few Enterococcus faecalis  Rare Staphylococcus aureus  Few Streptococcus mitis/oralis group "Susceptibilities not performed"  Numerous Peptostreptococcus anaerobius "Susceptibilities not performed" (11-02 @ 19:33)  Culture Results:   Moderate Escherichia coli See previous culture  # [id number]    Rare Staphylococcus aureus  Rare Streptococcus mitis/oralis group "Susceptibilities not performed"  Moderate Peptostreptococcus anaerobius "Susceptibilities not performed" (11-02 @ 19:33)  Culture Results:   Moderate Escherichia coli  Rare Staphylococcus aureus  Few Alpha hemolytic strep "Susceptibilities not performed"  Numerous Peptostreptococcus anaerobius "Susceptibilities not performed" (11-02 @ 19:33)    MEDICATIONS  (STANDING):  amLODIPine   Tablet 5 milliGRAM(s) Oral daily  Dakins Solution - Full Strength 1 Application(s) Topical two times a day  fondaparinux Injectable 2.5 milliGRAM(s) SubCutaneous daily  piperacillin/tazobactam IVPB.. 3.375 Gram(s) IV Intermittent every 8 hours    MEDICATIONS  (PRN):  acetaminophen   Tablet .. 650 milliGRAM(s) Oral every 6 hours PRN Mild Pain (1 - 3)  ALBUTerol    90 MICROgram(s) HFA Inhaler 2 Puff(s) Inhalation every 6 hours PRN Shortness of Breath and/or Wheezing  ALPRAZolam 0.25 milliGRAM(s) Oral every 8 hours PRN anxiety  HYDROmorphone  Injectable 0.5 milliGRAM(s) IV Push every 4 hours PRN Severe Pain (7 - 10)  oxyCODONE    IR 10 milliGRAM(s) Oral every 6 hours PRN Severe Pain (7 - 10)  oxyCODONE    IR 5 milliGRAM(s) Oral every 6 hours PRN Moderate Pain (4 - 6)

## 2020-11-16 NOTE — PROGRESS NOTE ADULT - PROBLEM SELECTOR PLAN 1
Microcytic anemia– likely complex mechanism including patient's unusually long premenopause, possible hemoglobinopathy, poor nutrition.  Will continue on oral iron sulfate with vitamin C; consider parenteral iron supplementation. Hemoglobin stable > 8 g/dL.

## 2020-11-17 LAB
ANION GAP SERPL CALC-SCNC: 6 MMOL/L — SIGNIFICANT CHANGE UP (ref 5–17)
BUN SERPL-MCNC: 10 MG/DL — SIGNIFICANT CHANGE UP (ref 7–23)
CALCIUM SERPL-MCNC: 8.4 MG/DL — LOW (ref 8.5–10.1)
CHLORIDE SERPL-SCNC: 102 MMOL/L — SIGNIFICANT CHANGE UP (ref 96–108)
CO2 SERPL-SCNC: 26 MMOL/L — SIGNIFICANT CHANGE UP (ref 22–31)
CREAT SERPL-MCNC: 0.66 MG/DL — SIGNIFICANT CHANGE UP (ref 0.5–1.3)
GLUCOSE SERPL-MCNC: 105 MG/DL — HIGH (ref 70–99)
HCT VFR BLD CALC: 30.8 % — LOW (ref 34.5–45)
HEMOGLOBIN INTERPRETATION: SIGNIFICANT CHANGE UP
HGB A MFR BLD: 97.8 % — SIGNIFICANT CHANGE UP (ref 95.8–98)
HGB A2 MFR BLD: 2.2 % — SIGNIFICANT CHANGE UP (ref 2–3.2)
HGB BLD-MCNC: 9 G/DL — LOW (ref 11.5–15.5)
MAGNESIUM SERPL-MCNC: 2 MG/DL — SIGNIFICANT CHANGE UP (ref 1.6–2.6)
MCHC RBC-ENTMCNC: 21.1 PG — LOW (ref 27–34)
MCHC RBC-ENTMCNC: 29.2 GM/DL — LOW (ref 32–36)
MCV RBC AUTO: 72.3 FL — LOW (ref 80–100)
PHOSPHATE SERPL-MCNC: 3.4 MG/DL — SIGNIFICANT CHANGE UP (ref 2.5–4.5)
PLATELET # BLD AUTO: 527 K/UL — HIGH (ref 150–400)
POTASSIUM SERPL-MCNC: 3.9 MMOL/L — SIGNIFICANT CHANGE UP (ref 3.5–5.3)
POTASSIUM SERPL-SCNC: 3.9 MMOL/L — SIGNIFICANT CHANGE UP (ref 3.5–5.3)
RBC # BLD: 4.26 M/UL — SIGNIFICANT CHANGE UP (ref 3.8–5.2)
RBC # FLD: SIGNIFICANT CHANGE UP (ref 10.3–14.5)
SODIUM SERPL-SCNC: 134 MMOL/L — LOW (ref 135–145)
WBC # BLD: 14.82 K/UL — HIGH (ref 3.8–10.5)
WBC # FLD AUTO: 14.82 K/UL — HIGH (ref 3.8–10.5)

## 2020-11-17 PROCEDURE — 99232 SBSQ HOSP IP/OBS MODERATE 35: CPT

## 2020-11-17 RX ORDER — SODIUM CHLORIDE 9 MG/ML
1000 INJECTION INTRAMUSCULAR; INTRAVENOUS; SUBCUTANEOUS
Refills: 0 | Status: DISCONTINUED | OUTPATIENT
Start: 2020-11-17 | End: 2020-11-18

## 2020-11-17 RX ORDER — HYDROMORPHONE HYDROCHLORIDE 2 MG/ML
1 INJECTION INTRAMUSCULAR; INTRAVENOUS; SUBCUTANEOUS ONCE
Refills: 0 | Status: DISCONTINUED | OUTPATIENT
Start: 2020-11-17 | End: 2020-11-17

## 2020-11-17 RX ORDER — SODIUM CHLORIDE 9 MG/ML
1000 INJECTION, SOLUTION INTRAVENOUS
Refills: 0 | Status: DISCONTINUED | OUTPATIENT
Start: 2020-11-17 | End: 2020-11-17

## 2020-11-17 RX ADMIN — HYDROMORPHONE HYDROCHLORIDE 0.5 MILLIGRAM(S): 2 INJECTION INTRAMUSCULAR; INTRAVENOUS; SUBCUTANEOUS at 12:15

## 2020-11-17 RX ADMIN — OXYCODONE HYDROCHLORIDE 10 MILLIGRAM(S): 5 TABLET ORAL at 18:00

## 2020-11-17 RX ADMIN — AMLODIPINE BESYLATE 5 MILLIGRAM(S): 2.5 TABLET ORAL at 09:40

## 2020-11-17 RX ADMIN — PIPERACILLIN AND TAZOBACTAM 25 GRAM(S): 4; .5 INJECTION, POWDER, LYOPHILIZED, FOR SOLUTION INTRAVENOUS at 05:46

## 2020-11-17 RX ADMIN — OXYCODONE HYDROCHLORIDE 10 MILLIGRAM(S): 5 TABLET ORAL at 09:40

## 2020-11-17 RX ADMIN — HYDROMORPHONE HYDROCHLORIDE 0.5 MILLIGRAM(S): 2 INJECTION INTRAMUSCULAR; INTRAVENOUS; SUBCUTANEOUS at 22:10

## 2020-11-17 RX ADMIN — HYDROMORPHONE HYDROCHLORIDE 1 MILLIGRAM(S): 2 INJECTION INTRAMUSCULAR; INTRAVENOUS; SUBCUTANEOUS at 13:45

## 2020-11-17 RX ADMIN — HYDROMORPHONE HYDROCHLORIDE 0.5 MILLIGRAM(S): 2 INJECTION INTRAMUSCULAR; INTRAVENOUS; SUBCUTANEOUS at 11:45

## 2020-11-17 RX ADMIN — OXYCODONE HYDROCHLORIDE 10 MILLIGRAM(S): 5 TABLET ORAL at 17:25

## 2020-11-17 RX ADMIN — PIPERACILLIN AND TAZOBACTAM 25 GRAM(S): 4; .5 INJECTION, POWDER, LYOPHILIZED, FOR SOLUTION INTRAVENOUS at 20:15

## 2020-11-17 RX ADMIN — FONDAPARINUX SODIUM 2.5 MILLIGRAM(S): 2.5 INJECTION, SOLUTION SUBCUTANEOUS at 09:42

## 2020-11-17 RX ADMIN — Medication 1 APPLICATION(S): at 11:44

## 2020-11-17 RX ADMIN — PIPERACILLIN AND TAZOBACTAM 25 GRAM(S): 4; .5 INJECTION, POWDER, LYOPHILIZED, FOR SOLUTION INTRAVENOUS at 13:15

## 2020-11-17 RX ADMIN — Medication 1 APPLICATION(S): at 22:17

## 2020-11-17 RX ADMIN — HYDROMORPHONE HYDROCHLORIDE 1 MILLIGRAM(S): 2 INJECTION INTRAMUSCULAR; INTRAVENOUS; SUBCUTANEOUS at 13:09

## 2020-11-17 RX ADMIN — OXYCODONE HYDROCHLORIDE 10 MILLIGRAM(S): 5 TABLET ORAL at 02:44

## 2020-11-17 RX ADMIN — OXYCODONE HYDROCHLORIDE 10 MILLIGRAM(S): 5 TABLET ORAL at 10:10

## 2020-11-17 NOTE — PROGRESS NOTE ADULT - SUBJECTIVE AND OBJECTIVE BOX
SURGERY DAILY PROGRESS NOTE:     Subjective:  Patient seen and examined at bedside during am rounds. AVSS. Denies any fevers, chills, n/v/d, chest pain or shortness of breath. Dressing changed at bedside.    Objective:    MEDICATIONS  (STANDING):  amLODIPine   Tablet 5 milliGRAM(s) Oral daily  Dakins Solution - Full Strength 1 Application(s) Topical two times a day  fondaparinux Injectable 2.5 milliGRAM(s) SubCutaneous daily  piperacillin/tazobactam IVPB.. 3.375 Gram(s) IV Intermittent every 8 hours    MEDICATIONS  (PRN):  acetaminophen   Tablet .. 650 milliGRAM(s) Oral every 6 hours PRN Mild Pain (1 - 3)  ALBUTerol    90 MICROgram(s) HFA Inhaler 2 Puff(s) Inhalation every 6 hours PRN Shortness of Breath and/or Wheezing  ALPRAZolam 0.25 milliGRAM(s) Oral every 8 hours PRN anxiety  HYDROmorphone  Injectable 0.5 milliGRAM(s) IV Push every 4 hours PRN Severe Pain (7 - 10)  oxyCODONE    IR 10 milliGRAM(s) Oral every 6 hours PRN Severe Pain (7 - 10)  oxyCODONE    IR 5 milliGRAM(s) Oral every 6 hours PRN Moderate Pain (4 - 6)      Vital Signs Last 24 Hrs  T(C): 37.5 (17 Nov 2020 07:52), Max: 37.8 (17 Nov 2020 00:03)  T(F): 99.5 (17 Nov 2020 07:52), Max: 100.1 (17 Nov 2020 00:03)  HR: 94 (17 Nov 2020 07:52) (80 - 95)  BP: 140/72 (17 Nov 2020 07:52) (123/72 - 148/66)  BP(mean): --  RR: 19 (17 Nov 2020 07:52) (13 - 19)  SpO2: 92% (17 Nov 2020 07:52) (92% - 100%)      PHYSICAL EXAM   Gen: well-appearing, in no acute distress  CV: pulse regularly present   Resp: airway patent, non-labored breathing  Abd: soft, NTND; no rebound or guarding. Incision c/d/i; ostomy pink and patent      I&O's Detail    16 Nov 2020 07:01  -  17 Nov 2020 07:00  --------------------------------------------------------  IN:  Total IN: 0 mL    OUT:    Indwelling Catheter - Urethral (mL): 1300 mL  Total OUT: 1300 mL    Total NET: -1300 mL      17 Nov 2020 07:01  -  17 Nov 2020 10:53  --------------------------------------------------------  IN:  Total IN: 0 mL    OUT:    Indwelling Catheter - Urethral (mL): 450 mL  Total OUT: 450 mL    Total NET: -450 mL    LABS:                        9.0    14.82 )-----------( 527      ( 17 Nov 2020 07:26 )             30.8     11-17    134<L>  |  102  |  10  ----------------------------<  105<H>  3.9   |  26  |  0.66    Ca    8.4<L>      17 Nov 2020 07:26  Phos  3.4     11-17  Mg     2.0     11-17

## 2020-11-17 NOTE — PROGRESS NOTE ADULT - ASSESSMENT
60y old  Female with PMHX of obesity and HTN who presented to the ER with a chief complaint of myalgia/ weakness/ anorexia/ SOB x2 weeks.  Of note, patient's  just passed away suddenly 10/15/20.  Patient was found to have sepsis due to necrotizing fasciitis and admitted by Colorectal surgery and taken to the OR.  Medicine was consulted for medical management.      #Sepsis secondary to Necrotizing Fascitis:    S/p multiple OR debridements with colorectal surgery.  Last 11/16.    OR cultures with:  e.coli/ enterococcus/ strep mitis/ staph aureus/ peptostreptococcus.    Cont IV Zosyn as per ID.    Completed 7 days clinda.    S/p diverting colostomy to help with wound healing.    Cont trujillo as per colorectal.    Monitor temps/ WBC.    Blood cx negative to date.    Pain control.      #Thrombocytosis:    Reactive.  Trend. 2/2 to stress/infection    #Anemia:  Acute on Chronic.  Microcytic with normal RBC  Appreciate heme eval.    S/p transfusions on admission.    Stable ~8.    T/c IV iron when infection improved.      #HTN.  Cont amlodipine.    #hx VTE w/ UFH allergy:    Cont Fondaparinux.    #Severe protein calorie malnutrition  -supportive care

## 2020-11-17 NOTE — PROGRESS NOTE ADULT - PROBLEM SELECTOR PLAN 1
Microcytic anemia– hemoglobin stable increased at 9 g/dL. Continue monitoring CBC while on iron supplement.

## 2020-11-17 NOTE — PROGRESS NOTE ADULT - ASSESSMENT
ASSESSMENT/PLAN:  61 y/o F presents with necrotizing fasciitis of perineal area, s/p extensive perineal debridement x 2, and end colostomy creation. Hospital course complicated with acute anemia      NPO at [facility] for OR debridement in AM once again for attempted VW placement   monitor vitals  Leukocytosis unchanged, H/H stable  C/W IV abx per ID recs, Zosyn, Clinda  Surgical cx results: E.coli, peptostreptococcus, strep species   C/W Monitor stoma and ostomy output  Appreciated hospitalist recs  Plastic surgery consult appreciated; patient will likely need additional debridement prior to closure  DVT ppx/SCD    Case discussed with Colorectal surgery team

## 2020-11-17 NOTE — PROGRESS NOTE ADULT - SUBJECTIVE AND OBJECTIVE BOX
MORGAN BEATTY, 60y Female  MRN: [id number]  ATTENDING: Adolfo Gordillo    HPI:  60F with no significant past medical history, admitted with necrotizing fasciitis of the buttocks, unclear origin, anemia, reactive leukocytosis.  Went to the OR for debridement on 11/2/2020.  Has been on numerous antibiotic regimens (completed 7 days clindamycin, 12 days of Zosyn, etc. On 11/5 patient had diverting colostomy.  Second wound debridement was performed 11/13/2020.  Hematologic consult requested for anemia (hemoglobin 8.4 g/dL) and leuko– thrombocytosis (WBC 15.9, platelets 604,000).  11/16/20- non-excisional wound debridement  MEDICATIONS:  acetaminophen   Tablet .. 650 milliGRAM(s) Oral every 6 hours PRN  ALBUTerol    90 MICROgram(s) HFA Inhaler 2 Puff(s) Inhalation every 6 hours PRN  ALPRAZolam 0.25 milliGRAM(s) Oral every 8 hours PRN  amLODIPine   Tablet 5 milliGRAM(s) Oral daily  Dakins Solution - Full Strength 1 Application(s) Topical two times a day  dextrose 5% + sodium chloride 0.9% with potassium chloride 20 mEq/L 1000 milliLiter(s) IV Continuous <Continuous>  fondaparinux Injectable 2.5 milliGRAM(s) SubCutaneous daily  HYDROmorphone  Injectable 0.5 milliGRAM(s) IV Push every 4 hours PRN  ondansetron Injectable 4 milliGRAM(s) IV Push once PRN  oxyCODONE    IR 10 milliGRAM(s) Oral every 6 hours PRN  oxyCODONE    IR 5 milliGRAM(s) Oral every 6 hours PRN  piperacillin/tazobactam IVPB.. 3.375 Gram(s) IV Intermittent every 8 hours    All other medications reviewed.    SUBJECTIVE:  Status post diverting colostomy with liquid stool.    Denies chest pain, palpitation.  Wound VAC in place.  For another wound debridement today    VITALS:  T(C): 36.8 (11-16-20 @ 08:10), Max: 37.3 (11-15-20 @ 17:36)  T(F): 98.3 (11-16-20 @ 08:10), Max: 99.1 (11-15-20 @ 17:36)  HR: 80 (11-16-20 @ 08:10) (80 - 91)  BP: 154/83 (11-16-20 @ 08:10) (148/58 - 166/73)    PHYSICAL EXAM:  Constitutional: alert, well developed  HEENT: normocephalic, anicteric sclerae, no mucositis or thrush  Respiratory: bilateral clear to auscultation anteriorly  Cardiovascular : S1, S2 regular, rhythmic, no murmurs, gallops or rubs  Abdomen: soft, distended, + normoactive BS, no palpable HS- megaly  Extremities: no tenderness;  -c/c/e, pulses equal bilaterally  Skin- wound VAC in place left buttock    LABS:  Hemoglobin: 9.0 g/dL (11-17-20 @ 07:26)  Hemoglobin: 8.5 g/dL (11-15-20 @ 07:54)  Hemoglobin: 8.7 g/dL (11-14-20 @ 08:54)    WBC Count: 14.82 K/uL (11-17-20 @ 07:26)  WBC Count: 15.84 K/uL (11-15-20 @ 07:54)  WBC Count: 19.38 K/uL (11-14-20 @ 08:54)    Platelet Count - Automated: 527 K/uL (11-17-20 @ 07:26)  Platelet Count - Automated: 629 K/uL (11-15-20 @ 07:54)  Platelet Count - Automated: 643 K/uL (11-14-20 @ 08:54)    RADIOLOGY:  EXAM:  CT ABDOMEN AND PELVIS IC                        EXAM:  CT CHEST IC                        PROCEDURE DATE:  11/02/2020    INTERPRETATION:  CLINICAL INFORMATION: sepsis  COMPARISON: None.    PROCEDURE:  CT of the Chest, Abdomen and Pelvis was performed with intravenous contrast.  Intravenous contrast: 90 ml Omnipaque 350. 10 ml discarded.  Oral contrast: None.  Sagittal and coronal reformats were performed.    FINDINGS:    CHEST:    LUNGS AND LARGE AIRWAYS: Patent central airways. Azygos fissure. A few less than 5 mm nodules in the left lower lobe.  PLEURA: No pleural effusion.  VESSELS: Within normal limits.  HEART: Heart size is normal.  No pericardial effusion.  MEDIASTINUM AND BLANK: No lymphadenopathy.  CHEST WALL ANDLOWER NECK: Within normal limits.    ABDOMEN AND PELVIS:    LIVER: Within normal limits.  BILE DUCTS: Normal caliber.  GALLBLADDER: Cholelithiasis.  SPLEEN: Within normal limits.  PANCREAS: Within normal limits.  ADRENALS: Within normal limits.  KIDNEYS/URETERS: Hypodense left renal lesion too small to characterize.    BLADDER: Within normal limits.  REPRODUCTIVE ORGANS: Fibroid uterus.    BOWEL: No bowel obstruction. The appendix is normal.  PERITONEUM: No ascites.  VESSELS:  Within normal limits.  RETROPERITONEUM/LYMPH NODES: No lymphadenopathy.  ABDOMINAL WALL: There is within the visualized left ischioanal fossa and around the left gluteal muscles. Air tracks along the deep left pelvis and presacral space into the retroperitoneum. There is air along the left psoas muscle.  BONES: Within normal limits.    IMPRESSION: Extensive subcutaneous gas within the left ischioanal fossa tracking into the pelvis and retroperitoneum worrisome for gas forming infection.

## 2020-11-17 NOTE — PROGRESS NOTE ADULT - SUBJECTIVE AND OBJECTIVE BOX
60y old  Female with PMHX of obesity and HTN who presented to the ER with a chief complaint of myalgia/ weakness/ anorexia/ SOB x2 weeks.  Of note, patient's  just passed away suddenly 10/15/20.  Patient was found to have sepsis due to necrotizing fasciitis and admitted by Colorectal surgery and taken to the OR.  Medicine was consulted for medical management.      11/17: repeat debridement yeaterday. C/o right sided buttock pain.     ROS:   All 10 systems reviewed and found to be negative with the exception of what has been described above.    ICU Vital Signs Last 24 Hrs  T(C): 37.5 (17 Nov 2020 07:52), Max: 37.8 (17 Nov 2020 00:03)  T(F): 99.5 (17 Nov 2020 07:52), Max: 100.1 (17 Nov 2020 00:03)  HR: 94 (17 Nov 2020 07:52) (80 - 95)  BP: 140/72 (17 Nov 2020 07:52) (123/72 - 148/66)  BP(mean): --  ABP: --  ABP(mean): --  RR: 19 (17 Nov 2020 07:52) (13 - 19)  SpO2: 92% (17 Nov 2020 07:52) (92% - 100%)    Constitutional: NAD, morbidly obese   HEENT: PERRL, EOMI, MMM.  Neck: Soft and supple, No carotid bruit, No JVD  Respiratory: Breath sounds are clear bilaterally, No wheezing, rales or rhonchi, resp unlabored  Cardiovascular: S1 and S2, regular rate and rhythm, no murmur, rub or gallop.  Gastrointestinal: Bowel Sounds present, soft, nontender, nondistended, no guarding, +ostomy with pick viable stump; midline staples, c/d/i  Extremities: No peripheral edema  Vascular: 2+ peripheral pulses  Neurological: A/O x 3, no focal deficits, sensory and cn2-12 intact  Skin: wounds dressed s/p debridement buttock area.  wound VAC.               11-15    135  |  102  |  11  ----------------------------<  96  4.0   |  25  |  0.68    Ca    8.6      15 Nov 2020 07:54  Phos  3.1     11-15  Mg     2.3     11-15    TPro  7.1  /  Alb  1.9<L>  /  TBili  0.3  /  DBili  x   /  AST  21  /  ALT  18  /  AlkPhos  121<H>  11-15                       8.5    15.84 )-----------( 629      ( 15 Nov 2020 07:54 )             28.7     LIVER FUNCTIONS - ( 15 Nov 2020 07:54 )  Alb: 1.9 g/dL / Pro: 7.1 gm/dL / ALK PHOS: 121 U/L / ALT: 18 U/L / AST: 21 U/L / GGT: x           Culture Results:   Moderate Escherichia coli  Numerous Peptostreptococcus anaerobius "Susceptibilities not performed"  Rare Alpha hemolytic strep "Susceptibilities not performed" (11-03 @ 19:37)    Culture Results:   Moderate Escherichia coli  Few Enterococcus faecalis  Rare Staphylococcus aureus  Few Streptococcus mitis/oralis group "Susceptibilities not performed"  Numerous Peptostreptococcus anaerobius "Susceptibilities not performed" (11-02 @ 19:33)  Culture Results:   Moderate Escherichia coli See previous culture  # [id number]    Rare Staphylococcus aureus  Rare Streptococcus mitis/oralis group "Susceptibilities not performed"  Moderate Peptostreptococcus anaerobius "Susceptibilities not performed" (11-02 @ 19:33)  Culture Results:   Moderate Escherichia coli  Rare Staphylococcus aureus  Few Alpha hemolytic strep "Susceptibilities not performed"  Numerous Peptostreptococcus anaerobius "Susceptibilities not performed" (11-02 @ 19:33)    MEDICATIONS  (STANDING):  amLODIPine   Tablet 5 milliGRAM(s) Oral daily  Dakins Solution - Full Strength 1 Application(s) Topical two times a day  fondaparinux Injectable 2.5 milliGRAM(s) SubCutaneous daily  piperacillin/tazobactam IVPB.. 3.375 Gram(s) IV Intermittent every 8 hours    MEDICATIONS  (PRN):  acetaminophen   Tablet .. 650 milliGRAM(s) Oral every 6 hours PRN Mild Pain (1 - 3)  ALBUTerol    90 MICROgram(s) HFA Inhaler 2 Puff(s) Inhalation every 6 hours PRN Shortness of Breath and/or Wheezing  ALPRAZolam 0.25 milliGRAM(s) Oral every 8 hours PRN anxiety  HYDROmorphone  Injectable 0.5 milliGRAM(s) IV Push every 4 hours PRN Severe Pain (7 - 10)  oxyCODONE    IR 10 milliGRAM(s) Oral every 6 hours PRN Severe Pain (7 - 10)  oxyCODONE    IR 5 milliGRAM(s) Oral every 6 hours PRN Moderate Pain (4 - 6)

## 2020-11-18 LAB
ANION GAP SERPL CALC-SCNC: 7 MMOL/L — SIGNIFICANT CHANGE UP (ref 5–17)
BUN SERPL-MCNC: 10 MG/DL — SIGNIFICANT CHANGE UP (ref 7–23)
CALCIUM SERPL-MCNC: 8.4 MG/DL — LOW (ref 8.5–10.1)
CHLORIDE SERPL-SCNC: 102 MMOL/L — SIGNIFICANT CHANGE UP (ref 96–108)
CO2 SERPL-SCNC: 27 MMOL/L — SIGNIFICANT CHANGE UP (ref 22–31)
CREAT SERPL-MCNC: 0.62 MG/DL — SIGNIFICANT CHANGE UP (ref 0.5–1.3)
GLUCOSE SERPL-MCNC: 104 MG/DL — HIGH (ref 70–99)
HCT VFR BLD CALC: 27.8 % — LOW (ref 34.5–45)
HGB BLD-MCNC: 8.3 G/DL — LOW (ref 11.5–15.5)
MAGNESIUM SERPL-MCNC: 2.1 MG/DL — SIGNIFICANT CHANGE UP (ref 1.6–2.6)
MCHC RBC-ENTMCNC: 21.5 PG — LOW (ref 27–34)
MCHC RBC-ENTMCNC: 29.9 GM/DL — LOW (ref 32–36)
MCV RBC AUTO: 72 FL — LOW (ref 80–100)
PHOSPHATE SERPL-MCNC: 3.2 MG/DL — SIGNIFICANT CHANGE UP (ref 2.5–4.5)
PLATELET # BLD AUTO: 448 K/UL — HIGH (ref 150–400)
POTASSIUM SERPL-MCNC: 3.9 MMOL/L — SIGNIFICANT CHANGE UP (ref 3.5–5.3)
POTASSIUM SERPL-SCNC: 3.9 MMOL/L — SIGNIFICANT CHANGE UP (ref 3.5–5.3)
RBC # BLD: 3.86 M/UL — SIGNIFICANT CHANGE UP (ref 3.8–5.2)
RBC # FLD: SIGNIFICANT CHANGE UP (ref 10.3–14.5)
SODIUM SERPL-SCNC: 136 MMOL/L — SIGNIFICANT CHANGE UP (ref 135–145)
WBC # BLD: 10.18 K/UL — SIGNIFICANT CHANGE UP (ref 3.8–10.5)
WBC # FLD AUTO: 10.18 K/UL — SIGNIFICANT CHANGE UP (ref 3.8–10.5)

## 2020-11-18 PROCEDURE — 99232 SBSQ HOSP IP/OBS MODERATE 35: CPT

## 2020-11-18 PROCEDURE — 86077 PHYS BLOOD BANK SERV XMATCH: CPT

## 2020-11-18 RX ORDER — ALBUTEROL 90 UG/1
2 AEROSOL, METERED ORAL EVERY 6 HOURS
Refills: 0 | Status: DISCONTINUED | OUTPATIENT
Start: 2020-11-18 | End: 2020-11-23

## 2020-11-18 RX ORDER — OXYCODONE HYDROCHLORIDE 5 MG/1
10 TABLET ORAL ONCE
Refills: 0 | Status: DISCONTINUED | OUTPATIENT
Start: 2020-11-18 | End: 2020-11-18

## 2020-11-18 RX ORDER — FONDAPARINUX SODIUM 2.5 MG/.5ML
2.5 INJECTION, SOLUTION SUBCUTANEOUS DAILY
Refills: 0 | Status: DISCONTINUED | OUTPATIENT
Start: 2020-11-18 | End: 2020-11-23

## 2020-11-18 RX ORDER — FENTANYL CITRATE 50 UG/ML
50 INJECTION INTRAVENOUS
Refills: 0 | Status: DISCONTINUED | OUTPATIENT
Start: 2020-11-18 | End: 2020-11-18

## 2020-11-18 RX ORDER — AMLODIPINE BESYLATE 2.5 MG/1
5 TABLET ORAL DAILY
Refills: 0 | Status: DISCONTINUED | OUTPATIENT
Start: 2020-11-18 | End: 2020-11-23

## 2020-11-18 RX ORDER — PIPERACILLIN AND TAZOBACTAM 4; .5 G/20ML; G/20ML
3.38 INJECTION, POWDER, LYOPHILIZED, FOR SOLUTION INTRAVENOUS EVERY 8 HOURS
Refills: 0 | Status: DISCONTINUED | OUTPATIENT
Start: 2020-11-18 | End: 2020-11-23

## 2020-11-18 RX ORDER — HYDROMORPHONE HYDROCHLORIDE 2 MG/ML
0.5 INJECTION INTRAMUSCULAR; INTRAVENOUS; SUBCUTANEOUS EVERY 4 HOURS
Refills: 0 | Status: DISCONTINUED | OUTPATIENT
Start: 2020-11-18 | End: 2020-11-22

## 2020-11-18 RX ORDER — OXYCODONE HYDROCHLORIDE 5 MG/1
5 TABLET ORAL EVERY 6 HOURS
Refills: 0 | Status: DISCONTINUED | OUTPATIENT
Start: 2020-11-18 | End: 2020-11-23

## 2020-11-18 RX ORDER — ONDANSETRON 8 MG/1
4 TABLET, FILM COATED ORAL ONCE
Refills: 0 | Status: DISCONTINUED | OUTPATIENT
Start: 2020-11-18 | End: 2020-11-18

## 2020-11-18 RX ORDER — OXYCODONE HYDROCHLORIDE 5 MG/1
10 TABLET ORAL EVERY 6 HOURS
Refills: 0 | Status: DISCONTINUED | OUTPATIENT
Start: 2020-11-18 | End: 2020-11-23

## 2020-11-18 RX ORDER — SODIUM CHLORIDE 9 MG/ML
1000 INJECTION, SOLUTION INTRAVENOUS
Refills: 0 | Status: DISCONTINUED | OUTPATIENT
Start: 2020-11-18 | End: 2020-11-18

## 2020-11-18 RX ORDER — ACETAMINOPHEN 500 MG
650 TABLET ORAL EVERY 6 HOURS
Refills: 0 | Status: DISCONTINUED | OUTPATIENT
Start: 2020-11-18 | End: 2020-11-23

## 2020-11-18 RX ADMIN — AMLODIPINE BESYLATE 5 MILLIGRAM(S): 2.5 TABLET ORAL at 09:37

## 2020-11-18 RX ADMIN — SODIUM CHLORIDE 75 MILLILITER(S): 9 INJECTION INTRAMUSCULAR; INTRAVENOUS; SUBCUTANEOUS at 00:34

## 2020-11-18 RX ADMIN — HYDROMORPHONE HYDROCHLORIDE 0.5 MILLIGRAM(S): 2 INJECTION INTRAMUSCULAR; INTRAVENOUS; SUBCUTANEOUS at 00:05

## 2020-11-18 RX ADMIN — OXYCODONE HYDROCHLORIDE 10 MILLIGRAM(S): 5 TABLET ORAL at 09:37

## 2020-11-18 RX ADMIN — OXYCODONE HYDROCHLORIDE 10 MILLIGRAM(S): 5 TABLET ORAL at 10:00

## 2020-11-18 RX ADMIN — HYDROMORPHONE HYDROCHLORIDE 0.5 MILLIGRAM(S): 2 INJECTION INTRAMUSCULAR; INTRAVENOUS; SUBCUTANEOUS at 22:30

## 2020-11-18 RX ADMIN — PIPERACILLIN AND TAZOBACTAM 25 GRAM(S): 4; .5 INJECTION, POWDER, LYOPHILIZED, FOR SOLUTION INTRAVENOUS at 05:16

## 2020-11-18 RX ADMIN — PIPERACILLIN AND TAZOBACTAM 25 GRAM(S): 4; .5 INJECTION, POWDER, LYOPHILIZED, FOR SOLUTION INTRAVENOUS at 14:26

## 2020-11-18 RX ADMIN — SODIUM CHLORIDE 75 MILLILITER(S): 9 INJECTION, SOLUTION INTRAVENOUS at 21:27

## 2020-11-18 RX ADMIN — PIPERACILLIN AND TAZOBACTAM 25 GRAM(S): 4; .5 INJECTION, POWDER, LYOPHILIZED, FOR SOLUTION INTRAVENOUS at 22:22

## 2020-11-18 RX ADMIN — OXYCODONE HYDROCHLORIDE 10 MILLIGRAM(S): 5 TABLET ORAL at 14:40

## 2020-11-18 RX ADMIN — OXYCODONE HYDROCHLORIDE 10 MILLIGRAM(S): 5 TABLET ORAL at 13:41

## 2020-11-18 RX ADMIN — OXYCODONE HYDROCHLORIDE 10 MILLIGRAM(S): 5 TABLET ORAL at 00:02

## 2020-11-18 RX ADMIN — Medication 1 APPLICATION(S): at 09:38

## 2020-11-18 RX ADMIN — HYDROMORPHONE HYDROCHLORIDE 0.5 MILLIGRAM(S): 2 INJECTION INTRAMUSCULAR; INTRAVENOUS; SUBCUTANEOUS at 22:13

## 2020-11-18 NOTE — BRIEF OPERATIVE NOTE - NSICDXBRIEFOPLAUNCH_GEN_ALL_CORE
<--- Click to Launch ICDx for PreOp, PostOp and Procedure

## 2020-11-18 NOTE — PROGRESS NOTE ADULT - PROBLEM SELECTOR PLAN 2
Thrombocytosis–platelet count improving; reactive process. Continue DVT prophylaxis Thrombocytosis–platelet count defervescing, reactive.

## 2020-11-18 NOTE — BRIEF OPERATIVE NOTE - OPERATION/FINDINGS
Fibrinous purulent tissue found deep in left buttock wound.
b/l buttock wounds clean bases, viable looking tissue.  Pulse lavage with antibiotic irrigation
Extensive debridement of b/l buttocks.  Area of debridement of left buttock atleast 6x5x6 and right buttock 6x4x5, extensive necrotic tissue debrided b/l. Extensive pus drained b/l.  Wounds track to rectum and up into labia b/l. Tissue debrided until bleeding healthy tissue encountered. Patient will need further debridement and possible diverting ostomy in future.  High risk of morbidity and mortality 2/2  to the extent of the necrotizing soft tissue infection.
No necrotic tissue encountered.  Wound vac placed
Additional pocket of purulent drainage removed from left perineal wound; pulse irrigation performed and Malencot drain placed right perineal wound with good granulation tissue and healing well. Metcalf removed
left end colostomy creation
left perineal cavity purulent with more fluid pocket

## 2020-11-18 NOTE — PROGRESS NOTE ADULT - PROBLEM SELECTOR PLAN 1
Microcytic anemia– hemoglobin stable increased at 9 g/dL. Continue monitoring CBC while on iron supplement. Microcytic anemia– hemoglobin improving; continue present care

## 2020-11-18 NOTE — PROGRESS NOTE ADULT - ASSESSMENT
ASSESSMENT/PLAN:  59 y/o F presents with necrotizing fasciitis of perineal area, s/p extensive perineal debridement x 2, and end colostomy creation. Hospital course complicated with acute anemia      OR today for debridement for attempted VW placement   Monitor vitals  Leukocytosis trending down, H/H stable  C/W IV abx per ID recs, Zosyn, Clinda as per ID  Surgical cx results: E.coli, peptostreptococcus, strep species   C/W Monitor stoma and ostomy output  Appreciated hospitalist recs  Plastic surgery consult appreciated; patient will likely need additional debridement prior to closure  DVT ppx/SCD    Case discussed with Colorectal surgery team

## 2020-11-18 NOTE — BRIEF OPERATIVE NOTE - NSICDXBRIEFPOSTOP_GEN_ALL_CORE_FT
POST-OP DIAGNOSIS:  Necrotizing fasciitis 02-Nov-2020 21:56:47  Darrion Bear  

## 2020-11-18 NOTE — PROGRESS NOTE ADULT - SUBJECTIVE AND OBJECTIVE BOX
MORGAN BEATTY, 60y Female  MRN: [id number]  ATTENDING: Adolfo Gordillo    HPI:  60F with no significant past medical history, admitted with necrotizing fasciitis of the buttocks, unclear origin, anemia, reactive leukocytosis.  Went to the OR for debridement on 11/2/2020.  Has been on numerous antibiotic regimens (completed 7 days clindamycin, 12 days of Zosyn, etc. On 11/5 patient had diverting colostomy.  Second wound debridement was performed 11/13/2020.  Hematologic consult requested for anemia (hemoglobin 8.4 g/dL) and leuko– thrombocytosis (WBC 15.9, platelets 604,000).  11/16/20- non-excisional wound debridement  MEDICATIONS:  acetaminophen   Tablet .. 650 milliGRAM(s) Oral every 6 hours PRN  ALBUTerol    90 MICROgram(s) HFA Inhaler 2 Puff(s) Inhalation every 6 hours PRN  ALPRAZolam 0.25 milliGRAM(s) Oral every 8 hours PRN  amLODIPine   Tablet 5 milliGRAM(s) Oral daily  Dakins Solution - Full Strength 1 Application(s) Topical two times a day  dextrose 5% + sodium chloride 0.9% with potassium chloride 20 mEq/L 1000 milliLiter(s) IV Continuous <Continuous>  fondaparinux Injectable 2.5 milliGRAM(s) SubCutaneous daily  HYDROmorphone  Injectable 0.5 milliGRAM(s) IV Push every 4 hours PRN  ondansetron Injectable 4 milliGRAM(s) IV Push once PRN  oxyCODONE    IR 10 milliGRAM(s) Oral every 6 hours PRN  oxyCODONE    IR 5 milliGRAM(s) Oral every 6 hours PRN  piperacillin/tazobactam IVPB.. 3.375 Gram(s) IV Intermittent every 8 hours    All other medications reviewed.    SUBJECTIVE:  Status post diverting colostomy with liquid stool.    Denies chest pain, palpitation.  Wound VAC in place.  For another wound debridement today    VITALS:  T(C): 36.8 (11-16-20 @ 08:10), Max: 37.3 (11-15-20 @ 17:36)  T(F): 98.3 (11-16-20 @ 08:10), Max: 99.1 (11-15-20 @ 17:36)  HR: 80 (11-16-20 @ 08:10) (80 - 91)  BP: 154/83 (11-16-20 @ 08:10) (148/58 - 166/73)    PHYSICAL EXAM:  Constitutional: alert, well developed  HEENT: normocephalic, anicteric sclerae, no mucositis or thrush  Respiratory: bilateral clear to auscultation anteriorly  Cardiovascular : S1, S2 regular, rhythmic, no murmurs, gallops or rubs  Abdomen: soft, distended, + normoactive BS, no palpable HS- megaly  Extremities: no tenderness;  -c/c/e, pulses equal bilaterally  Skin- wound VAC in place left buttock    LABS:  Hemoglobin: 9.0 g/dL (11-17-20 @ 07:26)  Hemoglobin: 8.5 g/dL (11-15-20 @ 07:54)  Hemoglobin: 8.7 g/dL (11-14-20 @ 08:54)    WBC Count: 14.82 K/uL (11-17-20 @ 07:26)  WBC Count: 15.84 K/uL (11-15-20 @ 07:54)  WBC Count: 19.38 K/uL (11-14-20 @ 08:54)    Platelet Count - Automated: 527 K/uL (11-17-20 @ 07:26)  Platelet Count - Automated: 629 K/uL (11-15-20 @ 07:54)  Platelet Count - Automated: 643 K/uL (11-14-20 @ 08:54)    RADIOLOGY:  EXAM:  CT ABDOMEN AND PELVIS IC                        EXAM:  CT CHEST IC                        PROCEDURE DATE:  11/02/2020    INTERPRETATION:  CLINICAL INFORMATION: sepsis  COMPARISON: None.    PROCEDURE:  CT of the Chest, Abdomen and Pelvis was performed with intravenous contrast.  Intravenous contrast: 90 ml Omnipaque 350. 10 ml discarded.  Oral contrast: None.  Sagittal and coronal reformats were performed.    FINDINGS:    CHEST:    LUNGS AND LARGE AIRWAYS: Patent central airways. Azygos fissure. A few less than 5 mm nodules in the left lower lobe.  PLEURA: No pleural effusion.  VESSELS: Within normal limits.  HEART: Heart size is normal.  No pericardial effusion.  MEDIASTINUM AND BLANK: No lymphadenopathy.  CHEST WALL ANDLOWER NECK: Within normal limits.    ABDOMEN AND PELVIS:    LIVER: Within normal limits.  BILE DUCTS: Normal caliber.  GALLBLADDER: Cholelithiasis.  SPLEEN: Within normal limits.  PANCREAS: Within normal limits.  ADRENALS: Within normal limits.  KIDNEYS/URETERS: Hypodense left renal lesion too small to characterize.    BLADDER: Within normal limits.  REPRODUCTIVE ORGANS: Fibroid uterus.    BOWEL: No bowel obstruction. The appendix is normal.  PERITONEUM: No ascites.  VESSELS:  Within normal limits.  RETROPERITONEUM/LYMPH NODES: No lymphadenopathy.  ABDOMINAL WALL: There is within the visualized left ischioanal fossa and around the left gluteal muscles. Air tracks along the deep left pelvis and presacral space into the retroperitoneum. There is air along the left psoas muscle.  BONES: Within normal limits.    IMPRESSION: Extensive subcutaneous gas within the left ischioanal fossa tracking into the pelvis and retroperitoneum worrisome for gas forming infection.     MORGAN BEATTY, 60y Female  MRN: [id number]  ATTENDING: Adolfo Gordillo    HPI:  60F with no significant past medical history, admitted with necrotizing fasciitis of the buttocks, unclear origin, anemia, reactive leukocytosis.  Went to the OR for debridement on 11/2/2020.  Has been on numerous antibiotic regimens (completed 7 days clindamycin, 12 days of Zosyn, etc. On 11/5 patient had diverting colostomy.  Second wound debridement was performed 11/13/2020.  Hematologic consult requested for anemia (hemoglobin 8.4 g/dL) and leuko– thrombocytosis (WBC 15.9, platelets 604,000).  11/16/20- non-excisional wound debridement  MEDICATIONS:  acetaminophen   Tablet .. 650 milliGRAM(s) Oral every 6 hours PRN  ALBUTerol    90 MICROgram(s) HFA Inhaler 2 Puff(s) Inhalation every 6 hours PRN  ALPRAZolam 0.25 milliGRAM(s) Oral every 8 hours PRN  amLODIPine   Tablet 5 milliGRAM(s) Oral daily  Dakins Solution - Full Strength 1 Application(s) Topical two times a day  dextrose 5% + sodium chloride 0.9% with potassium chloride 20 mEq/L 1000 milliLiter(s) IV Continuous <Continuous>  fondaparinux Injectable 2.5 milliGRAM(s) SubCutaneous daily  HYDROmorphone  Injectable 0.5 milliGRAM(s) IV Push every 4 hours PRN  ondansetron Injectable 4 milliGRAM(s) IV Push once PRN  oxyCODONE    IR 10 milliGRAM(s) Oral every 6 hours PRN  oxyCODONE    IR 5 milliGRAM(s) Oral every 6 hours PRN  piperacillin/tazobactam IVPB.. 3.375 Gram(s) IV Intermittent every 8 hours    All other medications reviewed.    SUBJECTIVE:  No new complaints    VITALS:  T(C): 36.8 (11-16-20 @ 08:10), Max: 37.3 (11-15-20 @ 17:36)  T(F): 98.3 (11-16-20 @ 08:10), Max: 99.1 (11-15-20 @ 17:36)  HR: 80 (11-16-20 @ 08:10) (80 - 91)  BP: 154/83 (11-16-20 @ 08:10) (148/58 - 166/73)    PHYSICAL EXAM:  Constitutional: alert, well developed  HEENT: normocephalic, anicteric sclerae, no mucositis or thrush  Respiratory: bilateral clear to auscultation anteriorly  Cardiovascular : S1, S2 regular, rhythmic, no murmurs, gallops or rubs  Abdomen: soft, distended, + normoactive BS, no palpable HS- megaly  Extremities: no tenderness;  -c/c/e, pulses equal bilaterally  Skin- wound VAC in place left buttock    LABS:  Hemoglobin: 9.0 g/dL (11-17-20 @ 07:26)  Hemoglobin: 8.5 g/dL (11-15-20 @ 07:54)  Hemoglobin: 8.7 g/dL (11-14-20 @ 08:54)    WBC Count: 14.82 K/uL (11-17-20 @ 07:26)  WBC Count: 15.84 K/uL (11-15-20 @ 07:54)  WBC Count: 19.38 K/uL (11-14-20 @ 08:54)    Platelet Count - Automated: 527 K/uL (11-17-20 @ 07:26)  Platelet Count - Automated: 629 K/uL (11-15-20 @ 07:54)  Platelet Count - Automated: 643 K/uL (11-14-20 @ 08:54)    RADIOLOGY:  EXAM:  CT ABDOMEN AND PELVIS IC                        EXAM:  CT CHEST IC                        PROCEDURE DATE:  11/02/2020    INTERPRETATION:  CLINICAL INFORMATION: sepsis  COMPARISON: None.    PROCEDURE:  CT of the Chest, Abdomen and Pelvis was performed with intravenous contrast.  Intravenous contrast: 90 ml Omnipaque 350. 10 ml discarded.  Oral contrast: None.  Sagittal and coronal reformats were performed.    FINDINGS:    CHEST:    LUNGS AND LARGE AIRWAYS: Patent central airways. Azygos fissure. A few less than 5 mm nodules in the left lower lobe.  PLEURA: No pleural effusion.  VESSELS: Within normal limits.  HEART: Heart size is normal.  No pericardial effusion.  MEDIASTINUM AND BLANK: No lymphadenopathy.  CHEST WALL ANDLOWER NECK: Within normal limits.    ABDOMEN AND PELVIS:    LIVER: Within normal limits.  BILE DUCTS: Normal caliber.  GALLBLADDER: Cholelithiasis.  SPLEEN: Within normal limits.  PANCREAS: Within normal limits.  ADRENALS: Within normal limits.  KIDNEYS/URETERS: Hypodense left renal lesion too small to characterize.    BLADDER: Within normal limits.  REPRODUCTIVE ORGANS: Fibroid uterus.    BOWEL: No bowel obstruction. The appendix is normal.  PERITONEUM: No ascites.  VESSELS:  Within normal limits.  RETROPERITONEUM/LYMPH NODES: No lymphadenopathy.  ABDOMINAL WALL: There is within the visualized left ischioanal fossa and around the left gluteal muscles. Air tracks along the deep left pelvis and presacral space into the retroperitoneum. There is air along the left psoas muscle.  BONES: Within normal limits.    IMPRESSION: Extensive subcutaneous gas within the left ischioanal fossa tracking into the pelvis and retroperitoneum worrisome for gas forming infection.

## 2020-11-18 NOTE — PROGRESS NOTE ADULT - SUBJECTIVE AND OBJECTIVE BOX
SURGERY DAILY PROGRESS NOTE:     Subjective:  Patient seen and examined at bedside during am rounds. AVSS. Denies any fevers, chills, n/v/d, chest pain or shortness of breath.    Objective:    MEDICATIONS  (STANDING):  amLODIPine   Tablet 5 milliGRAM(s) Oral daily  Dakins Solution - Full Strength 1 Application(s) Topical two times a day  fondaparinux Injectable 2.5 milliGRAM(s) SubCutaneous daily  piperacillin/tazobactam IVPB.. 3.375 Gram(s) IV Intermittent every 8 hours  sodium chloride 0.9%. 1000 milliLiter(s) (75 mL/Hr) IV Continuous <Continuous>    MEDICATIONS  (PRN):  acetaminophen   Tablet .. 650 milliGRAM(s) Oral every 6 hours PRN Mild Pain (1 - 3)  ALBUTerol    90 MICROgram(s) HFA Inhaler 2 Puff(s) Inhalation every 6 hours PRN Shortness of Breath and/or Wheezing  HYDROmorphone  Injectable 0.5 milliGRAM(s) IV Push every 4 hours PRN Severe Pain (7 - 10)  oxyCODONE    IR 10 milliGRAM(s) Oral every 6 hours PRN Severe Pain (7 - 10)  oxyCODONE    IR 5 milliGRAM(s) Oral every 6 hours PRN Moderate Pain (4 - 6)      Vital Signs Last 24 Hrs  T(C): 37.1 (18 Nov 2020 08:16), Max: 37.2 (17 Nov 2020 15:10)  T(F): 98.8 (18 Nov 2020 08:16), Max: 99 (17 Nov 2020 15:10)  HR: 85 (18 Nov 2020 08:16) (82 - 93)  BP: 150/77 (18 Nov 2020 08:16) (123/68 - 154/68)  BP(mean): --  RR: 17 (18 Nov 2020 08:16) (17 - 18)  SpO2: 99% (18 Nov 2020 08:16) (97% - 99%)      PHYSICAL EXAM   Gen: well-appearing, in no acute distress  CV: pulse regularly present   Resp: airway patent, non-labored breathing  Abd: soft, NTND; no rebound or guarding. Incision c/d/i      I&O's Detail    17 Nov 2020 07:01  -  18 Nov 2020 07:00  --------------------------------------------------------  IN:  Total IN: 0 mL    OUT:    Indwelling Catheter - Urethral (mL): 1150 mL  Total OUT: 1150 mL    Total NET: -1150 mL      LABS:                        9.0    14.82 )-----------( 527      ( 17 Nov 2020 07:26 )             30.8     11-17    134<L>  |  102  |  10  ----------------------------<  105<H>  3.9   |  26  |  0.66    Ca    8.4<L>      17 Nov 2020 07:26  Phos  3.4     11-17  Mg     2.0     11-17

## 2020-11-18 NOTE — PROGRESS NOTE ADULT - SUBJECTIVE AND OBJECTIVE BOX
Date of service: 11-18-20 @ 10:45      Patient lying in bed; having episodes of dizziness; to go to OR today      ROS: no fever or chills; no HA, no SOB or cough, no abdominal pain, no diarrhea or constipation; no dysuria, no urinary frequency, no legs pain, no rashes    MEDICATIONS  (STANDING):  amLODIPine   Tablet 5 milliGRAM(s) Oral daily  Dakins Solution - Full Strength 1 Application(s) Topical two times a day  fondaparinux Injectable 2.5 milliGRAM(s) SubCutaneous daily  piperacillin/tazobactam IVPB.. 3.375 Gram(s) IV Intermittent every 8 hours  sodium chloride 0.9%. 1000 milliLiter(s) (75 mL/Hr) IV Continuous <Continuous>    MEDICATIONS  (PRN):  acetaminophen   Tablet .. 650 milliGRAM(s) Oral every 6 hours PRN Mild Pain (1 - 3)  ALBUTerol    90 MICROgram(s) HFA Inhaler 2 Puff(s) Inhalation every 6 hours PRN Shortness of Breath and/or Wheezing  HYDROmorphone  Injectable 0.5 milliGRAM(s) IV Push every 4 hours PRN Severe Pain (7 - 10)  oxyCODONE    IR 5 milliGRAM(s) Oral every 6 hours PRN Moderate Pain (4 - 6)  oxyCODONE    IR 10 milliGRAM(s) Oral every 6 hours PRN Severe Pain (7 - 10)      Vital Signs Last 24 Hrs  T(C): 37.1 (18 Nov 2020 08:16), Max: 37.2 (17 Nov 2020 15:10)  T(F): 98.8 (18 Nov 2020 08:16), Max: 99 (17 Nov 2020 15:10)  HR: 85 (18 Nov 2020 08:16) (82 - 93)  BP: 150/77 (18 Nov 2020 08:16) (123/68 - 154/68)  BP(mean): --  RR: 17 (18 Nov 2020 08:16) (17 - 18)  SpO2: 99% (18 Nov 2020 08:16) (97% - 99%)        Physical Exam:      Constitutional: frail looking  HEENT: NC/AT, EOMI, PERRLA, conjunctivae clear; ears and nose atraumatic; pharynx clear  Neck: supple; thyroid not palpable  Back: no tenderness  Respiratory: respiratory effort normal; clear to auscultation  Cardiovascular: S1S2 regular, no murmurs  Abdomen: soft, not tender, not distended, positive BS; no liver or spleen organomegaly; ostomy  Genitourinary: no suprapubic tenderness  Musculoskeletal: no muscle tenderness, no joint swelling or tenderness  Neurological/ Psychiatric: AxOx3, judgement and insight normal;  moving all extremities  Skin: dressings on buttocks    Labs: all available labs reviewed     Labs:             Labs:                        8.3    10.18 )-----------( 448      ( 18 Nov 2020 08:02 )             27.8     11-18    136  |  102  |  10  ----------------------------<  104<H>  3.9   |  27  |  0.62    Ca    8.4<L>      18 Nov 2020 08:02  Phos  3.2     11-18  Mg     2.1     11-18             Cultures:       Ferritin, Serum: 57 ng/mL (11-13-20 @ 07:54)        < from: CT Abdomen and Pelvis w/ IV Cont (11.02.20 @ 17:46) >    EXAM:  CT ABDOMEN AND PELVIS IC                          EXAM:  CT CHEST IC                            PROCEDURE DATE:  11/02/2020          INTERPRETATION:  CLINICAL INFORMATION: sepsis    COMPARISON: None.    PROCEDURE:  CT of the Chest, Abdomen and Pelvis was performed with intravenous contrast.  Intravenous contrast: 90 ml Omnipaque 350. 10 ml discarded.  Oral contrast: None.  Sagittal and coronal reformats were performed.    FINDINGS:    CHEST:    LUNGS AND LARGE AIRWAYS: Patent central airways. Azygos fissure. A few less than 5 mm nodules in the left lower lobe.  PLEURA: No pleural effusion.  VESSELS: Within normal limits.  HEART: Heart size is normal.  No pericardial effusion.  MEDIASTINUM AND BLANK: No lymphadenopathy.  CHEST WALL ANDLOWER NECK: Within normal limits.    ABDOMEN AND PELVIS:    LIVER: Within normal limits.  BILE DUCTS: Normal caliber.  GALLBLADDER: Cholelithiasis.  SPLEEN: Within normal limits.  PANCREAS: Within normal limits.  ADRENALS: Within normal limits.  KIDNEYS/URETERS: Hypodense left renal lesion too small to characterize.    BLADDER: Within normal limits.  REPRODUCTIVE ORGANS: Fibroid uterus.    BOWEL: No bowel obstruction. The appendix is normal.  PERITONEUM: No ascites.  VESSELS:  Within normal limits.  RETROPERITONEUM/LYMPH NODES: No lymphadenopathy.  ABDOMINAL WALL: There is within the visualized left ischioanal fossa and around the left gluteal muscles. Air tracks along the deep left pelvis and presacral space into the retroperitoneum. There is air along the left psoas muscle.  BONES: Within normal limits.    IMPRESSION: Extensive subcutaneous gas within the left ischioanal fossa tracking into the pelvis and retroperitoneum worrisome for gas forming infection.      < end of copied text >      Radiology: all available radiological tests reviewed    Advanced directives addressed: full resuscitation

## 2020-11-18 NOTE — BRIEF OPERATIVE NOTE - NSICDXBRIEFPROCEDURE_GEN_ALL_CORE_FT
PROCEDURES:  Nonexcisional debridement of wound 07-Nov-2020 09:37:31  Darrion Bear  Lysis of adhesions of peritoneum 05-Nov-2020 12:46:49  Darrion Bear  Exploratory laparotomy 05-Nov-2020 12:45:53  Darrion Bear  Diagnostic laparoscopy 05-Nov-2020 12:45:21  Darrion Bear  Creation, end colostomy 05-Nov-2020 12:44:48  Darrion Bear  Debridement of necrotizing fasciitis of both buttocks 02-Nov-2020 21:56:16  Darrion Bear  
PROCEDURES:  Initial intraoperative application of wound vacuum-assisted closure device 18-Nov-2020 21:28:18  Darrion Bear  Nonexcisional debridement of wound 07-Nov-2020 09:37:31  Darrion Bear  Debridement of necrotizing fasciitis of both buttocks 02-Nov-2020 21:56:16  Darrion Bear  
PROCEDURES:  Nonexcisional debridement of wound 07-Nov-2020 09:37:31  Darrion Bear  Debridement of necrotizing fasciitis of both buttocks 02-Nov-2020 21:56:16  Darrion Bear  
PROCEDURES:  Debridement of necrotizing fasciitis of both buttocks 02-Nov-2020 21:56:16  Darrion Bear  
PROCEDURES:  Lysis of adhesions of peritoneum 05-Nov-2020 12:46:49  Darrion Bear  Exploratory laparotomy 05-Nov-2020 12:45:53  Darrion Bear  Diagnostic laparoscopy 05-Nov-2020 12:45:21  Darrion Bear  Creation, end colostomy 05-Nov-2020 12:44:48  Darrion Bear  Debridement of necrotizing fasciitis of both buttocks 02-Nov-2020 21:56:16  Darrion Bear

## 2020-11-18 NOTE — BRIEF OPERATIVE NOTE - NSICDXBRIEFPREOP_GEN_ALL_CORE_FT
PRE-OP DIAGNOSIS:  Necrotizing fasciitis 02-Nov-2020 21:56:36  Darrion Bear  

## 2020-11-18 NOTE — PROGRESS NOTE ADULT - ASSESSMENT
59 y/o F with PMHx of hemorrhoids admitted on 11/2 for evaluation of shortness of breath, weakness and muscle aches; had loose stools; is poor historian and history per medical record; the patient had imaging upon admission and was found to have bilateral buttock infection with necrotizing fasciitis and went to OR for debridement on 11/2. She cannot recall any details about the infection or how she came about having such a severe infection. She denies any allergy to penicillin or cephalosporins.     1. Patient admitted with necrotizing fasciitis of the buttocks; unclear origin; also noted with leukocytosis most likely reactive to infection  - follow up cultures   - iv hydration and supportive care   - wound care per surgery  - will need further debridement  - probably will need diverting colostomy for full wound care and healing---- and this surgery was done on 11/5  - serial cbc and monitor temperature   - reviewed prior medical records to evaluate for resistant or atypical pathogens   - day #15 zosyn  - completed 7 days clindamycin  - tolerating antibiotics without rashes or side effects   - all organisms are sensitive; NO ISOLATION IS NEEDED  - diet per surgery  - should patient become dizzy when sitting up should check orthostatics  Will follow

## 2020-11-19 LAB
ANION GAP SERPL CALC-SCNC: 7 MMOL/L — SIGNIFICANT CHANGE UP (ref 5–17)
BUN SERPL-MCNC: 11 MG/DL — SIGNIFICANT CHANGE UP (ref 7–23)
CALCIUM SERPL-MCNC: 8.5 MG/DL — SIGNIFICANT CHANGE UP (ref 8.5–10.1)
CHLORIDE SERPL-SCNC: 103 MMOL/L — SIGNIFICANT CHANGE UP (ref 96–108)
CO2 SERPL-SCNC: 26 MMOL/L — SIGNIFICANT CHANGE UP (ref 22–31)
CREAT SERPL-MCNC: 0.62 MG/DL — SIGNIFICANT CHANGE UP (ref 0.5–1.3)
GLUCOSE SERPL-MCNC: 110 MG/DL — HIGH (ref 70–99)
HCT VFR BLD CALC: 28 % — LOW (ref 34.5–45)
HGB BLD-MCNC: 8.3 G/DL — LOW (ref 11.5–15.5)
MAGNESIUM SERPL-MCNC: 2 MG/DL — SIGNIFICANT CHANGE UP (ref 1.6–2.6)
MCHC RBC-ENTMCNC: 21.2 PG — LOW (ref 27–34)
MCHC RBC-ENTMCNC: 29.6 GM/DL — LOW (ref 32–36)
MCV RBC AUTO: 71.4 FL — LOW (ref 80–100)
PHOSPHATE SERPL-MCNC: 3.5 MG/DL — SIGNIFICANT CHANGE UP (ref 2.5–4.5)
PLATELET # BLD AUTO: 440 K/UL — HIGH (ref 150–400)
POTASSIUM SERPL-MCNC: 3.8 MMOL/L — SIGNIFICANT CHANGE UP (ref 3.5–5.3)
POTASSIUM SERPL-SCNC: 3.8 MMOL/L — SIGNIFICANT CHANGE UP (ref 3.5–5.3)
RBC # BLD: 3.92 M/UL — SIGNIFICANT CHANGE UP (ref 3.8–5.2)
RBC # FLD: SIGNIFICANT CHANGE UP (ref 10.3–14.5)
SODIUM SERPL-SCNC: 136 MMOL/L — SIGNIFICANT CHANGE UP (ref 135–145)
WBC # BLD: 12.47 K/UL — HIGH (ref 3.8–10.5)
WBC # FLD AUTO: 12.47 K/UL — HIGH (ref 3.8–10.5)

## 2020-11-19 PROCEDURE — 99232 SBSQ HOSP IP/OBS MODERATE 35: CPT

## 2020-11-19 RX ADMIN — OXYCODONE HYDROCHLORIDE 10 MILLIGRAM(S): 5 TABLET ORAL at 23:06

## 2020-11-19 RX ADMIN — AMLODIPINE BESYLATE 5 MILLIGRAM(S): 2.5 TABLET ORAL at 09:18

## 2020-11-19 RX ADMIN — OXYCODONE HYDROCHLORIDE 10 MILLIGRAM(S): 5 TABLET ORAL at 16:45

## 2020-11-19 RX ADMIN — HYDROMORPHONE HYDROCHLORIDE 0.5 MILLIGRAM(S): 2 INJECTION INTRAMUSCULAR; INTRAVENOUS; SUBCUTANEOUS at 14:25

## 2020-11-19 RX ADMIN — OXYCODONE HYDROCHLORIDE 10 MILLIGRAM(S): 5 TABLET ORAL at 10:00

## 2020-11-19 RX ADMIN — PIPERACILLIN AND TAZOBACTAM 25 GRAM(S): 4; .5 INJECTION, POWDER, LYOPHILIZED, FOR SOLUTION INTRAVENOUS at 21:38

## 2020-11-19 RX ADMIN — FONDAPARINUX SODIUM 2.5 MILLIGRAM(S): 2.5 INJECTION, SOLUTION SUBCUTANEOUS at 09:19

## 2020-11-19 RX ADMIN — OXYCODONE HYDROCHLORIDE 10 MILLIGRAM(S): 5 TABLET ORAL at 09:23

## 2020-11-19 RX ADMIN — OXYCODONE HYDROCHLORIDE 10 MILLIGRAM(S): 5 TABLET ORAL at 00:15

## 2020-11-19 RX ADMIN — OXYCODONE HYDROCHLORIDE 10 MILLIGRAM(S): 5 TABLET ORAL at 01:00

## 2020-11-19 RX ADMIN — OXYCODONE HYDROCHLORIDE 10 MILLIGRAM(S): 5 TABLET ORAL at 16:13

## 2020-11-19 RX ADMIN — PIPERACILLIN AND TAZOBACTAM 25 GRAM(S): 4; .5 INJECTION, POWDER, LYOPHILIZED, FOR SOLUTION INTRAVENOUS at 14:48

## 2020-11-19 RX ADMIN — PIPERACILLIN AND TAZOBACTAM 25 GRAM(S): 4; .5 INJECTION, POWDER, LYOPHILIZED, FOR SOLUTION INTRAVENOUS at 05:02

## 2020-11-19 RX ADMIN — HYDROMORPHONE HYDROCHLORIDE 0.5 MILLIGRAM(S): 2 INJECTION INTRAMUSCULAR; INTRAVENOUS; SUBCUTANEOUS at 14:09

## 2020-11-19 NOTE — PROGRESS NOTE ADULT - SUBJECTIVE AND OBJECTIVE BOX
Date of service: 11-19-20 @ 11:10    Patient lying in bed; had wound vac placed on 11/18  Afebrile; eating breakfast        ROS: no fever or chills; denies dizziness, no HA, no SOB or cough, no abdominal pain, no diarrhea or constipation; no dysuria, no urinary frequency, no legs pain, no rashes    MEDICATIONS  (STANDING):  amLODIPine   Tablet 5 milliGRAM(s) Oral daily  fondaparinux Injectable 2.5 milliGRAM(s) SubCutaneous daily  piperacillin/tazobactam IVPB.. 3.375 Gram(s) IV Intermittent every 8 hours    MEDICATIONS  (PRN):  acetaminophen   Tablet .. 650 milliGRAM(s) Oral every 6 hours PRN Mild Pain (1 - 3)  ALBUTerol    90 MICROgram(s) HFA Inhaler 2 Puff(s) Inhalation every 6 hours PRN Shortness of Breath and/or Wheezing  HYDROmorphone  Injectable 0.5 milliGRAM(s) IV Push every 4 hours PRN Severe Pain (7 - 10)  oxyCODONE    IR 10 milliGRAM(s) Oral every 6 hours PRN Severe Pain (7 - 10)  oxyCODONE    IR 5 milliGRAM(s) Oral every 6 hours PRN Moderate Pain (4 - 6)      Vital Signs Last 24 Hrs  T(C): 37.1 (19 Nov 2020 07:24), Max: 37.3 (18 Nov 2020 16:30)  T(F): 98.7 (19 Nov 2020 07:24), Max: 99.1 (18 Nov 2020 16:30)  HR: 85 (19 Nov 2020 07:24) (81 - 96)  BP: 136/71 (19 Nov 2020 07:24) (136/71 - 169/68)  BP(mean): --  RR: 17 (19 Nov 2020 07:24) (13 - 18)  SpO2: 95% (19 Nov 2020 07:24) (95% - 100%)        Physical Exam:      Constitutional: frail looking  HEENT: NC/AT, EOMI, PERRLA, conjunctivae clear; ears and nose atraumatic; pharynx clear  Neck: supple; thyroid not palpable  Back: no tenderness  Respiratory: respiratory effort normal; clear to auscultation  Cardiovascular: S1S2 regular, no murmurs  Abdomen: soft, not tender, not distended, positive BS; no liver or spleen organomegaly; ostomy  Genitourinary: no suprapubic tenderness  Musculoskeletal: no muscle tenderness, no joint swelling or tenderness  Neurological/ Psychiatric: AxOx3, judgement and insight normal;  moving all extremities  Skin: dressings on buttocks    Labs: all available labs reviewed     Labs:             Labs:               Labs:                        8.3    12.47 )-----------( 440      ( 19 Nov 2020 07:19 )             28.0     11-19    136  |  103  |  11  ----------------------------<  110<H>  3.8   |  26  |  0.62    Ca    8.5      19 Nov 2020 07:19  Phos  3.5     11-19  Mg     2.0     11-19             Cultures:       Ferritin, Serum: 57 ng/mL (11-13-20 @ 07:54)         Cultures:       Ferritin, Serum: 57 ng/mL (11-13-20 @ 07:54)        < from: CT Abdomen and Pelvis w/ IV Cont (11.02.20 @ 17:46) >    EXAM:  CT ABDOMEN AND PELVIS IC                          EXAM:  CT CHEST IC                            PROCEDURE DATE:  11/02/2020          INTERPRETATION:  CLINICAL INFORMATION: sepsis    COMPARISON: None.    PROCEDURE:  CT of the Chest, Abdomen and Pelvis was performed with intravenous contrast.  Intravenous contrast: 90 ml Omnipaque 350. 10 ml discarded.  Oral contrast: None.  Sagittal and coronal reformats were performed.    FINDINGS:    CHEST:    LUNGS AND LARGE AIRWAYS: Patent central airways. Azygos fissure. A few less than 5 mm nodules in the left lower lobe.  PLEURA: No pleural effusion.  VESSELS: Within normal limits.  HEART: Heart size is normal.  No pericardial effusion.  MEDIASTINUM AND BLANK: No lymphadenopathy.  CHEST WALL ANDLOWER NECK: Within normal limits.    ABDOMEN AND PELVIS:    LIVER: Within normal limits.  BILE DUCTS: Normal caliber.  GALLBLADDER: Cholelithiasis.  SPLEEN: Within normal limits.  PANCREAS: Within normal limits.  ADRENALS: Within normal limits.  KIDNEYS/URETERS: Hypodense left renal lesion too small to characterize.    BLADDER: Within normal limits.  REPRODUCTIVE ORGANS: Fibroid uterus.    BOWEL: No bowel obstruction. The appendix is normal.  PERITONEUM: No ascites.  VESSELS:  Within normal limits.  RETROPERITONEUM/LYMPH NODES: No lymphadenopathy.  ABDOMINAL WALL: There is within the visualized left ischioanal fossa and around the left gluteal muscles. Air tracks along the deep left pelvis and presacral space into the retroperitoneum. There is air along the left psoas muscle.  BONES: Within normal limits.    IMPRESSION: Extensive subcutaneous gas within the left ischioanal fossa tracking into the pelvis and retroperitoneum worrisome for gas forming infection.      < end of copied text >      Radiology: all available radiological tests reviewed    Advanced directives addressed: full resuscitation

## 2020-11-19 NOTE — PROGRESS NOTE ADULT - SUBJECTIVE AND OBJECTIVE BOX
Subjective:  Patient seen and examined at bedside during am rounds. AVSS. Denies any fevers, chills, n/v/d, chest pain or shortness of breath. wound vac in place and functioning.     Objective:    MEDICATIONS  (STANDING):  amLODIPine   Tablet 5 milliGRAM(s) Oral daily  Dakins Solution - Full Strength 1 Application(s) Topical two times a day  fondaparinux Injectable 2.5 milliGRAM(s) SubCutaneous daily  piperacillin/tazobactam IVPB.. 3.375 Gram(s) IV Intermittent every 8 hours  sodium chloride 0.9%. 1000 milliLiter(s) (75 mL/Hr) IV Continuous <Continuous>    MEDICATIONS  (PRN):  acetaminophen   Tablet .. 650 milliGRAM(s) Oral every 6 hours PRN Mild Pain (1 - 3)  ALBUTerol    90 MICROgram(s) HFA Inhaler 2 Puff(s) Inhalation every 6 hours PRN Shortness of Breath and/or Wheezing  HYDROmorphone  Injectable 0.5 milliGRAM(s) IV Push every 4 hours PRN Severe Pain (7 - 10)  oxyCODONE    IR 10 milliGRAM(s) Oral every 6 hours PRN Severe Pain (7 - 10)  oxyCODONE    IR 5 milliGRAM(s) Oral every 6 hours PRN Moderate Pain (4 - 6)      Vital Signs (24 Hrs):  T(C): 37.1 (11-19-20 @ 07:24), Max: 37.3 (11-18-20 @ 16:30)  HR: 85 (11-19-20 @ 07:24) (81 - 96)  BP: 136/71 (11-19-20 @ 07:24) (136/71 - 169/68)  RR: 17 (11-19-20 @ 07:24) (13 - 18)  SpO2: 95% (11-19-20 @ 07:24) (95% - 100%)  Wt(kg): --  Daily     Daily     I&O's Summary    18 Nov 2020 07:01  -  19 Nov 2020 07:00  --------------------------------------------------------  IN: 400 mL / OUT: 605 mL / NET: -205 mL          PHYSICAL EXAM   Gen: well-appearing, in no acute distress  CV: pulse regularly present   Resp: airway patent, non-labored breathing  Abd: soft, NTND; no rebound or guarding.  Wound: wound vac in place with black foam, on suction         LABS:                         8.3<L>                136  | 26   | 11           12.47<H> >-----------< 440<H>   ------------------------< 110<H>                 28.0<L>                3.8  | 103  | 0.62                                                                      Ca 8.5   Mg 2.0   Ph 3.5      ,             8.3<L>                136  | 27   | 10           10.18 >-----------< 448<H>   ------------------------< 104<H>                 27.8<L>                3.9  | 102  | 0.62                                                                      Ca 8.4<L> Mg 2.1   Ph 3.2

## 2020-11-19 NOTE — PROGRESS NOTE ADULT - SUBJECTIVE AND OBJECTIVE BOX
60y old  Female with PMHX of obesity and HTN who presented to the ER with a chief complaint of myalgia/ weakness/ anorexia/ SOB x2 weeks.  Of note, patient's  just passed away suddenly 10/15/20.  Patient was found to have sepsis due to necrotizing fasciitis and admitted by Colorectal surgery and taken to the OR.  Medicine was consulted for medical management.      11/19: repeat debridement yeaterday. C/o right sided buttock pain. wound vac placed    ROS:   All 10 systems reviewed and found to be negative with the exception of what has been described above.    ICU Vital Signs Last 24 Hrs  T(C): 37.1 (19 Nov 2020 07:24), Max: 37.3 (18 Nov 2020 16:30)  T(F): 98.7 (19 Nov 2020 07:24), Max: 99.1 (18 Nov 2020 16:30)  HR: 85 (19 Nov 2020 07:24) (81 - 96)  BP: 136/71 (19 Nov 2020 07:24) (136/71 - 169/68)  BP(mean): --  ABP: --  ABP(mean): --  RR: 17 (19 Nov 2020 07:24) (13 - 18)  SpO2: 95% (19 Nov 2020 07:24) (95% - 100%)      Constitutional: NAD, morbidly obese   HEENT: PERRL, EOMI, MMM.  Neck: Soft and supple, No carotid bruit, No JVD  Respiratory: Breath sounds are clear bilaterally, No wheezing, rales or rhonchi, resp unlabored  Cardiovascular: S1 and S2, regular rate and rhythm, no murmur, rub or gallop.  Gastrointestinal: Bowel Sounds present, soft, nontender, nondistended, no guarding, +ostomy with pick viable stump; midline staples, c/d/i  Extremities: No peripheral edema  Vascular: 2+ peripheral pulses  Neurological: A/O x 3, no focal deficits, sensory and cn2-12 intact  Skin: wounds dressed s/p debridement buttock area.  wound VAC.               11-15    135  |  102  |  11  ----------------------------<  96  4.0   |  25  |  0.68    Ca    8.6      15 Nov 2020 07:54  Phos  3.1     11-15  Mg     2.3     11-15    TPro  7.1  /  Alb  1.9<L>  /  TBili  0.3  /  DBili  x   /  AST  21  /  ALT  18  /  AlkPhos  121<H>  11-15                       8.5    15.84 )-----------( 629      ( 15 Nov 2020 07:54 )             28.7     LIVER FUNCTIONS - ( 15 Nov 2020 07:54 )  Alb: 1.9 g/dL / Pro: 7.1 gm/dL / ALK PHOS: 121 U/L / ALT: 18 U/L / AST: 21 U/L / GGT: x           Culture Results:   Moderate Escherichia coli  Numerous Peptostreptococcus anaerobius "Susceptibilities not performed"  Rare Alpha hemolytic strep "Susceptibilities not performed" (11-03 @ 19:37)    Culture Results:   Moderate Escherichia coli  Few Enterococcus faecalis  Rare Staphylococcus aureus  Few Streptococcus mitis/oralis group "Susceptibilities not performed"  Numerous Peptostreptococcus anaerobius "Susceptibilities not performed" (11-02 @ 19:33)  Culture Results:   Moderate Escherichia coli See previous culture  # [id number]    Rare Staphylococcus aureus  Rare Streptococcus mitis/oralis group "Susceptibilities not performed"  Moderate Peptostreptococcus anaerobius "Susceptibilities not performed" (11-02 @ 19:33)  Culture Results:   Moderate Escherichia coli  Rare Staphylococcus aureus  Few Alpha hemolytic strep "Susceptibilities not performed"  Numerous Peptostreptococcus anaerobius "Susceptibilities not performed" (11-02 @ 19:33)    MEDICATIONS  (STANDING):  amLODIPine   Tablet 5 milliGRAM(s) Oral daily  Dakins Solution - Full Strength 1 Application(s) Topical two times a day  fondaparinux Injectable 2.5 milliGRAM(s) SubCutaneous daily  piperacillin/tazobactam IVPB.. 3.375 Gram(s) IV Intermittent every 8 hours    MEDICATIONS  (PRN):  acetaminophen   Tablet .. 650 milliGRAM(s) Oral every 6 hours PRN Mild Pain (1 - 3)  ALBUTerol    90 MICROgram(s) HFA Inhaler 2 Puff(s) Inhalation every 6 hours PRN Shortness of Breath and/or Wheezing  ALPRAZolam 0.25 milliGRAM(s) Oral every 8 hours PRN anxiety  HYDROmorphone  Injectable 0.5 milliGRAM(s) IV Push every 4 hours PRN Severe Pain (7 - 10)  oxyCODONE    IR 10 milliGRAM(s) Oral every 6 hours PRN Severe Pain (7 - 10)  oxyCODONE    IR 5 milliGRAM(s) Oral every 6 hours PRN Moderate Pain (4 - 6)

## 2020-11-19 NOTE — PROGRESS NOTE ADULT - ASSESSMENT
60y old  Female with PMHX of obesity and HTN who presented to the ER with a chief complaint of myalgia/ weakness/ anorexia/ SOB x2 weeks.  Of note, patient's  just passed away suddenly 10/15/20.  Patient was found to have sepsis due to necrotizing fasciitis and admitted by Colorectal surgery and taken to the OR.  Medicine was consulted for medical management.      #Sepsis secondary to Necrotizing Fascitis:    S/p multiple OR debridements with colorectal surgery.  Last 11/18.    OR cultures with:  e.coli/ enterococcus/ strep mitis/ staph aureus/ peptostreptococcus.    Cont IV Zosyn as per ID.    Completed 7 days clinda.    S/p diverting colostomy to help with wound healing; wound vac  Cont trujillo as per colorectal.    Monitor temps/ WBC.    Blood cx negative to date.    Pain control.      #Thrombocytosis:    Reactive.  Trend. 2/2 to stress/infection    #Anemia:  Acute on Chronic.  Microcytic with normal RBC  Appreciate heme eval.    S/p transfusions on admission.    Stable ~8.    T/c IV iron when infection improved.      #HTN.  Cont amlodipine.    #hx VTE w/ UFH allergy:    Cont Fondaparinux.    #Severe protein calorie malnutrition  -supportive care

## 2020-11-19 NOTE — PROGRESS NOTE ADULT - ASSESSMENT
ASSESSMENT/PLAN:  61 y/o F presents with necrotizing fasciitis of perineal area, s/p extensive perineal debridement x 2, and end colostomy creation. Hospital course complicated with acute anemia  Wound vac placed in OR 11/18      Monitor vitals  Leukocytosis trending down, H/H stable  C/W IV abx per ID recs, Zosyn, Clinda as per ID  Surgical cx results: E.coli, peptostreptococcus, strep species   C/W Monitor stoma and ostomy output  Appreciated hospitalist recs  Plastic surgery consult appreciated  DVT ppx/SCD    Case discussed with Colorectal surgery team       ASSESSMENT/PLAN:  61 y/o F presents with necrotizing fasciitis of perineal area, s/p extensive perineal debridement x 2, and end colostomy creation. Hospital course complicated with acute anemia  Wound vac placed in OR 11/18      Monitor vitals  Leukocytosis trending down, H/H stable  C/W IV abx per ID recs, Zosyn, Clinda as per ID  Surgical cx results: E.coli, peptostreptococcus, strep species   C/W Monitor stoma and ostomy output  Appreciated hospitalist recs  Plastic surgery consult appreciated  Wound care consult for wound vac management   DVT ppx/SCD    Case discussed with Colorectal surgery team

## 2020-11-19 NOTE — PROGRESS NOTE ADULT - ASSESSMENT
59 y/o F with PMHx of hemorrhoids admitted on 11/2 for evaluation of shortness of breath, weakness and muscle aches; had loose stools; is poor historian and history per medical record; the patient had imaging upon admission and was found to have bilateral buttock infection with necrotizing fasciitis and went to OR for debridement on 11/2. She cannot recall any details about the infection or how she came about having such a severe infection. She denies any allergy to penicillin or cephalosporins.     1. Patient admitted with necrotizing fasciitis of the buttocks; unclear origin; also noted with leukocytosis most likely reactive to infection  - follow up cultures   - iv hydration and supportive care   - wound care per surgery; wound vac  - will need further debridement  - probably will need diverting colostomy for full wound care and healing---- and this surgery was done on 11/5  - serial cbc and monitor temperature   - reviewed prior medical records to evaluate for resistant or atypical pathogens   - day #16 zosyn  - completed 7 days clindamycin  - tolerating antibiotics without rashes or side effects   - all organisms are sensitive; NO ISOLATION IS NEEDED  - diet per surgery  - should patient become dizzy when sitting up should check orthostatics  Will follow

## 2020-11-20 LAB
ANION GAP SERPL CALC-SCNC: 6 MMOL/L — SIGNIFICANT CHANGE UP (ref 5–17)
BUN SERPL-MCNC: 11 MG/DL — SIGNIFICANT CHANGE UP (ref 7–23)
CALCIUM SERPL-MCNC: 8.9 MG/DL — SIGNIFICANT CHANGE UP (ref 8.5–10.1)
CHLORIDE SERPL-SCNC: 103 MMOL/L — SIGNIFICANT CHANGE UP (ref 96–108)
CO2 SERPL-SCNC: 28 MMOL/L — SIGNIFICANT CHANGE UP (ref 22–31)
CREAT SERPL-MCNC: 0.59 MG/DL — SIGNIFICANT CHANGE UP (ref 0.5–1.3)
GLUCOSE SERPL-MCNC: 110 MG/DL — HIGH (ref 70–99)
HCT VFR BLD CALC: 28.4 % — LOW (ref 34.5–45)
HGB BLD-MCNC: 8.4 G/DL — LOW (ref 11.5–15.5)
MAGNESIUM SERPL-MCNC: 2.3 MG/DL — SIGNIFICANT CHANGE UP (ref 1.6–2.6)
MCHC RBC-ENTMCNC: 21.3 PG — LOW (ref 27–34)
MCHC RBC-ENTMCNC: 29.6 GM/DL — LOW (ref 32–36)
MCV RBC AUTO: 71.9 FL — LOW (ref 80–100)
PHOSPHATE SERPL-MCNC: 4 MG/DL — SIGNIFICANT CHANGE UP (ref 2.5–4.5)
PLATELET # BLD AUTO: 414 K/UL — HIGH (ref 150–400)
POTASSIUM SERPL-MCNC: 3.9 MMOL/L — SIGNIFICANT CHANGE UP (ref 3.5–5.3)
POTASSIUM SERPL-SCNC: 3.9 MMOL/L — SIGNIFICANT CHANGE UP (ref 3.5–5.3)
RBC # BLD: 3.95 M/UL — SIGNIFICANT CHANGE UP (ref 3.8–5.2)
RBC # FLD: SIGNIFICANT CHANGE UP (ref 10.3–14.5)
SODIUM SERPL-SCNC: 137 MMOL/L — SIGNIFICANT CHANGE UP (ref 135–145)
WBC # BLD: 9.88 K/UL — SIGNIFICANT CHANGE UP (ref 3.8–10.5)
WBC # FLD AUTO: 9.88 K/UL — SIGNIFICANT CHANGE UP (ref 3.8–10.5)

## 2020-11-20 RX ORDER — KETOROLAC TROMETHAMINE 30 MG/ML
15 SYRINGE (ML) INJECTION ONCE
Refills: 0 | Status: DISCONTINUED | OUTPATIENT
Start: 2020-11-20 | End: 2020-11-20

## 2020-11-20 RX ADMIN — Medication 15 MILLIGRAM(S): at 11:00

## 2020-11-20 RX ADMIN — OXYCODONE HYDROCHLORIDE 10 MILLIGRAM(S): 5 TABLET ORAL at 06:11

## 2020-11-20 RX ADMIN — Medication 15 MILLIGRAM(S): at 10:45

## 2020-11-20 RX ADMIN — FONDAPARINUX SODIUM 2.5 MILLIGRAM(S): 2.5 INJECTION, SOLUTION SUBCUTANEOUS at 09:05

## 2020-11-20 RX ADMIN — OXYCODONE HYDROCHLORIDE 10 MILLIGRAM(S): 5 TABLET ORAL at 22:00

## 2020-11-20 RX ADMIN — OXYCODONE HYDROCHLORIDE 10 MILLIGRAM(S): 5 TABLET ORAL at 21:45

## 2020-11-20 RX ADMIN — PIPERACILLIN AND TAZOBACTAM 25 GRAM(S): 4; .5 INJECTION, POWDER, LYOPHILIZED, FOR SOLUTION INTRAVENOUS at 21:43

## 2020-11-20 RX ADMIN — OXYCODONE HYDROCHLORIDE 10 MILLIGRAM(S): 5 TABLET ORAL at 00:00

## 2020-11-20 RX ADMIN — HYDROMORPHONE HYDROCHLORIDE 0.5 MILLIGRAM(S): 2 INJECTION INTRAMUSCULAR; INTRAVENOUS; SUBCUTANEOUS at 09:26

## 2020-11-20 RX ADMIN — PIPERACILLIN AND TAZOBACTAM 25 GRAM(S): 4; .5 INJECTION, POWDER, LYOPHILIZED, FOR SOLUTION INTRAVENOUS at 13:06

## 2020-11-20 RX ADMIN — PIPERACILLIN AND TAZOBACTAM 25 GRAM(S): 4; .5 INJECTION, POWDER, LYOPHILIZED, FOR SOLUTION INTRAVENOUS at 06:12

## 2020-11-20 RX ADMIN — OXYCODONE HYDROCHLORIDE 10 MILLIGRAM(S): 5 TABLET ORAL at 14:07

## 2020-11-20 RX ADMIN — HYDROMORPHONE HYDROCHLORIDE 0.5 MILLIGRAM(S): 2 INJECTION INTRAMUSCULAR; INTRAVENOUS; SUBCUTANEOUS at 18:15

## 2020-11-20 RX ADMIN — HYDROMORPHONE HYDROCHLORIDE 0.5 MILLIGRAM(S): 2 INJECTION INTRAMUSCULAR; INTRAVENOUS; SUBCUTANEOUS at 09:11

## 2020-11-20 RX ADMIN — HYDROMORPHONE HYDROCHLORIDE 0.5 MILLIGRAM(S): 2 INJECTION INTRAMUSCULAR; INTRAVENOUS; SUBCUTANEOUS at 18:30

## 2020-11-20 RX ADMIN — AMLODIPINE BESYLATE 5 MILLIGRAM(S): 2.5 TABLET ORAL at 09:05

## 2020-11-20 RX ADMIN — OXYCODONE HYDROCHLORIDE 10 MILLIGRAM(S): 5 TABLET ORAL at 13:07

## 2020-11-20 NOTE — PROGRESS NOTE ADULT - SUBJECTIVE AND OBJECTIVE BOX
Date of service: 11-20-20 @ 12:21      Patient lying in bed; no complaints; in good spirits      ROS: no fever or chills; denies dizziness, no HA, no SOB or cough, no abdominal pain, no diarrhea or constipation; no dysuria, no urinary frequency, no legs pain, no rashes    MEDICATIONS  (STANDING):  amLODIPine   Tablet 5 milliGRAM(s) Oral daily  fondaparinux Injectable 2.5 milliGRAM(s) SubCutaneous daily  piperacillin/tazobactam IVPB.. 3.375 Gram(s) IV Intermittent every 8 hours    MEDICATIONS  (PRN):  acetaminophen   Tablet .. 650 milliGRAM(s) Oral every 6 hours PRN Mild Pain (1 - 3)  ALBUTerol    90 MICROgram(s) HFA Inhaler 2 Puff(s) Inhalation every 6 hours PRN Shortness of Breath and/or Wheezing  HYDROmorphone  Injectable 0.5 milliGRAM(s) IV Push every 4 hours PRN Severe Pain (7 - 10)  oxyCODONE    IR 5 milliGRAM(s) Oral every 6 hours PRN Moderate Pain (4 - 6)  oxyCODONE    IR 10 milliGRAM(s) Oral every 6 hours PRN Severe Pain (7 - 10)      Vital Signs Last 24 Hrs  T(C): 37 (20 Nov 2020 08:06), Max: 37 (20 Nov 2020 08:06)  T(F): 98.6 (20 Nov 2020 08:06), Max: 98.6 (20 Nov 2020 08:06)  HR: 74 (20 Nov 2020 08:06) (74 - 82)  BP: 144/71 (20 Nov 2020 08:06) (124/69 - 144/71)  BP(mean): --  RR: 17 (20 Nov 2020 08:06) (17 - 18)  SpO2: 95% (20 Nov 2020 08:06) (95% - 98%)        Physical Exam:          Physical Exam:      Constitutional: frail looking  HEENT: NC/AT, EOMI, PERRLA, conjunctivae clear; ears and nose atraumatic; pharynx clear  Neck: supple; thyroid not palpable  Back: no tenderness  Respiratory: respiratory effort normal; clear to auscultation  Cardiovascular: S1S2 regular, no murmurs  Abdomen: soft, not tender, not distended, positive BS; no liver or spleen organomegaly; ostomy  Genitourinary: no suprapubic tenderness  Musculoskeletal: no muscle tenderness, no joint swelling or tenderness  Neurological/ Psychiatric: AxOx3, judgement and insight normal;  moving all extremities  Skin: dressings on buttocks    Labs: all available labs reviewed     Labs:                        8.4    9.88  )-----------( 414      ( 20 Nov 2020 07:53 )             28.4     11-20    137  |  103  |  11  ----------------------------<  110<H>  3.9   |  28  |  0.59    Ca    8.9      20 Nov 2020 07:53  Phos  4.0     11-20  Mg     2.3     11-20             Cultures:       Ferritin, Serum: 57 ng/mL (11-13-20 @ 07:54)           Cultures:       Ferritin, Serum: 57 ng/mL (11-13-20 @ 07:54)         Cultures:       Ferritin, Serum: 57 ng/mL (11-13-20 @ 07:54)        < from: CT Abdomen and Pelvis w/ IV Cont (11.02.20 @ 17:46) >    EXAM:  CT ABDOMEN AND PELVIS IC                          EXAM:  CT CHEST IC                            PROCEDURE DATE:  11/02/2020          INTERPRETATION:  CLINICAL INFORMATION: sepsis    COMPARISON: None.    PROCEDURE:  CT of the Chest, Abdomen and Pelvis was performed with intravenous contrast.  Intravenous contrast: 90 ml Omnipaque 350. 10 ml discarded.  Oral contrast: None.  Sagittal and coronal reformats were performed.    FINDINGS:    CHEST:    LUNGS AND LARGE AIRWAYS: Patent central airways. Azygos fissure. A few less than 5 mm nodules in the left lower lobe.  PLEURA: No pleural effusion.  VESSELS: Within normal limits.  HEART: Heart size is normal.  No pericardial effusion.  MEDIASTINUM AND BLANK: No lymphadenopathy.  CHEST WALL ANDLOWER NECK: Within normal limits.    ABDOMEN AND PELVIS:    LIVER: Within normal limits.  BILE DUCTS: Normal caliber.  GALLBLADDER: Cholelithiasis.  SPLEEN: Within normal limits.  PANCREAS: Within normal limits.  ADRENALS: Within normal limits.  KIDNEYS/URETERS: Hypodense left renal lesion too small to characterize.    BLADDER: Within normal limits.  REPRODUCTIVE ORGANS: Fibroid uterus.    BOWEL: No bowel obstruction. The appendix is normal.  PERITONEUM: No ascites.  VESSELS:  Within normal limits.  RETROPERITONEUM/LYMPH NODES: No lymphadenopathy.  ABDOMINAL WALL: There is within the visualized left ischioanal fossa and around the left gluteal muscles. Air tracks along the deep left pelvis and presacral space into the retroperitoneum. There is air along the left psoas muscle.  BONES: Within normal limits.    IMPRESSION: Extensive subcutaneous gas within the left ischioanal fossa tracking into the pelvis and retroperitoneum worrisome for gas forming infection.      < end of copied text >      Radiology: all available radiological tests reviewed    Advanced directives addressed: full resuscitation

## 2020-11-20 NOTE — PROGRESS NOTE ADULT - SUBJECTIVE AND OBJECTIVE BOX
SURGERY DAILY PROGRESS NOTE:     Subjective:  Patient seen and examined at bedside during am rounds. AVSS. Denies any fevers, chills, n/v/d, chest pain or shortness of breath. Resting comfortably in bed    Objective:    MEDICATIONS  (STANDING):  amLODIPine   Tablet 5 milliGRAM(s) Oral daily  fondaparinux Injectable 2.5 milliGRAM(s) SubCutaneous daily  piperacillin/tazobactam IVPB.. 3.375 Gram(s) IV Intermittent every 8 hours    MEDICATIONS  (PRN):  acetaminophen   Tablet .. 650 milliGRAM(s) Oral every 6 hours PRN Mild Pain (1 - 3)  ALBUTerol    90 MICROgram(s) HFA Inhaler 2 Puff(s) Inhalation every 6 hours PRN Shortness of Breath and/or Wheezing  HYDROmorphone  Injectable 0.5 milliGRAM(s) IV Push every 4 hours PRN Severe Pain (7 - 10)  oxyCODONE    IR 5 milliGRAM(s) Oral every 6 hours PRN Moderate Pain (4 - 6)  oxyCODONE    IR 10 milliGRAM(s) Oral every 6 hours PRN Severe Pain (7 - 10)      Vital Signs Last 24 Hrs  T(C): 37 (20 Nov 2020 08:06), Max: 37 (20 Nov 2020 08:06)  T(F): 98.6 (20 Nov 2020 08:06), Max: 98.6 (20 Nov 2020 08:06)  HR: 74 (20 Nov 2020 08:06) (74 - 82)  BP: 144/71 (20 Nov 2020 08:06) (124/69 - 144/71)  BP(mean): --  RR: 17 (20 Nov 2020 08:06) (17 - 18)  SpO2: 95% (20 Nov 2020 08:06) (95% - 98%)      PHYSICAL EXAM   Gen: well-appearing, in no acute distress  CV: pulse regularly present   Resp: airway patent, non-labored breathing  Abd: soft, NTND; no rebound or guarding. Incision c/d/i; WV in place over groin wound to 125mmHg suction    Daily     LABS:                        8.3    12.47 )-----------( 440      ( 19 Nov 2020 07:19 )             28.0     11-20    137  |  103  |  11  ----------------------------<  110<H>  3.9   |  28  |  0.59    Ca    8.9      20 Nov 2020 07:53  Phos  4.0     11-20  Mg     2.3     11-20

## 2020-11-20 NOTE — PROGRESS NOTE ADULT - ASSESSMENT
ASSESSMENT/PLAN:  59 y/o F presents with necrotizing fasciitis of perineal area, s/p extensive perineal debridement x 2, and end colostomy creation. Hospital course complicated with acute anemia  Wound vac placed in OR 11/18      Monitor vitals  Leukocytosis trending down, H/H stable  C/W IV abx per ID recs, Zosyn, Clinda as per ID  Surgical cx results: E.coli, peptostreptococcus, strep species   C/W Monitor stoma and ostomy output  Wound care consult for wound vac management   WV change possibly today vs tomorrow  DVT ppx/SCD    Case discussed with Colorectal surgery team

## 2020-11-20 NOTE — PROGRESS NOTE ADULT - ASSESSMENT
59 y/o F with PMHx of hemorrhoids admitted on 11/2 for evaluation of shortness of breath, weakness and muscle aches; had loose stools; is poor historian and history per medical record; the patient had imaging upon admission and was found to have bilateral buttock infection with necrotizing fasciitis and went to OR for debridement on 11/2. She cannot recall any details about the infection or how she came about having such a severe infection. She denies any allergy to penicillin or cephalosporins.     1. Patient admitted with necrotizing fasciitis of the buttocks; unclear origin; also noted with leukocytosis most likely reactive to infection  - follow up cultures   - iv hydration and supportive care   - wound care per surgery; wound vac  - will need further debridement  - probably will need diverting colostomy for full wound care and healing---- and this surgery was done on 11/5  - serial cbc and monitor temperature   - reviewed prior medical records to evaluate for resistant or atypical pathogens   - day #17 zosyn  - completed 7 days clindamycin  - tolerating antibiotics without rashes or side effects   - all organisms are sensitive; NO ISOLATION IS NEEDED  - diet per surgery  - should patient become dizzy when sitting up should check orthostatics  Will follow

## 2020-11-21 LAB
ANION GAP SERPL CALC-SCNC: 7 MMOL/L — SIGNIFICANT CHANGE UP (ref 5–17)
BUN SERPL-MCNC: 12 MG/DL — SIGNIFICANT CHANGE UP (ref 7–23)
CALCIUM SERPL-MCNC: 9 MG/DL — SIGNIFICANT CHANGE UP (ref 8.5–10.1)
CHLORIDE SERPL-SCNC: 103 MMOL/L — SIGNIFICANT CHANGE UP (ref 96–108)
CO2 SERPL-SCNC: 27 MMOL/L — SIGNIFICANT CHANGE UP (ref 22–31)
CREAT SERPL-MCNC: 0.68 MG/DL — SIGNIFICANT CHANGE UP (ref 0.5–1.3)
GLUCOSE SERPL-MCNC: 111 MG/DL — HIGH (ref 70–99)
HCT VFR BLD CALC: 28.8 % — LOW (ref 34.5–45)
HGB BLD-MCNC: 8.5 G/DL — LOW (ref 11.5–15.5)
MAGNESIUM SERPL-MCNC: 2.1 MG/DL — SIGNIFICANT CHANGE UP (ref 1.6–2.6)
MCHC RBC-ENTMCNC: 21.1 PG — LOW (ref 27–34)
MCHC RBC-ENTMCNC: 29.5 GM/DL — LOW (ref 32–36)
MCV RBC AUTO: 71.6 FL — LOW (ref 80–100)
PHOSPHATE SERPL-MCNC: 3.5 MG/DL — SIGNIFICANT CHANGE UP (ref 2.5–4.5)
PLATELET # BLD AUTO: 411 K/UL — HIGH (ref 150–400)
POTASSIUM SERPL-MCNC: 3.7 MMOL/L — SIGNIFICANT CHANGE UP (ref 3.5–5.3)
POTASSIUM SERPL-SCNC: 3.7 MMOL/L — SIGNIFICANT CHANGE UP (ref 3.5–5.3)
RBC # BLD: 4.02 M/UL — SIGNIFICANT CHANGE UP (ref 3.8–5.2)
RBC # FLD: SIGNIFICANT CHANGE UP (ref 10.3–14.5)
SODIUM SERPL-SCNC: 137 MMOL/L — SIGNIFICANT CHANGE UP (ref 135–145)
WBC # BLD: 8.55 K/UL — SIGNIFICANT CHANGE UP (ref 3.8–10.5)
WBC # FLD AUTO: 8.55 K/UL — SIGNIFICANT CHANGE UP (ref 3.8–10.5)

## 2020-11-21 RX ADMIN — OXYCODONE HYDROCHLORIDE 10 MILLIGRAM(S): 5 TABLET ORAL at 12:33

## 2020-11-21 RX ADMIN — OXYCODONE HYDROCHLORIDE 10 MILLIGRAM(S): 5 TABLET ORAL at 05:55

## 2020-11-21 RX ADMIN — AMLODIPINE BESYLATE 5 MILLIGRAM(S): 2.5 TABLET ORAL at 09:04

## 2020-11-21 RX ADMIN — PIPERACILLIN AND TAZOBACTAM 25 GRAM(S): 4; .5 INJECTION, POWDER, LYOPHILIZED, FOR SOLUTION INTRAVENOUS at 06:45

## 2020-11-21 RX ADMIN — HYDROMORPHONE HYDROCHLORIDE 0.5 MILLIGRAM(S): 2 INJECTION INTRAMUSCULAR; INTRAVENOUS; SUBCUTANEOUS at 03:00

## 2020-11-21 RX ADMIN — OXYCODONE HYDROCHLORIDE 10 MILLIGRAM(S): 5 TABLET ORAL at 21:00

## 2020-11-21 RX ADMIN — HYDROMORPHONE HYDROCHLORIDE 0.5 MILLIGRAM(S): 2 INJECTION INTRAMUSCULAR; INTRAVENOUS; SUBCUTANEOUS at 09:17

## 2020-11-21 RX ADMIN — HYDROMORPHONE HYDROCHLORIDE 0.5 MILLIGRAM(S): 2 INJECTION INTRAMUSCULAR; INTRAVENOUS; SUBCUTANEOUS at 09:02

## 2020-11-21 RX ADMIN — PIPERACILLIN AND TAZOBACTAM 25 GRAM(S): 4; .5 INJECTION, POWDER, LYOPHILIZED, FOR SOLUTION INTRAVENOUS at 23:49

## 2020-11-21 RX ADMIN — HYDROMORPHONE HYDROCHLORIDE 0.5 MILLIGRAM(S): 2 INJECTION INTRAMUSCULAR; INTRAVENOUS; SUBCUTANEOUS at 16:37

## 2020-11-21 RX ADMIN — OXYCODONE HYDROCHLORIDE 10 MILLIGRAM(S): 5 TABLET ORAL at 06:45

## 2020-11-21 RX ADMIN — FONDAPARINUX SODIUM 2.5 MILLIGRAM(S): 2.5 INJECTION, SOLUTION SUBCUTANEOUS at 09:02

## 2020-11-21 RX ADMIN — HYDROMORPHONE HYDROCHLORIDE 0.5 MILLIGRAM(S): 2 INJECTION INTRAMUSCULAR; INTRAVENOUS; SUBCUTANEOUS at 16:52

## 2020-11-21 RX ADMIN — Medication 650 MILLIGRAM(S): at 17:49

## 2020-11-21 RX ADMIN — HYDROMORPHONE HYDROCHLORIDE 0.5 MILLIGRAM(S): 2 INJECTION INTRAMUSCULAR; INTRAVENOUS; SUBCUTANEOUS at 01:48

## 2020-11-21 RX ADMIN — Medication 650 MILLIGRAM(S): at 18:20

## 2020-11-21 RX ADMIN — OXYCODONE HYDROCHLORIDE 10 MILLIGRAM(S): 5 TABLET ORAL at 12:03

## 2020-11-21 RX ADMIN — PIPERACILLIN AND TAZOBACTAM 25 GRAM(S): 4; .5 INJECTION, POWDER, LYOPHILIZED, FOR SOLUTION INTRAVENOUS at 15:27

## 2020-11-21 RX ADMIN — OXYCODONE HYDROCHLORIDE 10 MILLIGRAM(S): 5 TABLET ORAL at 20:23

## 2020-11-21 NOTE — PROGRESS NOTE ADULT - ASSESSMENT
ASSESSMENT/PLAN:  59 y/o F presents with necrotizing fasciitis of perineal area, s/p extensive perineal debridement x 2, and end colostomy creation. Hospital course complicated with acute anemia  Wound vac placed in OR 11/18      Monitor vitals  Leukocytosis trending down, H/H stable  As per ID, no further abx requires  Surgical cx results: E.coli, peptostreptococcus, strep species   C/W Monitor stoma and ostomy output  c/w Wound care for wound vac management   WV change as per wound care team  DVT ppx/SCD    Case discussed with Colorectal surgery team

## 2020-11-21 NOTE — PROGRESS NOTE ADULT - SUBJECTIVE AND OBJECTIVE BOX
Subjective:  Patient seen and examined at bedside during am rounds. AVSS. Denies any fevers, chills, n/v/d, chest pain or shortness of breath. Resting comfortably in bed    Objective:    MEDICATIONS  (STANDING):  amLODIPine   Tablet 5 milliGRAM(s) Oral daily  fondaparinux Injectable 2.5 milliGRAM(s) SubCutaneous daily  piperacillin/tazobactam IVPB.. 3.375 Gram(s) IV Intermittent every 8 hours    MEDICATIONS  (PRN):  acetaminophen   Tablet .. 650 milliGRAM(s) Oral every 6 hours PRN Mild Pain (1 - 3)  ALBUTerol    90 MICROgram(s) HFA Inhaler 2 Puff(s) Inhalation every 6 hours PRN Shortness of Breath and/or Wheezing  HYDROmorphone  Injectable 0.5 milliGRAM(s) IV Push every 4 hours PRN Severe Pain (7 - 10)  oxyCODONE    IR 5 milliGRAM(s) Oral every 6 hours PRN Moderate Pain (4 - 6)  oxyCODONE    IR 10 milliGRAM(s) Oral every 6 hours PRN Severe Pain (7 - 10)      Vital Signs (24 Hrs):  T(C): 36.8 (11-21-20 @ 07:25), Max: 36.8 (11-20-20 @ 15:05)  HR: 75 (11-21-20 @ 07:25) (75 - 92)  BP: 140/66 (11-21-20 @ 07:25) (111/52 - 140/66)  RR: 19 (11-21-20 @ 07:25) (17 - 19)  SpO2: 98% (11-21-20 @ 07:25) (96% - 99%)  Wt(kg): --  Daily     Daily     I&O's Summary    20 Nov 2020 07:01  -  21 Nov 2020 07:00  --------------------------------------------------------  IN: 300 mL / OUT: 1400 mL / NET: -1100 mL          PHYSICAL EXAM   Gen: well-appearing, in no acute distress  CV: pulse regularly present   Resp: airway patent, non-labored breathing  Abd: soft, NTND; no rebound or guarding. Incision c/d/i; WV in place over groin wound to 125mmHg suction    Daily     LABS:                         8.4<L>                137  | 28   | 11           9.88  >-----------< 414<H>   ------------------------< 110<H>                 28.4<L>                3.9  | 103  | 0.59                                                                      Ca 8.9   Mg 2.3   Ph 4.0

## 2020-11-22 LAB
ANION GAP SERPL CALC-SCNC: 5 MMOL/L — SIGNIFICANT CHANGE UP (ref 5–17)
BUN SERPL-MCNC: 8 MG/DL — SIGNIFICANT CHANGE UP (ref 7–23)
CALCIUM SERPL-MCNC: 8.7 MG/DL — SIGNIFICANT CHANGE UP (ref 8.5–10.1)
CHLORIDE SERPL-SCNC: 103 MMOL/L — SIGNIFICANT CHANGE UP (ref 96–108)
CO2 SERPL-SCNC: 29 MMOL/L — SIGNIFICANT CHANGE UP (ref 22–31)
CREAT SERPL-MCNC: 0.61 MG/DL — SIGNIFICANT CHANGE UP (ref 0.5–1.3)
GLUCOSE SERPL-MCNC: 103 MG/DL — HIGH (ref 70–99)
HCT VFR BLD CALC: 29.1 % — LOW (ref 34.5–45)
HGB BLD-MCNC: 8.7 G/DL — LOW (ref 11.5–15.5)
MAGNESIUM SERPL-MCNC: 2.1 MG/DL — SIGNIFICANT CHANGE UP (ref 1.6–2.6)
MCHC RBC-ENTMCNC: 21.3 PG — LOW (ref 27–34)
MCHC RBC-ENTMCNC: 29.9 GM/DL — LOW (ref 32–36)
MCV RBC AUTO: 71.1 FL — LOW (ref 80–100)
PHOSPHATE SERPL-MCNC: 3.9 MG/DL — SIGNIFICANT CHANGE UP (ref 2.5–4.5)
PLATELET # BLD AUTO: 393 K/UL — SIGNIFICANT CHANGE UP (ref 150–400)
POTASSIUM SERPL-MCNC: 3.8 MMOL/L — SIGNIFICANT CHANGE UP (ref 3.5–5.3)
POTASSIUM SERPL-SCNC: 3.8 MMOL/L — SIGNIFICANT CHANGE UP (ref 3.5–5.3)
RBC # BLD: 4.09 M/UL — SIGNIFICANT CHANGE UP (ref 3.8–5.2)
RBC # FLD: SIGNIFICANT CHANGE UP (ref 10.3–14.5)
SODIUM SERPL-SCNC: 137 MMOL/L — SIGNIFICANT CHANGE UP (ref 135–145)
WBC # BLD: 8.37 K/UL — SIGNIFICANT CHANGE UP (ref 3.8–10.5)
WBC # FLD AUTO: 8.37 K/UL — SIGNIFICANT CHANGE UP (ref 3.8–10.5)

## 2020-11-22 PROCEDURE — 99233 SBSQ HOSP IP/OBS HIGH 50: CPT

## 2020-11-22 RX ADMIN — PIPERACILLIN AND TAZOBACTAM 25 GRAM(S): 4; .5 INJECTION, POWDER, LYOPHILIZED, FOR SOLUTION INTRAVENOUS at 05:45

## 2020-11-22 RX ADMIN — PIPERACILLIN AND TAZOBACTAM 25 GRAM(S): 4; .5 INJECTION, POWDER, LYOPHILIZED, FOR SOLUTION INTRAVENOUS at 21:22

## 2020-11-22 RX ADMIN — OXYCODONE HYDROCHLORIDE 10 MILLIGRAM(S): 5 TABLET ORAL at 05:45

## 2020-11-22 RX ADMIN — OXYCODONE HYDROCHLORIDE 10 MILLIGRAM(S): 5 TABLET ORAL at 12:13

## 2020-11-22 RX ADMIN — AMLODIPINE BESYLATE 5 MILLIGRAM(S): 2.5 TABLET ORAL at 09:52

## 2020-11-22 RX ADMIN — HYDROMORPHONE HYDROCHLORIDE 0.5 MILLIGRAM(S): 2 INJECTION INTRAMUSCULAR; INTRAVENOUS; SUBCUTANEOUS at 00:20

## 2020-11-22 RX ADMIN — Medication 650 MILLIGRAM(S): at 11:26

## 2020-11-22 RX ADMIN — HYDROMORPHONE HYDROCHLORIDE 0.5 MILLIGRAM(S): 2 INJECTION INTRAMUSCULAR; INTRAVENOUS; SUBCUTANEOUS at 00:01

## 2020-11-22 RX ADMIN — Medication 650 MILLIGRAM(S): at 22:00

## 2020-11-22 RX ADMIN — OXYCODONE HYDROCHLORIDE 10 MILLIGRAM(S): 5 TABLET ORAL at 18:09

## 2020-11-22 RX ADMIN — PIPERACILLIN AND TAZOBACTAM 25 GRAM(S): 4; .5 INJECTION, POWDER, LYOPHILIZED, FOR SOLUTION INTRAVENOUS at 14:09

## 2020-11-22 RX ADMIN — OXYCODONE HYDROCHLORIDE 10 MILLIGRAM(S): 5 TABLET ORAL at 06:43

## 2020-11-22 RX ADMIN — FONDAPARINUX SODIUM 2.5 MILLIGRAM(S): 2.5 INJECTION, SOLUTION SUBCUTANEOUS at 09:52

## 2020-11-22 RX ADMIN — OXYCODONE HYDROCHLORIDE 10 MILLIGRAM(S): 5 TABLET ORAL at 19:00

## 2020-11-22 RX ADMIN — Medication 650 MILLIGRAM(S): at 21:22

## 2020-11-22 RX ADMIN — OXYCODONE HYDROCHLORIDE 10 MILLIGRAM(S): 5 TABLET ORAL at 12:43

## 2020-11-22 RX ADMIN — Medication 650 MILLIGRAM(S): at 10:56

## 2020-11-22 NOTE — PROGRESS NOTE ADULT - SUBJECTIVE AND OBJECTIVE BOX
60y old  Female with PMHX of obesity and HTN who presented to the ER with a chief complaint of myalgia/ weakness/ anorexia/ SOB x2 weeks.  Of note, patient's  just passed away suddenly 10/15/20.  Patient was found to have sepsis due to necrotizing fasciitis and admitted by Colorectal surgery and taken to the OR.  Medicine was consulted for medical management.      11/22:. C/o right sided buttock pain    ROS:   All 10 systems reviewed and found to be negative with the exception of what has been described above.    ICU Vital Signs Last 24 Hrs  T(C): 37.1 (22 Nov 2020 07:46), Max: 37.1 (22 Nov 2020 07:46)  T(F): 98.8 (22 Nov 2020 07:46), Max: 98.8 (22 Nov 2020 07:46)  HR: 79 (22 Nov 2020 07:46) (75 - 90)  BP: 149/78 (22 Nov 2020 07:46) (132/69 - 149/78)  BP(mean): --  ABP: --  ABP(mean): --  RR: 18 (22 Nov 2020 07:46) (18 - 18)  SpO2: 96% (22 Nov 2020 07:46) (95% - 98%)        Constitutional: NAD, morbidly obese   HEENT: PERRL, EOMI, MMM.  Neck: Soft and supple, No carotid bruit, No JVD  Respiratory: Breath sounds are clear bilaterally, No wheezing, rales or rhonchi, resp unlabored  Cardiovascular: S1 and S2, regular rate and rhythm, no murmur, rub or gallop.  Gastrointestinal: Bowel Sounds present, soft, nontender, nondistended, no guarding, +ostomy with pick viable stump; midline staples, c/d/i  Extremities: No peripheral edema  Vascular: 2+ peripheral pulses  Neurological: A/O x 3, no focal deficits, sensory and cn2-12 intact  Skin: wounds dressed s/p debridement buttock area.  wound VAC.               11-15    135  |  102  |  11  ----------------------------<  96  4.0   |  25  |  0.68    Ca    8.6      15 Nov 2020 07:54  Phos  3.1     11-15  Mg     2.3     11-15    TPro  7.1  /  Alb  1.9<L>  /  TBili  0.3  /  DBili  x   /  AST  21  /  ALT  18  /  AlkPhos  121<H>  11-15                       8.5    15.84 )-----------( 629      ( 15 Nov 2020 07:54 )             28.7     LIVER FUNCTIONS - ( 15 Nov 2020 07:54 )  Alb: 1.9 g/dL / Pro: 7.1 gm/dL / ALK PHOS: 121 U/L / ALT: 18 U/L / AST: 21 U/L / GGT: x           Culture Results:   Moderate Escherichia coli  Numerous Peptostreptococcus anaerobius "Susceptibilities not performed"  Rare Alpha hemolytic strep "Susceptibilities not performed" (11-03 @ 19:37)    Culture Results:   Moderate Escherichia coli  Few Enterococcus faecalis  Rare Staphylococcus aureus  Few Streptococcus mitis/oralis group "Susceptibilities not performed"  Numerous Peptostreptococcus anaerobius "Susceptibilities not performed" (11-02 @ 19:33)  Culture Results:   Moderate Escherichia coli See previous culture  # [id number]    Rare Staphylococcus aureus  Rare Streptococcus mitis/oralis group "Susceptibilities not performed"  Moderate Peptostreptococcus anaerobius "Susceptibilities not performed" (11-02 @ 19:33)  Culture Results:   Moderate Escherichia coli  Rare Staphylococcus aureus  Few Alpha hemolytic strep "Susceptibilities not performed"  Numerous Peptostreptococcus anaerobius "Susceptibilities not performed" (11-02 @ 19:33)    MEDICATIONS  (STANDING):  amLODIPine   Tablet 5 milliGRAM(s) Oral daily  Dakins Solution - Full Strength 1 Application(s) Topical two times a day  fondaparinux Injectable 2.5 milliGRAM(s) SubCutaneous daily  piperacillin/tazobactam IVPB.. 3.375 Gram(s) IV Intermittent every 8 hours    MEDICATIONS  (PRN):  acetaminophen   Tablet .. 650 milliGRAM(s) Oral every 6 hours PRN Mild Pain (1 - 3)  ALBUTerol    90 MICROgram(s) HFA Inhaler 2 Puff(s) Inhalation every 6 hours PRN Shortness of Breath and/or Wheezing  ALPRAZolam 0.25 milliGRAM(s) Oral every 8 hours PRN anxiety  HYDROmorphone  Injectable 0.5 milliGRAM(s) IV Push every 4 hours PRN Severe Pain (7 - 10)  oxyCODONE    IR 10 milliGRAM(s) Oral every 6 hours PRN Severe Pain (7 - 10)  oxyCODONE    IR 5 milliGRAM(s) Oral every 6 hours PRN Moderate Pain (4 - 6)

## 2020-11-22 NOTE — PROGRESS NOTE ADULT - ASSESSMENT
60y old  Female with PMHX of obesity and HTN who presented to the ER with a chief complaint of myalgia/ weakness/ anorexia/ SOB x2 weeks.  Of note, patient's  just passed away suddenly 10/15/20.  Patient was found to have sepsis due to necrotizing fasciitis and admitted by Colorectal surgery and taken to the OR.  Medicine was consulted for medical management.      #Sepsis secondary to Necrotizing Fascitis:    S/p multiple OR debridements with colorectal surgery.  Last 11/18.    OR cultures with:  e.coli/ enterococcus/ strep mitis/ staph aureus/ peptostreptococcus.    Cont IV Zosyn as per ID.    Completed 7 days clinda.    S/p diverting colostomy to help with wound healing; wound vac management per surgery  Monitor temps/ WBC.    Blood cx negative to date.    Pain control.      #Thrombocytosis:    Reactive.  Trend. 2/2 to stress/infection    #Anemia:  Acute on Chronic.  Microcytic with normal RBC  Appreciate heme eval.    S/p transfusions on admission.    Stable ~8.    T/c IV iron when infection improved.      #HTN.  Cont amlodipine.    #hx VTE w/ UFH allergy:    Cont Fondaparinux.    #Severe protein calorie malnutrition  -supportive care    DC planning

## 2020-11-22 NOTE — PROGRESS NOTE ADULT - ATTENDING COMMENTS
Pt seen and examined    No acute issues.  pt reports mild lower abdominal pain and moderate perineal pain, no nausea.    Vital Signs Last 24 Hrs  T(C): 36.4 (03 Nov 2020 10:20), Max: 38.7 (02 Nov 2020 13:38)  T(F): 97.6 (03 Nov 2020 10:20), Max: 101.6 (02 Nov 2020 13:38)  HR: 82 (03 Nov 2020 10:20) (75 - 98)  BP: 114/78 (03 Nov 2020 10:20) (86/64 - 130/71)  BP(mean): 87 (03 Nov 2020 10:20) (56 - 93)  RR: 27 (03 Nov 2020 10:20) (18 - 37)  SpO2: 99% (03 Nov 2020 10:20) (92% - 100%)    NAD  Abd:  obese/soft/mild tenderness b/l LQ to deep palpation, no guarding, no percussive or rebound tenderness.  Perineal dressings only with betadine, no evidence of ongoing bleeding.    WBC 26  Hgb 6.9  Cr 0.7    imp: necrotising soft tissue infection  --plan exam under anesthesia with perineal wound debridement, possible laparotomy, possible ostomy today.  Risks including bleeding, infection, fistula, injury to bowel/bladder/ureter/liver/spleen, DVT, cardiac/pulmonary failure, death discussed.  Pt consents.  will proceed.  --will transfuse more PRBC today in order to optimize.  Pt appears hemodynamically stable at this point.
Patient seen and examined. Doing well. Tolerating diet and stoma functioning. Vac intact. Plan for changing on Monday and discharge planing. WBC normalized
Doing well. Vac change tomorrow and discharge planning. Still has a trujillo and may need to be discharged with this if unable to void on her own
Seen and examined.   As above.  Cont abx.  OR debridement tomorrow.
Seen and examined.  As above.
Seen and examined.  Wound vac placed yest evening.  No complaints. Danny po, colostomy functioning.  AFVSS  NAD  colostomy healthy, soft, NTND  Labs reviewed  Cont abx per ID.  Wound care with vac change for tomorrow vs Saturday.  Home care and discharge planning.

## 2020-11-22 NOTE — PROGRESS NOTE ADULT - ASSESSMENT
ASSESSMENT/PLAN:  59 y/o F presents with necrotizing fasciitis of perineal area, s/p extensive perineal debridement x 2, and end colostomy creation. Hospital course complicated with acute anemia  Wound vac placed in OR 11/18      Monitor vitals  As per ID, no further abx requires  Surgical cx results: E.coli, peptostreptococcus, strep species   C/W Monitor stoma and ostomy output  c/w Wound care for wound vac management   WV change as per wound care team  DVT ppx/SCD  Dispo planning for [facility] possible tomorrow vs Tuesday     Case discussed with Colorectal surgery team

## 2020-11-22 NOTE — PROGRESS NOTE ADULT - SUBJECTIVE AND OBJECTIVE BOX
Patient seen and examined at bedside during am rounds. AVSS. Denies any fevers, chills, n/v/d, chest pain or shortness of breath. Resting comfortably in bed      MEDICATIONS  (STANDING):  amLODIPine   Tablet 5 milliGRAM(s) Oral daily  fondaparinux Injectable 2.5 milliGRAM(s) SubCutaneous daily  piperacillin/tazobactam IVPB.. 3.375 Gram(s) IV Intermittent every 8 hours    MEDICATIONS  (PRN):  acetaminophen   Tablet .. 650 milliGRAM(s) Oral every 6 hours PRN Mild Pain (1 - 3)  ALBUTerol    90 MICROgram(s) HFA Inhaler 2 Puff(s) Inhalation every 6 hours PRN Shortness of Breath and/or Wheezing  HYDROmorphone  Injectable 0.5 milliGRAM(s) IV Push every 4 hours PRN Severe Pain (7 - 10)  oxyCODONE    IR 5 milliGRAM(s) Oral every 6 hours PRN Moderate Pain (4 - 6)  oxyCODONE    IR 10 milliGRAM(s) Oral every 6 hours PRN Severe Pain (7 - 10)      Vital Signs (24 Hrs):  T(C): 37.1 (11-22-20 @ 07:46), Max: 37.1 (11-22-20 @ 07:46)  HR: 79 (11-22-20 @ 07:46) (75 - 90)  BP: 149/78 (11-22-20 @ 07:46) (132/69 - 149/78)  RR: 18 (11-22-20 @ 07:46) (18 - 18)  SpO2: 96% (11-22-20 @ 07:46) (95% - 98%)  Wt(kg): --  Daily     Daily     I&O's Summary    21 Nov 2020 07:01  -  22 Nov 2020 07:00  --------------------------------------------------------  IN: 0 mL / OUT: 500 mL / NET: -500 mL              PHYSICAL EXAM   Gen: well-appearing, in no acute distress  CV: pulse regularly present   Resp: airway patent, non-labored breathing  Abd: soft, NTND; no rebound or guarding. Incision c/d/i; WV in place over groin wound to 125mmHg suction. 50cc ss from WV                8.7<L>                137  | 29   | 8            8.37  >-----------< 393     ------------------------< 103<H>                 29.1<L>                3.8  | 103  | 0.61                                                                      Ca 8.7   Mg 2.1   Ph 3.9      ,             8.5<L>                137  | 27   | 12           8.55  >-----------< 411<H>   ------------------------< 111<H>                 28.8<L>                3.7  | 103  | 0.68                                                                      Ca 9.0   Mg 2.1   Ph 3.5                       Patient seen and examined at bedside during am rounds. AVSS. Denies any fevers, chills, n/v/d, chest pain or shortness of breath. Resting comfortably in bed      MEDICATIONS  (STANDING):  amLODIPine   Tablet 5 milliGRAM(s) Oral daily  fondaparinux Injectable 2.5 milliGRAM(s) SubCutaneous daily  piperacillin/tazobactam IVPB.. 3.375 Gram(s) IV Intermittent every 8 hours    MEDICATIONS  (PRN):  acetaminophen   Tablet .. 650 milliGRAM(s) Oral every 6 hours PRN Mild Pain (1 - 3)  ALBUTerol    90 MICROgram(s) HFA Inhaler 2 Puff(s) Inhalation every 6 hours PRN Shortness of Breath and/or Wheezing  HYDROmorphone  Injectable 0.5 milliGRAM(s) IV Push every 4 hours PRN Severe Pain (7 - 10)  oxyCODONE    IR 5 milliGRAM(s) Oral every 6 hours PRN Moderate Pain (4 - 6)  oxyCODONE    IR 10 milliGRAM(s) Oral every 6 hours PRN Severe Pain (7 - 10)      Vital Signs (24 Hrs):  T(C): 37.1 (11-22-20 @ 07:46), Max: 37.1 (11-22-20 @ 07:46)  HR: 79 (11-22-20 @ 07:46) (75 - 90)  BP: 149/78 (11-22-20 @ 07:46) (132/69 - 149/78)  RR: 18 (11-22-20 @ 07:46) (18 - 18)  SpO2: 96% (11-22-20 @ 07:46) (95% - 98%)  Wt(kg): --  Daily     Daily     I&O's Summary    21 Nov 2020 07:01  -  22 Nov 2020 07:00  --------------------------------------------------------  IN: 0 mL / OUT: 500 mL / NET: -500 mL    PHYSICAL EXAM   Gen: well-appearing, in no acute distress  CV: pulse regularly present   Resp: airway patent, non-labored breathing  Abd: soft, NTND; no rebound or guarding. Incision c/d/i; WV in place over groin wound to 125mmHg suction. 50cc ss from WV                8.7<L>                137  | 29   | 8            8.37  >-----------< 393     ------------------------< 103<H>                 29.1<L>                3.8  | 103  | 0.61                                                                      Ca 8.7   Mg 2.1   Ph 3.9      ,             8.5<L>                137  | 27   | 12           8.55  >-----------< 411<H>   ------------------------< 111<H>                 28.8<L>                3.7  | 103  | 0.68                                                                      Ca 9.0   Mg 2.1   Ph 3.5

## 2020-11-23 ENCOUNTER — TRANSCRIPTION ENCOUNTER (OUTPATIENT)
Age: 60
End: 2020-11-23

## 2020-11-23 VITALS
DIASTOLIC BLOOD PRESSURE: 72 MMHG | HEART RATE: 97 BPM | SYSTOLIC BLOOD PRESSURE: 141 MMHG | RESPIRATION RATE: 18 BRPM | OXYGEN SATURATION: 98 % | TEMPERATURE: 99 F

## 2020-11-23 LAB
ANION GAP SERPL CALC-SCNC: 8 MMOL/L — SIGNIFICANT CHANGE UP (ref 5–17)
BUN SERPL-MCNC: 8 MG/DL — SIGNIFICANT CHANGE UP (ref 7–23)
CALCIUM SERPL-MCNC: 9.3 MG/DL — SIGNIFICANT CHANGE UP (ref 8.5–10.1)
CHLORIDE SERPL-SCNC: 103 MMOL/L — SIGNIFICANT CHANGE UP (ref 96–108)
CO2 SERPL-SCNC: 27 MMOL/L — SIGNIFICANT CHANGE UP (ref 22–31)
CREAT SERPL-MCNC: 0.58 MG/DL — SIGNIFICANT CHANGE UP (ref 0.5–1.3)
GLUCOSE SERPL-MCNC: 112 MG/DL — HIGH (ref 70–99)
HCT VFR BLD CALC: 29.2 % — LOW (ref 34.5–45)
HGB BLD-MCNC: 8.6 G/DL — LOW (ref 11.5–15.5)
MAGNESIUM SERPL-MCNC: 2.3 MG/DL — SIGNIFICANT CHANGE UP (ref 1.6–2.6)
MCHC RBC-ENTMCNC: 21 PG — LOW (ref 27–34)
MCHC RBC-ENTMCNC: 29.5 GM/DL — LOW (ref 32–36)
MCV RBC AUTO: 71.2 FL — LOW (ref 80–100)
PHOSPHATE SERPL-MCNC: 3.7 MG/DL — SIGNIFICANT CHANGE UP (ref 2.5–4.5)
PLATELET # BLD AUTO: 411 K/UL — HIGH (ref 150–400)
POTASSIUM SERPL-MCNC: 3.5 MMOL/L — SIGNIFICANT CHANGE UP (ref 3.5–5.3)
POTASSIUM SERPL-SCNC: 3.5 MMOL/L — SIGNIFICANT CHANGE UP (ref 3.5–5.3)
RBC # BLD: 4.1 M/UL — SIGNIFICANT CHANGE UP (ref 3.8–5.2)
RBC # FLD: SIGNIFICANT CHANGE UP (ref 10.3–14.5)
SARS-COV-2 RNA SPEC QL NAA+PROBE: SIGNIFICANT CHANGE UP
SODIUM SERPL-SCNC: 138 MMOL/L — SIGNIFICANT CHANGE UP (ref 135–145)
WBC # BLD: 9.24 K/UL — SIGNIFICANT CHANGE UP (ref 3.8–10.5)
WBC # FLD AUTO: 9.24 K/UL — SIGNIFICANT CHANGE UP (ref 3.8–10.5)

## 2020-11-23 PROCEDURE — 99232 SBSQ HOSP IP/OBS MODERATE 35: CPT

## 2020-11-23 PROCEDURE — 97606 NEG PRS WND THER DME>50 SQCM: CPT

## 2020-11-23 RX ORDER — ALBUTEROL 90 UG/1
2 AEROSOL, METERED ORAL
Qty: 0 | Refills: 0 | DISCHARGE
Start: 2020-11-23

## 2020-11-23 RX ORDER — ACETAMINOPHEN 500 MG
2 TABLET ORAL
Qty: 0 | Refills: 0 | DISCHARGE
Start: 2020-11-23

## 2020-11-23 RX ORDER — OXYCODONE HYDROCHLORIDE 5 MG/1
1 TABLET ORAL
Qty: 0 | Refills: 0 | DISCHARGE
Start: 2020-11-23

## 2020-11-23 RX ORDER — AMLODIPINE BESYLATE 2.5 MG/1
1 TABLET ORAL
Qty: 0 | Refills: 0 | DISCHARGE
Start: 2020-11-23

## 2020-11-23 RX ADMIN — OXYCODONE HYDROCHLORIDE 5 MILLIGRAM(S): 5 TABLET ORAL at 10:30

## 2020-11-23 RX ADMIN — OXYCODONE HYDROCHLORIDE 10 MILLIGRAM(S): 5 TABLET ORAL at 06:10

## 2020-11-23 RX ADMIN — AMLODIPINE BESYLATE 5 MILLIGRAM(S): 2.5 TABLET ORAL at 09:48

## 2020-11-23 RX ADMIN — OXYCODONE HYDROCHLORIDE 5 MILLIGRAM(S): 5 TABLET ORAL at 09:48

## 2020-11-23 RX ADMIN — PIPERACILLIN AND TAZOBACTAM 25 GRAM(S): 4; .5 INJECTION, POWDER, LYOPHILIZED, FOR SOLUTION INTRAVENOUS at 13:29

## 2020-11-23 RX ADMIN — OXYCODONE HYDROCHLORIDE 10 MILLIGRAM(S): 5 TABLET ORAL at 13:29

## 2020-11-23 RX ADMIN — OXYCODONE HYDROCHLORIDE 10 MILLIGRAM(S): 5 TABLET ORAL at 14:03

## 2020-11-23 RX ADMIN — FONDAPARINUX SODIUM 2.5 MILLIGRAM(S): 2.5 INJECTION, SOLUTION SUBCUTANEOUS at 09:48

## 2020-11-23 RX ADMIN — PIPERACILLIN AND TAZOBACTAM 25 GRAM(S): 4; .5 INJECTION, POWDER, LYOPHILIZED, FOR SOLUTION INTRAVENOUS at 06:11

## 2020-11-23 NOTE — PROGRESS NOTE ADULT - SUBJECTIVE AND OBJECTIVE BOX
60y old  Female with PMHX of obesity and HTN who presented to the ER with a chief complaint of myalgia/ weakness/ anorexia/ SOB x2 weeks.  Of note, patient's  just passed away suddenly 10/15/20.  Patient was found to have sepsis due to necrotizing fasciitis and admitted by Colorectal surgery and taken to the OR.  Medicine was consulted for medical management.      11/22:. C/o right sided buttock pain  11.23: no distress; would like to go to rehab     ROS:   All 10 systems reviewed and found to be negative with the exception of what has been described above.    Vital Signs Last 24 Hrs  T(C): 36.8 (23 Nov 2020 07:41), Max: 37.3 (22 Nov 2020 16:00)  T(F): 98.2 (23 Nov 2020 07:41), Max: 99.1 (22 Nov 2020 16:00)  HR: 74 (23 Nov 2020 07:41) (74 - 77)  BP: 161/77 (23 Nov 2020 07:41) (148/73 - 161/77)  RR: 18 (23 Nov 2020 07:41) (18 - 18)  SpO2: 98% (23 Nov 2020 07:41) (97% - 98%)      Constitutional: NAD, morbidly obese   HEENT: PERRL, EOMI, MMM.  Neck: Soft and supple, No carotid bruit, No JVD  Respiratory: Breath sounds are clear bilaterally, No wheezing, rales or rhonchi, resp unlabored  Cardiovascular: S1 and S2, regular rate and rhythm, no murmur, rub or gallop.  Gastrointestinal: Bowel Sounds present, soft, nontender, nondistended, no guarding, +colostomy with pick viable stump; midline staples, c/d/i  Extremities: No peripheral edema  Vascular: 2+ peripheral pulses  Neurological: A/O x 3, no focal deficits, sensory and cn2-12 intact  Skin: wounds dressed s/p debridement buttock area.  wound VAC on perineal area              11-15    135  |  102  |  11  ----------------------------<  96  4.0   |  25  |  0.68    Ca    8.6      15 Nov 2020 07:54  Phos  3.1     11-15  Mg     2.3     11-15    TPro  7.1  /  Alb  1.9<L>  /  TBili  0.3  /  DBili  x   /  AST  21  /  ALT  18  /  AlkPhos  121<H>  11-15                       8.5    15.84 )-----------( 629      ( 15 Nov 2020 07:54 )             28.7     LIVER FUNCTIONS - ( 15 Nov 2020 07:54 )  Alb: 1.9 g/dL / Pro: 7.1 gm/dL / ALK PHOS: 121 U/L / ALT: 18 U/L / AST: 21 U/L / GGT: x           Culture Results:   Moderate Escherichia coli  Numerous Peptostreptococcus anaerobius "Susceptibilities not performed"  Rare Alpha hemolytic strep "Susceptibilities not performed" (11-03 @ 19:37)    Culture Results:   Moderate Escherichia coli  Few Enterococcus faecalis  Rare Staphylococcus aureus  Few Streptococcus mitis/oralis group "Susceptibilities not performed"  Numerous Peptostreptococcus anaerobius "Susceptibilities not performed" (11-02 @ 19:33)  Culture Results:   Moderate Escherichia coli See previous culture  # [id number]    Rare Staphylococcus aureus  Rare Streptococcus mitis/oralis group "Susceptibilities not performed"  Moderate Peptostreptococcus anaerobius "Susceptibilities not performed" (11-02 @ 19:33)  Culture Results:   Moderate Escherichia coli  Rare Staphylococcus aureus  Few Alpha hemolytic strep "Susceptibilities not performed"  Numerous Peptostreptococcus anaerobius "Susceptibilities not performed" (11-02 @ 19:33)    MEDICATIONS  (STANDING):  amLODIPine   Tablet 5 milliGRAM(s) Oral daily  Dakins Solution - Full Strength 1 Application(s) Topical two times a day  fondaparinux Injectable 2.5 milliGRAM(s) SubCutaneous daily  piperacillin/tazobactam IVPB.. 3.375 Gram(s) IV Intermittent every 8 hours    MEDICATIONS  (PRN):  acetaminophen   Tablet .. 650 milliGRAM(s) Oral every 6 hours PRN Mild Pain (1 - 3)  ALBUTerol    90 MICROgram(s) HFA Inhaler 2 Puff(s) Inhalation every 6 hours PRN Shortness of Breath and/or Wheezing  ALPRAZolam 0.25 milliGRAM(s) Oral every 8 hours PRN anxiety  HYDROmorphone  Injectable 0.5 milliGRAM(s) IV Push every 4 hours PRN Severe Pain (7 - 10)  oxyCODONE    IR 10 milliGRAM(s) Oral every 6 hours PRN Severe Pain (7 - 10)  oxyCODONE    IR 5 milliGRAM(s) Oral every 6 hours PRN Moderate Pain (4 - 6)        60y old  Female with PMHX of obesity and HTN who presented to the ER with a chief complaint of myalgia/ weakness/ anorexia/ SOB x2 weeks.  Of note, patient's  just passed away suddenly 10/15/20.  Patient was found to have sepsis due to necrotizing fasciitis and admitted by Colorectal surgery and taken to the OR.  Medicine was consulted for medical management.      #Sepsis secondary to Necrotizing Fascitis:    S/p multiple OR debridements with colorectal surgery.  Last 11/18.    OR cultures with:  e.coli/ enterococcus/ strep mitis/ staph aureus/ peptostreptococcus.    Zosyn day#20  Completed 7 days clinda.    ok to d/c off Abx per ID  S/p diverting colostomy to help with wound healing; wound vac management per surgery  Blood cx negative to date.    Pain control.      #Thrombocytosis:    Reactive.  Trend. 2/2 to stress/infection    #Anemia:  Acute on Chronic.  Microcytic with normal RBC  Appreciate heme eval.    S/p transfusions on admission.    Stable ~8.    T/c IV iron when infection improved.      #HTN.  Cont amlodipine.    #hx VTE w/ UFH allergy:    Cont Fondaparinux.    #Severe protein calorie malnutrition  -supportive care    DC planning

## 2020-11-23 NOTE — PROGRESS NOTE ADULT - ASSESSMENT
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    - patient to be d/c'd to [facility] today, patient can follow up in the office in 1-2 weeks. Can Call [phone] to schedule appointment

## 2020-11-23 NOTE — DISCHARGE NOTE PROVIDER - NSDCFUADDAPPT_GEN_ALL_CORE_FT
Please call  to take an appointment with Dr Cristi Perez for plastic surgery reconstruction in 1 week after discharge.

## 2020-11-23 NOTE — PROGRESS NOTE ADULT - REASON FOR ADMISSION
Necrotizing fasciitis
Necrotizing fasciitis
Necrotizing fasciitis/fourniers gangreen
Necrotizing fasciitis

## 2020-11-23 NOTE — DISCHARGE NOTE PROVIDER - HOSPITAL COURSE
61 y/o F presents with necrotizing fasciitis of perineal area, s/p extensive perineal debridement x 2, and end colostomy creation. Hospital course complicated with acute anemia  Wound vac placed in OR 11/18,plan for wound vac change today by wound care team at bedside      Monitor vitals  As per ID, no further abx required  Surgical cx results: E.coli, peptostreptococcus, strep species   C/W Monitor stoma and ostomy output  c/w Wound care for wound vac management   WV change as per wound care team  DVT ppx/SCD  Dispo planning for [facility] today  Wound vac changes three times per week, negative pressure at 125mmHg,   May shower  trujillo catheter to protect vac placement and keep area clean

## 2020-11-23 NOTE — DISCHARGE NOTE PROVIDER - NSDCACTIVITY_GEN_ALL_CORE
Walking - Outdoors allowed/Stairs allowed/Showering allowed/Walking - Indoors allowed/No heavy lifting/straining

## 2020-11-23 NOTE — DISCHARGE NOTE PROVIDER - NSDCCPTREATMENT_GEN_ALL_CORE_FT
PRINCIPAL PROCEDURE  Procedure: Creation, end colostomy  Findings and Treatment:       SECONDARY PROCEDURE  Procedure: Lysis of adhesions of peritoneum  Findings and Treatment:     Procedure: Exploratory laparotomy  Findings and Treatment:     Procedure: Nonexcisional debridement of wound  Findings and Treatment:     Procedure: Debridement of necrotizing fasciitis of both buttocks  Findings and Treatment:     Procedure: Initial intraoperative application of wound vacuum-assisted closure device  Findings and Treatment:

## 2020-11-23 NOTE — DISCHARGE NOTE PROVIDER - NSDCMRMEDTOKEN_GEN_ALL_CORE_FT
acetaminophen 325 mg oral tablet: 2 tab(s) orally every 6 hours, As needed, Mild Pain (1 - 3)  albuterol 90 mcg/inh inhalation aerosol: 2 puff(s) inhaled every 6 hours, As needed, Shortness of Breath and/or Wheezing  amLODIPine 5 mg oral tablet: 1 tab(s) orally once a day  oxyCODONE 10 mg oral tablet: 1 tab(s) orally every 6 hours, As needed, Severe Pain (7 - 10)  oxyCODONE 5 mg oral tablet: 1 tab(s) orally every 6 hours, As needed, Moderate Pain (4 - 6)

## 2020-11-23 NOTE — PROVIDER CONTACT NOTE (OTHER) - DATE AND TIME:
13-Nov-2020 11:40
03-Nov-2020 00:21
03-Nov-2020 11:18
05-Nov-2020 16:15
10-Nov-2020 07:48
23-Nov-2020 00:54
23-Nov-2020 03:23

## 2020-11-23 NOTE — DISCHARGE NOTE PROVIDER - CARE PROVIDER_API CALL
Adolfo Gordillo  COLON/RECTAL SURGERY  [address] [place] [address]  Phone: [phone]  Fax: [phone]  Follow Up Time:

## 2020-11-23 NOTE — DISCHARGE NOTE NURSING/CASE MANAGEMENT/SOCIAL WORK - PATIENT PORTAL LINK FT
You can access the FollowMyHealth Patient Portal offered by [facility] by registering at the following website: [email or web address]. By joining [employer]’s FollowMyHealth portal, you will also be able to view your health information using other applications (apps) compatible with our system.

## 2020-11-23 NOTE — PROGRESS NOTE ADULT - SUBJECTIVE AND OBJECTIVE BOX
Patient seen and examined at bedside during am rounds. AVSS. Denies any fevers, chills, n/v/d, chest pain or shortness of breath. Resting comfortably in bed      MEDICATIONS  (STANDING):  amLODIPine   Tablet 5 milliGRAM(s) Oral daily  fondaparinux Injectable 2.5 milliGRAM(s) SubCutaneous daily  piperacillin/tazobactam IVPB.. 3.375 Gram(s) IV Intermittent every 8 hours    MEDICATIONS  (PRN):  acetaminophen   Tablet .. 650 milliGRAM(s) Oral every 6 hours PRN Mild Pain (1 - 3)  ALBUTerol    90 MICROgram(s) HFA Inhaler 2 Puff(s) Inhalation every 6 hours PRN Shortness of Breath and/or Wheezing  HYDROmorphone  Injectable 0.5 milliGRAM(s) IV Push every 4 hours PRN Severe Pain (7 - 10)  oxyCODONE    IR 5 milliGRAM(s) Oral every 6 hours PRN Moderate Pain (4 - 6)  oxyCODONE    IR 10 milliGRAM(s) Oral every 6 hours PRN Severe Pain (7 - 10)    Vital Signs Last 24 Hrs  T(C): 36.8 (23 Nov 2020 07:41), Max: 37.3 (22 Nov 2020 16:00)  T(F): 98.2 (23 Nov 2020 07:41), Max: 99.1 (22 Nov 2020 16:00)  HR: 74 (23 Nov 2020 07:41) (74 - 77)  BP: 161/77 (23 Nov 2020 07:41) (148/73 - 161/77)  RR: 18 (23 Nov 2020 07:41) (18 - 18)  SpO2: 98% (23 Nov 2020 07:41) (97% - 98%)    I&O's Summary    I&O's Detail    22 Nov 2020 07:01  -  23 Nov 2020 07:00  --------------------------------------------------------  IN:  Total IN: 0 mL    OUT:    Indwelling Catheter - Urethral (mL): 600 mL    VAC (Vacuum Assisted Closure) System (mL): 100 mL    Voided (mL): 150 mL  Total OUT: 850 mL    Total NET: -850 mL          PHYSICAL EXAM   Gen: well-appearing, in no acute distress  CV: pulse regularly present   Resp: airway patent, non-labored breathing  Abd: soft, NTND; no rebound or guarding. Incision c/d/i; WV in place over groin wound to 125mmHg suction. 100cc ss from WV                8.7<L>                137  | 29   | 8            8.37  >-----------< 393     ------------------------< 103<H>                 29.1<L>                3.8  | 103  | 0.61                                                                      Ca 8.7   Mg 2.1   Ph 3.9      ,             8.5<L>                137  | 27   | 12           8.55  >-----------< 411<H>   ------------------------< 111<H>                 28.8<L>                3.7  | 103  | 0.68                                                                      Ca 9.0   Mg 2.1   Ph 3.5

## 2020-11-23 NOTE — PROVIDER CONTACT NOTE (OTHER) - NAME OF MD/NP/PA/DO NOTIFIED:
WILLIE SPOKE WITH ANGEL FROM MD'S OFFICE.
Blanche Mccallum
Dr Banegas
Dr Tyrese Ham (PCP)
Dr. Duran
MD Bear
MD Bear
MD Garrido

## 2020-11-23 NOTE — PROGRESS NOTE ADULT - SUBJECTIVE AND OBJECTIVE BOX
Attempted to see patient but patient not in room.    According to chart patient has wound vac in place, wbc 9, afebrile and will be D/C'd to [facility]

## 2020-11-23 NOTE — PROVIDER CONTACT NOTE (OTHER) - SITUATION
Dr aware that patient is in [facility].  Please fax discharge papers to [phone].
Pt. had trujillo catheter d/c'd and due to void. Denies any discomfort or pain. Bladder scan done and 173ml noted.
Pt. still has not voided, Bladder scan done and 307ml noted. Denies any urge to void, Abd nondistended, no pain or discomfort . Encouraged Fluids
Sent email to Blanche Mccallum for inform her of consult.
Spoke with Belinda from service to inform  of consult.

## 2020-11-23 NOTE — PROGRESS NOTE ADULT - SUBJECTIVE AND OBJECTIVE BOX
Date of service: 11-23-20 @ 12:24      Patient lying in bed; had trujillo removed, however it saturates the wound dressing      ROS: no fever or chills; denies dizziness, no HA, no SOB or cough, no abdominal pain, no diarrhea or constipation; no dysuria, no urinary frequency, no legs pain, no rashes    MEDICATIONS  (STANDING):  amLODIPine   Tablet 5 milliGRAM(s) Oral daily  fondaparinux Injectable 2.5 milliGRAM(s) SubCutaneous daily  piperacillin/tazobactam IVPB.. 3.375 Gram(s) IV Intermittent every 8 hours    MEDICATIONS  (PRN):  acetaminophen   Tablet .. 650 milliGRAM(s) Oral every 6 hours PRN Mild Pain (1 - 3)  ALBUTerol    90 MICROgram(s) HFA Inhaler 2 Puff(s) Inhalation every 6 hours PRN Shortness of Breath and/or Wheezing  oxyCODONE    IR 5 milliGRAM(s) Oral every 6 hours PRN Moderate Pain (4 - 6)  oxyCODONE    IR 10 milliGRAM(s) Oral every 6 hours PRN Severe Pain (7 - 10)      Vital Signs Last 24 Hrs  T(C): 36.8 (23 Nov 2020 07:41), Max: 37.3 (22 Nov 2020 16:00)  T(F): 98.2 (23 Nov 2020 07:41), Max: 99.1 (22 Nov 2020 16:00)  HR: 74 (23 Nov 2020 07:41) (74 - 77)  BP: 161/77 (23 Nov 2020 07:41) (148/73 - 161/77)  BP(mean): --  RR: 18 (23 Nov 2020 07:41) (18 - 18)  SpO2: 98% (23 Nov 2020 07:41) (97% - 98%)        Physical Exam:          Physical Exam:      Constitutional: frail looking  HEENT: NC/AT, EOMI, PERRLA, conjunctivae clear; ears and nose atraumatic; pharynx clear  Neck: supple; thyroid not palpable  Back: no tenderness  Respiratory: respiratory effort normal; clear to auscultation  Cardiovascular: S1S2 regular, no murmurs  Abdomen: soft, not tender, not distended, positive BS; no liver or spleen organomegaly; ostomy  Genitourinary: no suprapubic tenderness  Musculoskeletal: no muscle tenderness, no joint swelling or tenderness  Neurological/ Psychiatric: AxOx3, judgement and insight normal;  moving all extremities  Skin: dressings on buttocks    Labs: all available labs reviewed     Labs:                 Labs:                        8.6    9.24  )-----------( 411      ( 23 Nov 2020 07:28 )             29.2     11-23    138  |  103  |  8   ----------------------------<  112<H>  3.5   |  27  |  0.58    Ca    9.3      23 Nov 2020 07:28  Phos  3.7     11-23  Mg     2.3     11-23             Cultures:       Ferritin, Serum: 57 ng/mL (11-13-20 @ 07:54)          < from: CT Abdomen and Pelvis w/ IV Cont (11.02.20 @ 17:46) >    EXAM:  CT ABDOMEN AND PELVIS IC                          EXAM:  CT CHEST IC                            PROCEDURE DATE:  11/02/2020          INTERPRETATION:  CLINICAL INFORMATION: sepsis    COMPARISON: None.    PROCEDURE:  CT of the Chest, Abdomen and Pelvis was performed with intravenous contrast.  Intravenous contrast: 90 ml Omnipaque 350. 10 ml discarded.  Oral contrast: None.  Sagittal and coronal reformats were performed.    FINDINGS:    CHEST:    LUNGS AND LARGE AIRWAYS: Patent central airways. Azygos fissure. A few less than 5 mm nodules in the left lower lobe.  PLEURA: No pleural effusion.  VESSELS: Within normal limits.  HEART: Heart size is normal.  No pericardial effusion.  MEDIASTINUM AND BLANK: No lymphadenopathy.  CHEST WALL ANDLOWER NECK: Within normal limits.    ABDOMEN AND PELVIS:    LIVER: Within normal limits.  BILE DUCTS: Normal caliber.  GALLBLADDER: Cholelithiasis.  SPLEEN: Within normal limits.  PANCREAS: Within normal limits.  ADRENALS: Within normal limits.  KIDNEYS/URETERS: Hypodense left renal lesion too small to characterize.    BLADDER: Within normal limits.  REPRODUCTIVE ORGANS: Fibroid uterus.    BOWEL: No bowel obstruction. The appendix is normal.  PERITONEUM: No ascites.  VESSELS:  Within normal limits.  RETROPERITONEUM/LYMPH NODES: No lymphadenopathy.  ABDOMINAL WALL: There is within the visualized left ischioanal fossa and around the left gluteal muscles. Air tracks along the deep left pelvis and presacral space into the retroperitoneum. There is air along the left psoas muscle.  BONES: Within normal limits.    IMPRESSION: Extensive subcutaneous gas within the left ischioanal fossa tracking into the pelvis and retroperitoneum worrisome for gas forming infection.      < end of copied text >      Radiology: all available radiological tests reviewed    Advanced directives addressed: full resuscitation

## 2020-11-23 NOTE — ADVANCED PRACTICE NURSE CONSULT - RECOMMEDATIONS
1) Wound vac dressing to be changed on Wednesday or Thursday (11/25/2020  or 11/26/2020)  2) patient is set up with discharge ostomy appliances   3) Encourage patient to empty pouch

## 2020-11-23 NOTE — PROGRESS NOTE ADULT - ASSESSMENT
59 y/o F with PMHx of hemorrhoids admitted on 11/2 for evaluation of shortness of breath, weakness and muscle aches; had loose stools; is poor historian and history per medical record; the patient had imaging upon admission and was found to have bilateral buttock infection with necrotizing fasciitis and went to OR for debridement on 11/2. She cannot recall any details about the infection or how she came about having such a severe infection. She denies any allergy to penicillin or cephalosporins.     1. Patient admitted with necrotizing fasciitis of the buttocks; unclear origin; also noted with leukocytosis most likely reactive to infection  - follow up cultures   - iv hydration and supportive care   - wound care per surgery; wound vac  - will need further debridement  - probably will need diverting colostomy for full wound care and healing---- and this surgery was done on 11/5  - serial cbc and monitor temperature   - reviewed prior medical records to evaluate for resistant or atypical pathogens   - day #20 zosyn  - completed 7 days clindamycin  - tolerating antibiotics without rashes or side effects   - all organisms are sensitive; NO ISOLATION IS NEEDED  - diet per surgery  - discharge planning for rehab placement; at which time will discharge on no antibiotics  - should place trujillo catheter back in place, so as to avoid saturating the wound  Will follow

## 2020-11-23 NOTE — PROGRESS NOTE ADULT - ASSESSMENT
ASSESSMENT/PLAN:  61 y/o F presents with necrotizing fasciitis of perineal area, s/p extensive perineal debridement x 2, and end colostomy creation. Hospital course complicated with acute anemia  Wound vac placed in OR 11/18,plan for wound vac change today by wound care team at bedside      Monitor vitals  As per ID, no further abx required  Surgical cx results: E.coli, peptostreptococcus, strep species   C/W Monitor stoma and ostomy output  c/w Wound care for wound vac management   WV change as per wound care team  DVT ppx/SCD  Dispo planning for [facility] possible today vs Tuesday     Case discussed with Colorectal surgery team

## 2020-11-23 NOTE — DISCHARGE NOTE PROVIDER - NSDCCPCAREPLAN_GEN_ALL_CORE_FT
PRINCIPAL DISCHARGE DIAGNOSIS  Diagnosis: Necrotizing fasciitis  Assessment and Plan of Treatment:       SECONDARY DISCHARGE DIAGNOSES  Diagnosis: Anemia  Assessment and Plan of Treatment:     Diagnosis: Fever  Assessment and Plan of Treatment:     Diagnosis: Necrotizing fasciitis  Assessment and Plan of Treatment:

## 2020-11-23 NOTE — PROVIDER CONTACT NOTE (OTHER) - ACTION/TREATMENT ORDERED:
Message left with answering service.
Ordered bladder scan for 0600
Ordered to reassess pt and repeat bladder scan at 0300

## 2020-11-23 NOTE — PROGRESS NOTE ADULT - NUTRITIONAL ASSESSMENT
This patient has been assessed with a concern for Malnutrition and has been determined to have a diagnosis/diagnoses of Severe protein-calorie malnutrition.    This patient is being managed with:   Diet Low Fiber-  Regular  Supplement Feeding Modality:  Oral  Ensure High Protein Cans or Servings Per Day:  2       Frequency:  Three Times a day  Entered: Nov 18 2020  7:18PM    
This patient has been assessed with a concern for Malnutrition and has been determined to have a diagnosis/diagnoses of Severe protein-calorie malnutrition.    This patient is being managed with:   Diet Clear Liquid-  Entered: Nov 5 2020 11:28AM    
This patient has been assessed with a concern for Malnutrition and has been determined to have a diagnosis/diagnoses of Severe protein-calorie malnutrition.    This patient is being managed with:   Diet Clear Liquid-  Entered: Nov 5 2020 11:28AM    
This patient has been assessed with a concern for Malnutrition and has been determined to have a diagnosis/diagnoses of Severe protein-calorie malnutrition.    This patient is being managed with:   Diet Low Fiber-  Regular  Supplement Feeding Modality:  Oral  Ensure High Protein Cans or Servings Per Day:  2       Frequency:  Three Times a day  Entered: Nov 13 2020  2:55PM    
This patient has been assessed with a concern for Malnutrition and has been determined to have a diagnosis/diagnoses of Severe protein-calorie malnutrition.    This patient is being managed with:   Diet Low Fiber-  Regular  Supplement Feeding Modality:  Oral  Ensure High Protein Cans or Servings Per Day:  2       Frequency:  Three Times a day  Entered: Nov 18 2020  7:18PM    
This patient has been assessed with a concern for Malnutrition and has been determined to have a diagnosis/diagnoses of Severe protein-calorie malnutrition.    This patient is being managed with:   Diet NPO after Midnight-     NPO Start Date: 04-Nov-2020   NPO Start Time: 23:59  Except Medications  With Ice Chips/Sips of Water  Entered: Nov 4 2020  9:39AM    Diet Clear Liquid-  Entered: Nov 4 2020  9:07AM    
This patient has been assessed with a concern for Malnutrition and has been determined to have a diagnosis/diagnoses of Severe protein-calorie malnutrition.    This patient is being managed with:   Diet NPO after Midnight-     NPO Start Date: 12-Nov-2020   NPO Start Time: 23:59  Entered: Nov 12 2020 10:43AM    
This patient has been assessed with a concern for Malnutrition and has been determined to have a diagnosis/diagnoses of Severe protein-calorie malnutrition.    This patient is being managed with:   Diet NPO after Midnight-     NPO Start Date: 12-Nov-2020   NPO Start Time: 23:59  Entered: Nov 12 2020 10:43AM    
This patient has been assessed with a concern for Malnutrition and has been determined to have a diagnosis/diagnoses of Severe protein-calorie malnutrition.    This patient is being managed with:   Diet NPO after Midnight-     NPO Start Date: 12-Nov-2020   NPO Start Time: 23:59  Entered: Nov 12 2020 10:43AM    Diet Low Fiber-  Regular  Supplement Feeding Modality:  Oral  Ensure High Protein Cans or Servings Per Day:  2       Frequency:  Three Times a day  Entered: Nov 12 2020  9:57AM    
This patient has been assessed with a concern for Malnutrition and has been determined to have a diagnosis/diagnoses of Severe protein-calorie malnutrition.    This patient is being managed with:   Diet NPO after Midnight-     NPO Start Date: 15-Nov-2020   NPO Start Time: 23:59  Except Medications  With Ice Chips/Sips of Water  Entered: Nov 15 2020  1:56PM    Diet Low Fiber-  Regular  Supplement Feeding Modality:  Oral  Ensure High Protein Cans or Servings Per Day:  2       Frequency:  Three Times a day  Entered: Nov 13 2020  2:55PM    
This patient has been assessed with a concern for Malnutrition and has been determined to have a diagnosis/diagnoses of Severe protein-calorie malnutrition.    This patient is being managed with:   Diet NPO-  Entered: Nov 2 2020  9:51PM    
This patient has been assessed with a concern for Malnutrition and has been determined to have a diagnosis/diagnoses of Severe protein-calorie malnutrition.    This patient is being managed with:   Diet Regular-  Fiber/Residue Restricted (LOWFIBER)  Entered: Nov 6 2020 10:13AM    
This patient has been assessed with a concern for Malnutrition and has been determined to have a diagnosis/diagnoses of Severe protein-calorie malnutrition.    This patient is being managed with:   Diet Clear Liquid-  Entered: Nov 5 2020 11:28AM    
This patient has been assessed with a concern for Malnutrition and has been determined to have a diagnosis/diagnoses of Severe protein-calorie malnutrition.    This patient is being managed with:   Diet Low Fiber-  Regular  Supplement Feeding Modality:  Oral  Ensure High Protein Cans or Servings Per Day:  2       Frequency:  Three Times a day  Entered: Nov 13 2020  2:55PM    
This patient has been assessed with a concern for Malnutrition and has been determined to have a diagnosis/diagnoses of Severe protein-calorie malnutrition.    This patient is being managed with:   Diet Low Fiber-  Regular  Supplement Feeding Modality:  Oral  Ensure High Protein Cans or Servings Per Day:  2       Frequency:  Three Times a day  Entered: Nov 13 2020  2:55PM    
This patient has been assessed with a concern for Malnutrition and has been determined to have a diagnosis/diagnoses of Severe protein-calorie malnutrition.    This patient is being managed with:   Diet Low Fiber-  Regular  Supplement Feeding Modality:  Oral  Ensure High Protein Cans or Servings Per Day:  2       Frequency:  Three Times a day  Entered: Nov 18 2020  7:18PM    
This patient has been assessed with a concern for Malnutrition and has been determined to have a diagnosis/diagnoses of Severe protein-calorie malnutrition.    This patient is being managed with:   Diet Low Fiber-  Regular  Supplement Feeding Modality:  Oral  Ensure High Protein Cans or Servings Per Day:  2       Frequency:  Three Times a day  Entered: Nov 18 2020  7:18PM    
This patient has been assessed with a concern for Malnutrition and has been determined to have a diagnosis/diagnoses of Severe protein-calorie malnutrition.    This patient is being managed with:   Diet NPO after Midnight-     NPO Start Date: 15-Nov-2020   NPO Start Time: 23:59  Except Medications  With Ice Chips/Sips of Water  Entered: Nov 15 2020  1:56PM    Diet Low Fiber-  Regular  Supplement Feeding Modality:  Oral  Ensure High Protein Cans or Servings Per Day:  2       Frequency:  Three Times a day  Entered: Nov 13 2020  2:55PM    
This patient has been assessed with a concern for Malnutrition and has been determined to have a diagnosis/diagnoses of Severe protein-calorie malnutrition.    This patient is being managed with:   Diet NPO after Midnight-     NPO Start Date: 17-Nov-2020   NPO Start Time: 23:59  Entered: Nov 17 2020  7:34PM    Diet Low Fiber-  Regular  Supplement Feeding Modality:  Oral  Ensure High Protein Cans or Servings Per Day:  2       Frequency:  Three Times a day  Entered: Nov 13 2020  2:55PM    
This patient has been assessed with a concern for Malnutrition and has been determined to have a diagnosis/diagnoses of Severe protein-calorie malnutrition.    This patient is being managed with:   Diet Regular-  Fiber/Residue Restricted (LOWFIBER)  Entered: Nov 6 2020 10:13AM    
This patient has been assessed with a concern for Malnutrition and has been determined to have a diagnosis/diagnoses of Severe protein-calorie malnutrition.    This patient is being managed with:   Diet Low Fiber-  Regular  Supplement Feeding Modality:  Oral  Ensure High Protein Cans or Servings Per Day:  2       Frequency:  Three Times a day  Entered: Nov 13 2020  2:55PM    
This patient has been assessed with a concern for Malnutrition and has been determined to have a diagnosis/diagnoses of Severe protein-calorie malnutrition.    This patient is being managed with:   Diet NPO after Midnight-     NPO Start Date: 17-Nov-2020   NPO Start Time: 23:59  Entered: Nov 17 2020  7:34PM    Diet Low Fiber-  Regular  Supplement Feeding Modality:  Oral  Ensure High Protein Cans or Servings Per Day:  2       Frequency:  Three Times a day  Entered: Nov 13 2020  2:55PM    
This patient has been assessed with a concern for Malnutrition and has been determined to have a diagnosis/diagnoses of Severe protein-calorie malnutrition.    This patient is being managed with:   Diet Low Fiber-  Regular  Supplement Feeding Modality:  Oral  Ensure High Protein Cans or Servings Per Day:  2       Frequency:  Three Times a day  Entered: Nov 13 2020  2:55PM    
This patient has been assessed with a concern for Malnutrition and has been determined to have a diagnosis/diagnoses of Severe protein-calorie malnutrition.    This patient is being managed with:   Diet NPO after Midnight-     NPO Start Date: 15-Nov-2020   NPO Start Time: 23:59  Except Medications  With Ice Chips/Sips of Water  Entered: Nov 15 2020  1:56PM    Diet Low Fiber-  Regular  Supplement Feeding Modality:  Oral  Ensure High Protein Cans or Servings Per Day:  2       Frequency:  Three Times a day  Entered: Nov 13 2020  2:55PM

## 2020-11-30 DIAGNOSIS — B95.2 ENTEROCOCCUS AS THE CAUSE OF DISEASES CLASSIFIED ELSEWHERE: ICD-10-CM

## 2020-11-30 DIAGNOSIS — D47.3 ESSENTIAL (HEMORRHAGIC) THROMBOCYTHEMIA: ICD-10-CM

## 2020-11-30 DIAGNOSIS — M72.6 NECROTIZING FASCIITIS: ICD-10-CM

## 2020-11-30 DIAGNOSIS — I10 ESSENTIAL (PRIMARY) HYPERTENSION: ICD-10-CM

## 2020-11-30 DIAGNOSIS — N76.89 OTHER SPECIFIED INFLAMMATION OF VAGINA AND VULVA: ICD-10-CM

## 2020-11-30 DIAGNOSIS — F43.21 ADJUSTMENT DISORDER WITH DEPRESSED MOOD: ICD-10-CM

## 2020-11-30 DIAGNOSIS — B95.4 OTHER STREPTOCOCCUS AS THE CAUSE OF DISEASES CLASSIFIED ELSEWHERE: ICD-10-CM

## 2020-11-30 DIAGNOSIS — D50.9 IRON DEFICIENCY ANEMIA, UNSPECIFIED: ICD-10-CM

## 2020-11-30 DIAGNOSIS — A41.9 SEPSIS, UNSPECIFIED ORGANISM: ICD-10-CM

## 2020-11-30 DIAGNOSIS — Z86.718 PERSONAL HISTORY OF OTHER VENOUS THROMBOSIS AND EMBOLISM: ICD-10-CM

## 2020-11-30 DIAGNOSIS — Z53.31 LAPAROSCOPIC SURGICAL PROCEDURE CONVERTED TO OPEN PROCEDURE: ICD-10-CM

## 2020-11-30 DIAGNOSIS — Z88.2 ALLERGY STATUS TO SULFONAMIDES: ICD-10-CM

## 2020-11-30 DIAGNOSIS — L03.317 CELLULITIS OF BUTTOCK: ICD-10-CM

## 2020-11-30 DIAGNOSIS — D62 ACUTE POSTHEMORRHAGIC ANEMIA: ICD-10-CM

## 2020-11-30 DIAGNOSIS — E43 UNSPECIFIED SEVERE PROTEIN-CALORIE MALNUTRITION: ICD-10-CM

## 2020-11-30 DIAGNOSIS — B96.20 UNSPECIFIED ESCHERICHIA COLI [E. COLI] AS THE CAUSE OF DISEASES CLASSIFIED ELSEWHERE: ICD-10-CM

## 2020-11-30 DIAGNOSIS — Z88.1 ALLERGY STATUS TO OTHER ANTIBIOTIC AGENTS STATUS: ICD-10-CM

## 2020-11-30 DIAGNOSIS — E88.09 OTHER DISORDERS OF PLASMA-PROTEIN METABOLISM, NOT ELSEWHERE CLASSIFIED: ICD-10-CM

## 2020-11-30 DIAGNOSIS — Z63.4 DISAPPEARANCE AND DEATH OF FAMILY MEMBER: ICD-10-CM

## 2020-11-30 DIAGNOSIS — E66.01 MORBID (SEVERE) OBESITY DUE TO EXCESS CALORIES: ICD-10-CM

## 2020-11-30 SDOH — SOCIAL STABILITY - SOCIAL INSECURITY: DISSAPEARANCE AND DEATH OF FAMILY MEMBER: Z63.4

## 2021-01-29 ENCOUNTER — EMERGENCY (EMERGENCY)
Facility: HOSPITAL | Age: 61
LOS: 0 days | Discharge: HOME | End: 2021-01-29
Attending: STUDENT IN AN ORGANIZED HEALTH CARE EDUCATION/TRAINING PROGRAM
Payer: MEDICAID

## 2021-01-29 VITALS
HEIGHT: 67 IN | SYSTOLIC BLOOD PRESSURE: 161 MMHG | WEIGHT: 220.02 LBS | TEMPERATURE: 98 F | OXYGEN SATURATION: 99 % | RESPIRATION RATE: 17 BRPM | DIASTOLIC BLOOD PRESSURE: 95 MMHG | HEART RATE: 75 BPM

## 2021-01-29 VITALS
OXYGEN SATURATION: 100 % | SYSTOLIC BLOOD PRESSURE: 145 MMHG | TEMPERATURE: 98 F | RESPIRATION RATE: 18 BRPM | DIASTOLIC BLOOD PRESSURE: 86 MMHG | HEART RATE: 69 BPM

## 2021-01-29 DIAGNOSIS — R10.9 UNSPECIFIED ABDOMINAL PAIN: ICD-10-CM

## 2021-01-29 DIAGNOSIS — Z93.3 COLOSTOMY STATUS: ICD-10-CM

## 2021-01-29 DIAGNOSIS — D56.9 THALASSEMIA, UNSPECIFIED: ICD-10-CM

## 2021-01-29 DIAGNOSIS — I10 ESSENTIAL (PRIMARY) HYPERTENSION: ICD-10-CM

## 2021-01-29 DIAGNOSIS — Z88.2 ALLERGY STATUS TO SULFONAMIDES: ICD-10-CM

## 2021-01-29 DIAGNOSIS — Z79.891 LONG TERM (CURRENT) USE OF OPIATE ANALGESIC: ICD-10-CM

## 2021-01-29 DIAGNOSIS — K43.5 PARASTOMAL HERNIA WITHOUT OBSTRUCTION OR GANGRENE: ICD-10-CM

## 2021-01-29 DIAGNOSIS — K64.9 UNSPECIFIED HEMORRHOIDS: ICD-10-CM

## 2021-01-29 DIAGNOSIS — K59.00 CONSTIPATION, UNSPECIFIED: ICD-10-CM

## 2021-01-29 LAB
ALBUMIN SERPL ELPH-MCNC: 3.3 G/DL — SIGNIFICANT CHANGE UP (ref 3.3–5)
ALP SERPL-CCNC: 74 U/L — SIGNIFICANT CHANGE UP (ref 40–120)
ALT FLD-CCNC: 17 U/L — SIGNIFICANT CHANGE UP (ref 12–78)
ANION GAP SERPL CALC-SCNC: 8 MMOL/L — SIGNIFICANT CHANGE UP (ref 5–17)
APPEARANCE UR: CLEAR — SIGNIFICANT CHANGE UP
APTT BLD: 32 SEC — SIGNIFICANT CHANGE UP (ref 27.5–35.5)
AST SERPL-CCNC: 17 U/L — SIGNIFICANT CHANGE UP (ref 15–37)
BASOPHILS # BLD AUTO: 0.06 K/UL — SIGNIFICANT CHANGE UP (ref 0–0.2)
BASOPHILS NFR BLD AUTO: 0.8 % — SIGNIFICANT CHANGE UP (ref 0–2)
BILIRUB SERPL-MCNC: 0.3 MG/DL — SIGNIFICANT CHANGE UP (ref 0.2–1.2)
BILIRUB UR-MCNC: NEGATIVE — SIGNIFICANT CHANGE UP
BUN SERPL-MCNC: 11 MG/DL — SIGNIFICANT CHANGE UP (ref 7–23)
CALCIUM SERPL-MCNC: 9.3 MG/DL — SIGNIFICANT CHANGE UP (ref 8.5–10.1)
CHLORIDE SERPL-SCNC: 107 MMOL/L — SIGNIFICANT CHANGE UP (ref 96–108)
CO2 SERPL-SCNC: 25 MMOL/L — SIGNIFICANT CHANGE UP (ref 22–31)
COLOR SPEC: YELLOW — SIGNIFICANT CHANGE UP
CREAT SERPL-MCNC: 0.62 MG/DL — SIGNIFICANT CHANGE UP (ref 0.5–1.3)
DIFF PNL FLD: ABNORMAL
EOSINOPHIL # BLD AUTO: 0.41 K/UL — SIGNIFICANT CHANGE UP (ref 0–0.5)
EOSINOPHIL NFR BLD AUTO: 5.5 % — SIGNIFICANT CHANGE UP (ref 0–6)
GLUCOSE SERPL-MCNC: 96 MG/DL — SIGNIFICANT CHANGE UP (ref 70–99)
GLUCOSE UR QL: NEGATIVE MG/DL — SIGNIFICANT CHANGE UP
HCT VFR BLD CALC: 36.9 % — SIGNIFICANT CHANGE UP (ref 34.5–45)
HGB BLD-MCNC: 10.7 G/DL — LOW (ref 11.5–15.5)
IMM GRANULOCYTES NFR BLD AUTO: 0.1 % — SIGNIFICANT CHANGE UP (ref 0–1.5)
INR BLD: 1.11 RATIO — SIGNIFICANT CHANGE UP (ref 0.88–1.16)
KETONES UR-MCNC: NEGATIVE — SIGNIFICANT CHANGE UP
LEUKOCYTE ESTERASE UR-ACNC: ABNORMAL
LIDOCAIN IGE QN: 166 U/L — SIGNIFICANT CHANGE UP (ref 73–393)
LYMPHOCYTES # BLD AUTO: 2.18 K/UL — SIGNIFICANT CHANGE UP (ref 1–3.3)
LYMPHOCYTES # BLD AUTO: 29.5 % — SIGNIFICANT CHANGE UP (ref 13–44)
MCHC RBC-ENTMCNC: 20 PG — LOW (ref 27–34)
MCHC RBC-ENTMCNC: 29 GM/DL — LOW (ref 32–36)
MCV RBC AUTO: 69 FL — LOW (ref 80–100)
MONOCYTES # BLD AUTO: 0.74 K/UL — SIGNIFICANT CHANGE UP (ref 0–0.9)
MONOCYTES NFR BLD AUTO: 10 % — SIGNIFICANT CHANGE UP (ref 2–14)
NEUTROPHILS # BLD AUTO: 3.99 K/UL — SIGNIFICANT CHANGE UP (ref 1.8–7.4)
NEUTROPHILS NFR BLD AUTO: 54.1 % — SIGNIFICANT CHANGE UP (ref 43–77)
NITRITE UR-MCNC: NEGATIVE — SIGNIFICANT CHANGE UP
PH UR: 5 — SIGNIFICANT CHANGE UP (ref 5–8)
PLATELET # BLD AUTO: 381 K/UL — SIGNIFICANT CHANGE UP (ref 150–400)
POTASSIUM SERPL-MCNC: 3.3 MMOL/L — LOW (ref 3.5–5.3)
POTASSIUM SERPL-SCNC: 3.3 MMOL/L — LOW (ref 3.5–5.3)
PROT SERPL-MCNC: 8.2 GM/DL — SIGNIFICANT CHANGE UP (ref 6–8.3)
PROT UR-MCNC: 15 MG/DL
PROTHROM AB SERPL-ACNC: 12.9 SEC — SIGNIFICANT CHANGE UP (ref 10.6–13.6)
RBC # BLD: 5.35 M/UL — HIGH (ref 3.8–5.2)
RBC # FLD: 16.6 % — HIGH (ref 10.3–14.5)
SODIUM SERPL-SCNC: 140 MMOL/L — SIGNIFICANT CHANGE UP (ref 135–145)
SP GR SPEC: 1.02 — SIGNIFICANT CHANGE UP (ref 1.01–1.02)
UROBILINOGEN FLD QL: NEGATIVE MG/DL — SIGNIFICANT CHANGE UP
WBC # BLD: 7.39 K/UL — SIGNIFICANT CHANGE UP (ref 3.8–10.5)
WBC # FLD AUTO: 7.39 K/UL — SIGNIFICANT CHANGE UP (ref 3.8–10.5)

## 2021-01-29 PROCEDURE — 93010 ELECTROCARDIOGRAM REPORT: CPT

## 2021-01-29 PROCEDURE — 83690 ASSAY OF LIPASE: CPT

## 2021-01-29 PROCEDURE — 74177 CT ABD & PELVIS W/CONTRAST: CPT | Mod: 26

## 2021-01-29 PROCEDURE — 86900 BLOOD TYPING SEROLOGIC ABO: CPT

## 2021-01-29 PROCEDURE — 93005 ELECTROCARDIOGRAM TRACING: CPT

## 2021-01-29 PROCEDURE — 85730 THROMBOPLASTIN TIME PARTIAL: CPT

## 2021-01-29 PROCEDURE — 86901 BLOOD TYPING SEROLOGIC RH(D): CPT

## 2021-01-29 PROCEDURE — 81001 URINALYSIS AUTO W/SCOPE: CPT

## 2021-01-29 PROCEDURE — 74150 CT ABDOMEN W/O CONTRAST: CPT

## 2021-01-29 PROCEDURE — 85025 COMPLETE CBC W/AUTO DIFF WBC: CPT

## 2021-01-29 PROCEDURE — 99284 EMERGENCY DEPT VISIT MOD MDM: CPT

## 2021-01-29 PROCEDURE — 71045 X-RAY EXAM CHEST 1 VIEW: CPT | Mod: 26

## 2021-01-29 PROCEDURE — 74177 CT ABD & PELVIS W/CONTRAST: CPT

## 2021-01-29 PROCEDURE — 80053 COMPREHEN METABOLIC PANEL: CPT

## 2021-01-29 PROCEDURE — 36415 COLL VENOUS BLD VENIPUNCTURE: CPT

## 2021-01-29 PROCEDURE — 87086 URINE CULTURE/COLONY COUNT: CPT

## 2021-01-29 PROCEDURE — 74150 CT ABDOMEN W/O CONTRAST: CPT | Mod: 26,59

## 2021-01-29 PROCEDURE — 71045 X-RAY EXAM CHEST 1 VIEW: CPT

## 2021-01-29 PROCEDURE — 99284 EMERGENCY DEPT VISIT MOD MDM: CPT | Mod: 25

## 2021-01-29 PROCEDURE — 85610 PROTHROMBIN TIME: CPT

## 2021-01-29 PROCEDURE — 96360 HYDRATION IV INFUSION INIT: CPT | Mod: XU

## 2021-01-29 PROCEDURE — 86850 RBC ANTIBODY SCREEN: CPT

## 2021-01-29 RX ORDER — DOCUSATE SODIUM 100 MG
1 CAPSULE ORAL
Qty: 14 | Refills: 0
Start: 2021-01-29 | End: 2021-02-04

## 2021-01-29 RX ORDER — POLYETHYLENE GLYCOL 3350 17 G/17G
17 POWDER, FOR SOLUTION ORAL ONCE
Refills: 0 | Status: COMPLETED | OUTPATIENT
Start: 2021-01-29 | End: 2021-01-29

## 2021-01-29 RX ORDER — SODIUM CHLORIDE 9 MG/ML
1000 INJECTION INTRAMUSCULAR; INTRAVENOUS; SUBCUTANEOUS ONCE
Refills: 0 | Status: COMPLETED | OUTPATIENT
Start: 2021-01-29 | End: 2021-01-29

## 2021-01-29 RX ORDER — POLYETHYLENE GLYCOL 3350 17 G/17G
17 POWDER, FOR SOLUTION ORAL
Qty: 119 | Refills: 0
Start: 2021-01-29 | End: 2021-02-04

## 2021-01-29 RX ADMIN — POLYETHYLENE GLYCOL 3350 17 GRAM(S): 17 POWDER, FOR SOLUTION ORAL at 21:54

## 2021-01-29 RX ADMIN — SODIUM CHLORIDE 1000 MILLILITER(S): 9 INJECTION INTRAMUSCULAR; INTRAVENOUS; SUBCUTANEOUS at 16:19

## 2021-01-29 RX ADMIN — SODIUM CHLORIDE 1000 MILLILITER(S): 9 INJECTION INTRAMUSCULAR; INTRAVENOUS; SUBCUTANEOUS at 15:19

## 2021-01-29 NOTE — ED ADULT TRIAGE NOTE - CHIEF COMPLAINT QUOTE
pt biba for abd pain/abd distension, no output from ostomy. hx necrotizing fascitis. Aox4 from apex. pt biba for abd pain/abd distension, no output from ostomy since wednesday 3am. hx necrotizing fascitis. Aox4 from apex.

## 2021-01-29 NOTE — ED PROVIDER NOTE - PROGRESS NOTE DETAILS
Surgery came to try to reduce the parastoma hernia but was unable to reduce it. Since no obstruction, sx mentioned no need for surgical intervention. Pt may be placed on miralax and colace and f/u with Dr. Carroll in 2 weeks. Spoke with [place], states they use [facility] in [place]. -Cristi Geronimo PA-C

## 2021-01-29 NOTE — ED ADULT NURSE NOTE - OBJECTIVE STATEMENT
patient axox3, BIBEMS from apex c/o abdominal pressure, abdominal distension and decreased output from her colostomy since wednesday night. patient denies abdominal pain, states "it feels like a pressure". patient states she only saw green liquid in her colostomy collection bag today. patient states she vomited x1 yesterday, able to tolerate this morning's oral medications. patient states last oral intake was 1:30 pm today. hx necrotizing fascitis s/p colostomy in 11/2020. stoma beefy red. patient denies headache, n/v/d, fever, chills, cough, chest pain, SOB.

## 2021-01-29 NOTE — ED PROVIDER NOTE - CARE PLAN
Principal Discharge DX:	Parastomal hernia without obstruction or gangrene  Secondary Diagnosis:	Constipation, unspecified constipation type

## 2021-01-29 NOTE — ED PROVIDER NOTE - OBJECTIVE STATEMENT
59 yo female with a PMH of necrotizing fascitis s/p colostomy in 11/2020, htn, thalassemia, presents with abd pressure and distension. Pt states she had pressure for the last few days. Last time she noticed any output from her colostomy was Wednesday and today only saw liquid in her bag. +vomited x 1 yesterday. Last oral intake was 130pm today. 61 yo female with a PMH of necrotizing fascitis s/p colostomy in 11/2020 with DR. Ismael Carroll, htn, thalassemia, presents with abd pressure and distension. Pt states she had pressure for the last few days. Last time she noticed any output from her colostomy was Wednesday and today only saw liquid in her bag. +vomited x 1 yesterday. Last oral intake was 130pm today.

## 2021-01-29 NOTE — ED PROVIDER NOTE - ABDOMINAL EXAM
Colostomy bag in place with green liquid noted in bag without any solid material. No surrounding erythema or discharge ./soft/nontender...

## 2021-01-29 NOTE — ED PROVIDER NOTE - PATIENT PORTAL LINK FT
You can access the FollowMyHealth Patient Portal offered by Health system by registering at the following website: [email or web address]. By joining [facility]’s FollowMyHealth portal, you will also be able to view your health information using other applications (apps) compatible with our system.

## 2021-01-29 NOTE — ED ADULT NURSE NOTE - CHIEF COMPLAINT QUOTE
pt biba for abd pain/abd distension, no output from ostomy since wednesday 3am. hx necrotizing fascitis. Aox4 from apex.

## 2021-01-29 NOTE — ED PROVIDER NOTE - CLINICAL SUMMARY MEDICAL DECISION MAKING FREE TEXT BOX
59 yo female presents with 2 bag of decreased output from colostomy bag and abd distension with pressure. Labs, CTAP, r/o SBO. -Cristi Geronimo PA-C

## 2021-01-29 NOTE — CONSULT NOTE ADULT - SUBJECTIVE AND OBJECTIVE BOX
HPI:  59yo F presents w/ abdominal distension. Patient had necrotizing soft tissue infection back in November that tracked up to sigmoid colon, s/p perineal wound debridement, end colostomy creation. Patient reports that her ostomy stopped functioning 2 days ago, with some nausea. Otherwise, denies any abdominal pain. Denies fever/chills, shortness of breath, chest pain    In ED, ostomy started functioning. Patient is also worked up for CT and lab which demonstrated parastomal hernia, nonobstructing and constipation      PAST MEDICAL & SURGICAL HISTORY:  Hemorrhoids    No significant past surgical history        MEDICATIONS  (STANDING):    MEDICATIONS  (PRN):      Allergies    cefazolin (Other (Moderate))  heparin (Unknown)  sulfa drugs (Unknown)    Intolerances        SOCIAL HISTORY:    FAMILY HISTORY:          Physical Exam:  GENERAL: NAD, AOx3, well developed, well nourished  HEAD: Atraumatic, normocephalic  EYES: EOMI, PERRLA, conjunctiva and sclera clear  ENT: moist mucous membrane  NECK: supple, No JVD, midline trachea  CHEST/LUNG: clear to auscultation b/l, no rales, rhonchi, wheezing or rubs. unlabored respirations  Heart: S1, S2 normal, RRR w/o murmur  ABDOMEN: soft, distended, nontender. ostomy functioning putting out thickened stool. herniated bowel palpable.  EXTREMITIES: +2 peripheral pulses, brisk cap refill. no clubbing, cyanosis or edema  NERVOUS SYSTEM: AOx3, speech clear, no neuro-deficits  MSK: full ROM, no deformities  SKIN: warm to touch, no rash or lesions        Vital Signs Last 24 Hrs  T(C): 36.8 (2021 14:41), Max: 36.8 (2021 14:41)  T(F): 98.3 (2021 14:41), Max: 98.3 (2021 14:41)  HR: 75 (2021 14:41) (75 - 75)  BP: 161/95 (2021 14:41) (161/95 - 161/95)  BP(mean): --  RR: 17 (2021 14:41) (17 - 17)  SpO2: 99% (2021 14:41) (99% - 99%)    I&O's Summary          LABS:                        10.7   7.39  )-----------( 381      ( 2021 14:51 )             36.9         140  |  107  |  11  ----------------------------<  96  3.3<L>   |  25  |  0.62    Ca    9.3      2021 14:51    TPro  8.2  /  Alb  3.3  /  TBili  0.3  /  DBili  x   /  AST  17  /  ALT  17  /  AlkPhos  74      PT/INR - ( 2021 14:51 )   PT: 12.9 sec;   INR: 1.11 ratio         PTT - ( 2021 14:51 )  PTT:32.0 sec  Urinalysis Basic - ( 2021 16:41 )    Color: Yellow / Appearance: Clear / S.020 / pH: x  Gluc: x / Ketone: Negative  / Bili: Negative / Urobili: Negative mg/dL   Blood: x / Protein: 15 mg/dL / Nitrite: Negative   Leuk Esterase: Small / RBC: 0-2 /HPF / WBC 3-5   Sq Epi: x / Non Sq Epi: Moderate / Bacteria: Occasional      CAPILLARY BLOOD GLUCOSE        LIVER FUNCTIONS - ( 2021 14:51 )  Alb: 3.3 g/dL / Pro: 8.2 gm/dL / ALK PHOS: 74 U/L / ALT: 17 U/L / AST: 17 U/L / GGT: x             Cultures:      RADIOLOGY & ADDITIONAL STUDIES:  < from: CT Abdomen No Cont (21 @ 19:44) >  EXAM:  CT ABDOMEN ONLY                            PROCEDURE DATE:  2021          INTERPRETATION:  CLINICAL INFORMATION: Evaluate for parastomal hernia reduction    < end of copied text >  < from: CT Abdomen No Cont (21 @ 19:44) >  IMPRESSION:  No significant change in the appearance of parastomal hernia.      < end of copied text >

## 2021-01-29 NOTE — ED PROVIDER NOTE - NSFOLLOWUPINSTRUCTIONS_ED_ALL_ED_FT
Follow up with Dr. Carroll in 2 weeks and follow instructions given to you by the surgery resident.   Take the prescribed medication as directed.       Constipation    WHAT YOU NEED TO KNOW:    Constipation is when you have hard, dry bowel movements, or you go longer than usual between bowel movements.     DISCHARGE INSTRUCTIONS:    Call your doctor if:   •You have blood in your bowel movements.      •You have a fever and abdominal pain with the constipation.      •Your constipation gets worse.       •You start to vomit.      •You have questions or concerns about your condition or care.      Medicines:   •Medicine such as a laxative may help relax and loosen your intestines to help you have a bowel movement. Your provider may recommend you only use laxatives for a short time. Long-term use may make your bowels dependent on the medicine.      •Take your medicine as directed. Contact your healthcare provider if you think your medicine is not helping or if you have side effects. Tell him of her if you are allergic to any medicine. Keep a list of the medicines, vitamins, and herbs you take. Include the amounts, and when and why you take them. Bring the list or the pill bottles to follow-up visits. Carry your medicine list with you in case of an emergency.      Relieve constipation:   •A suppository may be used to help soften your bowel movements. This may make them easier to pass. A suppository is guided into your rectum through your anus.  Suppository for Constipation           •An enema is liquid medicine used to clear bowel movement from your rectum. The medicine is put into your rectum through your anus.  Enemas           Prevent constipation:   •Drink liquids as directed. You may need to drink extra liquids to help soften and move your bowels. Ask how much liquid to drink each day and which liquids are best for you.       •Eat high-fiber foods. This may help decrease constipation by adding bulk to your bowel movements. High-fiber foods include fruit, vegetables, whole-grain breads and cereals, and beans. Your healthcare provider or dietitian can help you create a high-fiber meal plan. Your provider may also recommend a fiber supplement if you cannot get enough fiber from food.              •Exercise regularly. Regular physical activity can help stimulate your intestines. Walking is a good exercise to prevent or relieve constipation. Ask which exercises are best for you.  Walking for Exercise           •Schedule a time each day to have a bowel movement. This may help train your body to have regular bowel movements. Bend forward while you are on the toilet to help move the bowel movement out. Sit on the toilet for at least 10 minutes, even if you do not have a bowel movement.       •Talk to your healthcare provider about your medicines. Certain medicines, such as opioids, can cause constipation. Your provider may be able to make medicine changes. For example, he or she may change the kind of medicine, or change when you take it.      Follow up with your healthcare provider as directed: Write down your questions so you remember to ask them during your visits

## 2021-01-29 NOTE — ED PROVIDER NOTE - ATTENDING CONTRIBUTION TO CARE
60 F recent hx of necrotizing fasciitis requiring colostomy formation by Dr. Carroll here from nursing home for decreased ostomy output. she has associated abdominal bloating. Given constipation medications by nursing home with minimal liquid output today. On exam, patient comfortable, lungs clear to auscultation anteriorly, heart regular rate and rhythm, + bowel sounds, ostomy bag with minimal liquid brown stool in bag, abdomen softly distended. will r/o obstruction.

## 2021-01-31 LAB
CULTURE RESULTS: SIGNIFICANT CHANGE UP
SPECIMEN SOURCE: SIGNIFICANT CHANGE UP

## 2021-02-01 NOTE — ED POST DISCHARGE NOTE - RESULT SUMMARY
+urine cx. Pt at [facility] [phone]. I spoke to nurse supervisor [employer] and faxed over results [phone], she will show results to doctor for abx rx. signed Sophie Chou PA-C

## 2021-02-02 ENCOUNTER — APPOINTMENT (OUTPATIENT)
Dept: COLORECTAL SURGERY | Facility: CLINIC | Age: 61
End: 2021-02-02

## 2021-02-03 ENCOUNTER — APPOINTMENT (OUTPATIENT)
Dept: COLORECTAL SURGERY | Facility: CLINIC | Age: 61
End: 2021-02-03
Payer: MEDICAID

## 2021-02-03 VITALS
WEIGHT: 215 LBS | HEART RATE: 86 BPM | RESPIRATION RATE: 14 BRPM | TEMPERATURE: 97.8 F | BODY MASS INDEX: 33.74 KG/M2 | HEIGHT: 67 IN | DIASTOLIC BLOOD PRESSURE: 83 MMHG | SYSTOLIC BLOOD PRESSURE: 151 MMHG

## 2021-02-03 PROCEDURE — 99024 POSTOP FOLLOW-UP VISIT: CPT

## 2021-02-03 PROCEDURE — 17250 CHEM CAUT OF GRANLTJ TISSUE: CPT | Mod: 58

## 2021-02-03 RX ORDER — AMLODIPINE BESYLATE 5 MG/1
TABLET ORAL
Refills: 0 | Status: ACTIVE | COMMUNITY

## 2021-02-03 NOTE — ASSESSMENT
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 She is improving wounds are healing nicely. Recommend continued wound care and colostomy care continue weight loss

## 2021-02-03 NOTE — PHYSICAL EXAM
[Abdomen Masses] : No abdominal masses [Abdomen Tenderness] : ~T No ~M abdominal tenderness [Tender] : nontender [Normal] : was normal [None] : no [JVD] : no jugular venous distention  [Normal Breath Sounds] : Normal breath sounds [Normal Heart Sounds] : normal heart sounds [Normal Rate and Rhythm] : normal rate and rhythm [No Rash or Lesion] : No rash or lesion [Alert] : alert [Oriented to Person] : oriented to person [Oriented to Place] : oriented to place [Oriented to Time] : oriented to time [Calm] : calm [de-identified] : obese, colostomy with large parastomal hernia [de-identified] : Chronic granulation tissue around perineal area [de-identified] : Looks well in no distress, of stated age. [de-identified] : pupils equal reactive to light normocephalic atraumatic. [de-identified] : moves all 4 extremities appropriately with 5 over 5 strength

## 2021-02-03 NOTE — HISTORY OF PRESENT ILLNESS
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 Here for followup. Patient has been at a subacute rehabilitation facility. Improving

## 2021-03-02 ENCOUNTER — APPOINTMENT (OUTPATIENT)
Dept: COLORECTAL SURGERY | Facility: CLINIC | Age: 61
End: 2021-03-02
Payer: MEDICAID

## 2021-03-02 VITALS
HEIGHT: 67 IN | DIASTOLIC BLOOD PRESSURE: 73 MMHG | SYSTOLIC BLOOD PRESSURE: 153 MMHG | BODY MASS INDEX: 33.74 KG/M2 | WEIGHT: 215 LBS | TEMPERATURE: 98 F | OXYGEN SATURATION: 97 % | HEART RATE: 91 BPM | RESPIRATION RATE: 14 BRPM

## 2021-03-02 DIAGNOSIS — Z12.11 ENCOUNTER FOR SCREENING FOR MALIGNANT NEOPLASM OF COLON: ICD-10-CM

## 2021-03-02 PROCEDURE — 99214 OFFICE O/P EST MOD 30 MIN: CPT

## 2021-03-02 NOTE — CONSULT LETTER
[Dear  ___] : Dear  [unfilled], [Courtesy Letter:] : I had the pleasure of seeing your patient, [unfilled], in my office today. [Please see my note below.] : Please see my note below. [Consult Closing:] : Thank you very much for allowing me to participate in the care of this patient.  If you have any questions, please do not hesitate to contact me. [Sincerely,] : Sincerely, [FreeTextEntry2] : [facility] [FreeTextEntry3] : Ismael Carroll MD FACS

## 2021-03-02 NOTE — ASSESSMENT
[FreeTextEntry1] : Gabriele's gangrene\par --wound almost completely healed.\par \par Colostomy with parastomal hernia\par --s/p lap loop sigmoid colostomy\par --Will repeat CT scan Abd/Pelvis with oral and IV contrast for evaluation of the parastomal hernia as well as the pelvis.\par --Pt will also need colonoscopy prior to stoma reversal given patient has never had a colonoscopy before.  No family history of colon cancer.  Golytely bowel prep recommended.  Risks of colonoscopy include bleeding, perforation discussed.  Questions answered.  Will schedule after CT scan. \par --follow up in office afterwards\par --If patient has worsening pain or obstructive symptoms, patient will need urgent re-evaluation in the ER to rule out obstruction.  This may require additional imaging.

## 2021-03-02 NOTE — REVIEW OF SYSTEMS
[Fever] : no fever [Chest Pain] : no chest pain [Shortness Of Breath] : no shortness of breath [Abdominal Pain] : abdominal pain [Constipation] : constipation [Diarrhea] : diarrhea [Negative] : Respiratory

## 2021-03-02 NOTE — HISTORY OF PRESENT ILLNESS
[FreeTextEntry1] : Here for followup \par \par Pt is still at rehab for wound care.  Wound is healing up nicely per report.\par \par Pt reports normal diet. Eating 3 meals a day.  However still having moderate constipation not amenable with colace, miralax, or senekot.  She needs to take lactulose 2x daily and this results in liquid BM's constantly.  Not on any narcotic pain medications.  Not sure that the constipation is secondary to her parastomal hernia.\par \par Pt has been to the hosptial for evaluation of a parastomal hernia about a month ago. Has some leakage issues from the parastomal hernia.  It was easily reducible.  Imaging suggests that there was a loop of transverse colon.  Moderate stool was seen throughout the colon.  No evidence of obstruction.  Within the L ischioanla fossa there was increased density tissue that had replaced the previously seen subcutaneous air.  Pt still reports some rectal pain/pressure.  No stool per rectum.  minimal bleeding, some drainage from rectal wound still.

## 2021-03-02 NOTE — PHYSICAL EXAM
[de-identified] : Colostomy pink with liquid stool.  Large parastomal hernia [de-identified] : Wound largely filled in.  area of granulation tissue.

## 2021-03-10 ENCOUNTER — RESULT REVIEW (OUTPATIENT)
Age: 61
End: 2021-03-10

## 2021-03-15 ENCOUNTER — OUTPATIENT (OUTPATIENT)
Dept: OUTPATIENT SERVICES | Facility: HOSPITAL | Age: 61
LOS: 1 days | End: 2021-03-15
Payer: MEDICAID

## 2021-03-15 ENCOUNTER — APPOINTMENT (OUTPATIENT)
Dept: CT IMAGING | Facility: CLINIC | Age: 61
End: 2021-03-15
Payer: MEDICAID

## 2021-03-15 DIAGNOSIS — Z00.8 ENCOUNTER FOR OTHER GENERAL EXAMINATION: ICD-10-CM

## 2021-03-15 PROCEDURE — 74177 CT ABD & PELVIS W/CONTRAST: CPT | Mod: 26

## 2021-03-15 PROCEDURE — 74177 CT ABD & PELVIS W/CONTRAST: CPT

## 2021-03-15 PROCEDURE — 82565 ASSAY OF CREATININE: CPT

## 2021-03-18 ENCOUNTER — NON-APPOINTMENT (OUTPATIENT)
Age: 61
End: 2021-03-18

## 2021-03-22 ENCOUNTER — NON-APPOINTMENT (OUTPATIENT)
Age: 61
End: 2021-03-22

## 2021-03-25 ENCOUNTER — APPOINTMENT (OUTPATIENT)
Dept: COLORECTAL SURGERY | Facility: CLINIC | Age: 61
End: 2021-03-25
Payer: MEDICAID

## 2021-03-25 VITALS
WEIGHT: 200 LBS | TEMPERATURE: 97.4 F | SYSTOLIC BLOOD PRESSURE: 150 MMHG | BODY MASS INDEX: 31.39 KG/M2 | HEIGHT: 67 IN | DIASTOLIC BLOOD PRESSURE: 74 MMHG | OXYGEN SATURATION: 97 % | HEART RATE: 72 BPM

## 2021-03-25 PROCEDURE — 99213 OFFICE O/P EST LOW 20 MIN: CPT

## 2021-03-25 NOTE — ASSESSMENT
[FreeTextEntry1] : Colostomy with parastomal hernia\par --s/p lap loop sigmoid colostomy\par --CT scan of abdomen/pelvis shows parastomal hernia with transverse colon, no evidence of obstruction.  Pelvis appears to be healed in with soft tissue/scar at area of previous infectious process. wound still with granulation tissue. Continue current wound cares.\par --colonoscopy scheduled for 4/9.  will evaluate for synchronous lesions given no prior screening colonoscopy.  No family history of colon cancer.\par --if colonoscopy normal, plan for laparoscopic colostomy reversal, possible open, possible hernia repair with biologic mesh.  Will discuss surgical options at office visit after colonoscopy.

## 2021-03-25 NOTE — REVIEW OF SYSTEMS
[Fever] : no fever [Abdominal Pain] : no abdominal pain [Constipation] : no constipation [Diarrhea] : no diarrhea [Anxiety] : anxiety [Negative] : Genitourinary

## 2021-03-25 NOTE — HISTORY OF PRESENT ILLNESS
[FreeTextEntry1] : doing about the same.  Using calcium alginatefor wound.   Still at [facility] for wound care.  no stoma complications.  Underwent CT scan.  Colonoscopy planned for 4/9

## 2021-03-25 NOTE — PHYSICAL EXAM
[JVD] : no jugular venous distention  [Respiratory Effort] : normal respiratory effort [Normal Rate and Rhythm] : normal rate and rhythm [de-identified] : Stoma pink with large amount of liquid stool.  Large parastomal hernia, soft nontender. [de-identified] : Wound largely filled in.  area of granulation tissue. on the L buttock, no pus no sinus. [de-identified] : NAD

## 2021-04-06 ENCOUNTER — OUTPATIENT (OUTPATIENT)
Dept: OUTPATIENT SERVICES | Facility: HOSPITAL | Age: 61
LOS: 1 days | End: 2021-04-06
Payer: MEDICAID

## 2021-04-06 VITALS
HEART RATE: 73 BPM | TEMPERATURE: 99 F | WEIGHT: 244.71 LBS | SYSTOLIC BLOOD PRESSURE: 100 MMHG | RESPIRATION RATE: 16 BRPM | DIASTOLIC BLOOD PRESSURE: 70 MMHG | HEIGHT: 67 IN

## 2021-04-06 DIAGNOSIS — J45.909 UNSPECIFIED ASTHMA, UNCOMPLICATED: ICD-10-CM

## 2021-04-06 DIAGNOSIS — Z29.9 ENCOUNTER FOR PROPHYLACTIC MEASURES, UNSPECIFIED: ICD-10-CM

## 2021-04-06 DIAGNOSIS — Z12.11 ENCOUNTER FOR SCREENING FOR MALIGNANT NEOPLASM OF COLON: ICD-10-CM

## 2021-04-06 DIAGNOSIS — Z98.890 OTHER SPECIFIED POSTPROCEDURAL STATES: Chronic | ICD-10-CM

## 2021-04-06 DIAGNOSIS — Z93.3 COLOSTOMY STATUS: Chronic | ICD-10-CM

## 2021-04-06 DIAGNOSIS — Z01.818 ENCOUNTER FOR OTHER PREPROCEDURAL EXAMINATION: ICD-10-CM

## 2021-04-06 LAB
ANION GAP SERPL CALC-SCNC: 13 MMOL/L — SIGNIFICANT CHANGE UP (ref 5–17)
BUN SERPL-MCNC: 11 MG/DL — SIGNIFICANT CHANGE UP (ref 8–20)
CALCIUM SERPL-MCNC: 9.7 MG/DL — SIGNIFICANT CHANGE UP (ref 8.6–10.2)
CHLORIDE SERPL-SCNC: 103 MMOL/L — SIGNIFICANT CHANGE UP (ref 98–107)
CO2 SERPL-SCNC: 26 MMOL/L — SIGNIFICANT CHANGE UP (ref 22–29)
CREAT SERPL-MCNC: 0.54 MG/DL — SIGNIFICANT CHANGE UP (ref 0.5–1.3)
GLUCOSE SERPL-MCNC: 99 MG/DL — SIGNIFICANT CHANGE UP (ref 70–99)
HCT VFR BLD CALC: 40.2 % — SIGNIFICANT CHANGE UP (ref 34.5–45)
HGB BLD-MCNC: 11.8 G/DL — SIGNIFICANT CHANGE UP (ref 11.5–15.5)
MCHC RBC-ENTMCNC: 20.6 PG — LOW (ref 27–34)
MCHC RBC-ENTMCNC: 29.4 GM/DL — LOW (ref 32–36)
MCV RBC AUTO: 70 FL — LOW (ref 80–100)
PLATELET # BLD AUTO: 356 K/UL — SIGNIFICANT CHANGE UP (ref 150–400)
POTASSIUM SERPL-MCNC: 4.1 MMOL/L — SIGNIFICANT CHANGE UP (ref 3.5–5.3)
POTASSIUM SERPL-SCNC: 4.1 MMOL/L — SIGNIFICANT CHANGE UP (ref 3.5–5.3)
RBC # BLD: 5.74 M/UL — HIGH (ref 3.8–5.2)
RBC # FLD: 20.4 % — HIGH (ref 10.3–14.5)
SARS-COV-2 RNA SPEC QL NAA+PROBE: SIGNIFICANT CHANGE UP
SODIUM SERPL-SCNC: 142 MMOL/L — SIGNIFICANT CHANGE UP (ref 135–145)
WBC # BLD: 10.06 K/UL — SIGNIFICANT CHANGE UP (ref 3.8–10.5)
WBC # FLD AUTO: 10.06 K/UL — SIGNIFICANT CHANGE UP (ref 3.8–10.5)

## 2021-04-06 PROCEDURE — G0463: CPT

## 2021-04-06 PROCEDURE — 80048 BASIC METABOLIC PNL TOTAL CA: CPT

## 2021-04-06 PROCEDURE — 93005 ELECTROCARDIOGRAM TRACING: CPT

## 2021-04-06 PROCEDURE — 36415 COLL VENOUS BLD VENIPUNCTURE: CPT

## 2021-04-06 PROCEDURE — U0005: CPT

## 2021-04-06 PROCEDURE — 44388 COLONOSCOPY THRU STOMA SPX: CPT

## 2021-04-06 PROCEDURE — 85027 COMPLETE CBC AUTOMATED: CPT

## 2021-04-06 PROCEDURE — 93010 ELECTROCARDIOGRAM REPORT: CPT

## 2021-04-06 PROCEDURE — U0003: CPT

## 2021-04-06 NOTE — H&P PST ADULT - NSICDXFAMILYHX_GEN_ALL_CORE_FT
FAMILY HISTORY:  Father  Still living? No  FH: hypertension, Age at diagnosis: Age Unknown  FH: prostate cancer, Age at diagnosis: Age Unknown

## 2021-04-06 NOTE — H&P PST ADULT - ASSESSMENT
62 y/o F with PMHx of hemorrhoids presents to the [facility] ED c/o diffuse +myalgias and +SOB x2 weeks in 2020, she said she started having  +weakness and +decreased PO intake since her  passed 10/15. she said she developed an infection and was admitted to the hospital for a month, she end up with a colostomy, she developed a hernia and also has rash and infection around the stoma. she is scheduled for a colonoscopy via stoma by DR. Carroll on 21. she is currently stays in a [facility] home. The doctor in the [facility] home will be medically clearing her for the procedure.  medications reviewed, instructions given on what medications to take and what not to take. Asked the patient to take the Blood pressure medication/ heart medication on DOP. . Asked the pt not to take any NSAID's 5-7 days before surgery and told the pt Tylenol is okay to take for pain, pt verbalized understanding. . Asked the pt not to take DM meds on the DOP   CAPRINI VTE 2.0 SCORE [CLOT updated 2019]    AGE RELATED RISK FACTORS                                                       MOBILITY RELATED FACTORS  [x ] Age 41-60 years                                            (1 Point)                    [ ] Bed rest                                                        (1 Point)  [ ] Age: 61-74 years                                           (2 Points)                  [ ] Plaster cast                                                   (2 Points)  [ ] Age= 75 years                                              (3 Points)                    [ ] Bed bound for more than 72 hours                 (2 Points)    DISEASE RELATED RISK FACTORS                                               GENDER SPECIFIC FACTORS  [ ] Edema in the lower extremities                       (1 Point)              [ ] Pregnancy                                                     (1 Point)  [ ] Varicose veins                                               (1 Point)                     [ ] Post-partum < 6 weeks                                   (1 Point)             [x ] BMI > 25 Kg/m2                                            (1 Point)                     [ ] Hormonal therapy  or oral contraception          (1 Point)                 [ ] Sepsis (in the previous month)                        (1 Point)               [ ] History of pregnancy complications                 (1 point)  [ ] Pneumonia or serious lung disease                                               [ ] Unexplained or recurrent                     (1 Point)           (in the previous month)                               (1 Point)  [ ] Abnormal pulmonary function test                     (1 Point)                 SURGERY RELATED RISK FACTORS  [ ] Acute myocardial infarction                              (1 Point)               [ ]  Section                                             (1 Point)  [ ] Congestive heart failure (in the previous month)  (1 Point)      [x ] Minor surgery                                                  (1 Point)   [ ] Inflammatory bowel disease                             (1 Point)               [ ] Arthroscopic surgery                                        (2 Points)  [ ] Central venous access                                      (2 Points)                [ ] General surgery lasting more than 45 minutes (2 points)  [ ] Malignancy- Present or previous                   (2 Points)                [ ] Elective arthroplasty                                         (5 points)    [ ] Stroke (in the previous month)                          (5 Points)                                                                                                                                                           HEMATOLOGY RELATED FACTORS                                                 TRAUMA RELATED RISK FACTORS  [ ] Prior episodes of VTE                                     (3 Points)                [ ] Fracture of the hip, pelvis, or leg                       (5 Points)  [ ] Positive family history for VTE                         (3 Points)             [ ] Acute spinal cord injury (in the previous month)  (5 Points)  [ ] Prothrombin 24849 A                                     (3 Points)               [ ] Paralysis  (less than 1 month)                             (5 Points)  [ ] Factor V Leiden                                             (3 Points)                  [ ] Multiple Trauma within 1 month                        (5 Points)  [ ] Lupus anticoagulants                                     (3 Points)                                                           [ ] Anticardiolipin antibodies                               (3 Points)                                                       [ ] High homocysteine in the blood                      (3 Points)                                             [ ] Other congenital or acquired thrombophilia      (3 Points)                                                [ ] Heparin induced thrombocytopenia                  (3 Points)                                     Total Score [     3     ]    OPIOID RISK TOOL    CARLITOS EACH BOX THAT APPLIES AND ADD TOTALS AT THE END    FAMILY HISTORY OF SUBSTANCE ABUSE                 FEMALE         MALE                                                Alcohol                             [  ]1 pt          [  ]3pts                                               Illegal Drugs                     [  ]2 pts        [  ]3pts                                               Rx Drugs                           [  ]4 pts        [  ]4 pts    PERSONAL HISTORY OF SUBSTANCE ABUSE                                                                                          Alcohol                             [  ]3 pts       [  ]3 pts                                               Illegal Drugs                     [  ]4 pts        [  ]4 pts                                               Rx Drugs                           [  ]5 pts        [  ]5 pts    AGE BETWEEN 16-45 YEARS                                      [  ]1 pt         [  ]1 pt    HISTORY OF PREADOLESCENT   SEXUAL ABUSE                                                             [  ]3 pts        [  ]0pts    PSYCHOLOGICAL DISEASE                     ADD, OCD, Bipolar, Schizophrenia        [  ]2 pts         [  ]2 pts                      Depression                                               [  ]1 pt           [  ]1 pt           SCORING TOTAL   (add numbers and type here)              (0)                                     A score of 3 or lower indicated LOW risk for future opioid abuse  A score of 4 to 7 indicated moderate risk for future opioid abuse  A score of 8 or higher indicates a high risk for opioid abuse

## 2021-04-06 NOTE — H&P PST ADULT - HISTORY OF PRESENT ILLNESS
59 y/o F with PMHx of hemorrhoids presents to the ED c/o diffuse +myalgias and +SOB x2 weeks. Notes associated +weakness and +decreased PO intake as well since her  passed 10/15.  Reports inability to perform ADLs 2/2 symptoms so called EMS today. Non-drinker. Never a smoker. Allergic to cefazolin and sulfa drugs. Denies hx of DM, denies fever, chills, chest pain, n/v. Endorses diarrhea x 2weeks 60 y/o F with PMHx of hemorrhoids presents to the [facility] ED c/o diffuse +myalgias and +SOB x2 weeks in Nov 2020, she said she started having  +weakness and +decreased PO intake since her  passed 10/15. she said she developed an infection and was admitted to the hospital for a month, she end up with a colostomy, she developed a hernia and also has rash and infection around the stoma. she is scheduled for a colonoscopy via stoma by DR. Carroll on 4/9/21. she is currently stays in a [facility] home. The doctor in the [facility] home will be medically clearing her for the procedure.

## 2021-04-06 NOTE — H&P PST ADULT - GASTROINTESTINAL COMMENTS
has a colostomy and has  skin irritated around it , has a rash noted a hernia, and irritation/rash around the stoma, noted a rectal/ B/l buttocks ulcer wound, dressing intact, packed unable t5o assess has a colostomy and has  skin irritated around it , has a rash, has rectal/ buttocks ulcer

## 2021-04-06 NOTE — H&P PST ADULT - NSICDXPROBLEM_GEN_ALL_CORE_FT
PROBLEM DIAGNOSES  Problem: Colon cancer screening  Assessment and Plan: colonoscopy via stoma. Medical Clearance pending     Problem: Asthma  Assessment and Plan: continue medications as instructed.     Problem: Need for prophylactic measure  Assessment and Plan: Caprini Score 3 Moderate Risk, surgical team should consider VTE prophylaxis

## 2021-04-08 ENCOUNTER — TRANSCRIPTION ENCOUNTER (OUTPATIENT)
Age: 61
End: 2021-04-08

## 2021-04-09 ENCOUNTER — APPOINTMENT (OUTPATIENT)
Dept: COLORECTAL SURGERY | Facility: HOSPITAL | Age: 61
End: 2021-04-09
Payer: MEDICAID

## 2021-04-09 ENCOUNTER — OUTPATIENT (OUTPATIENT)
Dept: OUTPATIENT SERVICES | Facility: HOSPITAL | Age: 61
LOS: 1 days | End: 2021-04-09
Payer: MEDICAID

## 2021-04-09 DIAGNOSIS — Z93.3 COLOSTOMY STATUS: Chronic | ICD-10-CM

## 2021-04-09 DIAGNOSIS — Z12.11 ENCOUNTER FOR SCREENING FOR MALIGNANT NEOPLASM OF COLON: ICD-10-CM

## 2021-04-09 DIAGNOSIS — Z98.890 OTHER SPECIFIED POSTPROCEDURAL STATES: Chronic | ICD-10-CM

## 2021-04-09 PROBLEM — D64.9 ANEMIA, UNSPECIFIED: Chronic | Status: ACTIVE | Noted: 2021-04-06

## 2021-04-09 PROBLEM — J45.909 UNSPECIFIED ASTHMA, UNCOMPLICATED: Chronic | Status: ACTIVE | Noted: 2021-04-06

## 2021-04-09 PROCEDURE — 44388 COLONOSCOPY THRU STOMA SPX: CPT

## 2021-04-09 PROCEDURE — XXXXX: CPT

## 2021-04-16 ENCOUNTER — APPOINTMENT (OUTPATIENT)
Dept: COLORECTAL SURGERY | Facility: CLINIC | Age: 61
End: 2021-04-16
Payer: MEDICAID

## 2021-04-16 VITALS — WEIGHT: 200 LBS | RESPIRATION RATE: 14 BRPM | BODY MASS INDEX: 31.39 KG/M2 | HEIGHT: 67 IN | TEMPERATURE: 97.4 F

## 2021-04-16 PROCEDURE — 99213 OFFICE O/P EST LOW 20 MIN: CPT

## 2021-05-12 NOTE — HISTORY OF PRESENT ILLNESS
[FreeTextEntry1] : Pt returns after recent colonoscopy for screening.  It showed diverticulosis in colon proximal to the stoma, however, the scope of the rectal stump was suboptimal due to significant amount of impacted old stool.  Recommended repeat colonoscopy in 1 year.\par \par Pt still has large parastomal hernia and wishes for ostomy reversal and repair of hernia.

## 2021-05-12 NOTE — PHYSICAL EXAM
[de-identified] : obese/soft.  With parastomal hernia.  stoma is pink.  Small umbilical hernia [de-identified] : L sided perianal wound with granulation tissue.  No evidence of deep defect or sinus..  Clean, no pus.

## 2021-05-12 NOTE — ASSESSMENT
[FreeTextEntry1] : Colostomy with parastomal hernia\par --s/p lap loop sigmoid colostomy\par --Recent colonoscopy showed diverticulosis, however, the scope of the rectal stump was suboptimal given significant amount of impacted old stool.  Recommended repeat colonoscopy in 1 year.\par \par Unhealed perineal wound/Gabriele's\par --perineal wound opened up again just prior to colonoscopy.  Pt reports that the wound doctor at her [facility] is recommending BID dressing changes.  \par --Pt did see Dr. Cristi Kruse towards the end of her hospitalization @ [facility] in late November.  He recommended follow up with him a couple weeks after discharge for evaluation of wound and possible primary closure.  I provided contact info for Dr. Kruse ( [phone]) with patient.  Referral to be made again for evaluation.  Will need to see whether they are planning additional procedures to help with healing of the perineal wound while patient is diverted before commiting patient to stoma reversal.\par --follow up with me in 1 month for discussion of surgery (colostomy reversal and parastomal hernia repair).

## 2021-05-13 ENCOUNTER — APPOINTMENT (OUTPATIENT)
Dept: COLORECTAL SURGERY | Facility: CLINIC | Age: 61
End: 2021-05-13
Payer: MEDICAID

## 2021-05-13 VITALS
BODY MASS INDEX: 37.98 KG/M2 | DIASTOLIC BLOOD PRESSURE: 89 MMHG | HEIGHT: 67 IN | SYSTOLIC BLOOD PRESSURE: 129 MMHG | HEART RATE: 82 BPM | TEMPERATURE: 97.2 F | WEIGHT: 242 LBS | OXYGEN SATURATION: 98 %

## 2021-05-13 PROCEDURE — 99214 OFFICE O/P EST MOD 30 MIN: CPT

## 2021-06-10 NOTE — PHYSICAL EXAM
[de-identified] : abd ND/soft/NT.  L sided colostomy with parastomal hernia. [de-identified] : L sided perineal wound with clean base and granulation tissue, no pus, no deeper sinus.

## 2021-06-10 NOTE — HISTORY OF PRESENT ILLNESS
[FreeTextEntry1] : Dr. Kruse did a house call at patient's [facility].  He recommended continuing wound care and did not see the need for surgical intervention. PRN silver nitrate and avoid sitting.\par \par

## 2021-06-10 NOTE — ASSESSMENT
[FreeTextEntry1] : Colostomy with parastomal hernia\par --s/p lap loop sigmoid colostomy\par --At this point, I can recommend Laparoscopic sigmoid colostomy reversal with parastomal hernia repair, possible open.  Risks include but not limited to bleeding, infection, anastomotic leak, injury to bowel/ bladder/ureters/liver/spleen, DVT/PE, cardiac/pulmonary, death, hernia recurrence, bowel obstruction discussed.  Questions answered. Pt wishes to proceed. \par \par --Recent CT scan demonstrates loop of transverse colon within the parastomal hernia.\par \par --Recent colonoscopy showed diverticulosis, however, the scope of the rectal stump was suboptimal given significant amount of impacted old stool.  Recommended repeat colonoscopy in 1 year.\par \par Unhealed perineal wound/Gabriele's\par --continue current wound cares as per Dr. Kruse recommended (no surgery at this time).  Dr. Carroll will contact him to see whether it is reasonable from the wound perspective to reverse stoma. \par \par \par \par Addendum\par --After contacting Dr. Kruse, he recommended that stoma reversal to be considered after healing of perineal wound.  Will have patient follow up with me after this is the case.  In the meantime, if symptoms worsen from the hernia, I would recommend moving up the surgery.  I will call  patient to communicate this recommendation.

## 2021-08-12 ENCOUNTER — APPOINTMENT (OUTPATIENT)
Dept: COLORECTAL SURGERY | Facility: CLINIC | Age: 61
End: 2021-08-12
Payer: MEDICAID

## 2021-08-12 VITALS
SYSTOLIC BLOOD PRESSURE: 130 MMHG | RESPIRATION RATE: 15 BRPM | HEIGHT: 67 IN | WEIGHT: 242 LBS | HEART RATE: 75 BPM | DIASTOLIC BLOOD PRESSURE: 84 MMHG | TEMPERATURE: 97.4 F | OXYGEN SATURATION: 97 % | BODY MASS INDEX: 37.98 KG/M2

## 2021-08-12 PROCEDURE — 99214 OFFICE O/P EST MOD 30 MIN: CPT

## 2021-08-12 NOTE — HISTORY OF PRESENT ILLNESS
[FreeTextEntry1] : Says the wound has healed completely about a week ago.  Still at [facility].  Stoma is working well.  Still with parastomal hernia.  No significant pain.  No nausea/vomiting\par

## 2021-08-12 NOTE — ASSESSMENT
[FreeTextEntry1] : Colostomy with parastomal hernia\par --s/p lap loop sigmoid colostomy\par --At this point, given size of hernia, I would recommend Open sigmoid colostomy reversal with parastomal hernia repair.  Risks include but not limited to bleeding, infection, anastomotic leak, injury to bowel/ bladder/ureters/liver/spleen, DVT/PE, cardiac/pulmonary failure, heart attack, stroke, death, hernia recurrence, bowel obstruction discussed.  Questions answered. Pt wishes to proceed. \par \par --Recent CT scan demonstrates loop of transverse colon within the parastomal hernia.\par \par --Recent colonoscopy showed diverticulosis, however, the scope of the rectal stump was suboptimal given significant amount of impacted old stool.  Recommended repeat colonoscopy in 1 year.\par \par Unhealed perineal wound/Gabriele's\par --wound has healed.  Proceed with surgery as above.

## 2021-08-12 NOTE — PHYSICAL EXAM
[Respiratory Effort] : normal respiratory effort [Normal Rate and Rhythm] : normal rate and rhythm [de-identified] : obese/L sided colostomy pink, large parastomal hernia, no stigmata of leakage. [de-identified] : perineal wounds completely healed and epithelialized.  no residual granulation tissue seen.  [de-identified] : NAD

## 2021-08-31 ENCOUNTER — RESULT REVIEW (OUTPATIENT)
Age: 61
End: 2021-08-31

## 2021-08-31 ENCOUNTER — APPOINTMENT (OUTPATIENT)
Dept: COLORECTAL SURGERY | Facility: HOSPITAL | Age: 61
End: 2021-08-31

## 2021-08-31 ENCOUNTER — INPATIENT (INPATIENT)
Facility: HOSPITAL | Age: 61
LOS: 5 days | Discharge: HOME | DRG: 223 | End: 2021-09-06
Attending: SPECIALIST | Admitting: SPECIALIST
Payer: MEDICAID

## 2021-08-31 VITALS
WEIGHT: 242.51 LBS | RESPIRATION RATE: 16 BRPM | SYSTOLIC BLOOD PRESSURE: 163 MMHG | HEART RATE: 69 BPM | OXYGEN SATURATION: 97 % | DIASTOLIC BLOOD PRESSURE: 82 MMHG | TEMPERATURE: 98 F | HEIGHT: 67 IN

## 2021-08-31 DIAGNOSIS — Z93.3 COLOSTOMY STATUS: Chronic | ICD-10-CM

## 2021-08-31 DIAGNOSIS — K94.09 OTHER COMPLICATIONS OF COLOSTOMY: ICD-10-CM

## 2021-08-31 DIAGNOSIS — Z98.890 OTHER SPECIFIED POSTPROCEDURAL STATES: Chronic | ICD-10-CM

## 2021-08-31 DIAGNOSIS — Z93.3 COLOSTOMY STATUS: ICD-10-CM

## 2021-08-31 LAB
ALBUMIN SERPL ELPH-MCNC: 3.5 G/DL — SIGNIFICANT CHANGE UP (ref 3.3–5)
ALP SERPL-CCNC: 68 U/L — SIGNIFICANT CHANGE UP (ref 40–120)
ALT FLD-CCNC: 25 U/L — SIGNIFICANT CHANGE UP (ref 12–78)
ANION GAP SERPL CALC-SCNC: 4 MMOL/L — LOW (ref 5–17)
AST SERPL-CCNC: 20 U/L — SIGNIFICANT CHANGE UP (ref 15–37)
BILIRUB SERPL-MCNC: 0.4 MG/DL — SIGNIFICANT CHANGE UP (ref 0.2–1.2)
BUN SERPL-MCNC: 10 MG/DL — SIGNIFICANT CHANGE UP (ref 7–23)
CALCIUM SERPL-MCNC: 8.8 MG/DL — SIGNIFICANT CHANGE UP (ref 8.5–10.1)
CHLORIDE SERPL-SCNC: 106 MMOL/L — SIGNIFICANT CHANGE UP (ref 96–108)
CO2 SERPL-SCNC: 26 MMOL/L — SIGNIFICANT CHANGE UP (ref 22–31)
CREAT SERPL-MCNC: 0.64 MG/DL — SIGNIFICANT CHANGE UP (ref 0.5–1.3)
GLUCOSE BLDC GLUCOMTR-MCNC: 126 MG/DL — HIGH (ref 70–99)
GLUCOSE SERPL-MCNC: 101 MG/DL — HIGH (ref 70–99)
POTASSIUM SERPL-MCNC: 3.4 MMOL/L — LOW (ref 3.5–5.3)
POTASSIUM SERPL-SCNC: 3.4 MMOL/L — LOW (ref 3.5–5.3)
PROT SERPL-MCNC: 7.5 GM/DL — SIGNIFICANT CHANGE UP (ref 6–8.3)
SODIUM SERPL-SCNC: 136 MMOL/L — SIGNIFICANT CHANGE UP (ref 135–145)

## 2021-08-31 PROCEDURE — 73030 X-RAY EXAM OF SHOULDER: CPT | Mod: LT

## 2021-08-31 PROCEDURE — 71045 X-RAY EXAM CHEST 1 VIEW: CPT

## 2021-08-31 PROCEDURE — 82962 GLUCOSE BLOOD TEST: CPT

## 2021-08-31 PROCEDURE — 80048 BASIC METABOLIC PNL TOTAL CA: CPT

## 2021-08-31 PROCEDURE — 93010 ELECTROCARDIOGRAM REPORT: CPT

## 2021-08-31 PROCEDURE — 86769 SARS-COV-2 COVID-19 ANTIBODY: CPT

## 2021-08-31 PROCEDURE — 88300 SURGICAL PATH GROSS: CPT | Mod: 26

## 2021-08-31 PROCEDURE — 36415 COLL VENOUS BLD VENIPUNCTURE: CPT

## 2021-08-31 PROCEDURE — 44626 REPAIR BOWEL OPENING: CPT | Mod: 22

## 2021-08-31 PROCEDURE — 87635 SARS-COV-2 COVID-19 AMP PRB: CPT

## 2021-08-31 PROCEDURE — 73502 X-RAY EXAM HIP UNI 2-3 VIEWS: CPT | Mod: LT

## 2021-08-31 PROCEDURE — 45560 REPAIR OF RECTOCELE: CPT

## 2021-08-31 PROCEDURE — C9399: CPT

## 2021-08-31 PROCEDURE — 97530 THERAPEUTIC ACTIVITIES: CPT | Mod: GP

## 2021-08-31 PROCEDURE — 84100 ASSAY OF PHOSPHORUS: CPT

## 2021-08-31 PROCEDURE — 99253 IP/OBS CNSLTJ NEW/EST LOW 45: CPT

## 2021-08-31 PROCEDURE — 83735 ASSAY OF MAGNESIUM: CPT

## 2021-08-31 PROCEDURE — 85027 COMPLETE CBC AUTOMATED: CPT

## 2021-08-31 PROCEDURE — 88302 TISSUE EXAM BY PATHOLOGIST: CPT | Mod: 26

## 2021-08-31 PROCEDURE — 94640 AIRWAY INHALATION TREATMENT: CPT

## 2021-08-31 PROCEDURE — 97116 GAIT TRAINING THERAPY: CPT | Mod: GP

## 2021-08-31 PROCEDURE — 97162 PT EVAL MOD COMPLEX 30 MIN: CPT | Mod: GP

## 2021-08-31 PROCEDURE — 88304 TISSUE EXAM BY PATHOLOGIST: CPT | Mod: 26

## 2021-08-31 RX ORDER — ACETAMINOPHEN 500 MG
1000 TABLET ORAL EVERY 6 HOURS
Refills: 0 | Status: COMPLETED | OUTPATIENT
Start: 2021-08-31 | End: 2021-09-01

## 2021-08-31 RX ORDER — FENTANYL CITRATE 50 UG/ML
50 INJECTION INTRAVENOUS
Refills: 0 | Status: DISCONTINUED | OUTPATIENT
Start: 2021-08-31 | End: 2021-08-31

## 2021-08-31 RX ORDER — FERROUS SULFATE 325(65) MG
0 TABLET ORAL
Qty: 0 | Refills: 0 | DISCHARGE

## 2021-08-31 RX ORDER — AMLODIPINE BESYLATE 2.5 MG/1
5 TABLET ORAL DAILY
Refills: 0 | Status: DISCONTINUED | OUTPATIENT
Start: 2021-08-31 | End: 2021-09-04

## 2021-08-31 RX ORDER — OXYCODONE HYDROCHLORIDE 5 MG/1
5 TABLET ORAL ONCE
Refills: 0 | Status: DISCONTINUED | OUTPATIENT
Start: 2021-08-31 | End: 2021-08-31

## 2021-08-31 RX ORDER — ONDANSETRON 8 MG/1
4 TABLET, FILM COATED ORAL ONCE
Refills: 0 | Status: DISCONTINUED | OUTPATIENT
Start: 2021-08-31 | End: 2021-08-31

## 2021-08-31 RX ORDER — LANOLIN ALCOHOL/MO/W.PET/CERES
1 CREAM (GRAM) TOPICAL
Qty: 0 | Refills: 0 | DISCHARGE

## 2021-08-31 RX ORDER — ALVIMOPAN 12 MG/1
12 CAPSULE ORAL
Refills: 0 | Status: DISCONTINUED | OUTPATIENT
Start: 2021-08-01 | End: 2021-09-06

## 2021-08-31 RX ORDER — ZINC SULFATE TAB 220 MG (50 MG ZINC EQUIVALENT) 220 (50 ZN) MG
1 TAB ORAL
Qty: 0 | Refills: 0 | DISCHARGE

## 2021-08-31 RX ORDER — HYDROMORPHONE HYDROCHLORIDE 2 MG/ML
0.5 INJECTION INTRAMUSCULAR; INTRAVENOUS; SUBCUTANEOUS
Refills: 0 | Status: DISCONTINUED | OUTPATIENT
Start: 2021-08-31 | End: 2021-08-31

## 2021-08-31 RX ORDER — ALVIMOPAN 12 MG/1
12 CAPSULE ORAL ONCE
Refills: 0 | Status: COMPLETED | OUTPATIENT
Start: 2021-08-31 | End: 2021-08-31

## 2021-08-31 RX ORDER — SENNA PLUS 8.6 MG/1
1 TABLET ORAL
Qty: 0 | Refills: 0 | DISCHARGE

## 2021-08-31 RX ORDER — MORPHINE SULFATE 50 MG/1
4 CAPSULE, EXTENDED RELEASE ORAL EVERY 4 HOURS
Refills: 0 | Status: DISCONTINUED | OUTPATIENT
Start: 2021-08-31 | End: 2021-09-06

## 2021-08-31 RX ORDER — ASCORBIC ACID 60 MG
1 TABLET,CHEWABLE ORAL
Qty: 0 | Refills: 0 | DISCHARGE

## 2021-08-31 RX ORDER — ALBUTEROL 90 UG/1
2 AEROSOL, METERED ORAL EVERY 6 HOURS
Refills: 0 | Status: DISCONTINUED | OUTPATIENT
Start: 2021-08-31 | End: 2021-09-03

## 2021-08-31 RX ORDER — SODIUM CHLORIDE 9 MG/ML
1000 INJECTION, SOLUTION INTRAVENOUS
Refills: 0 | Status: DISCONTINUED | OUTPATIENT
Start: 2021-08-31 | End: 2021-09-03

## 2021-08-31 RX ORDER — ACETAMINOPHEN 500 MG
1000 TABLET ORAL ONCE
Refills: 0 | Status: COMPLETED | OUTPATIENT
Start: 2021-08-31 | End: 2021-08-31

## 2021-08-31 RX ORDER — ONDANSETRON 8 MG/1
4 TABLET, FILM COATED ORAL EVERY 8 HOURS
Refills: 0 | Status: DISCONTINUED | OUTPATIENT
Start: 2021-08-31 | End: 2021-09-06

## 2021-08-31 RX ORDER — FONDAPARINUX SODIUM 2.5 MG/.5ML
0 INJECTION, SOLUTION SUBCUTANEOUS
Qty: 0 | Refills: 0 | DISCHARGE

## 2021-08-31 RX ORDER — CEFOTETAN DISODIUM 1 G
2 VIAL (EA) INJECTION EVERY 12 HOURS
Refills: 0 | Status: COMPLETED | OUTPATIENT
Start: 2021-09-01 | End: 2021-09-01

## 2021-08-31 RX ORDER — IBUPROFEN 200 MG
1 TABLET ORAL
Qty: 0 | Refills: 0 | DISCHARGE

## 2021-08-31 RX ORDER — FONDAPARINUX SODIUM 2.5 MG/.5ML
2.5 INJECTION, SOLUTION SUBCUTANEOUS DAILY
Refills: 0 | Status: DISCONTINUED | OUTPATIENT
Start: 2021-08-31 | End: 2021-09-06

## 2021-08-31 RX ORDER — LACTULOSE 10 G/15ML
30 SOLUTION ORAL
Qty: 0 | Refills: 0 | DISCHARGE

## 2021-08-31 RX ORDER — SODIUM CHLORIDE 9 MG/ML
1000 INJECTION, SOLUTION INTRAVENOUS
Refills: 0 | Status: DISCONTINUED | OUTPATIENT
Start: 2021-08-31 | End: 2021-08-31

## 2021-08-31 RX ORDER — FLUTICASONE PROPIONATE 50 MCG
1 SPRAY, SUSPENSION NASAL
Qty: 0 | Refills: 0 | DISCHARGE

## 2021-08-31 RX ORDER — KETOROLAC TROMETHAMINE 30 MG/ML
15 SYRINGE (ML) INJECTION EVERY 6 HOURS
Refills: 0 | Status: DISCONTINUED | OUTPATIENT
Start: 2021-08-31 | End: 2021-09-03

## 2021-08-31 RX ADMIN — Medication 400 MILLIGRAM(S): at 22:00

## 2021-08-31 RX ADMIN — SODIUM CHLORIDE 75 MILLILITER(S): 9 INJECTION, SOLUTION INTRAVENOUS at 15:40

## 2021-08-31 RX ADMIN — ALVIMOPAN 12 MILLIGRAM(S): 12 CAPSULE ORAL at 21:59

## 2021-08-31 RX ADMIN — HYDROMORPHONE HYDROCHLORIDE 0.5 MILLIGRAM(S): 2 INJECTION INTRAMUSCULAR; INTRAVENOUS; SUBCUTANEOUS at 16:21

## 2021-08-31 RX ADMIN — FONDAPARINUX SODIUM 2.5 MILLIGRAM(S): 2.5 INJECTION, SOLUTION SUBCUTANEOUS at 22:05

## 2021-08-31 RX ADMIN — ALVIMOPAN 12 MILLIGRAM(S): 12 CAPSULE ORAL at 07:01

## 2021-08-31 RX ADMIN — HYDROMORPHONE HYDROCHLORIDE 0.5 MILLIGRAM(S): 2 INJECTION INTRAMUSCULAR; INTRAVENOUS; SUBCUTANEOUS at 15:40

## 2021-08-31 RX ADMIN — Medication 1000 MILLIGRAM(S): at 22:02

## 2021-08-31 RX ADMIN — Medication 1000 MILLIGRAM(S): at 16:30

## 2021-08-31 RX ADMIN — Medication 400 MILLIGRAM(S): at 16:04

## 2021-08-31 NOTE — BRIEF OPERATIVE NOTE - NSICDXBRIEFPROCEDURE_GEN_ALL_CORE_FT
PROCEDURES:  Closure of Lisbeth colostomy 31-Aug-2021 15:34:20  Chiara Harrington  Repair of incisional hernia with mesh 31-Aug-2021 15:35:24  Chiara Harrington

## 2021-08-31 NOTE — BRIEF OPERATIVE NOTE - OPERATION/FINDINGS
Large Parastomal hernia  Stoma was taken down and hernia sac excised  Hernia defect was closed using running PDS and a Biologic Mesh was used as an underlay     Rectal stump identified with stay suture  Large Uterus present  Anastomosis performed Iso-peristaltic; antiperistaltic was not performed due to the inability to mobilize the rectal stump  Air test x5 performed  Leak noted; the common channel was re-stapled and oversewn with Lambert suture  Repeat Air test x5 negative

## 2021-08-31 NOTE — CONSULT NOTE ADULT - ATTENDING COMMENTS
Patient is seen and examined at bedside with DHRUV Cherry  60yo/F presented for colostomy reversal  Seen postop in PACU, pain is OK controlled  Metcalf+, MARISOL drain+  Diet per CRS team  OOB to ambulate  Pain meds prn  Agree with above assessment and plan. D/w pt

## 2021-08-31 NOTE — ASU PATIENT PROFILE, ADULT - NSICDXPASTMEDICALHX_GEN_ALL_CORE_FT
PAST MEDICAL HISTORY:  Anemia, mild     Asthma     Hemorrhoids     History of COPD     History of pressure ulcer necrotizing fascitis    HTN (hypertension)     Insomnia

## 2021-08-31 NOTE — CONSULT NOTE ADULT - SUBJECTIVE AND OBJECTIVE BOX
PCP:  Dr. Tyrese Salazar    CHIEF COMPLAINT: colostomy reversal    HISTORY OF THE PRESENT ILLNESS:  this is a 62 yo female with pmh;  anemia, COPD, HTN, morbid obesity who was admitted in November 2020 for evaluation of SOB, weakness, muscle aches, diarrhea, found on exam to have bilateral stage 4 sacral wounds, noted to be + necrotizing fascitis. It is not clear as to the etiology of these wounds.  at that time the wound were debrided and was seen by Colorectal surgery to have an colostomy performed to aide in wound healing, per chart review it appears that the patient came from [facility] where she has been residing since the surgery.  The sacral wounds have since healed and pt is here for elective colostomy reversal. She is seen in PACU, still sedated, in [facility]. S.  We are consulted for medical management    PAST MEDICAL HISTORY: as above    PAST SURGICAL HISTORY: sacral debridements, colostomy     FAMILY HISTORY:   family h/o HTN, prostate CA    SOCIAL HISTORY:  no smoking, no alcohol, no drugs    ALLERGIES: heparins, sulfa    HOME MEDS: see med rec    REVIEW OF SYSTEMS:   unable to assess 2/2 sedation    medications ( standing)  acetaminophen  IVPB .. 1000 milliGRAM(s) IV Intermittent every 6 hours  alvimopan 12 milliGRAM(s) Oral two times a day  dextrose 5% + sodium chloride 0.45% 1000 milliLiter(s) (75 mL/Hr) IV Continuous <Continuous>  fondaparinux Injectable 2.5 milliGRAM(s) SubCutaneous daily  lactated ringers. 1000 milliLiter(s) (75 mL/Hr) IV Continuous <Continuous>  ondansetron Injectable 4 milliGRAM(s) IV Push every 8 hours    MEDICATIONS  (PRN):  fentaNYL    Injectable 50 MICROGram(s) IV Push every 10 minutes PRN Severe Pain (7 - 10)  HYDROmorphone  Injectable 0.5 milliGRAM(s) IV Push every 10 minutes PRN Moderate Pain (4 - 6)  ketorolac   Injectable 15 milliGRAM(s) IV Push every 6 hours PRN Moderate Pain (4 - 6)  morphine  - Injectable 4 milliGRAM(s) IV Push every 4 hours PRN Severe Pain (7 - 10)  ondansetron Injectable 4 milliGRAM(s) IV Push once PRN Nausea and/or Vomiting  oxyCODONE    IR 5 milliGRAM(s) Oral once PRN Moderate Pain (4 - 6)      VITALS:  T(F): 98.2 (08-31-21 @ 15:00), Max: 98.2 (08-31-21 @ 15:00)  HR: 75 (08-31-21 @ 15:45) (69 - 75)  BP: 136/70 (08-31-21 @ 15:45) (109/64 - 163/82)  RR: 17 (08-31-21 @ 15:45) (12 - 17)  SpO2: 99% (08-31-21 @ 15:45) (97% - 100%)  Wt(kg): --    I&O's Summary    31 Aug 2021 07:01  -  31 Aug 2021 16:10  --------------------------------------------------------  IN: 2000 mL / OUT: 350 mL / NET: 1650 mL        CAPILLARY BLOOD GLUCOSE      POCT Blood Glucose.: 155 mg/dL (31 Aug 2021 15:04)  POCT Blood Glucose.: 107 mg/dL (31 Aug 2021 07:56)  POCT Blood Glucose.: 108 mg/dL (31 Aug 2021 06:22)    PHYSICAL EXAM:    GENERAL: Comfortable, no acute distress   HEAD:  Normocephalic, atraumatic  EYES: EOMI, PERRLA  HEENT: Moist mucous membranes  NECK: Supple, No JVD  NERVOUS SYSTEM: sedated  CHEST/LUNG: Clear to auscultation bilaterally  HEART: Regular rate and rhythm  ABDOMEN: Soft, Nondistended, Bowel sounds hypoactive, + pervena, + jasmin  GENITOURINARY: trujillo  EXTREMITIES:   No clubbing, cyanosis, or edema  MUSCULOSKELETAL- No muscle tenderness, no joint tenderness  SKIN-no rash            LABS:        08-31    136  |  106  |  10  ----------------------------<  101<H>  3.4<L>   |  26  |  0.64    Ca    8.8      31 Aug 2021 06:40    TPro  7.5  /  Alb  3.5  /  TBili  0.4  /  DBili  x   /  AST  20  /  ALT  25  /  AlkPhos  68  08-31        LIVER FUNCTIONS - ( 31 Aug 2021 06:40 )  Alb: 3.5 g/dL / Pro: 7.5 gm/dL / ALK PHOS: 68 U/L / ALT: 25 U/L / AST: 20 U/L / GGT: x                   IMPRESSION: 62 yo fwith above pmh  a/w:  #colostomy reversal  pain control  IVF's  trujillo  Monitor I& O  Monitor Cbc, BMP  BGM per CRS protocol  Cont Abxs  clear liq diet  Enterag    #HTN  cont amlodipine    #anemia   per chart review: lab from 8/20/21 (preop)    wnl   monitor     #COPD  cont albuterol prn    #VTE prophylaxis  hep sq  Venodynes  Amb    thank you for the consult, will follow with jose enriqueu           PCP:  Dr. Tyrese Salazar    CHIEF COMPLAINT: colostomy reversal    HISTORY OF THE PRESENT ILLNESS:  this is a 60 yo female with pmh;  anemia, COPD, HTN, morbid obesity who was admitted in November 2020 for evaluation of SOB, weakness, muscle aches, diarrhea, found on exam to have bilateral stage 4 sacral wounds, noted to be + necrotizing fascitis. It is not clear as to the etiology of these wounds.  At that time the wounds were debrided and was seen by Colorectal surgery to have an colostomy performed to aide in wound healing. Per chart review it appears that the patient came from [facility] where she has been residing since the surgery.  The sacral wounds have since healed and pt is here for elective colostomy reversal. She is seen in PACU, still sedated, in [facility]. S.  We are consulted for medical management    PAST MEDICAL HISTORY: as above    PAST SURGICAL HISTORY: sacral debridements, colostomy     FAMILY HISTORY:   family h/o HTN, prostate CA    SOCIAL HISTORY:  no smoking, no alcohol, no drugs    ALLERGIES: heparins, sulfa    HOME MEDS: see med rec    REVIEW OF SYSTEMS:   unable to assess 2/2 sedation    medications ( standing)  acetaminophen  IVPB .. 1000 milliGRAM(s) IV Intermittent every 6 hours  alvimopan 12 milliGRAM(s) Oral two times a day  dextrose 5% + sodium chloride 0.45% 1000 milliLiter(s) (75 mL/Hr) IV Continuous <Continuous>  fondaparinux Injectable 2.5 milliGRAM(s) SubCutaneous daily  lactated ringers. 1000 milliLiter(s) (75 mL/Hr) IV Continuous <Continuous>  ondansetron Injectable 4 milliGRAM(s) IV Push every 8 hours    MEDICATIONS  (PRN):  fentaNYL    Injectable 50 MICROGram(s) IV Push every 10 minutes PRN Severe Pain (7 - 10)  HYDROmorphone  Injectable 0.5 milliGRAM(s) IV Push every 10 minutes PRN Moderate Pain (4 - 6)  ketorolac   Injectable 15 milliGRAM(s) IV Push every 6 hours PRN Moderate Pain (4 - 6)  morphine  - Injectable 4 milliGRAM(s) IV Push every 4 hours PRN Severe Pain (7 - 10)  ondansetron Injectable 4 milliGRAM(s) IV Push once PRN Nausea and/or Vomiting  oxyCODONE    IR 5 milliGRAM(s) Oral once PRN Moderate Pain (4 - 6)      VITALS:  T(F): 98.2 (08-31-21 @ 15:00), Max: 98.2 (08-31-21 @ 15:00)  HR: 75 (08-31-21 @ 15:45) (69 - 75)  BP: 136/70 (08-31-21 @ 15:45) (109/64 - 163/82)  RR: 17 (08-31-21 @ 15:45) (12 - 17)  SpO2: 99% (08-31-21 @ 15:45) (97% - 100%)  Wt(kg): --    I&O's Summary    31 Aug 2021 07:01  -  31 Aug 2021 16:10  --------------------------------------------------------  IN: 2000 mL / OUT: 350 mL / NET: 1650 mL        CAPILLARY BLOOD GLUCOSE      POCT Blood Glucose.: 155 mg/dL (31 Aug 2021 15:04)  POCT Blood Glucose.: 107 mg/dL (31 Aug 2021 07:56)  POCT Blood Glucose.: 108 mg/dL (31 Aug 2021 06:22)    PHYSICAL EXAM:    GENERAL: Comfortable, no acute distress   HEAD:  Normocephalic, atraumatic  EYES: EOMI, PERRLA  HEENT: Moist mucous membranes  NECK: Supple, No JVD  NERVOUS SYSTEM: sedated  CHEST/LUNG: Clear to auscultation bilaterally  HEART: Regular rate and rhythm  ABDOMEN: Soft, Nondistended, Bowel sounds hypoactive, + pervena, + jasmin  GENITOURINARY: trujillo  EXTREMITIES:   No clubbing, cyanosis, or edema  MUSCULOSKELETAL- No muscle tenderness, no joint tenderness  SKIN-no rash            LABS:        08-31    136  |  106  |  10  ----------------------------<  101<H>  3.4<L>   |  26  |  0.64    Ca    8.8      31 Aug 2021 06:40    TPro  7.5  /  Alb  3.5  /  TBili  0.4  /  DBili  x   /  AST  20  /  ALT  25  /  AlkPhos  68  08-31        LIVER FUNCTIONS - ( 31 Aug 2021 06:40 )  Alb: 3.5 g/dL / Pro: 7.5 gm/dL / ALK PHOS: 68 U/L / ALT: 25 U/L / AST: 20 U/L / GGT: x                   IMPRESSION: 60 yo fwith above pmh  a/w:  #colostomy reversal  pain control  IVF's  trujillo  Monitor I& O  Monitor Cbc, BMP  BGM per CRS protocol  Cont Abxs  clear liq diet  Enterag    #HTN  cont amlodipine    #anemia   per chart review: lab from 8/20/21 (preop)    wnl   monitor     #COPD  cont albuterol prn    #VTE prophylaxis  hep sq  Venodynes  Amb    thank you for the consult, will follow with jose enriqueu

## 2021-09-01 LAB
ANION GAP SERPL CALC-SCNC: 6 MMOL/L — SIGNIFICANT CHANGE UP (ref 5–17)
BUN SERPL-MCNC: 11 MG/DL — SIGNIFICANT CHANGE UP (ref 7–23)
CALCIUM SERPL-MCNC: 8.2 MG/DL — LOW (ref 8.5–10.1)
CHLORIDE SERPL-SCNC: 107 MMOL/L — SIGNIFICANT CHANGE UP (ref 96–108)
CO2 SERPL-SCNC: 24 MMOL/L — SIGNIFICANT CHANGE UP (ref 22–31)
COVID-19 SPIKE DOMAIN AB INTERP: POSITIVE
COVID-19 SPIKE DOMAIN ANTIBODY RESULT: >250 U/ML — HIGH
CREAT SERPL-MCNC: 0.68 MG/DL — SIGNIFICANT CHANGE UP (ref 0.5–1.3)
GLUCOSE BLDC GLUCOMTR-MCNC: 102 MG/DL — HIGH (ref 70–99)
GLUCOSE BLDC GLUCOMTR-MCNC: 130 MG/DL — HIGH (ref 70–99)
GLUCOSE BLDC GLUCOMTR-MCNC: 130 MG/DL — HIGH (ref 70–99)
GLUCOSE BLDC GLUCOMTR-MCNC: 136 MG/DL — HIGH (ref 70–99)
GLUCOSE SERPL-MCNC: 120 MG/DL — HIGH (ref 70–99)
HCT VFR BLD CALC: 41.2 % — SIGNIFICANT CHANGE UP (ref 34.5–45)
HGB BLD-MCNC: 12.9 G/DL — SIGNIFICANT CHANGE UP (ref 11.5–15.5)
MAGNESIUM SERPL-MCNC: 1.8 MG/DL — SIGNIFICANT CHANGE UP (ref 1.6–2.6)
MCHC RBC-ENTMCNC: 23.2 PG — LOW (ref 27–34)
MCHC RBC-ENTMCNC: 31.3 GM/DL — LOW (ref 32–36)
MCV RBC AUTO: 74.2 FL — LOW (ref 80–100)
PHOSPHATE SERPL-MCNC: 2.5 MG/DL — SIGNIFICANT CHANGE UP (ref 2.5–4.5)
PLATELET # BLD AUTO: 335 K/UL — SIGNIFICANT CHANGE UP (ref 150–400)
POTASSIUM SERPL-MCNC: 3.5 MMOL/L — SIGNIFICANT CHANGE UP (ref 3.5–5.3)
POTASSIUM SERPL-SCNC: 3.5 MMOL/L — SIGNIFICANT CHANGE UP (ref 3.5–5.3)
RBC # BLD: 5.55 M/UL — HIGH (ref 3.8–5.2)
RBC # FLD: 16.8 % — HIGH (ref 10.3–14.5)
SARS-COV-2 IGG+IGM SERPL QL IA: >250 U/ML — HIGH
SARS-COV-2 IGG+IGM SERPL QL IA: POSITIVE
SODIUM SERPL-SCNC: 137 MMOL/L — SIGNIFICANT CHANGE UP (ref 135–145)
WBC # BLD: 12.16 K/UL — HIGH (ref 3.8–10.5)
WBC # FLD AUTO: 12.16 K/UL — HIGH (ref 3.8–10.5)

## 2021-09-01 PROCEDURE — 99232 SBSQ HOSP IP/OBS MODERATE 35: CPT

## 2021-09-01 RX ADMIN — Medication 400 MILLIGRAM(S): at 16:05

## 2021-09-01 RX ADMIN — SODIUM CHLORIDE 75 MILLILITER(S): 9 INJECTION, SOLUTION INTRAVENOUS at 00:06

## 2021-09-01 RX ADMIN — Medication 1000 MILLIGRAM(S): at 04:17

## 2021-09-01 RX ADMIN — FONDAPARINUX SODIUM 2.5 MILLIGRAM(S): 2.5 INJECTION, SOLUTION SUBCUTANEOUS at 10:05

## 2021-09-01 RX ADMIN — Medication 15 MILLIGRAM(S): at 21:25

## 2021-09-01 RX ADMIN — Medication 400 MILLIGRAM(S): at 04:16

## 2021-09-01 RX ADMIN — AMLODIPINE BESYLATE 5 MILLIGRAM(S): 2.5 TABLET ORAL at 05:42

## 2021-09-01 RX ADMIN — Medication 15 MILLIGRAM(S): at 21:55

## 2021-09-01 RX ADMIN — Medication 400 MILLIGRAM(S): at 10:01

## 2021-09-01 RX ADMIN — Medication 100 GRAM(S): at 21:25

## 2021-09-01 NOTE — PHYSICAL THERAPY INITIAL EVALUATION ADULT - MODALITIES TREATMENT COMMENTS
Pt OOB in chair, NAD, reports 5/10 pain now reduced to 3/10 pain as before session. +Metcalf, +IV, +vac, +SCD's B, pillow given for abd support for cough or sneeze, Pt resting comfortable, CBIR, RN aware, +Alarm.

## 2021-09-01 NOTE — PHYSICAL THERAPY INITIAL EVALUATION ADULT - DID THE PATIENT HAVE SURGERY?
Closure of Lisbeth colostomy , Repair of incisional hernia with mesh 31-Aug-2021 15:35:24  Chiara Harrington./yes

## 2021-09-01 NOTE — PROGRESS NOTE ADULT - SUBJECTIVE AND OBJECTIVE BOX
SURGERY DAILY PROGRESS NOTE:     Subjective:  Patient seen and examined at bedside during am rounds. AVSS. Tolerating a CLD. Denies any fevers, chills, n/v/d, chest pain or shortness of breath    Objective:    MEDICATIONS  (STANDING):  acetaminophen  IVPB .. 1000 milliGRAM(s) IV Intermittent every 6 hours  alvimopan 12 milliGRAM(s) Oral two times a day  amLODIPine   Tablet 5 milliGRAM(s) Oral daily  cefoTEtan  IVPB 2 Gram(s) IV Intermittent every 12 hours  dextrose 5% + sodium chloride 0.45% 1000 milliLiter(s) (75 mL/Hr) IV Continuous <Continuous>  fondaparinux Injectable 2.5 milliGRAM(s) SubCutaneous daily  ondansetron Injectable 4 milliGRAM(s) IV Push every 8 hours    MEDICATIONS  (PRN):  ALBUTerol    90 MICROgram(s) HFA Inhaler 2 Puff(s) Inhalation every 6 hours PRN Shortness of Breath and/or Wheezing  ketorolac   Injectable 15 milliGRAM(s) IV Push every 6 hours PRN Moderate Pain (4 - 6)  morphine  - Injectable 4 milliGRAM(s) IV Push every 4 hours PRN Severe Pain (7 - 10)      Vital Signs Last 24 Hrs  T(C): 37.1 (01 Sep 2021 05:09), Max: 37.1 (01 Sep 2021 05:09)  T(F): 98.8 (01 Sep 2021 05:09), Max: 98.8 (01 Sep 2021 05:09)  HR: 98 (01 Sep 2021 05:09) (70 - 98)  BP: 121/62 (01 Sep 2021 05:09) (109/64 - 144/75)  BP(mean): --  RR: 16 (01 Sep 2021 05:09) (12 - 19)  SpO2: 99% (01 Sep 2021 05:09) (99% - 100%)      PHYSICAL EXAM   Gen: well-appearing, in no acute distress  CV: pulse regularly present   Resp: airway patent, non-labored breathing  Abd: soft, NTND; no rebound or guarding. Incision c/d/i      I&O's Detail    31 Aug 2021 07:01  -  01 Sep 2021 07:00  --------------------------------------------------------  IN:    Other (mL): 2000 mL  Total IN: 2000 mL    OUT:    Bulb (mL): 10 mL    Indwelling Catheter - Urethral (mL): 705 mL    Other (mL): 350 mL  Total OUT: 1065 mL    Total NET: 935 mL          Daily     Daily     LABS:    08-31    136  |  106  |  10  ----------------------------<  101<H>  3.4<L>   |  26  |  0.64    Ca    8.8      31 Aug 2021 06:40    TPro  7.5  /  Alb  3.5  /  TBili  0.4  /  DBili  x   /  AST  20  /  ALT  25  /  AlkPhos  68  08-31          RADIOLOGY & ADDITIONAL STUDIES:  < from: CT Abdomen No Cont (01.29.21 @ 19:44) >    EXAM:  CT ABDOMEN ONLY                            PROCEDURE DATE:  01/29/2021          INTERPRETATION:  CLINICAL INFORMATION: Evaluate for parastomal hernia reduction    COMPARISON: CT performed earlier today.    PROCEDURE:  CT of the Abdomen was performed without intravenous contrast.  Intravenous contrast: None.  Oral contrast: None.  Sagittal and coronal reformats were performed.    FINDINGS:  LOWER CHEST: Within normal limits.    LIVER: Within normal limits.  BILE DUCTS: Normal caliber.  GALLBLADDER: Cholelithiasis.  SPLEEN: Within normal limits.  PANCREAS: Within normal limits.  ADRENALS: Slightly thickened left adrenal.  KIDNEYS/URETERS: Contrast within the collecting systems from prior IV contrast administration. Possible small cyst upper pole of the left kidney.    VISUALIZED PORTIONS:  BOWEL: Left parasagittal colostomy and parastomal herniation of a short segment of transverse colon, unchanged in comparison to the prior study. No evidence of secondary bowel obstruction.  PERITONEUM: No ascites.  VESSELS: Unremarkable as visualized.  RETROPERITONEUM/LYMPH NODES: No lymphadenopathy.  ABDOMINAL WALL: Left parasagittal colostomy and parastomal herniation of transverse colon.  BONES: Within normal limits.    IMPRESSION:  No significant change in the appearance of parastomal hernia.              LENNY ROMERO MD; Attending Radiologist  This document has been electronically signed. Jan 29 2021  7:57PM    < end of copied text >      ASSESSMENT/PLAN:    61 year-old female with PMH of , is POD#1 s/p Closure of Lisbeth colostomy and Repair of incisional hernia with mesh. Doing well      Plan:  - Advance to FLD  - discontinue trujillo  - discontinue IV fluids  - monitor for bowel function  - pain and nausea control  - physical therapy, encourage ambulation and IS use    Plan discussed with surgery team and Dr. Carroll.

## 2021-09-01 NOTE — PHYSICAL THERAPY INITIAL EVALUATION ADULT - DIAGNOSIS, PT EVAL
colostomy reversal, COPD POD#1 s/p Closure of Lisbeth colostomy and Repair of incisional hernia with mesh, COPD

## 2021-09-01 NOTE — PHYSICAL THERAPY INITIAL EVALUATION ADULT - PERTINENT HX OF CURRENT PROBLEM, REHAB EVAL
pt is here for elective colostomy reversal, Nov 2020 bilateral stage 4 sacral wounds debrided and was seen by Colorectal surgery to have an colostomy performed to aide in wound healing, now presents for reversal.

## 2021-09-01 NOTE — PHYSICAL THERAPY INITIAL EVALUATION ADULT - PRECAUTIONS/LIMITATIONS, REHAB EVAL
ERP Patient/cardiac precautions/fall precautions ERP Patient, pt has inhaler at bedside for asthma/cardiac precautions/fall precautions

## 2021-09-01 NOTE — PHYSICAL THERAPY INITIAL EVALUATION ADULT - GENERAL OBSERVATIONS, REHAB EVAL
Pt supine in bed on 2N, reports 3/10 sx pain at rest and 5/10 during movement, +vac, +trujillo, +IV, NAD, denies dizziness.

## 2021-09-01 NOTE — PROGRESS NOTE ADULT - SUBJECTIVE AND OBJECTIVE BOX
PCP:  Dr. Tyrese Salazar    CHIEF COMPLAINT: colostomy reversal    HISTORY OF THE PRESENT ILLNESS:  this is a 60 yo female with pmh;  anemia, COPD, HTN, morbid obesity who was admitted in November 2020 for evaluation of SOB, weakness, muscle aches, diarrhea, found on exam to have bilateral stage 4 sacral wounds, noted to be + necrotizing fascitis. It is not clear as to the etiology of these wounds.  At that time the wounds were debrided and was seen by Colorectal surgery to have an colostomy performed to aide in wound healing. Per chart review it appears that the patient came from [facility] where she has been residing since the surgery.  The sacral wounds have since healed and pt is here for elective colostomy reversal.   We are consulted for medical management      9/1: seen OOB to chair, alert, awake, poor historian, linda po, no N/V, -flatus,  -BM    REVIEW OF SYSTEMS:   all 10 systems are reviewed and are negative with pertinent positives documented in PE      MEDICATIONS  (STANDING):  acetaminophen  IVPB .. 1000 milliGRAM(s) IV Intermittent every 6 hours  alvimopan 12 milliGRAM(s) Oral two times a day  amLODIPine   Tablet 5 milliGRAM(s) Oral daily  cefoTEtan  IVPB 2 Gram(s) IV Intermittent every 12 hours  dextrose 5% + sodium chloride 0.45% 1000 milliLiter(s) (75 mL/Hr) IV Continuous <Continuous>  fondaparinux Injectable 2.5 milliGRAM(s) SubCutaneous daily  ondansetron Injectable 4 milliGRAM(s) IV Push every 8 hours    MEDICATIONS  (PRN):  ALBUTerol    90 MICROgram(s) HFA Inhaler 2 Puff(s) Inhalation every 6 hours PRN Shortness of Breath and/or Wheezing  ketorolac   Injectable 15 milliGRAM(s) IV Push every 6 hours PRN Moderate Pain (4 - 6)  morphine  - Injectable 4 milliGRAM(s) IV Push every 4 hours PRN Severe Pain (7 - 10)    Vital Signs Last 24 Hrs  T(C): 37.1 (09-01-21 @ 09:38), Max: 37.1 (09-01-21 @ 05:09)  T(F): 98.7 (09-01-21 @ 09:38), Max: 98.8 (09-01-21 @ 05:09)  HR: 87 (09-01-21 @ 09:38) (70 - 98)  BP: 124/63 (09-01-21 @ 09:38) (109/64 - 144/75)  BP(mean): 78 (09-01-21 @ 09:38) (78 - 78)  RR: 16 (09-01-21 @ 09:38) (12 - 19)  SpO2: 96% (09-01-21 @ 09:38) (96% - 100%)    CAPILLARY BLOOD GLUCOSE      POCT Blood Glucose.: 130 mg/dL (09.01.21 @ 11:59)   POCT Blood Glucose.: 136 mg/dL (09.01.21 @ 05:30) POCT Blood Glucose.: 155 mg/dL (31 Aug 2021 15:04)  POCT Blood Glucose.: 107 mg/dL (31 Aug 2021 07:56)  POCT Blood Glucose.: 108 mg/dL (31 Aug 2021 06:22)    PHYSICAL EXAM:    GENERAL: Comfortable, no acute distress   HEAD:  Normocephalic, atraumatic  EYES: EOMI, PERRLA  HEENT: Moist mucous membranes  NECK: Supple, No JVD  NERVOUS SYSTEM: awake, alert, oriented x 3, motor strength for BUE/BLE 5/5 power  CHEST/LUNG: Clear to auscultation bilaterally  HEART: Regular rate and rhythm  ABDOMEN: Soft, obese, Nondistended, Bowel sounds +, + pervena, + jasmin  GENITOURINARY: trujillo d/c'd DTV  EXTREMITIES:   No clubbing, cyanosis, or edema  MUSCULOSKELETAL- No muscle tenderness, no joint tenderness  SKIN-no rash        LABS:                        12.9   12.16 )-----------( 335      ( 01 Sep 2021 07:25 )             41.2       09-01    137  |  107  |  11  ----------------------------<  120<H>  3.5   |  24  |  0.68    Ca    8.2<L>      01 Sep 2021 07:25  Phos  2.5     09-01  Mg     1.8     09-01    TPro  7.5  /  Alb  3.5  /  TBili  0.4  /  DBili  x   /  AST  20  /  ALT  25  /  AlkPhos  68  08-31              IMPRESSION: 60 yo f with above pmh  a/w:  #colostomy reversal  pain control  IVF's  trujillo d/c'd DTV  Monitor I& O  Monitor Cbc, BMP  BGM per CRS protocol  Cont Abxs  diet adv per CRS  Enterag    # leukocytosis    likely reactive    pt afebrile    #HTN  cont amlodipine    #anemia per chart review: lab from 8/20/21 (preop)  WNL   monitor     #COPD  cont albuterol prn    #VTE prophylaxis  hep sq  Venodynes  Amb    dispo: d/c 1-2 days back to [facility] where she now resides       PCP:  Dr. Tyrese Salazar    CHIEF COMPLAINT: colostomy reversal    HISTORY OF THE PRESENT ILLNESS:  this is a 62 yo female with pmh;  anemia, COPD, HTN, morbid obesity who was admitted in November 2020 for evaluation of SOB, weakness, muscle aches, diarrhea, found on exam to have bilateral stage 4 sacral wounds, noted to be + necrotizing fascitis. It is not clear as to the etiology of these wounds.  At that time the wounds were debrided and was seen by Colorectal surgery to have an colostomy performed to aide in wound healing. Per chart review it appears that the patient came from [facility] where she has been residing since the surgery.  The sacral wounds have since healed and pt is here for elective colostomy reversal.   We are consulted for medical management    9/1: seen OOB to chair, alert, awake, poor historian, linda po, no N/V, -flatus,  -BM    REVIEW OF SYSTEMS:   all 10 systems are reviewed and are negative with pertinent positives documented in PE      MEDICATIONS  (STANDING):  acetaminophen  IVPB .. 1000 milliGRAM(s) IV Intermittent every 6 hours  alvimopan 12 milliGRAM(s) Oral two times a day  amLODIPine   Tablet 5 milliGRAM(s) Oral daily  cefoTEtan  IVPB 2 Gram(s) IV Intermittent every 12 hours  dextrose 5% + sodium chloride 0.45% 1000 milliLiter(s) (75 mL/Hr) IV Continuous <Continuous>  fondaparinux Injectable 2.5 milliGRAM(s) SubCutaneous daily  ondansetron Injectable 4 milliGRAM(s) IV Push every 8 hours    MEDICATIONS  (PRN):  ALBUTerol    90 MICROgram(s) HFA Inhaler 2 Puff(s) Inhalation every 6 hours PRN Shortness of Breath and/or Wheezing  ketorolac   Injectable 15 milliGRAM(s) IV Push every 6 hours PRN Moderate Pain (4 - 6)  morphine  - Injectable 4 milliGRAM(s) IV Push every 4 hours PRN Severe Pain (7 - 10)    Vital Signs Last 24 Hrs  T(C): 37.1 (09-01-21 @ 09:38), Max: 37.1 (09-01-21 @ 05:09)  T(F): 98.7 (09-01-21 @ 09:38), Max: 98.8 (09-01-21 @ 05:09)  HR: 87 (09-01-21 @ 09:38) (70 - 98)  BP: 124/63 (09-01-21 @ 09:38) (109/64 - 144/75)  BP(mean): 78 (09-01-21 @ 09:38) (78 - 78)  RR: 16 (09-01-21 @ 09:38) (12 - 19)  SpO2: 96% (09-01-21 @ 09:38) (96% - 100%)    CAPILLARY BLOOD GLUCOSE      POCT Blood Glucose.: 130 mg/dL (09.01.21 @ 11:59)   POCT Blood Glucose.: 136 mg/dL (09.01.21 @ 05:30) POCT Blood Glucose.: 155 mg/dL (31 Aug 2021 15:04)  POCT Blood Glucose.: 107 mg/dL (31 Aug 2021 07:56)  POCT Blood Glucose.: 108 mg/dL (31 Aug 2021 06:22)    PHYSICAL EXAM:    GENERAL: Comfortable, no acute distress   HEAD:  Normocephalic, atraumatic  EYES: EOMI, PERRLA  HEENT: Moist mucous membranes  NECK: Supple, No JVD  NERVOUS SYSTEM: awake, alert, oriented x 3, motor strength for BUE/BLE 5/5 power  CHEST/LUNG: Clear to auscultation bilaterally  HEART: Regular rate and rhythm  ABDOMEN: Soft, obese, Nondistended, Bowel sounds +, + pervena, + jasmin  GENITOURINARY: trujillo d/c'd DTV  EXTREMITIES:   No clubbing, cyanosis, or edema  MUSCULOSKELETAL- No muscle tenderness, no joint tenderness  SKIN-no rash        LABS:                        12.9   12.16 )-----------( 335      ( 01 Sep 2021 07:25 )             41.2       09-01    137  |  107  |  11  ----------------------------<  120<H>  3.5   |  24  |  0.68    Ca    8.2<L>      01 Sep 2021 07:25  Phos  2.5     09-01  Mg     1.8     09-01    TPro  7.5  /  Alb  3.5  /  TBili  0.4  /  DBili  x   /  AST  20  /  ALT  25  /  AlkPhos  68  08-31              IMPRESSION: 62 yo f with above pmh  a/w:  #colostomy reversal  pain control  IVF's  trujillo d/c'd DTV  Monitor I& O  Monitor Cbc, BMP  BGM per CRS protocol  Cont Abxs  diet adv per CRS  Enterag    # leukocytosis    likely reactive    pt afebrile    #HTN  cont amlodipine    #anemia per chart review: lab from 8/20/21 (preop)  WNL   monitor     #COPD  cont albuterol prn    #VTE prophylaxis  hep sq  Venodynes  Amb    dispo: d/c 1-2 days back to [facility] where she now resides

## 2021-09-02 LAB
ANION GAP SERPL CALC-SCNC: 6 MMOL/L — SIGNIFICANT CHANGE UP (ref 5–17)
BUN SERPL-MCNC: 8 MG/DL — SIGNIFICANT CHANGE UP (ref 7–23)
CALCIUM SERPL-MCNC: 8.4 MG/DL — LOW (ref 8.5–10.1)
CHLORIDE SERPL-SCNC: 105 MMOL/L — SIGNIFICANT CHANGE UP (ref 96–108)
CO2 SERPL-SCNC: 24 MMOL/L — SIGNIFICANT CHANGE UP (ref 22–31)
CREAT SERPL-MCNC: 0.7 MG/DL — SIGNIFICANT CHANGE UP (ref 0.5–1.3)
GLUCOSE BLDC GLUCOMTR-MCNC: 106 MG/DL — HIGH (ref 70–99)
GLUCOSE BLDC GLUCOMTR-MCNC: 124 MG/DL — HIGH (ref 70–99)
GLUCOSE BLDC GLUCOMTR-MCNC: 133 MG/DL — HIGH (ref 70–99)
GLUCOSE BLDC GLUCOMTR-MCNC: 148 MG/DL — HIGH (ref 70–99)
GLUCOSE SERPL-MCNC: 121 MG/DL — HIGH (ref 70–99)
HCT VFR BLD CALC: 37.9 % — SIGNIFICANT CHANGE UP (ref 34.5–45)
HGB BLD-MCNC: 11.9 G/DL — SIGNIFICANT CHANGE UP (ref 11.5–15.5)
MAGNESIUM SERPL-MCNC: 1.8 MG/DL — SIGNIFICANT CHANGE UP (ref 1.6–2.6)
MCHC RBC-ENTMCNC: 23.4 PG — LOW (ref 27–34)
MCHC RBC-ENTMCNC: 31.4 GM/DL — LOW (ref 32–36)
MCV RBC AUTO: 74.5 FL — LOW (ref 80–100)
PHOSPHATE SERPL-MCNC: 1.6 MG/DL — LOW (ref 2.5–4.5)
PLATELET # BLD AUTO: 324 K/UL — SIGNIFICANT CHANGE UP (ref 150–400)
POTASSIUM SERPL-MCNC: 3.6 MMOL/L — SIGNIFICANT CHANGE UP (ref 3.5–5.3)
POTASSIUM SERPL-SCNC: 3.6 MMOL/L — SIGNIFICANT CHANGE UP (ref 3.5–5.3)
RBC # BLD: 5.09 M/UL — SIGNIFICANT CHANGE UP (ref 3.8–5.2)
RBC # FLD: 16.8 % — HIGH (ref 10.3–14.5)
SODIUM SERPL-SCNC: 135 MMOL/L — SIGNIFICANT CHANGE UP (ref 135–145)
WBC # BLD: 13.81 K/UL — HIGH (ref 3.8–10.5)
WBC # FLD AUTO: 13.81 K/UL — HIGH (ref 3.8–10.5)

## 2021-09-02 PROCEDURE — 73502 X-RAY EXAM HIP UNI 2-3 VIEWS: CPT | Mod: 26,LT

## 2021-09-02 PROCEDURE — 99232 SBSQ HOSP IP/OBS MODERATE 35: CPT

## 2021-09-02 PROCEDURE — 73030 X-RAY EXAM OF SHOULDER: CPT | Mod: 26,LT

## 2021-09-02 RX ORDER — POTASSIUM PHOSPHATE, MONOBASIC POTASSIUM PHOSPHATE, DIBASIC 236; 224 MG/ML; MG/ML
15 INJECTION, SOLUTION INTRAVENOUS EVERY 6 HOURS
Refills: 0 | Status: COMPLETED | OUTPATIENT
Start: 2021-09-02 | End: 2021-09-02

## 2021-09-02 RX ADMIN — POTASSIUM PHOSPHATE, MONOBASIC POTASSIUM PHOSPHATE, DIBASIC 62.5 MILLIMOLE(S): 236; 224 INJECTION, SOLUTION INTRAVENOUS at 11:56

## 2021-09-02 RX ADMIN — Medication 15 MILLIGRAM(S): at 18:05

## 2021-09-02 RX ADMIN — MORPHINE SULFATE 4 MILLIGRAM(S): 50 CAPSULE, EXTENDED RELEASE ORAL at 12:26

## 2021-09-02 RX ADMIN — Medication 15 MILLIGRAM(S): at 07:52

## 2021-09-02 RX ADMIN — Medication 15 MILLIGRAM(S): at 07:09

## 2021-09-02 RX ADMIN — AMLODIPINE BESYLATE 5 MILLIGRAM(S): 2.5 TABLET ORAL at 05:39

## 2021-09-02 RX ADMIN — MORPHINE SULFATE 4 MILLIGRAM(S): 50 CAPSULE, EXTENDED RELEASE ORAL at 11:56

## 2021-09-02 RX ADMIN — MORPHINE SULFATE 4 MILLIGRAM(S): 50 CAPSULE, EXTENDED RELEASE ORAL at 23:23

## 2021-09-02 RX ADMIN — MORPHINE SULFATE 4 MILLIGRAM(S): 50 CAPSULE, EXTENDED RELEASE ORAL at 23:38

## 2021-09-02 RX ADMIN — FONDAPARINUX SODIUM 2.5 MILLIGRAM(S): 2.5 INJECTION, SOLUTION SUBCUTANEOUS at 11:55

## 2021-09-02 RX ADMIN — Medication 1200 MILLIGRAM(S): at 21:53

## 2021-09-02 RX ADMIN — SODIUM CHLORIDE 75 MILLILITER(S): 9 INJECTION, SOLUTION INTRAVENOUS at 23:25

## 2021-09-02 RX ADMIN — POTASSIUM PHOSPHATE, MONOBASIC POTASSIUM PHOSPHATE, DIBASIC 62.5 MILLIMOLE(S): 236; 224 INJECTION, SOLUTION INTRAVENOUS at 18:07

## 2021-09-02 NOTE — PROGRESS NOTE ADULT - SUBJECTIVE AND OBJECTIVE BOX
PCP:  Dr. Tyrese Salazar    CHIEF COMPLAINT: colostomy reversal    HISTORY OF THE PRESENT ILLNESS:  this is a 62 yo female with pmh;  anemia, COPD, HTN, morbid obesity who was admitted in November 2020 for evaluation of SOB, weakness, muscle aches, diarrhea, found on exam to have bilateral stage 4 sacral wounds, noted to be + necrotizing fascitis. It is not clear as to the etiology of these wounds.  At that time the wounds were debrided and was seen by Colorectal surgery to have an colostomy performed to aide in wound healing. Per chart review it appears that the patient came from [facility] where she has been residing since the surgery.  The sacral wounds have since healed and pt is here for elective colostomy reversal.   We are consulted for medical management    9/2: seen OOB to chair, alert, awake, poor historian, linda po, no N/V, -flatus,  -BM, c/o left shoulder pain and left hip pain, both of which pt states is new since her surgery and denies any injuries pre or post  op    REVIEW OF SYSTEMS:   all 10 systems are reviewed and are negative with pertinent positives documented in PE    MEDICATIONS  (STANDING):  alvimopan 12 milliGRAM(s) Oral two times a day  amLODIPine   Tablet 5 milliGRAM(s) Oral daily  dextrose 5% + sodium chloride 0.45% 1000 milliLiter(s) (75 mL/Hr) IV Continuous <Continuous>  fondaparinux Injectable 2.5 milliGRAM(s) SubCutaneous daily  ondansetron Injectable 4 milliGRAM(s) IV Push every 8 hours  potassium phosphate IVPB 15 milliMole(s) IV Intermittent every 6 hours    MEDICATIONS  (PRN):  ALBUTerol    90 MICROgram(s) HFA Inhaler 2 Puff(s) Inhalation every 6 hours PRN Shortness of Breath and/or Wheezing  ketorolac   Injectable 15 milliGRAM(s) IV Push every 6 hours PRN Moderate Pain (4 - 6)  morphine  - Injectable 4 milliGRAM(s) IV Push every 4 hours PRN Severe Pain (7 - 10)      Vital Signs Last 24 Hrs  T(C): 36.9 (09-02-21 @ 09:35), Max: 36.9 (09-02-21 @ 00:49)  T(F): 98.4 (09-02-21 @ 09:35), Max: 98.4 (09-02-21 @ 00:49)  HR: 95 (09-02-21 @ 09:35) (95 - 99)  BP: 140/73 (09-02-21 @ 09:35) (129/66 - 140/73)  BP(mean): 90 (09-02-21 @ 09:35) (90 - 90)  RR: 16 (09-02-21 @ 09:35) (16 - 16)  SpO2: 97% (09-02-21 @ 09:35) (96% - 98%)        CAPILLARY BLOOD GLUCOSE    POCT Blood Glucose.: 148 mg/dL (09.02.21 @ 11:39)   POCT Blood Glucose.: 124 mg/dL (09.02.21 @ 06:29)   POCT Blood Glucose.: 130 mg/dL (09.01.21 @ 11:59)   POCT Blood Glucose.: 136 mg/dL (09.01.21 @ 05:30)   POCT Blood Glucose.: 155 mg/dL (31 Aug 2021 15:04)  POCT Blood Glucose.: 107 mg/dL (31 Aug 2021 07:56)  POCT Blood Glucose.: 108 mg/dL (31 Aug 2021 06:22)    PHYSICAL EXAM:    GENERAL: Comfortable, no acute distress   HEAD:  Normocephalic, atraumatic  EYES: EOMI, PERRLA  HEENT: Moist mucous membranes  NECK: Supple, No JVD  NERVOUS SYSTEM: awake, alert, oriented x 3, motor strength for BUE/BLE 5/5 power  CHEST/LUNG: Clear to auscultation bilaterally  HEART: Regular rate and rhythm  ABDOMEN: Soft, obese, Nondistended, Bowel sounds +, + pervena, + jasmin, -flatus/bm  GENITOURINARY: voiding  EXTREMITIES:   No clubbing, cyanosis, or edema  MUSCULOSKELETAL-+ left shoulder pain, with limited ROM + abduction/adduction, unable to lift left arm above shoulder level, + left hip pain on palpation and ambulation, no erythema or swelling noted  SKIN-no rash        LABS:                                              11.9   13.81 )-----------( 324      ( 02 Sep 2021 06:35 )             37.9       09-02    135  |  105  |  8   ----------------------------<  121<H>  3.6   |  24  |  0.70    Ca    8.4<L>      02 Sep 2021 06:35  Phos  1.6     09-02  Mg     1.8     09-02                  IMPRESSION: 62 yo f with above pmh  a/w:  #colostomy reversal  pain control  IVF's  voiding  Monitor I& O  Monitor Cbc, BMP  BGM per CRS protocol  Cont Abxs  diet adv per CRS  Enterag    # leukocytosis    likely reactive    pt afebrile    # left shoulder/hip pain    will get xrays of both    #HTN  cont amlodipine    #anemia per chart review: lab from 8/20/21 (preop) HH WNL  stable   monitor     #COPD  cont albuterol prn    #VTE prophylaxis  hep sq  Venodynes  Amb    dispo: d/c  back to [facility] where she now resides once she has bowel function

## 2021-09-02 NOTE — PROVIDER CONTACT NOTE (OTHER) - SITUATION
Pt c/o sob, congestion. Requesting mucinex.   Pt noted to have order for entereg [id number] only 2 doses given.

## 2021-09-02 NOTE — PROGRESS NOTE ADULT - SUBJECTIVE AND OBJECTIVE BOX
SURGERY DAILY PROGRESS NOTE:     Subjective:  Patient seen and examined at bedside during am rounds. AVSS. Tolerating a FLD. Admits to belching, denies passing flatus or having a BM. Reports ambulating yesterday and voiding after Metcalf removal. Denies any fevers, chills, n/v/d, chest pain or shortness of breath.    Objective:    MEDICATIONS  (STANDING):  acetaminophen  IVPB .. 1000 milliGRAM(s) IV Intermittent every 6 hours  alvimopan 12 milliGRAM(s) Oral two times a day  amLODIPine   Tablet 5 milliGRAM(s) Oral daily  cefoTEtan  IVPB 2 Gram(s) IV Intermittent every 12 hours  dextrose 5% + sodium chloride 0.45% 1000 milliLiter(s) (75 mL/Hr) IV Continuous <Continuous>  fondaparinux Injectable 2.5 milliGRAM(s) SubCutaneous daily  ondansetron Injectable 4 milliGRAM(s) IV Push every 8 hours    MEDICATIONS  (PRN):  ALBUTerol    90 MICROgram(s) HFA Inhaler 2 Puff(s) Inhalation every 6 hours PRN Shortness of Breath and/or Wheezing  ketorolac   Injectable 15 milliGRAM(s) IV Push every 6 hours PRN Moderate Pain (4 - 6)  morphine  - Injectable 4 milliGRAM(s) IV Push every 4 hours PRN Severe Pain (7 - 10)      Vital Signs Last 24 Hrs  T(C): 36.9 (02 Sep 2021 00:49), Max: 37.1 (01 Sep 2021 09:38)  T(F): 98.4 (02 Sep 2021 00:49), Max: 98.7 (01 Sep 2021 09:38)  HR: 96 (02 Sep 2021 00:49) (87 - 99)  BP: 135/71 (02 Sep 2021 00:49) (124/63 - 135/71)  BP(mean): 78 (01 Sep 2021 09:38) (78 - 78)  RR: 16 (02 Sep 2021 00:49) (16 - 16)  SpO2: 98% (02 Sep 2021 00:49) (96% - 98%)    PHYSICAL EXAM   Gen: well-appearing, in no acute distress  CV: pulse regularly present   Resp: airway patent, non-labored breathing  Abd: soft, NTND; no rebound or guarding. Incision c/d/i, abdominal binder in place, MARISOL drain in place      I&O's Detail    01 Sep 2021 07:01  -  02 Sep 2021 07:00  --------------------------------------------------------  IN:  Total IN: 0 mL    OUT:    Blood Loss (mL): 5 mL    Bulb (mL): 35 mL    Voided (mL): 325 mL  Total OUT: 365 mL    Total NET: -365 mL        Daily     Daily     LABS:                        11.9   13.81 )-----------( 324      ( 02 Sep 2021 06:35 )             37.9   09-02    135  |  105  |  8   ----------------------------<  121<H>  3.6   |  24  |  0.70    Ca    8.4<L>      02 Sep 2021 06:35  Phos  1.6     09-02  Mg     1.8     09-02      ASSESSMENT/PLAN:    61 year-old female is POD#2 s/p Closure of Lisbeth colostomy and Repair of incisional hernia with mesh.      Plan:  - Advance to LRD  - monitor for bowel function  - monitor MARISOL drain output  - serial abdominal exams  - pain and nausea control  - physical therapy, encourage ambulation and IS use    Plan discussed with Dr. Carroll

## 2021-09-03 ENCOUNTER — TRANSCRIPTION ENCOUNTER (OUTPATIENT)
Age: 61
End: 2021-09-03

## 2021-09-03 LAB
ANION GAP SERPL CALC-SCNC: 6 MMOL/L — SIGNIFICANT CHANGE UP (ref 5–17)
BUN SERPL-MCNC: 10 MG/DL — SIGNIFICANT CHANGE UP (ref 7–23)
CALCIUM SERPL-MCNC: 8.5 MG/DL — SIGNIFICANT CHANGE UP (ref 8.5–10.1)
CHLORIDE SERPL-SCNC: 105 MMOL/L — SIGNIFICANT CHANGE UP (ref 96–108)
CO2 SERPL-SCNC: 24 MMOL/L — SIGNIFICANT CHANGE UP (ref 22–31)
CREAT SERPL-MCNC: 0.6 MG/DL — SIGNIFICANT CHANGE UP (ref 0.5–1.3)
GLUCOSE SERPL-MCNC: 143 MG/DL — HIGH (ref 70–99)
HCT VFR BLD CALC: 36.8 % — SIGNIFICANT CHANGE UP (ref 34.5–45)
HGB BLD-MCNC: 11.5 G/DL — SIGNIFICANT CHANGE UP (ref 11.5–15.5)
MAGNESIUM SERPL-MCNC: 1.9 MG/DL — SIGNIFICANT CHANGE UP (ref 1.6–2.6)
MCHC RBC-ENTMCNC: 23.3 PG — LOW (ref 27–34)
MCHC RBC-ENTMCNC: 31.3 GM/DL — LOW (ref 32–36)
MCV RBC AUTO: 74.5 FL — LOW (ref 80–100)
PHOSPHATE SERPL-MCNC: 2.1 MG/DL — LOW (ref 2.5–4.5)
PLATELET # BLD AUTO: 302 K/UL — SIGNIFICANT CHANGE UP (ref 150–400)
POTASSIUM SERPL-MCNC: 3.8 MMOL/L — SIGNIFICANT CHANGE UP (ref 3.5–5.3)
POTASSIUM SERPL-SCNC: 3.8 MMOL/L — SIGNIFICANT CHANGE UP (ref 3.5–5.3)
RBC # BLD: 4.94 M/UL — SIGNIFICANT CHANGE UP (ref 3.8–5.2)
RBC # FLD: 16.6 % — HIGH (ref 10.3–14.5)
SARS-COV-2 RNA SPEC QL NAA+PROBE: SIGNIFICANT CHANGE UP
SODIUM SERPL-SCNC: 135 MMOL/L — SIGNIFICANT CHANGE UP (ref 135–145)
WBC # BLD: 10.32 K/UL — SIGNIFICANT CHANGE UP (ref 3.8–10.5)
WBC # FLD AUTO: 10.32 K/UL — SIGNIFICANT CHANGE UP (ref 3.8–10.5)

## 2021-09-03 PROCEDURE — 99232 SBSQ HOSP IP/OBS MODERATE 35: CPT

## 2021-09-03 PROCEDURE — 71045 X-RAY EXAM CHEST 1 VIEW: CPT | Mod: 26

## 2021-09-03 RX ORDER — AZITHROMYCIN 500 MG/1
1 TABLET, FILM COATED ORAL
Qty: 0 | Refills: 0 | DISCHARGE
Start: 2021-09-03

## 2021-09-03 RX ORDER — KETOROLAC TROMETHAMINE 30 MG/ML
15 SYRINGE (ML) INJECTION EVERY 6 HOURS
Refills: 0 | Status: DISCONTINUED | OUTPATIENT
Start: 2021-09-03 | End: 2021-09-06

## 2021-09-03 RX ORDER — FLUTICASONE PROPIONATE AND SALMETEROL 50; 250 UG/1; UG/1
1 POWDER ORAL; RESPIRATORY (INHALATION)
Qty: 0 | Refills: 0 | DISCHARGE

## 2021-09-03 RX ORDER — AZITHROMYCIN 500 MG/1
250 TABLET, FILM COATED ORAL DAILY
Refills: 0 | Status: DISCONTINUED | OUTPATIENT
Start: 2021-09-04 | End: 2021-09-06

## 2021-09-03 RX ORDER — BUDESONIDE AND FORMOTEROL FUMARATE DIHYDRATE 160; 4.5 UG/1; UG/1
2 AEROSOL RESPIRATORY (INHALATION)
Refills: 0 | Status: DISCONTINUED | OUTPATIENT
Start: 2021-09-03 | End: 2021-09-06

## 2021-09-03 RX ORDER — TIOTROPIUM BROMIDE 18 UG/1
1 CAPSULE ORAL; RESPIRATORY (INHALATION) DAILY
Refills: 0 | Status: DISCONTINUED | OUTPATIENT
Start: 2021-09-03 | End: 2021-09-06

## 2021-09-03 RX ORDER — AZITHROMYCIN 500 MG/1
500 TABLET, FILM COATED ORAL ONCE
Refills: 0 | Status: COMPLETED | OUTPATIENT
Start: 2021-09-03 | End: 2021-09-03

## 2021-09-03 RX ORDER — TIOTROPIUM BROMIDE 18 UG/1
1 CAPSULE ORAL; RESPIRATORY (INHALATION)
Qty: 0 | Refills: 0 | DISCHARGE
Start: 2021-09-03

## 2021-09-03 RX ADMIN — AMLODIPINE BESYLATE 5 MILLIGRAM(S): 2.5 TABLET ORAL at 09:31

## 2021-09-03 RX ADMIN — FONDAPARINUX SODIUM 2.5 MILLIGRAM(S): 2.5 INJECTION, SOLUTION SUBCUTANEOUS at 15:35

## 2021-09-03 RX ADMIN — Medication 15 MILLIGRAM(S): at 16:26

## 2021-09-03 RX ADMIN — BUDESONIDE AND FORMOTEROL FUMARATE DIHYDRATE 2 PUFF(S): 160; 4.5 AEROSOL RESPIRATORY (INHALATION) at 21:36

## 2021-09-03 RX ADMIN — Medication 15 MILLIGRAM(S): at 16:57

## 2021-09-03 RX ADMIN — Medication 15 MILLIGRAM(S): at 05:13

## 2021-09-03 RX ADMIN — Medication 15 MILLIGRAM(S): at 05:43

## 2021-09-03 RX ADMIN — AZITHROMYCIN 500 MILLIGRAM(S): 500 TABLET, FILM COATED ORAL at 15:35

## 2021-09-03 RX ADMIN — TIOTROPIUM BROMIDE 1 CAPSULE(S): 18 CAPSULE ORAL; RESPIRATORY (INHALATION) at 22:05

## 2021-09-03 RX ADMIN — Medication 600 MILLIGRAM(S): at 16:26

## 2021-09-03 NOTE — PROGRESS NOTE ADULT - SUBJECTIVE AND OBJECTIVE BOX
SURGERY DAILY PROGRESS NOTE:     Subjective:  Patient seen and examined at bedside during am rounds. AVSS. Tolerating LRD. Admits to passing flatus and having BMs overnight. Overnight had SOB, was saturating well on RA, CXR was obtained. Denies any fevers, chills, n/v/d, chest pain or shortness of breath.    Objective:    MEDICATIONS  (STANDING):  acetaminophen  IVPB .. 1000 milliGRAM(s) IV Intermittent every 6 hours  alvimopan 12 milliGRAM(s) Oral two times a day  amLODIPine   Tablet 5 milliGRAM(s) Oral daily  cefoTEtan  IVPB 2 Gram(s) IV Intermittent every 12 hours  dextrose 5% + sodium chloride 0.45% 1000 milliLiter(s) (75 mL/Hr) IV Continuous <Continuous>  fondaparinux Injectable 2.5 milliGRAM(s) SubCutaneous daily  ondansetron Injectable 4 milliGRAM(s) IV Push every 8 hours    MEDICATIONS  (PRN):  ALBUTerol    90 MICROgram(s) HFA Inhaler 2 Puff(s) Inhalation every 6 hours PRN Shortness of Breath and/or Wheezing  ketorolac   Injectable 15 milliGRAM(s) IV Push every 6 hours PRN Moderate Pain (4 - 6)  morphine  - Injectable 4 milliGRAM(s) IV Push every 4 hours PRN Severe Pain (7 - 10)      Vital Signs Last 24 Hrs  T(C): 37.4 (03 Sep 2021 00:05), Max: 37.4 (03 Sep 2021 00:05)  T(F): 99.3 (03 Sep 2021 00:05), Max: 99.3 (03 Sep 2021 00:05)  HR: 91 (03 Sep 2021 00:05) (91 - 95)  BP: 139/69 (03 Sep 2021 00:05) (132/68 - 140/73)  BP(mean): 90 (02 Sep 2021 09:35) (90 - 90)  RR: 16 (03 Sep 2021 00:05) (16 - 16)  SpO2: 98% (03 Sep 2021 00:05) (95% - 98%)    PHYSICAL EXAM   Gen: well-appearing, in no acute distress  CV: pulse regularly present   Resp: airway patent, non-labored breathing  Abd: soft, NTND; no rebound or guarding. Incision c/d/i, abdominal binder in place, MARISOL drain in place      I&O's Detail    02 Sep 2021 07:01  -  03 Sep 2021 07:00  --------------------------------------------------------  IN:    dextrose 5% + sodium chloride 0.45% w/ Additives: 750 mL    IV PiggyBack: 500 mL    Oral Fluid: 760 mL  Total IN: 2010 mL    OUT:    Bulb (mL): 20 mL    Voided (mL): 800 mL  Total OUT: 820 mL    Total NET: 1190 mL              Daily     Daily     LABS:                                   11.9   13.81 )-----------( 324      ( 02 Sep 2021 06:35 )             37.9   09-02    135  |  105  |  8   ----------------------------<  121<H>  3.6   |  24  |  0.70    Ca    8.4<L>      02 Sep 2021 06:35  Phos  1.6     09-02  Mg     1.8     09-02

## 2021-09-03 NOTE — DISCHARGE NOTE PROVIDER - CARE PROVIDER_API CALL
Ismael Carroll)  ColonRectal Surgery; Surgery  [address] [address]  Phone: [phone]  Fax: [phone]  Follow Up Time: 1 week

## 2021-09-03 NOTE — DISCHARGE NOTE PROVIDER - NSDCCPCAREPLAN_GEN_ALL_CORE_FT
PRINCIPAL DISCHARGE DIAGNOSIS  Diagnosis: Colostomy status  Assessment and Plan of Treatment:

## 2021-09-03 NOTE — PROGRESS NOTE ADULT - SUBJECTIVE AND OBJECTIVE BOX
PCP:  Dr. Tyrese Salazar    CHIEF COMPLAINT: colostomy reversal    HPI: 60 yo female with pmh of anemia, COPD, HTN, morbid obesity who was admitted in November 2020 with sepsis due to necrotizing fasciitis requiring extensive debridement and diverting colostomy to aide in wound healing. She is now admitted from [facility] for reversal of colostomy. Hospitalist service consulted for medical comanagement.   Pt seen and examined on 2N. Overnight events noted. PT states she still has some difficulty breathing. Has had cough with some sputum production since surgery. Had CXR overnight which was negative.   Spo2 stable on room air.     REVIEW OF SYSTEMS:   all 10 systems are reviewed and are negative with pertinent positives documented in PE    Vital Signs Last 24 Hrs  T(C): 36.8 (03 Sep 2021 09:16), Max: 37.4 (03 Sep 2021 00:05)  T(F): 98.2 (03 Sep 2021 09:16), Max: 99.3 (03 Sep 2021 00:05)  HR: 98 (03 Sep 2021 09:16) (91 - 98)  BP: 124/97 (03 Sep 2021 09:16) (124/97 - 139/69)  RR: 16 (03 Sep 2021 09:16) (16 - 16)  SpO2: 99% (03 Sep 2021 09:16) (95% - 99%)      PHYSICAL EXAM:    GENERAL: Comfortable, no acute distress   HEAD:  Normocephalic, atraumatic  EYES: EOMI, PERRLA  HEENT: Moist mucous membranes  NECK: Supple, No JVD  NERVOUS SYSTEM: awake, alert, oriented x 3, motor strength for BUE/BLE 5/5 power  CHEST/LUNG: bilateral wheeze  HEART: Regular rate and rhythm  ABDOMEN: Soft, obese, Nondistended, Bowel sounds +, + pervena, + jasmin  GENITOURINARY: voiding  EXTREMITIES:   No clubbing, cyanosis, or edema  MUSCULOSKELETAL-no joint tenderness  SKIN-no rash      LABS:                        11.5   10.32 )-----------( 302      ( 03 Sep 2021 09:33 )             36.8     09-03    135  |  105  |  10  ----------------------------<  143<H>  3.8   |  24  |  0.60    Ca    8.5      03 Sep 2021 09:33  Phos  2.1     09-03  Mg     1.9     09-03        MEDICATIONS  (STANDING):  alvimopan 12 milliGRAM(s) Oral two times a day  amLODIPine   Tablet 5 milliGRAM(s) Oral daily  azithromycin   Tablet 500 milliGRAM(s) Oral once  budesonide 160 MICROgram(s)/formoterol 4.5 MICROgram(s) Inhaler 2 Puff(s) Inhalation two times a day  fondaparinux Injectable 2.5 milliGRAM(s) SubCutaneous daily  ondansetron Injectable 4 milliGRAM(s) IV Push every 8 hours  tiotropium 18 MICROgram(s) Capsule 1 Capsule(s) Inhalation daily    MEDICATIONS  (PRN):  morphine  - Injectable 4 milliGRAM(s) IV Push every 4 hours PRN Severe Pain (7 - 10)        ASSESSMENT AND PLAN  60 yo f with above pmh  a/w:    #colostomy reversal  -bowel function has returned.   -pain control   -ambualte  -incentive spirometry    #Acute bronchitis:  -start inhalers/zpack.  -cxr negative  -if symptoms worsen or becomes hypoxic may need steroids.   -f/u with pcp outpt (d/w surgical resident)    # left shoulder/hip pain  -xrays negative for acute disease  -outpt follow up    #HTN  cont amlodipine    #VTE prophylaxis  hep sq  Venodynes  Amb

## 2021-09-03 NOTE — PROVIDER CONTACT NOTE (OTHER) - ACTION/TREATMENT ORDERED:
Mucinex x 1 ordered  Pt only needed 2 doses of entereg, one before surgery and one after. Disregard the rest.
stat CXR

## 2021-09-03 NOTE — DISCHARGE NOTE PROVIDER - NSDCFUADDINST_GEN_ALL_CORE_FT
Maintain prevena vac in place to suction, will remove this in the office during follow up visit.  Monitor MARISOL drain output, record output daily on a log and bring log with you to the office. Maintain prevena vac in place to suction, will remove this in the office during follow up visit. PAIN: You may continue to take Acetaminophen (Tylenol) and Ibuprofen (Advil, Motrin) over the counter for pain.  If not controlled please call the office.  WOUND CARE:  You have a dressing in place. The tape and the gauze can be removed in 2 days. The strips on white take can be keep in place until your appointment. You are allowed to take a shower. You should allow warm soapy water to run down the wound in the shower. You do not need to scrub the area. You do not have any stitches that need to be removed. The strips of white tape may fall off in the shower before your appointment and that is ok. You have you ice packs on the area to help with swelling; 5-10 minutes on at a time.  BATHING: Please do not soak or submerge the wound in water (bath, swimming) for 14 days after your surgery.  ACTIVITY: No heavy lifting, straining, or vigorous activity until your follow-up appointment in 1 weeks.   FOLLOW-UP: Please call the office and make an appointment to follow up with Dr Carroll in 1 week.  Please follow up with your primary care physician in 1-2 weeks regarding your hospitalization. PAIN: You may continue to take Acetaminophen (Tylenol) and Ibuprofen (Advil, Motrin) over the counter for pain.  If not controlled please call the office.  WOUND CARE:  You have a dressing in place. The tape and the gauze can be removed in 2 days. You are allowed to take a shower. You should allow warm soapy water to run down the wound in the shower. You do not need to scrub the area. You do not have any stitches that need to be removed. The strips of white tape may fall off in the shower before your appointment and that is ok. You have you ice packs on the area to help with swelling; 5-10 minutes on at a time.  BATHING: Please do not soak or submerge the wound in water (bath, swimming) for 14 days after your surgery.  ACTIVITY: No heavy lifting, straining, or vigorous activity until your follow-up appointment in 1 weeks.   FOLLOW-UP: Please call the office and make an appointment to follow up with Dr Carroll in 1 week.  Please follow up with your primary care physician in 1-2 weeks regarding your hospitalization.

## 2021-09-03 NOTE — DISCHARGE NOTE PROVIDER - NSDCCPTREATMENT_GEN_ALL_CORE_FT
PRINCIPAL PROCEDURE  Procedure: Closure of Lisbeth colostomy  Findings and Treatment:       SECONDARY PROCEDURE  Procedure: Repair of incisional hernia with mesh  Findings and Treatment:

## 2021-09-03 NOTE — PROVIDER CONTACT NOTE (OTHER) - SITUATION
Pt c/o feeling like she is under water and is feeling SOB. Right lung sounds diminished more so than left. o2 sat on RA 97%, 2L nc applied for comfort.  O2sat 98% on 2L

## 2021-09-03 NOTE — DISCHARGE NOTE PROVIDER - NSDCMRMEDTOKEN_GEN_ALL_CORE_FT
acetaminophen 325 mg oral tablet: 2 tab(s) orally every 6 hours, As needed, Mild Pain (1 - 3)  albuterol 90 mcg/inh inhalation aerosol: 2 puff(s) inhaled every 6 hours, As needed, Shortness of Breath and/or Wheezing  amLODIPine 5 mg oral tablet: 1 tab(s) orally once a day  Colace 100 mg oral capsule: 1 cap(s) orally 2 times a day   Enulose 10 g/15 mL oral and rectal liquid: 30 milliliter(s) oral and rectal 3 times a day  ferrous sulfate 324 mg (65 mg elemental iron) oral tablet: orally once a day  fluticasone 50 mcg/inh nasal spray: 1 spray(s) nasal once a day  Melatonin 5 mg oral tablet: 1 tab(s) orally once a day (at bedtime)  MiraLax oral powder for reconstitution: 17 gram(s) orally once a day   Multiple Vitamins oral capsule: 1 cap(s) orally once a day  Senna 8.6 mg oral tablet: 1 tab(s) orally once a day (at bedtime)  Vitamin C 500 mg oral tablet: 1 tab(s) orally once a day   acetaminophen 325 mg oral tablet: 2 tab(s) orally every 6 hours, As needed, Mild Pain (1 - 3)  albuterol 90 mcg/inh inhalation aerosol: 2 puff(s) inhaled every 6 hours, As needed, Shortness of Breath and/or Wheezing  amLODIPine 5 mg oral tablet: 1 tab(s) orally once a day  Colace 100 mg oral capsule: 1 cap(s) orally 2 times a day   Enulose 10 g/15 mL oral and rectal liquid: 30 milliliter(s) oral and rectal 3 times a day  ferrous sulfate 324 mg (65 mg elemental iron) oral tablet: orally once a day  fluticasone 50 mcg/inh nasal spray: 1 spray(s) nasal once a day  Melatonin 5 mg oral tablet: 1 tab(s) orally once a day (at bedtime)  MiraLax oral powder for reconstitution: 17 gram(s) orally once a day   Multiple Vitamins oral capsule: 1 cap(s) orally once a day  oxycodone-acetaminophen 5 mg-325 mg oral tablet: 1 tab(s) orally every 6 hours, As Needed -for moderate pain MDD:004  Senna 8.6 mg oral tablet: 1 tab(s) orally once a day (at bedtime)  Vitamin C 500 mg oral tablet: 1 tab(s) orally once a day   acetaminophen 325 mg oral tablet: 2 tab(s) orally every 6 hours, As needed, Mild Pain (1 - 3)  Advair Diskus 250 mcg-50 mcg inhalation powder: 1 puff(s) inhaled 2 times a day  albuterol 90 mcg/inh inhalation aerosol: 2 puff(s) inhaled 4 times a day  amLODIPine 5 mg oral tablet: 1 tab(s) orally once a day  Colace 100 mg oral capsule: 1 cap(s) orally 2 times a day   Enulose 10 g/15 mL oral and rectal liquid: 30 milliliter(s) oral and rectal 3 times a day  ferrous sulfate 324 mg (65 mg elemental iron) oral tablet: orally once a day  fluticasone 50 mcg/inh nasal spray: 1 spray(s) nasal once a day  Melatonin 5 mg oral tablet: 1 tab(s) orally once a day (at bedtime)  MiraLax oral powder for reconstitution: 17 gram(s) orally once a day   Multiple Vitamins oral capsule: 1 cap(s) orally once a day  oxycodone-acetaminophen 5 mg-325 mg oral tablet: 1 tab(s) orally every 6 hours, As Needed -for moderate pain MDD:004  Senna 8.6 mg oral tablet: 1 tab(s) orally once a day (at bedtime)  tiotropium 18 mcg inhalation capsule: 1 cap(s) inhaled once a day  Vitamin C 500 mg oral tablet: 1 tab(s) orally once a day   acetaminophen 325 mg oral tablet: 2 tab(s) orally every 6 hours, As needed, Mild Pain (1 - 3)  Advair Diskus 250 mcg-50 mcg inhalation powder: 1 puff(s) inhaled 2 times a day  albuterol 90 mcg/inh inhalation aerosol: 2 puff(s) inhaled 4 times a day  amLODIPine 5 mg oral tablet: 1 tab(s) orally once a day  azithromycin 250 mg oral tablet: 1 tab(s) orally once a day. stop after 9/7  Colace 100 mg oral capsule: 1 cap(s) orally 2 times a day   Enulose 10 g/15 mL oral and rectal liquid: 30 milliliter(s) oral and rectal 3 times a day  ferrous sulfate 324 mg (65 mg elemental iron) oral tablet: orally once a day  fluticasone 50 mcg/inh nasal spray: 1 spray(s) nasal once a day  Melatonin 5 mg oral tablet: 1 tab(s) orally once a day (at bedtime)  MiraLax oral powder for reconstitution: 17 gram(s) orally once a day   Multiple Vitamins oral capsule: 1 cap(s) orally once a day  oxycodone-acetaminophen 5 mg-325 mg oral tablet: 1 tab(s) orally every 6 hours, As Needed -for moderate pain MDD:004  Senna 8.6 mg oral tablet: 1 tab(s) orally once a day (at bedtime)  tiotropium 18 mcg inhalation capsule: 1 cap(s) inhaled once a day  Vitamin C 500 mg oral tablet: 1 tab(s) orally once a day   acetaminophen 325 mg oral tablet: 2 tab(s) orally every 6 hours, As needed, Mild Pain (1 - 3)  Advair Diskus 250 mcg-50 mcg inhalation powder: 1 puff(s) inhaled 2 times a day  albuterol 90 mcg/inh inhalation aerosol: 2 puff(s) inhaled every 6 hours, As needed, Shortness of Breath and/or Wheezing  albuterol 90 mcg/inh inhalation aerosol: 2 puff(s) inhaled 4 times a day  amLODIPine 10 mg oral tablet: 1 tab(s) orally once a day  azithromycin 250 mg oral tablet: 1 tab(s) orally once a day. stop after 9/7  Colace 100 mg oral capsule: 1 cap(s) orally 2 times a day   Enulose 10 g/15 mL oral and rectal liquid: 30 milliliter(s) oral and rectal 3 times a day  ferrous sulfate 324 mg (65 mg elemental iron) oral tablet: orally once a day  fluticasone 50 mcg/inh nasal spray: 1 spray(s) nasal once a day  guaifenesin-dextromethorphan 100 mg-10 mg/5 mL oral liquid: 10 milliliter(s) orally every 6 hours, As needed, Cough  Melatonin 5 mg oral tablet: 1 tab(s) orally once a day (at bedtime)  MiraLax oral powder for reconstitution: 17 gram(s) orally once a day   Multiple Vitamins oral capsule: 1 cap(s) orally once a day  oxycodone-acetaminophen 5 mg-325 mg oral tablet: 1 tab(s) orally every 6 hours, As Needed -for moderate pain MDD:004  Senna 8.6 mg oral tablet: 1 tab(s) orally once a day (at bedtime)  tiotropium 18 mcg inhalation capsule: 1 cap(s) inhaled once a day  Vitamin C 500 mg oral tablet: 1 tab(s) orally once a day

## 2021-09-03 NOTE — DISCHARGE NOTE PROVIDER - HOSPITAL COURSE
Patient was admitted under the colorectal surgery service. Patient was taken to the OR for a colostomy reversal and parastomal hernia repair. Patient tolerated the procedure well. Metcalf was removed on POD1 and patient was voiding freely without issues. Incentive spirometry and out of bed/ambulation were encouraged. Diet was advanced as tolerated without nausea or vomiting. Bowel function was monitored throughout and serial abdominal exams were performed. MARISOL drain output was monitored. Patient was admitted under the colorectal surgery service. Patient was taken to the OR for a colostomy reversal and parastomal hernia repair. Patient tolerated the procedure well. Metcalf was removed on POD1 and patient was voiding freely without issues. Incentive spirometry and out of bed/ambulation were encouraged. Diet was advanced as tolerated without nausea or vomiting. Bowel function was monitored throughout and serial abdominal exams were performed. MARISOL drain output was monitored. Hospitalist service was consulted for medical management. Nebulizer treatment was provided for the patient given findings of wheezing. Patient was admitted under the colorectal surgery service. Patient was taken to the OR for a colostomy reversal and parastomal hernia repair. Patient tolerated the procedure well. Metcalf was removed on POD1 and patient was voiding freely without issues. Incentive spirometry and out of bed/ambulation were encouraged. Diet was advanced as tolerated without nausea or vomiting. Bowel function was monitored throughout and serial abdominal exams were performed. MARISOL drain output was monitored. Hospitalist service was consulted for medical management. Nebulizer treatment was provided for the patient given findings of wheezing. Was cleared for discharge by hospitalist, f/u with PCP regarding asthma/SOB.

## 2021-09-04 ENCOUNTER — TRANSCRIPTION ENCOUNTER (OUTPATIENT)
Age: 61
End: 2021-09-04

## 2021-09-04 LAB
ANION GAP SERPL CALC-SCNC: 5 MMOL/L — SIGNIFICANT CHANGE UP (ref 5–17)
BUN SERPL-MCNC: 9 MG/DL — SIGNIFICANT CHANGE UP (ref 7–23)
CALCIUM SERPL-MCNC: 8.8 MG/DL — SIGNIFICANT CHANGE UP (ref 8.5–10.1)
CHLORIDE SERPL-SCNC: 105 MMOL/L — SIGNIFICANT CHANGE UP (ref 96–108)
CO2 SERPL-SCNC: 26 MMOL/L — SIGNIFICANT CHANGE UP (ref 22–31)
CREAT SERPL-MCNC: 0.5 MG/DL — SIGNIFICANT CHANGE UP (ref 0.5–1.3)
GLUCOSE SERPL-MCNC: 97 MG/DL — SIGNIFICANT CHANGE UP (ref 70–99)
HCT VFR BLD CALC: 34 % — LOW (ref 34.5–45)
HGB BLD-MCNC: 10.8 G/DL — LOW (ref 11.5–15.5)
MAGNESIUM SERPL-MCNC: 1.9 MG/DL — SIGNIFICANT CHANGE UP (ref 1.6–2.6)
MCHC RBC-ENTMCNC: 23.7 PG — LOW (ref 27–34)
MCHC RBC-ENTMCNC: 31.8 GM/DL — LOW (ref 32–36)
MCV RBC AUTO: 74.7 FL — LOW (ref 80–100)
PHOSPHATE SERPL-MCNC: 3.2 MG/DL — SIGNIFICANT CHANGE UP (ref 2.5–4.5)
PLATELET # BLD AUTO: 321 K/UL — SIGNIFICANT CHANGE UP (ref 150–400)
POTASSIUM SERPL-MCNC: 3.8 MMOL/L — SIGNIFICANT CHANGE UP (ref 3.5–5.3)
POTASSIUM SERPL-SCNC: 3.8 MMOL/L — SIGNIFICANT CHANGE UP (ref 3.5–5.3)
RBC # BLD: 4.55 M/UL — SIGNIFICANT CHANGE UP (ref 3.8–5.2)
RBC # FLD: 16.3 % — HIGH (ref 10.3–14.5)
SODIUM SERPL-SCNC: 136 MMOL/L — SIGNIFICANT CHANGE UP (ref 135–145)
WBC # BLD: 9.13 K/UL — SIGNIFICANT CHANGE UP (ref 3.8–10.5)
WBC # FLD AUTO: 9.13 K/UL — SIGNIFICANT CHANGE UP (ref 3.8–10.5)

## 2021-09-04 PROCEDURE — 99232 SBSQ HOSP IP/OBS MODERATE 35: CPT

## 2021-09-04 RX ORDER — GUAIFENESIN/DEXTROMETHORPHAN 600MG-30MG
10 TABLET, EXTENDED RELEASE 12 HR ORAL EVERY 6 HOURS
Refills: 0 | Status: DISCONTINUED | OUTPATIENT
Start: 2021-09-04 | End: 2021-09-06

## 2021-09-04 RX ORDER — AMLODIPINE BESYLATE 2.5 MG/1
5 TABLET ORAL ONCE
Refills: 0 | Status: COMPLETED | OUTPATIENT
Start: 2021-09-04 | End: 2021-09-04

## 2021-09-04 RX ORDER — AMLODIPINE BESYLATE 2.5 MG/1
10 TABLET ORAL DAILY
Refills: 0 | Status: DISCONTINUED | OUTPATIENT
Start: 2021-09-05 | End: 2021-09-06

## 2021-09-04 RX ADMIN — TIOTROPIUM BROMIDE 1 CAPSULE(S): 18 CAPSULE ORAL; RESPIRATORY (INHALATION) at 08:59

## 2021-09-04 RX ADMIN — FONDAPARINUX SODIUM 2.5 MILLIGRAM(S): 2.5 INJECTION, SOLUTION SUBCUTANEOUS at 10:56

## 2021-09-04 RX ADMIN — AMLODIPINE BESYLATE 5 MILLIGRAM(S): 2.5 TABLET ORAL at 14:41

## 2021-09-04 RX ADMIN — Medication 10 MILLILITER(S): at 17:58

## 2021-09-04 RX ADMIN — AMLODIPINE BESYLATE 5 MILLIGRAM(S): 2.5 TABLET ORAL at 06:03

## 2021-09-04 RX ADMIN — Medication 15 MILLIGRAM(S): at 19:48

## 2021-09-04 RX ADMIN — AZITHROMYCIN 250 MILLIGRAM(S): 500 TABLET, FILM COATED ORAL at 09:40

## 2021-09-04 RX ADMIN — BUDESONIDE AND FORMOTEROL FUMARATE DIHYDRATE 2 PUFF(S): 160; 4.5 AEROSOL RESPIRATORY (INHALATION) at 08:56

## 2021-09-04 RX ADMIN — BUDESONIDE AND FORMOTEROL FUMARATE DIHYDRATE 2 PUFF(S): 160; 4.5 AEROSOL RESPIRATORY (INHALATION) at 20:37

## 2021-09-04 RX ADMIN — Medication 15 MILLIGRAM(S): at 20:03

## 2021-09-04 NOTE — PROGRESS NOTE ADULT - SUBJECTIVE AND OBJECTIVE BOX
PCP:  Dr. Tyrese Salazar    CHIEF COMPLAINT: colostomy reversal    HPI: 62 yo female with pmh of anemia, COPD, HTN, morbid obesity who was admitted in November 2020 with sepsis due to necrotizing fasciitis requiring extensive debridement and diverting colostomy to aide in wound healing. She is now admitted from [facility] for reversal of colostomy. Hospitalist service consulted for medical comanagement.   Pt seen and examined this am. Feeling better with inhalers and zpack. Feels breathing/wheeze is improving. no sob/chest pain at rest. no n/v. tolerating po. no f/c/r.     REVIEW OF SYSTEMS:   all 10 systems reviewed and is as above otherwise negative.     Vital Signs Last 24 Hrs  T(C): 37.3 (04 Sep 2021 09:41), Max: 37.3 (04 Sep 2021 00:14)  T(F): 99.1 (04 Sep 2021 09:41), Max: 99.1 (04 Sep 2021 00:14)  HR: 89 (04 Sep 2021 09:41) (89 - 92)  BP: 167/90 (04 Sep 2021 09:41) (148/62 - 167/90)  BP(mean): 101 (04 Sep 2021 09:41) (101 - 101)  RR: 16 (04 Sep 2021 09:41) (16 - 18)  SpO2: 98% (04 Sep 2021 09:41) (97% - 100%)      PHYSICAL EXAM:    GENERAL: Comfortable, no acute distress   HEAD:  Normocephalic, atraumatic  EYES: EOMI, PERRLA  HEENT: Moist mucous membranes  NECK: Supple, No JVD  NERVOUS SYSTEM: awake, alert, oriented x 3, motor strength for BUE/BLE 5/5 power  CHEST/LUNG: bilateral wheeze  HEART: Regular rate and rhythm  ABDOMEN: Soft, obese, Nondistended, Bowel sounds +, + pervena,  GENITOURINARY: voiding  EXTREMITIES:   No clubbing, cyanosis, or edema  MUSCULOSKELETAL-no joint tenderness  SKIN-no rash    LABS:                        10.8   9.13  )-----------( 321      ( 04 Sep 2021 06:34 )             34.0     09-04    136  |  105  |  9   ----------------------------<  97  3.8   |  26  |  0.50    Ca    8.8      04 Sep 2021 06:34  Phos  3.2     09-04  Mg     1.9     09-04    MEDICATIONS  (STANDING):  alvimopan 12 milliGRAM(s) Oral two times a day  amLODIPine   Tablet 5 milliGRAM(s) Oral daily  azithromycin   Tablet 250 milliGRAM(s) Oral daily  budesonide 160 MICROgram(s)/formoterol 4.5 MICROgram(s) Inhaler 2 Puff(s) Inhalation two times a day  fondaparinux Injectable 2.5 milliGRAM(s) SubCutaneous daily  ondansetron Injectable 4 milliGRAM(s) IV Push every 8 hours  tiotropium 18 MICROgram(s) Capsule 1 Capsule(s) Inhalation daily    MEDICATIONS  (PRN):  ketorolac   Injectable 15 milliGRAM(s) IV Push every 6 hours PRN Moderate Pain (4 - 6)  morphine  - Injectable 4 milliGRAM(s) IV Push every 4 hours PRN Severe Pain (7 - 10)          ASSESSMENT AND PLAN  62 yo f with above pmh  a/w:    #colostomy reversal  -bowel function has returned.   -pain control   -ambualte  -incentive spirometry    #Acute bronchitis:  -start inhalers/zpack.  -cxr negative  -if symptoms worsen or becomes hypoxic may need steroids.   -f/u with pcp outpt     # left shoulder/hip pain  -xrays negative for acute disease  -outpt follow up    #HTN  cont amlodipine    #VTE prophylaxis  hep sq  Venodynes  Amb     PCP:  Dr. Tyrese Salazar    CHIEF COMPLAINT: colostomy reversal    HPI: 62 yo female with pmh of anemia, COPD, HTN, morbid obesity who was admitted in November 2020 with sepsis due to necrotizing fasciitis requiring extensive debridement and diverting colostomy to aide in wound healing. She is now admitted from [facility] for reversal of colostomy. Hospitalist service consulted for medical comanagement.   Pt seen and examined this am. Feeling better with inhalers and zpack. Feels breathing/wheeze is improving. no sob/chest pain at rest. no n/v. tolerating po. no f/c/r.     REVIEW OF SYSTEMS:   all 10 systems reviewed and is as above otherwise negative.     Vital Signs Last 24 Hrs  T(C): 37.3 (04 Sep 2021 09:41), Max: 37.3 (04 Sep 2021 00:14)  T(F): 99.1 (04 Sep 2021 09:41), Max: 99.1 (04 Sep 2021 00:14)  HR: 89 (04 Sep 2021 09:41) (89 - 92)  BP: 167/90 (04 Sep 2021 09:41) (148/62 - 167/90)  BP(mean): 101 (04 Sep 2021 09:41) (101 - 101)  RR: 16 (04 Sep 2021 09:41) (16 - 18)  SpO2: 98% (04 Sep 2021 09:41) (97% - 100%)      PHYSICAL EXAM:    GENERAL: Comfortable, no acute distress   HEAD:  Normocephalic, atraumatic  EYES: EOMI, PERRLA  HEENT: Moist mucous membranes  NECK: Supple, No JVD  NERVOUS SYSTEM: awake, alert, oriented x 3, motor strength for BUE/BLE 5/5 power  CHEST/LUNG: bilateral wheeze  HEART: Regular rate and rhythm  ABDOMEN: Soft, obese, Nondistended, Bowel sounds +, + pervena,  GENITOURINARY: voiding  EXTREMITIES:   No clubbing, cyanosis, or edema  MUSCULOSKELETAL-no joint tenderness  SKIN-no rash    LABS:                        10.8   9.13  )-----------( 321      ( 04 Sep 2021 06:34 )             34.0     09-04    136  |  105  |  9   ----------------------------<  97  3.8   |  26  |  0.50    Ca    8.8      04 Sep 2021 06:34  Phos  3.2     09-04  Mg     1.9     09-04    MEDICATIONS  (STANDING):  alvimopan 12 milliGRAM(s) Oral two times a day  amLODIPine   Tablet 5 milliGRAM(s) Oral daily  azithromycin   Tablet 250 milliGRAM(s) Oral daily  budesonide 160 MICROgram(s)/formoterol 4.5 MICROgram(s) Inhaler 2 Puff(s) Inhalation two times a day  fondaparinux Injectable 2.5 milliGRAM(s) SubCutaneous daily  ondansetron Injectable 4 milliGRAM(s) IV Push every 8 hours  tiotropium 18 MICROgram(s) Capsule 1 Capsule(s) Inhalation daily    MEDICATIONS  (PRN):  ketorolac   Injectable 15 milliGRAM(s) IV Push every 6 hours PRN Moderate Pain (4 - 6)  morphine  - Injectable 4 milliGRAM(s) IV Push every 4 hours PRN Severe Pain (7 - 10)          ASSESSMENT AND PLAN  62 yo f with above pmh  a/w:    #colostomy reversal  -bowel function has returned.   -pain control   -ambualte  -incentive spirometry    #Acute bronchitis:  -start inhalers/zpack.  -cxr negative  -if symptoms worsen or becomes hypoxic may need steroids.   -f/u with pcp outpt     # left shoulder/hip pain  -xrays negative for acute disease  -outpt follow up    #HTN  -increase amlodipine to 10mg for better control    #VTE prophylaxis  hep sq  Venodynes  Amb

## 2021-09-04 NOTE — PROGRESS NOTE ADULT - SUBJECTIVE AND OBJECTIVE BOX
SURGERY DAILY PROGRESS NOTE:     Subjective:  Patient seen and examined at bedside during am rounds. AVSS. Tolerating LRD. Admits to passing flatus and having BMs overnight. Doing better on respiratory regimen. Denies any fevers, chills, n/v/d, chest pain or shortness of breath.    Objective:    MEDICATIONS  (STANDING):  acetaminophen  IVPB .. 1000 milliGRAM(s) IV Intermittent every 6 hours  alvimopan 12 milliGRAM(s) Oral two times a day  amLODIPine   Tablet 5 milliGRAM(s) Oral daily  cefoTEtan  IVPB 2 Gram(s) IV Intermittent every 12 hours  dextrose 5% + sodium chloride 0.45% 1000 milliLiter(s) (75 mL/Hr) IV Continuous <Continuous>  fondaparinux Injectable 2.5 milliGRAM(s) SubCutaneous daily  ondansetron Injectable 4 milliGRAM(s) IV Push every 8 hours    MEDICATIONS  (PRN):  ALBUTerol    90 MICROgram(s) HFA Inhaler 2 Puff(s) Inhalation every 6 hours PRN Shortness of Breath and/or Wheezing  ketorolac   Injectable 15 milliGRAM(s) IV Push every 6 hours PRN Moderate Pain (4 - 6)  morphine  - Injectable 4 milliGRAM(s) IV Push every 4 hours PRN Severe Pain (7 - 10)    Vital Signs Last 24 Hrs  T(C): 37.3 (04 Sep 2021 00:14), Max: 37.3 (04 Sep 2021 00:14)  T(F): 99.1 (04 Sep 2021 00:14), Max: 99.1 (04 Sep 2021 00:14)  HR: 89 (04 Sep 2021 00:14) (89 - 98)  BP: 159/78 (04 Sep 2021 00:14) (124/97 - 159/78)  BP(mean): --  RR: 18 (03 Sep 2021 20:11) (16 - 18)  SpO2: 100% (04 Sep 2021 00:14) (97% - 100%)    PHYSICAL EXAM   Gen: well-appearing, in no acute distress  CV: pulse regularly present   Resp: airway patent, non-labored breathing  Abd: soft, NTND; no rebound or guarding. Incision c/d/i, abdominal binder in place      I&O's Detail              Daily     Daily     LABS:                                              10.8   9.13  )-----------( 321      ( 04 Sep 2021 06:34 )             34.0   09-04    136  |  105  |  9   ----------------------------<  97  3.8   |  26  |  0.50    Ca    8.8      04 Sep 2021 06:34  Phos  3.2     09-04  Mg     1.9     09-04

## 2021-09-04 NOTE — DISCHARGE NOTE NURSING/CASE MANAGEMENT/SOCIAL WORK - PATIENT PORTAL LINK FT
You can access the FollowMyHealth Patient Portal offered by [facility] by registering at the following website: [email or web address]. By joining [employer]’s FollowMyHealth portal, you will also be able to view your health information using other applications (apps) compatible with our system.

## 2021-09-05 LAB
ANION GAP SERPL CALC-SCNC: 7 MMOL/L — SIGNIFICANT CHANGE UP (ref 5–17)
BUN SERPL-MCNC: 10 MG/DL — SIGNIFICANT CHANGE UP (ref 7–23)
CALCIUM SERPL-MCNC: 8.9 MG/DL — SIGNIFICANT CHANGE UP (ref 8.5–10.1)
CHLORIDE SERPL-SCNC: 104 MMOL/L — SIGNIFICANT CHANGE UP (ref 96–108)
CO2 SERPL-SCNC: 26 MMOL/L — SIGNIFICANT CHANGE UP (ref 22–31)
CREAT SERPL-MCNC: 0.62 MG/DL — SIGNIFICANT CHANGE UP (ref 0.5–1.3)
GLUCOSE SERPL-MCNC: 101 MG/DL — HIGH (ref 70–99)
HCT VFR BLD CALC: 38.5 % — SIGNIFICANT CHANGE UP (ref 34.5–45)
HGB BLD-MCNC: 12 G/DL — SIGNIFICANT CHANGE UP (ref 11.5–15.5)
MAGNESIUM SERPL-MCNC: 2.1 MG/DL — SIGNIFICANT CHANGE UP (ref 1.6–2.6)
MCHC RBC-ENTMCNC: 23.1 PG — LOW (ref 27–34)
MCHC RBC-ENTMCNC: 31.2 GM/DL — LOW (ref 32–36)
MCV RBC AUTO: 74 FL — LOW (ref 80–100)
PHOSPHATE SERPL-MCNC: 3.7 MG/DL — SIGNIFICANT CHANGE UP (ref 2.5–4.5)
PLATELET # BLD AUTO: 420 K/UL — HIGH (ref 150–400)
POTASSIUM SERPL-MCNC: 3.8 MMOL/L — SIGNIFICANT CHANGE UP (ref 3.5–5.3)
POTASSIUM SERPL-SCNC: 3.8 MMOL/L — SIGNIFICANT CHANGE UP (ref 3.5–5.3)
RBC # BLD: 5.2 M/UL — SIGNIFICANT CHANGE UP (ref 3.8–5.2)
RBC # FLD: 16.1 % — HIGH (ref 10.3–14.5)
SODIUM SERPL-SCNC: 137 MMOL/L — SIGNIFICANT CHANGE UP (ref 135–145)
WBC # BLD: 9.89 K/UL — SIGNIFICANT CHANGE UP (ref 3.8–10.5)
WBC # FLD AUTO: 9.89 K/UL — SIGNIFICANT CHANGE UP (ref 3.8–10.5)

## 2021-09-05 PROCEDURE — 99232 SBSQ HOSP IP/OBS MODERATE 35: CPT

## 2021-09-05 RX ORDER — ALBUTEROL 90 UG/1
2 AEROSOL, METERED ORAL EVERY 6 HOURS
Refills: 0 | Status: DISCONTINUED | OUTPATIENT
Start: 2021-09-05 | End: 2021-09-06

## 2021-09-05 RX ADMIN — BUDESONIDE AND FORMOTEROL FUMARATE DIHYDRATE 2 PUFF(S): 160; 4.5 AEROSOL RESPIRATORY (INHALATION) at 08:58

## 2021-09-05 RX ADMIN — TIOTROPIUM BROMIDE 1 CAPSULE(S): 18 CAPSULE ORAL; RESPIRATORY (INHALATION) at 08:58

## 2021-09-05 RX ADMIN — Medication 15 MILLIGRAM(S): at 23:08

## 2021-09-05 RX ADMIN — FONDAPARINUX SODIUM 2.5 MILLIGRAM(S): 2.5 INJECTION, SOLUTION SUBCUTANEOUS at 11:58

## 2021-09-05 RX ADMIN — BUDESONIDE AND FORMOTEROL FUMARATE DIHYDRATE 2 PUFF(S): 160; 4.5 AEROSOL RESPIRATORY (INHALATION) at 20:11

## 2021-09-05 RX ADMIN — AZITHROMYCIN 250 MILLIGRAM(S): 500 TABLET, FILM COATED ORAL at 11:08

## 2021-09-05 RX ADMIN — AMLODIPINE BESYLATE 10 MILLIGRAM(S): 2.5 TABLET ORAL at 11:09

## 2021-09-05 RX ADMIN — Medication 15 MILLIGRAM(S): at 23:23

## 2021-09-05 RX ADMIN — Medication 10 MILLILITER(S): at 16:44

## 2021-09-05 RX ADMIN — Medication 10 MILLILITER(S): at 23:08

## 2021-09-05 NOTE — PROGRESS NOTE ADULT - SUBJECTIVE AND OBJECTIVE BOX
Patient seen and examined at bedside during am rounds. AVSS. Tolerating LRD. Admits to passing flatus and having BMs overnight. Doing better on respiratory regimen. Denies any fevers, chills, n/v/d, chest pain or shortness of breath.    ICU Vital Signs Last 24 Hrs  T(C): 36.8 (05 Sep 2021 09:23), Max: 37.2 (04 Sep 2021 18:30)  T(F): 98.3 (05 Sep 2021 09:23), Max: 98.9 (04 Sep 2021 18:30)  HR: 78 (05 Sep 2021 09:23) (78 - 95)  BP: 162/94 (05 Sep 2021 09:23) (135/75 - 162/94)  BP(mean): 113 (05 Sep 2021 09:23) (113 - 113)  ABP: --  ABP(mean): --  RR: 16 (05 Sep 2021 09:23) (16 - 18)  SpO2: 97% (05 Sep 2021 09:23) (97% - 100%)      PHYSICAL EXAM   Gen: well-appearing, in no acute distress  CV: pulse regularly present   Resp: airway patent, non-labored breathing  Abd: soft, NTND; no rebound or guarding. Incision c/d/i, abdominal binder in place                          12.0   9.89  )-----------( 420      ( 05 Sep 2021 08:23 )             38.5   09-05    137  |  104  |  10  ----------------------------<  101<H>  3.8   |  26  |  0.62    Ca    8.9      05 Sep 2021 08:23  Phos  3.7     09-05  Mg     2.1     09-05

## 2021-09-05 NOTE — PROGRESS NOTE ADULT - SUBJECTIVE AND OBJECTIVE BOX
PCP:  Dr. Tyrese Salazar    CHIEF COMPLAINT: colostomy reversal    HPI: 60 yo female with pmh of anemia, COPD, HTN, morbid obesity who was admitted in November 2020 with sepsis due to necrotizing fasciitis requiring extensive debridement and diverting colostomy to aide in wound healing. She is now admitted from [facility] for reversal of colostomy. Hospitalist service consulted for medical comanagement.     9/5 seen and examined this am. laying in bed, no further wheezing with inhalers and zpack. Feels breathing/wheeze is improving. still has productive cough, no sob/chest pain at rest. no n/v. tolerating po. no f/c/r.     REVIEW OF SYSTEMS:   all 10 systems reviewed and is as above otherwise negative.       Vital Signs Last 24 Hrs  T(C): 36.8 (09-05-21 @ 09:23), Max: 37.2 (09-04-21 @ 18:30)  T(F): 98.3 (09-05-21 @ 09:23), Max: 98.9 (09-04-21 @ 18:30)  HR: 78 (09-05-21 @ 09:23) (78 - 95)  BP: 162/94 (09-05-21 @ 09:23) (135/75 - 162/94)  BP(mean): 113 (09-05-21 @ 09:23) (113 - 113)  RR: 16 (09-05-21 @ 09:23) (16 - 18)  SpO2: 97% (09-05-21 @ 09:23) (96% - 100%)    PHYSICAL EXAM:    GENERAL: Comfortable, no acute distress   HEAD:  Normocephalic, atraumatic  EYES: EOMI, PERRLA  HEENT: Moist mucous membranes  NECK: Supple, No JVD  NERVOUS SYSTEM: awake, alert, oriented x 3, motor strength for BUE/BLE 5/5 power  CHEST/LUNG:  B/L CTA no wheeze, rhonchi or rales noted  HEART: Regular rate and rhythm  ABDOMEN: Soft, obese, Nondistended, Bowel sounds +, regular dsg c/d/i  GENITOURINARY: voiding  EXTREMITIES:   No clubbing, cyanosis, or edema  MUSCULOSKELETAL-no joint tenderness  SKIN-no rash    LABS:                                         12.0   9.89  )-----------( 420      ( 05 Sep 2021 08:23 )             38.5       09-05    137  |  104  |  10  ----------------------------<  101<H>  3.8   |  26  |  0.62    Ca    8.9      05 Sep 2021 08:23  Phos  3.7     09-05  Mg     2.1     09-05      MEDICATIONS  (STANDING):  alvimopan 12 milliGRAM(s) Oral two times a day  amLODIPine   Tablet 10 milliGRAM(s) Oral daily  azithromycin   Tablet 250 milliGRAM(s) Oral daily  budesonide 160 MICROgram(s)/formoterol 4.5 MICROgram(s) Inhaler 2 Puff(s) Inhalation two times a day  fondaparinux Injectable 2.5 milliGRAM(s) SubCutaneous daily  ondansetron Injectable 4 milliGRAM(s) IV Push every 8 hours  tiotropium 18 MICROgram(s) Capsule 1 Capsule(s) Inhalation daily    MEDICATIONS  (PRN):  ALBUTerol    90 MICROgram(s) HFA Inhaler 2 Puff(s) Inhalation every 6 hours PRN Shortness of Breath and/or Wheezing  guaifenesin/dextromethorphan Oral Liquid 10 milliLiter(s) Oral every 6 hours PRN Cough  ketorolac   Injectable 15 milliGRAM(s) IV Push every 6 hours PRN Moderate Pain (4 - 6)  morphine  - Injectable 4 milliGRAM(s) IV Push every 4 hours PRN Severe Pain (7 - 10)        ASSESSMENT AND PLAN  60 yo f with above pmh  a/w:    #colostomy reversal  -bowel function has returned.   -pain control   -enc ambulation  -incentive spirometry    #Acute bronchitis:  -cont inhalers/zpack.  -cxr negative  -if symptoms worsen or becomes hypoxic may need steroids.   -f/u with pcp outpt     # left shoulder/hip pain  -xrays negative for acute disease: Old left clavicle fx  -outpt follow up    #HTN  cont  amlodipine to 10mg for better control    #VTE prophylaxis  fondaparinox sq  Venodynes  Amb

## 2021-09-06 VITALS
TEMPERATURE: 99 F | DIASTOLIC BLOOD PRESSURE: 86 MMHG | SYSTOLIC BLOOD PRESSURE: 152 MMHG | RESPIRATION RATE: 18 BRPM | OXYGEN SATURATION: 95 % | HEART RATE: 85 BPM

## 2021-09-06 PROCEDURE — 99232 SBSQ HOSP IP/OBS MODERATE 35: CPT

## 2021-09-06 RX ORDER — ALBUTEROL 90 UG/1
2 AEROSOL, METERED ORAL
Qty: 0 | Refills: 0 | DISCHARGE
Start: 2021-09-06

## 2021-09-06 RX ORDER — AMLODIPINE BESYLATE 2.5 MG/1
1 TABLET ORAL
Qty: 0 | Refills: 0 | DISCHARGE
Start: 2021-09-06

## 2021-09-06 RX ORDER — GUAIFENESIN/DEXTROMETHORPHAN 600MG-30MG
10 TABLET, EXTENDED RELEASE 12 HR ORAL
Qty: 0 | Refills: 0 | DISCHARGE
Start: 2021-09-06

## 2021-09-06 RX ADMIN — AZITHROMYCIN 250 MILLIGRAM(S): 500 TABLET, FILM COATED ORAL at 11:06

## 2021-09-06 RX ADMIN — BUDESONIDE AND FORMOTEROL FUMARATE DIHYDRATE 2 PUFF(S): 160; 4.5 AEROSOL RESPIRATORY (INHALATION) at 10:02

## 2021-09-06 RX ADMIN — AMLODIPINE BESYLATE 10 MILLIGRAM(S): 2.5 TABLET ORAL at 11:06

## 2021-09-06 RX ADMIN — FONDAPARINUX SODIUM 2.5 MILLIGRAM(S): 2.5 INJECTION, SOLUTION SUBCUTANEOUS at 13:16

## 2021-09-06 RX ADMIN — ALBUTEROL 2 PUFF(S): 90 AEROSOL, METERED ORAL at 10:02

## 2021-09-06 RX ADMIN — TIOTROPIUM BROMIDE 1 CAPSULE(S): 18 CAPSULE ORAL; RESPIRATORY (INHALATION) at 10:02

## 2021-09-06 NOTE — PROGRESS NOTE ADULT - ASSESSMENT
61 year-old female is POD#3 s/p Closure of Lisbeth colostomy and Repair of incisional hernia with mesh.    Plan:  - c/w LRD  - monitor MARISOL drain output  - serial abdominal exams  - pain and nausea control  - f/u CXR read  - physical therapy, encourage ambulation and IS use    Plan discussed with Dr. Carroll
61 year-old female is POD#6 s/p Closure of Lisbeth colostomy and Repair of parastomal hernia with mesh.    Plan:  - c/w LRD  - serial abdominal exams  - pain and nausea control  - hospitalist on board, recs appreciated  - physical therapy, encourage ambulation and IS use  - f/u with SW regarding dispo    Plan discussed with Dr. Carroll
61 year-old female is POD#5 s/p Closure of Lisbeth colostomy and Repair of parastomal hernia with mesh.    Plan:  - c/w LRD  - serial abdominal exams  - pain and nausea control  - hospitalist on board, recs appreciated  - physical therapy, encourage ambulation and IS use  - f/u with SW regarding dispo    Plan discussed with Dr. Carroll
61 year-old female is POD#4 s/p Closure of Lisbeth colostomy and Repair of parastomal hernia with mesh.    Plan:  - c/w LRD  - serial abdominal exams  - pain and nausea control  - hospitalist on board, recs appreciated  - physical therapy, encourage ambulation and IS use  - f/u with SW regarding dispo    Plan discussed with Dr. Carroll

## 2021-09-06 NOTE — PROGRESS NOTE ADULT - PROVIDER SPECIALTY LIST ADULT
Colorectal Surgery
Colorectal Surgery
Hospitalist
Colorectal Surgery
Hospitalist
Colorectal Surgery
Hospitalist
Hospitalist

## 2021-09-06 NOTE — PROGRESS NOTE ADULT - SUBJECTIVE AND OBJECTIVE BOX
Patient seen and examined at bedside during am rounds. AVSS. Tolerating LRD. Admits to passing flatus and having BMs overnight. Denies any fevers, chills, n/v/d, chest pain or shortness of breath.      Vital Signs Last 24 Hrs  T(C): 37.6 (06 Sep 2021 00:00), Max: 37.6 (06 Sep 2021 00:00)  T(F): 99.6 (06 Sep 2021 00:00), Max: 99.6 (06 Sep 2021 00:00)  HR: 61 (06 Sep 2021 00:00) (61 - 98)  BP: 143/67 (06 Sep 2021 00:00) (143/67 - 162/94)  BP(mean): 113 (05 Sep 2021 09:23) (113 - 113)  RR: 16 (06 Sep 2021 00:00) (16 - 16)  SpO2: 99% (06 Sep 2021 00:00) (96% - 99%)    Labs:                        12.0   9.89  )-----------( 420      ( 05 Sep 2021 08:23 )             38.5     09-05    137  |  104  |  10  ----------------------------<  101<H>  3.8   |  26  |  0.62    Ca    8.9      05 Sep 2021 08:23  Phos  3.7     09-05  Mg     2.1     09-05        PHYSICAL EXAM   Gen: well-appearing, in no acute distress  CV: pulse regularly present   Resp: airway patent, non-labored breathing  Abd: soft, NTND; no rebound or guarding. Incision c/d/i, abdominal binder in place

## 2021-09-06 NOTE — PROGRESS NOTE ADULT - ATTENDING COMMENTS
Pain under control.  has not been OOB.  no nausea.  No Bm/flatus yet.  Tolerated clears    Vital Signs Last 24 Hrs  T(C): 37.1 (01 Sep 2021 09:38), Max: 37.1 (01 Sep 2021 05:09)  T(F): 98.7 (01 Sep 2021 09:38), Max: 98.8 (01 Sep 2021 05:09)  HR: 87 (01 Sep 2021 09:38) (70 - 98)  BP: 124/63 (01 Sep 2021 09:38) (109/64 - 144/75)  BP(mean): 78 (01 Sep 2021 09:38) (78 - 78)  RR: 16 (01 Sep 2021 09:38) (12 - 19)  SpO2: 96% (01 Sep 2021 09:38) (96% - 100%)    Uo 705/12h  Drain 10mL serosang    Abd ND/soft/mild to moderate incisional tenderness L side  proveena intact, with no strikethrough.                          12.9   12.16 )-----------( 335      ( 01 Sep 2021 07:25 )             41.2   09-01    137  |  107  |  11  ----------------------------<  120<H>  3.5   |  24  |  0.68    Ca    8.2<L>      01 Sep 2021 07:25  Phos  2.5     09-01  Mg     1.8     09-01    TPro  7.5  /  Alb  3.5  /  TBili  0.4  /  DBili  x   /  AST  20  /  ALT  25  /  AlkPhos  68  08-31    imp: POD1 s/p Lisbeth reversal and parastomal hernia repair with biologic mesh  --PT/OOB today  --Incentive spirometry  --awaiting bowel function  --on arixtra for DVT prophylaxis  --full liquid diet  --d/c trujillo  --abdominal binder
Ambulated.  Pain under good control.  some belching but no nausea.  No bm/flatus    Abd ND/soft/mild incisional tenderness L side, no rebound or guarding  proveena intact    labs reviewed    POD2 s/p Lisbeth reversal  --voiding.  Reminded patient regarding checking I/O's and urine output  --ok for low fiber diet.  Awaiting bowel function  --Encourage activity.  --plan discharge back to [facility] when ready  --follow WBC.  Off abx
Patient is seen and examined at bedside with NP Stacy Cherry  Pt is up in a chair, Metcalf removed for TOV  Wearing abd bandage for support  Pain is OK controlled  NO flatus, no BM yet- awaiting return of bowel function postop  Diet per CRS team  Agree with above assessment and plan. D/w pt
Reports good pain control.  With stools and flatus.      Vital Signs Last 24 Hrs  T(C): 37 (06 Sep 2021 09:05), Max: 37.6 (06 Sep 2021 00:00)  T(F): 98.6 (06 Sep 2021 09:05), Max: 99.6 (06 Sep 2021 00:00)  HR: 85 (06 Sep 2021 09:05) (61 - 98)  BP: 152/86 (06 Sep 2021 09:05) (143/67 - 162/94)  BP(mean): 99 (06 Sep 2021 09:05) (99 - 113)  RR: 18 (06 Sep 2021 09:05) (16 - 18)  SpO2: 95% (06 Sep 2021 09:05) (95% - 99%)    Abd ND/soft/NT  incisions c/d/i without erythema    imp: s/p Lisbeth reversal, parastomal hernia repair  --doing well  --awaiting [facility] bed.  --with bowel function. no acute issues presently  --encourage IS/ambulation
Stacey is seen and examined at bedside with NP Stacy Cherry  Seen ambulating in the hallway  C/o LT shoulder and LT hip pain upon ROM- xrays ordered  +flatus, no BM yet- awaiting return of bowel function postop  Agree with above assessment and plan. D/w pt
With loose BM today.  Had some dyspena this morning.  CXR was normal, O2 sat normal on RA.  Pain under control.  ambulating, voiding    Vital Signs Last 24 Hrs  T(C): 36.8 (03 Sep 2021 09:16), Max: 37.4 (03 Sep 2021 00:05)  T(F): 98.2 (03 Sep 2021 09:16), Max: 99.3 (03 Sep 2021 00:05)  HR: 98 (03 Sep 2021 09:16) (91 - 98)  BP: 124/97 (03 Sep 2021 09:16) (124/97 - 139/69)  BP(mean): --  RR: 16 (03 Sep 2021 09:16) (16 - 16)  SpO2: 99% (03 Sep 2021 09:16) (95% - 99%)    NAD  IS to 500mL   Abd ND/soft nT  proveena intact, no strikethru.  MARISOL serous                          11.5   10.32 )-----------( 302      ( 03 Sep 2021 09:33 )             36.8  09-03    135  |  105  |  10  ----------------------------<  143<H>  3.8   |  24  |  0.60    Ca    8.5      03 Sep 2021 09:33  Phos  2.1     09-03  Mg     1.9     09-03      imp: POD3 s/p Lisbeth reversal and parastomal hernia repair  --with bowel function.  WBC improving.    --dyspnea secondary to poor inspiratory effort.  Recommend IS  --ok for d/c.  Awaiting placement  --d/c MARISOL prior to d/c to [facility]  --will go out with proveena x 1 week.  Can have [facility] starff remove or have patient come to office for us to remove.  --Ambulate, PT  --encourage ensure.
Less dyspneic today.  Still having BM and flatus.  Pain under control.  Ambulated    Vital Signs Last 24 Hrs  T(C): 37.3 (04 Sep 2021 09:41), Max: 37.3 (04 Sep 2021 00:14)  T(F): 99.1 (04 Sep 2021 09:41), Max: 99.1 (04 Sep 2021 00:14)  HR: 89 (04 Sep 2021 09:41) (89 - 92)  BP: 167/90 (04 Sep 2021 09:41) (148/62 - 167/90)  BP(mean): 101 (04 Sep 2021 09:41) (101 - 101)  RR: 16 (04 Sep 2021 09:41) (16 - 18)  SpO2: 98% (04 Sep 2021 09:41) (97% - 100%) on RA    IS to 500mL  Abd ND/soft/mild incisional tenderness  proveena removed.  incisions c/d/i without erythema.  Penrose removed.  Proveena canister with minimal output                          10.8   9.13  )-----------( 321      ( 04 Sep 2021 06:34 )             34.0   09-04    136  |  105  |  9   ----------------------------<  97  3.8   |  26  |  0.50    Ca    8.8      04 Sep 2021 06:34  Phos  3.2     09-04  Mg     1.9     09-04    imp: s/p Lisbeth reversal, parastomal hernia repair  --improving.  --awaiting bed placement back at [facility]  --with bowel function  --appreciate medicine input.

## 2021-09-06 NOTE — PROGRESS NOTE ADULT - SUBJECTIVE AND OBJECTIVE BOX
PCP:  Dr. Tyrese Salazar    CHIEF COMPLAINT: colostomy reversal    HPI: 62 yo female with pmh of anemia, COPD, HTN, morbid obesity who was admitted in November 2020 with sepsis due to necrotizing fasciitis requiring extensive debridement and diverting colostomy to aide in wound healing. She is now admitted from [facility] for reversal of colostomy. Hospitalist service consulted for medical comanagement.     9/6 seen and examined this am. laying in bed, feels better.  still has productive cough, no sob/chest pain at rest. no n/v. tolerating po. no f/c/r.     REVIEW OF SYSTEMS:   all 10 systems reviewed and is as above otherwise negative.     Vital Signs Last 24 Hrs  T(C): 37 (09-06-21 @ 09:05), Max: 37.6 (09-06-21 @ 00:00)  T(F): 98.6 (09-06-21 @ 09:05), Max: 99.6 (09-06-21 @ 00:00)  HR: 85 (09-06-21 @ 09:05) (61 - 98)  BP: 152/86 (09-06-21 @ 09:05) (143/67 - 152/86)  BP(mean): 99 (09-06-21 @ 09:05) (99 - 99)  RR: 18 (09-06-21 @ 09:05) (16 - 18)  SpO2: 95% (09-06-21 @ 09:05) (95% - 99%)      PHYSICAL EXAM:    GENERAL: Comfortable, no acute distress   HEAD:  Normocephalic, atraumatic  EYES: EOMI, PERRLA  HEENT: Moist mucous membranes  NECK: Supple, No JVD  NERVOUS SYSTEM: awake, alert, oriented x 3, motor strength for BUE/BLE 5/5 power  CHEST/LUNG:  B/L CTA no wheeze, rhonchi or rales noted, + productive cough tan sputum  HEART: Regular rate and rhythm  ABDOMEN: Soft, obese, Nondistended, Bowel sounds +, regular dsg c/d/i  GENITOURINARY: voiding  EXTREMITIES:   No clubbing, cyanosis, or edema  MUSCULOSKELETAL-no joint tenderness  SKIN-no rash    LABS:                                                           12.0   9.89  )-----------( 420      ( 05 Sep 2021 08:23 )             38.5       09-05    137  |  104  |  10  ----------------------------<  101<H>  3.8   |  26  |  0.62    Ca    8.9      05 Sep 2021 08:23  Phos  3.7     09-05  Mg     2.1     09-05      MEDICATIONS  (STANDING):  alvimopan 12 milliGRAM(s) Oral two times a day  amLODIPine   Tablet 10 milliGRAM(s) Oral daily  azithromycin   Tablet 250 milliGRAM(s) Oral daily  budesonide 160 MICROgram(s)/formoterol 4.5 MICROgram(s) Inhaler 2 Puff(s) Inhalation two times a day  fondaparinux Injectable 2.5 milliGRAM(s) SubCutaneous daily  ondansetron Injectable 4 milliGRAM(s) IV Push every 8 hours  tiotropium 18 MICROgram(s) Capsule 1 Capsule(s) Inhalation daily    MEDICATIONS  (PRN):  ALBUTerol    90 MICROgram(s) HFA Inhaler 2 Puff(s) Inhalation every 6 hours PRN Shortness of Breath and/or Wheezing  guaifenesin/dextromethorphan Oral Liquid 10 milliLiter(s) Oral every 6 hours PRN Cough  ketorolac   Injectable 15 milliGRAM(s) IV Push every 6 hours PRN Moderate Pain (4 - 6)  morphine  - Injectable 4 milliGRAM(s) IV Push every 4 hours PRN Severe Pain (7 - 10)        ASSESSMENT AND PLAN  62 yo f with above pmh  a/w:    #colostomy reversal  -bowel function has returned.   -pain control   -enc ambulation  -incentive spirometry    #Acute bronchitis:  -cont inhalers   cont zpack last dose on 9/7  -cxr negative  -f/u with pcp outpt     # left shoulder/hip pain  -xrays negative for acute disease: Old left clavicle fx  -outpt follow up    #HTN  B/p improving on 10 mg of amlodipine    #VTE prophylaxis  fondaparinox sq: d/c on discharge  Venodynes  Amb    dispo: back to [facility] today

## 2021-09-07 PROBLEM — G47.00 INSOMNIA, UNSPECIFIED: Chronic | Status: ACTIVE | Noted: 2021-08-30

## 2021-09-07 PROBLEM — Z87.2 PERSONAL HISTORY OF DISEASES OF THE SKIN AND SUBCUTANEOUS TISSUE: Chronic | Status: ACTIVE | Noted: 2021-04-06

## 2021-09-07 PROBLEM — Z87.09 PERSONAL HISTORY OF OTHER DISEASES OF THE RESPIRATORY SYSTEM: Chronic | Status: ACTIVE | Noted: 2021-08-30

## 2021-09-07 PROBLEM — I10 ESSENTIAL (PRIMARY) HYPERTENSION: Chronic | Status: ACTIVE | Noted: 2021-08-30

## 2021-09-09 LAB — SURGICAL PATHOLOGY STUDY: SIGNIFICANT CHANGE UP

## 2021-09-14 DIAGNOSIS — J20.9 ACUTE BRONCHITIS, UNSPECIFIED: ICD-10-CM

## 2021-09-14 DIAGNOSIS — K43.5 PARASTOMAL HERNIA WITHOUT OBSTRUCTION OR GANGRENE: ICD-10-CM

## 2021-09-14 DIAGNOSIS — J44.9 CHRONIC OBSTRUCTIVE PULMONARY DISEASE, UNSPECIFIED: ICD-10-CM

## 2021-09-14 DIAGNOSIS — M25.552 PAIN IN LEFT HIP: ICD-10-CM

## 2021-09-14 DIAGNOSIS — D64.9 ANEMIA, UNSPECIFIED: ICD-10-CM

## 2021-09-14 DIAGNOSIS — I10 ESSENTIAL (PRIMARY) HYPERTENSION: ICD-10-CM

## 2021-09-14 DIAGNOSIS — M25.512 PAIN IN LEFT SHOULDER: ICD-10-CM

## 2021-09-14 DIAGNOSIS — E66.01 MORBID (SEVERE) OBESITY DUE TO EXCESS CALORIES: ICD-10-CM

## 2021-09-14 DIAGNOSIS — Z43.3 ENCOUNTER FOR ATTENTION TO COLOSTOMY: ICD-10-CM

## 2021-09-15 ENCOUNTER — APPOINTMENT (OUTPATIENT)
Dept: COLORECTAL SURGERY | Facility: CLINIC | Age: 61
End: 2021-09-15
Payer: MEDICAID

## 2021-09-15 VITALS
HEIGHT: 67 IN | WEIGHT: 242 LBS | DIASTOLIC BLOOD PRESSURE: 65 MMHG | SYSTOLIC BLOOD PRESSURE: 128 MMHG | BODY MASS INDEX: 37.98 KG/M2 | HEART RATE: 97 BPM

## 2021-09-15 PROCEDURE — 99024 POSTOP FOLLOW-UP VISIT: CPT

## 2021-09-17 RX ORDER — METRONIDAZOLE 500 MG/1
500 TABLET ORAL
Qty: 3 | Refills: 0 | Status: DISCONTINUED | COMMUNITY
Start: 2021-08-12 | End: 2021-09-17

## 2021-09-17 RX ORDER — NEOMYCIN SULFATE 500 MG/1
500 TABLET ORAL
Qty: 6 | Refills: 0 | Status: DISCONTINUED | COMMUNITY
Start: 2021-08-12 | End: 2021-09-17

## 2021-09-17 RX ORDER — POLYETHYLENE GLYCOL 3350 AND ELECTROLYTES WITH LEMON FLAVOR 236; 22.74; 6.74; 5.86; 2.97 G/4L; G/4L; G/4L; G/4L; G/4L
236 POWDER, FOR SOLUTION ORAL
Qty: 1 | Refills: 0 | Status: DISCONTINUED | COMMUNITY
Start: 2021-04-06 | End: 2021-09-17

## 2021-09-17 NOTE — ASSESSMENT
[FreeTextEntry1] : imp:  colostomy (s/p Hartmanns for treatment of necrotising perineal infection)\par --s/p reversal and parastomal hernia repair with biologic mesh. pathology is benign.\par --Doing well otherwise.  Abdominal incisions are healing well.  Staples removed today.  no signs of infection\par --continue binder and weight lifting restrictions < 20 lbs for another 4-6 weeks.  Given placement of biologic mesh, there can be recurrence of the abdominal hernia.\par --The skin around anus appears normal, there are no open wounds or signs of infection/inflmamation.\par --follow up in 4 weeks for routine postop check.\par --ok for low fiber diet for another 2-3 weeks.  if tolerating still, then can advance to regular diet.

## 2021-09-17 NOTE — HISTORY OF PRESENT ILLNESS
[FreeTextEntry1] : Here for followup after parastomal hernia repair and Lisbeth reversal.  Pathology shows no evidence of dysplasia. \par \par Denies pain.  Bm's are now at least once a day.  Stools are formed.  No constipation.  Still complains of perianal irritation.  no bleeding.

## 2021-09-17 NOTE — PHYSICAL EXAM
[de-identified] : ND soft.  Incisions c/d/i without erythema.  Staples removed and steristrips applied. [de-identified] : perianal skin appears normal, no erythema or induration.  Previous wounds appear well healed.

## 2021-10-14 ENCOUNTER — APPOINTMENT (OUTPATIENT)
Dept: COLORECTAL SURGERY | Facility: CLINIC | Age: 61
End: 2021-10-14
Payer: MEDICAID

## 2021-10-14 DIAGNOSIS — K94.09 OTHER COMPLICATIONS OF COLOSTOMY: ICD-10-CM

## 2021-10-14 DIAGNOSIS — N76.89 OTHER SPECIFIED INFLAMMATION OF VAGINA AND VULVA: ICD-10-CM

## 2021-10-14 DIAGNOSIS — Z43.3 ENCOUNTER FOR ATTENTION TO COLOSTOMY: ICD-10-CM

## 2021-10-14 PROCEDURE — 99024 POSTOP FOLLOW-UP VISIT: CPT

## 2021-10-14 NOTE — ASSESSMENT
[FreeTextEntry1] : imp:  colostomy (s/p Hartmanns for treatment of necrotising perineal infection)\par --s/p reversal and parastomal hernia repair with biologic mesh. pathology is benign.\par --Doing well otherwise.  Incisions are healed.  I do not appreciate hernia.  no signs of infection\par --continue binder and weight lifting restrictions < 20 lbs for another 2 weeks.  Given placement of biologic mesh, there can be recurrence of the abdominal hernia.\par --No dietary restrictions going forward.\par --given colonoscopy attempt back in the spring showed a significant amount of impacted old stool in the rectal stump, recommended repeat colonoscopy in 1 year.  I would wait until patient has recovered from surgery (after Jan 1 before scheduling).  Pt to contact office to schedule.\par --follow up as needed.

## 2021-10-14 NOTE — HISTORY OF PRESENT ILLNESS
[FreeTextEntry1] : Here for followup after Lisbeth reversal. \par \par Doing ok.  Still having issues with constipation.  On colace and lactulose (pt reports that this has been going on to keep her stools regular. With this regimen, she has a BM 1-2x daily.  can be somewhat loose.  no blood.  Denies distension, soft.  Continuing with low fiber diet.  \par \par Pt still at [facility] due to patient not having permanent housing at present.  Continuing with therapy.  Wearing a binder.

## 2021-10-14 NOTE — PHYSICAL EXAM
[de-identified] : obese/soft/ND/NT.  midline and colostomy site appears to be healed.  No obvious hernia.

## 2022-01-15 NOTE — PROGRESS NOTE ADULT - PROBLEM/PLAN-2
Patient desats to low 80s while on optiflow 60L/100% and oxymask 15L. RN at beside and initiates deep breathing. ICU resident made aware, orders to start BiPap received. RT called to bedside to initiate. RN and Dr. Palacios learn that patient has not had a goals of care conversation documented. Dr. Palacios begins conversation with patient and patient needs further education on what code status means and wants her daughter, the unactivated POA, to be involved. Dr. Palacios, RN, and RT all in room to discuss code status, ventilator, and intubation with patient and daughter. It is determined that the patient would like to be intubated if in a situation where she is in respiratory distress while on the BiPap. ICU attending comes to bedside to discuss goals of care with RN, RT, and Dr. Palacios. ICU attending determines patient is stable enough to wait until morning to make a decision on intubation  if the time comes and to stop the conversation with the patient and family for now. RN will continue to monitor.   DISPLAY PLAN FREE TEXT

## 2022-01-21 NOTE — ADVANCED PRACTICE NURSE CONSULT - ASSESSMENT
HPI: This is a 60 year old that was admitted to the hospital for necrotizing fasciitis on 11/2/2020. Patient with diverting colostomy on 11/5/2020. PMH-  hemorrhoids presents to the ED c/o diffuse +myalgias and +SOB x2 weeks. Notes associated +weakness and +decreased PO intake as well since her  passed 10/15.  Reports inability to perform ADLs 2/2 symptoms so called EMS today. Non-drinker. Never a smoker. Allergic to cefazolin and sulfa drugs. Denies hx of DM, denies fever, chills, chest pain, n/v. Endorses diarrhea x 2weeks (02 Nov 2020 18:26)  Consulted to educate patient on colostomy. Patient presents sitting in a chair at the bedside. Reviewed the creation and function of the ostomy. Reviewed diet, bathing and supplies. Demonstrated what the wafer and pouch do and how they work together.  Patient receptive. Stoma located on the LLQ and is red, moist, viable and nicely protruded. Wafer and pouch intact. No stool or flatus noted.  PAST MEDICAL & SURGICAL HISTORY:  Hemorrhoids  No significant past surgical history  REVIEW OF SYSTEMS  General: reports tenderness to buttock area from surgery  Skin/Breast:  Ophthalmologic:  ENMT:	  Respiratory and Thorax: Denies shortness of breath  Cardiovascular:	Denies chest pain  Gastrointestinal:	  Genitourinary:	  Musculoskeletal:	  Neurological:	  Psychiatric:	  Hematology/Lymphatics:	  Endocrine:	  Allergic/Immunologic:	  MEDICATIONS  (STANDING):  clindamycin IVPB 600 milliGRAM(s) IV Intermittent every 8 hours  Dakins Solution - Full Strength 1 Application(s) Topical three times a day  dextrose 5% + sodium chloride 0.9% with potassium chloride 20 mEq/L 1000 milliLiter(s) (100 mL/Hr) IV Continuous <Continuous>  fondaparinux Injectable 2.5 milliGRAM(s) SubCutaneous daily  piperacillin/tazobactam IVPB.. 3.375 Gram(s) IV Intermittent every 8 hours  Allergies  cefazolin (Other (Moderate))  heparin (Unknown)  sulfa drugs (Unknown)  Intolerances  SOCIAL HISTORY:  FAMILY HISTORY:  Vital Signs Last 24 Hrs  T(C): 36.6 (06 Nov 2020 09:05), Max: 36.7 (05 Nov 2020 16:45)  T(F): 97.8 (06 Nov 2020 09:05), Max: 98 (05 Nov 2020 16:45)  HR: 82 (06 Nov 2020 08:00) (75 - 90)  BP: 149/62 (06 Nov 2020 06:00) (113/59 - 153/76)  BP(mean): 82 (06 Nov 2020 06:00) (73 - 113)  RR: 16 (06 Nov 2020 08:00) (11 - 27)  SpO2: 98% (06 Nov 2020 06:00) (72% - 100%)  PHYSICAL EXAM:  Constitutional:   Eyes: conjunctiva and sclera clear  ENMT: Moist mucous membranes  Neck:  Breasts:  Back:  Respiratory: Respirations even and unlabored  Cardiovascular:   Gastrointestinal: Stoma to LLQ, red, moist and viable  Genitourinary:  Rectal:  Extremities:  Vascular:  Neurological: Alert and oriented x 4  Skin: Rashid to midline abdominal incision  Lymph Nodes:  Musculoskeletal:  Psychiatric:  LABS:                        8.4    21.66 )-----------( 682      ( 06 Nov 2020 06:26 )             28.1      21-Jan-2022 19:43

## 2022-02-16 NOTE — BEHAVIORAL HEALTH ASSESSMENT NOTE - NS ED [facility] MED ROS ENDOCRINE
---------------------  From: Bebe Wong   To: Ralph Ribeiro MD;     Sent: 10/4/2019 3:56:44 PM CDT  Subject: Scheduling Management     pt no-showed for appt on 10/4/2019   No complaints

## 2022-03-03 NOTE — PRE-OP CHECKLIST - BLOOD AVAILABLE
Impression: S/P Cataract Extraction by phacoemulsification with IOL placement; ORA OS - 1 Day. Presence of intraocular lens  Z96.1. Post operative instructions reviewed - Condition is improving - Opacified Capsule OD. Plan: --Advised patient to use artificial tears for comfort.
yes

## 2022-04-27 NOTE — ED ADULT NURSE NOTE - TEMPLATE
[FreeTextEntry1] : 59 year old postmenopausal female with stage IIA (T2) ER-/NJ-/HER2 positive right breast cancer. S/p 6 cycles of neoadjuvant TCHP followed by bilateral mastectomy + BUNNY on 3/14/18, ypT0 ypN0 (pathologic complete response). Completed 1 year of Herceptin on 11/26/18. \par She developed R mastectomy flap recurrence in 10/2020, s/p excision on 11/6/20 -> 3.8 cm poorly differentiated IDC, ER-/NJ-/HER2 positive. Completed breast RT 2/25/21.\par She received adjuvant Kadcyla 1/11/21, dose reduced to 3 mg/kg in 7/2021 for elevated LFTs. Last cycle on 12/7/21. Discussed that there is no clear role for adjuvant neratinib in this setting as benefit was primarily seen in ER+/HER2 positive patients, and that too if given within 1 year of herceptin. Role of neratinib for ER-/HER2 positive disease and post Kadcyla is unclear. \par \par 01/05/2022 CT CAP: Mildly displaced fractures of the right sixth and seventh lateral ribs, new since 8/31/2021.\par Nonspecific mild right pleural thickening and focal thickening of the right major fissure of uncertain etiology.\par Stable appearance of the abdomen and pelvis since 8/31/2021.\par 01/05/2022 [facility]: No radionuclide evidence of osseous metastases.\par \par 02/05/2022 CT Chest -Interval resolution of right pleural and fissural thickening compared to 1/5/2022.\par 1/24/2022 Echo- LV EF 63% \par \par CBC with mild leukopenia 3.3 and stable platelets at 107K\par \par Plan: \par -hair thinning- iron panel/B12/folate ordered, advised if continues to f/u with derm \par -labs pending\par -Follow up in 4 months  Respiratory

## 2022-06-01 NOTE — H&P PST ADULT - SOURCE OF INFORMATION, PROFILE
Vidya Hale)  Internal Medicine  [address] [facility], [place] [address]  Phone: [phone]  Fax: [phone]  Follow Up Time:    patient

## 2022-06-26 ENCOUNTER — EMERGENCY (EMERGENCY)
Facility: HOSPITAL | Age: 62
LOS: 0 days | Discharge: HOME | End: 2022-06-26
Attending: EMERGENCY MEDICINE
Payer: MEDICAID

## 2022-06-26 VITALS
RESPIRATION RATE: 17 BRPM | DIASTOLIC BLOOD PRESSURE: 96 MMHG | TEMPERATURE: 98 F | SYSTOLIC BLOOD PRESSURE: 140 MMHG | OXYGEN SATURATION: 99 % | HEART RATE: 72 BPM

## 2022-06-26 VITALS
DIASTOLIC BLOOD PRESSURE: 70 MMHG | TEMPERATURE: 98 F | OXYGEN SATURATION: 98 % | HEART RATE: 73 BPM | WEIGHT: 210.1 LBS | HEIGHT: 67 IN | SYSTOLIC BLOOD PRESSURE: 142 MMHG | RESPIRATION RATE: 18 BRPM

## 2022-06-26 DIAGNOSIS — H81.10 BENIGN PAROXYSMAL VERTIGO, UNSPECIFIED EAR: ICD-10-CM

## 2022-06-26 DIAGNOSIS — R42 DIZZINESS AND GIDDINESS: ICD-10-CM

## 2022-06-26 DIAGNOSIS — Z88.2 ALLERGY STATUS TO SULFONAMIDES: ICD-10-CM

## 2022-06-26 DIAGNOSIS — Z93.3 COLOSTOMY STATUS: ICD-10-CM

## 2022-06-26 DIAGNOSIS — I10 ESSENTIAL (PRIMARY) HYPERTENSION: ICD-10-CM

## 2022-06-26 DIAGNOSIS — Z98.890 OTHER SPECIFIED POSTPROCEDURAL STATES: Chronic | ICD-10-CM

## 2022-06-26 DIAGNOSIS — J44.9 CHRONIC OBSTRUCTIVE PULMONARY DISEASE, UNSPECIFIED: ICD-10-CM

## 2022-06-26 DIAGNOSIS — D64.9 ANEMIA, UNSPECIFIED: ICD-10-CM

## 2022-06-26 DIAGNOSIS — Z88.8 ALLERGY STATUS TO OTHER DRUGS, MEDICAMENTS AND BIOLOGICAL SUBSTANCES STATUS: ICD-10-CM

## 2022-06-26 DIAGNOSIS — Z93.3 COLOSTOMY STATUS: Chronic | ICD-10-CM

## 2022-06-26 PROCEDURE — 99283 EMERGENCY DEPT VISIT LOW MDM: CPT

## 2022-06-26 PROCEDURE — 99284 EMERGENCY DEPT VISIT MOD MDM: CPT

## 2022-06-26 RX ORDER — MECLIZINE HCL 12.5 MG
50 TABLET ORAL ONCE
Refills: 0 | Status: COMPLETED | OUTPATIENT
Start: 2022-06-26 | End: 2022-06-26

## 2022-06-26 RX ORDER — MECLIZINE HCL 12.5 MG
1 TABLET ORAL
Qty: 30 | Refills: 0
Start: 2022-06-26

## 2022-06-26 RX ADMIN — Medication 50 MILLIGRAM(S): at 17:56

## 2022-06-26 NOTE — ED PROVIDER NOTE - NSFOLLOWUPINSTRUCTIONS_ED_ALL_ED_FT
plenty of rest  plenty of fluids  meclizine 1-2 tablet every 6-8 hours for dizziness  follow up with your doctor tomorrow  Anything worsens or persists, return to ER for further care and evaluation.    Benign Paroxysmal Positional Vertigo    WHAT YOU NEED TO KNOW:    BPPV is an inner ear condition that causes you to suddenly feel dizzy. Benign means it is not serious or life-threatening. BPPV is caused by a problem with the nerves and structure of your inner ear. BPPV happens when small pieces of calcium break loose and lump together in one of your inner ear canals. Ear Anatomy         DISCHARGE INSTRUCTIONS:    Return to the emergency department if:     You fall during a BPPV episode and are injured.      You have a severe headache that does not go away.      You have new changes in your vision or feel weak or confused.      You have problems hearing, or you have ringing or buzzing in your ears.    Contact your healthcare provider if:     Your BPPV symptoms do not go away or they return.      You have problems with your balance, or you are falling often.      You have new or increased nausea or vomiting with vertigo.      You feel anxious or depressed and do not want to leave your home.      You have questions or concerns about your condition or care.    Medicines:     Medicines may be recommended or prescribed to treat dizziness or nausea.      Take your medicine as directed. Contact your healthcare provider if you think your medicine is not helping or if you have side effects. Tell him of her if you are allergic to any medicine. Keep a list of the medicines, vitamins, and herbs you take. Include the amounts, and when and why you take them. Bring the list or the pill bottles to follow-up visits. Carry your medicine list with you in case of an emergency.    Prevent your symptoms:     Try to avoid sudden head movements. Stand up and lie down slowly.       Raise and support your head when you lie down. Place pillows under your upper back and head or rest in a recliner.       Change your position often when you are lying down. Try not to lie with your head on the same side for long periods of time. Roll over slowly.       Wear protective gear when you ride a bike or play sports. A helmet helps protect your head from injury.    Follow up with your healthcare provider as directed: You may need to return in 1 month to check the progress of your treatment. Write down your questions so you remember to ask them during your visits.

## 2022-06-26 NOTE — ED ADULT TRIAGE NOTE - CHIEF COMPLAINT QUOTE
Pt a/ox3, BIBEMS from home, c/o dizziness. pt has hx of vertigo. pt reports "dizziness started at 10:30am" denies falls/syncope. denies CP/sob, blurred vision, vision changes, numbness and tingling.

## 2022-06-26 NOTE — ED PROVIDER NOTE - OBJECTIVE STATEMENT
61 y/o female with PMHx of asthma, COPD, HTN, vertigo presents to the ED c/o dizziness since 10:30 this morning. Denies headache. No recent cough, runny nose or fever. Did not take any meds PTA.

## 2022-06-26 NOTE — ED ADULT NURSE NOTE - NSIMPLEMENTINTERV_GEN_ALL_ED
Implemented All Fall Risk Interventions:  [place] to call system. Call bell, personal items and telephone within reach. Instruct patient to call for assistance. Room bathroom lighting operational. Non-slip footwear when patient is off stretcher. Physically safe environment: no spills, clutter or unnecessary equipment. Stretcher in lowest position, wheels locked, appropriate side rails in place. Provide visual cue, wrist band, yellow gown, etc. Monitor gait and stability. Monitor for mental status changes and reorient to person, place, and time. Review medications for side effects contributing to fall risk. Reinforce activity limits and safety measures with patient and family.

## 2022-06-26 NOTE — ED ADULT NURSE REASSESSMENT NOTE - NS ED NURSE REASSESS COMMENT FT1
patient tolerated crackers and water, coffee.  patient ambulated to restroom with steady gait.  patient reports improvement in dizziness, meclizine worked well. Dr. BABS borrero.

## 2022-06-26 NOTE — ED ADULT NURSE NOTE - OBJECTIVE STATEMENT
63 y/o female with PMHx of asthma, COPD, HTN, vertigo presents to the ED c/o dizziness since 10:30 this morning. Denies headache. No recent cough, runny nose or fever. Did not take any meds PTA.

## 2022-06-26 NOTE — ED PROVIDER NOTE - PROGRESS NOTE DETAILS
pt feeling better after meclizine, linda PO and ambulating without difficulty.  will dc with meclizine.  Supportive measures and return precautions discussed.

## 2022-06-26 NOTE — ED PROVIDER NOTE - NSICDXPASTMEDICALHX_GEN_ALL_CORE_FT
PAST MEDICAL HISTORY:  Anemia, mild     Asthma     Hemorrhoids     History of COPD     History of pressure ulcer necrotizing fascitis    HTN (hypertension)     Insomnia       Vertigo

## 2022-06-26 NOTE — ED ADULT TRIAGE NOTE - RESPIRATORY RATE (BREATHS/MIN)
21 y/o M PMHx T1DM (on insulin and metformin), ADHD (on Adderall), presented to ED with L index finger swelling and pain, now s/p I&D. [age] y/o M PMHx T1DM (on insulin and metformin), ADHD (on Adderall), presented to ED with L index finger swelling and pain, now s/p decompression. 21 y/o M PMHx T1DM (on insulin and metformin), ADHD (on Adderall), presented to ED with L index finger swelling and pain, concern for early infectious flexor tenosynovitis, now s/p decompression.

## 2022-06-26 NOTE — ED PROVIDER NOTE - NEUROLOGICAL, MLM
Alert and oriented, no focal deficits, no motor or sensory deficits. Alert and oriented, no focal deficits, no motor or sensory deficits. normal F-->N, no pronator drift, neg rhomberg.

## 2022-06-26 NOTE — ED ADULT NURSE REASSESSMENT NOTE - NS ED NURSE REASSESS COMMENT FT1
Assumed care of Pt from JOEL Haq. Pt received resting comfortably in stretcher. Vital signs reassessed, reviewed with Dr. Damico, and stable at this time. No IV in place. Discharge instructions. Pt to sit in waiting room to wait for taxi home.

## 2022-06-26 NOTE — ED PROVIDER NOTE - PATIENT PORTAL LINK FT
You can access the FollowMyHealth Patient Portal offered by [facility] by registering at the following website: [email or web address]. By joining [facility] [facility] [employer] [facility]’s FollowMyHealth portal, you will also be able to view your health information using other applications (apps) compatible with our system.

## 2022-08-29 ENCOUNTER — EMERGENCY (EMERGENCY)
Facility: HOSPITAL | Age: 62
LOS: 0 days | Discharge: HOME | End: 2022-08-29
Attending: STUDENT IN AN ORGANIZED HEALTH CARE EDUCATION/TRAINING PROGRAM
Payer: MEDICAID

## 2022-08-29 VITALS
OXYGEN SATURATION: 100 % | TEMPERATURE: 98 F | SYSTOLIC BLOOD PRESSURE: 127 MMHG | HEART RATE: 74 BPM | RESPIRATION RATE: 16 BRPM | DIASTOLIC BLOOD PRESSURE: 78 MMHG

## 2022-08-29 VITALS
HEIGHT: 67 IN | DIASTOLIC BLOOD PRESSURE: 54 MMHG | TEMPERATURE: 98 F | HEART RATE: 79 BPM | OXYGEN SATURATION: 97 % | SYSTOLIC BLOOD PRESSURE: 129 MMHG | RESPIRATION RATE: 18 BRPM

## 2022-08-29 DIAGNOSIS — G47.00 INSOMNIA, UNSPECIFIED: ICD-10-CM

## 2022-08-29 DIAGNOSIS — R10.9 UNSPECIFIED ABDOMINAL PAIN: ICD-10-CM

## 2022-08-29 DIAGNOSIS — Z93.3 COLOSTOMY STATUS: Chronic | ICD-10-CM

## 2022-08-29 DIAGNOSIS — J44.9 CHRONIC OBSTRUCTIVE PULMONARY DISEASE, UNSPECIFIED: ICD-10-CM

## 2022-08-29 DIAGNOSIS — I10 ESSENTIAL (PRIMARY) HYPERTENSION: ICD-10-CM

## 2022-08-29 DIAGNOSIS — L03.311 CELLULITIS OF ABDOMINAL WALL: ICD-10-CM

## 2022-08-29 DIAGNOSIS — Z86.2 PERSONAL HISTORY OF DISEASES OF THE BLOOD AND BLOOD-FORMING ORGANS AND CERTAIN DISORDERS INVOLVING THE IMMUNE MECHANISM: ICD-10-CM

## 2022-08-29 DIAGNOSIS — Z88.8 ALLERGY STATUS TO OTHER DRUGS, MEDICAMENTS AND BIOLOGICAL SUBSTANCES STATUS: ICD-10-CM

## 2022-08-29 DIAGNOSIS — Z98.890 OTHER SPECIFIED POSTPROCEDURAL STATES: Chronic | ICD-10-CM

## 2022-08-29 DIAGNOSIS — Z20.822 CONTACT WITH AND (SUSPECTED) EXPOSURE TO COVID-19: ICD-10-CM

## 2022-08-29 PROBLEM — R42 DIZZINESS AND GIDDINESS: Chronic | Status: ACTIVE | Noted: 2022-06-26

## 2022-08-29 LAB
ALBUMIN SERPL ELPH-MCNC: 3.4 G/DL — SIGNIFICANT CHANGE UP (ref 3.3–5)
ALP SERPL-CCNC: 71 U/L — SIGNIFICANT CHANGE UP (ref 40–120)
ALT FLD-CCNC: 23 U/L — SIGNIFICANT CHANGE UP (ref 12–78)
ANION GAP SERPL CALC-SCNC: 5 MMOL/L — SIGNIFICANT CHANGE UP (ref 5–17)
AST SERPL-CCNC: 8 U/L — LOW (ref 15–37)
BASOPHILS # BLD AUTO: 0.06 K/UL — SIGNIFICANT CHANGE UP (ref 0–0.2)
BASOPHILS NFR BLD AUTO: 0.7 % — SIGNIFICANT CHANGE UP (ref 0–2)
BILIRUB SERPL-MCNC: 0.3 MG/DL — SIGNIFICANT CHANGE UP (ref 0.2–1.2)
BUN SERPL-MCNC: 15 MG/DL — SIGNIFICANT CHANGE UP (ref 7–23)
CALCIUM SERPL-MCNC: 9.3 MG/DL — SIGNIFICANT CHANGE UP (ref 8.5–10.1)
CHLORIDE SERPL-SCNC: 107 MMOL/L — SIGNIFICANT CHANGE UP (ref 96–108)
CO2 SERPL-SCNC: 26 MMOL/L — SIGNIFICANT CHANGE UP (ref 22–31)
CREAT SERPL-MCNC: 0.9 MG/DL — SIGNIFICANT CHANGE UP (ref 0.5–1.3)
EGFR: 73 ML/MIN/1.73M2 — SIGNIFICANT CHANGE UP
EOSINOPHIL # BLD AUTO: 0.36 K/UL — SIGNIFICANT CHANGE UP (ref 0–0.5)
EOSINOPHIL NFR BLD AUTO: 4.1 % — SIGNIFICANT CHANGE UP (ref 0–6)
GLUCOSE SERPL-MCNC: 116 MG/DL — HIGH (ref 70–99)
HCT VFR BLD CALC: 37.2 % — SIGNIFICANT CHANGE UP (ref 34.5–45)
HGB BLD-MCNC: 11.6 G/DL — SIGNIFICANT CHANGE UP (ref 11.5–15.5)
IMM GRANULOCYTES NFR BLD AUTO: 0.2 % — SIGNIFICANT CHANGE UP (ref 0–1.5)
LYMPHOCYTES # BLD AUTO: 2.5 K/UL — SIGNIFICANT CHANGE UP (ref 1–3.3)
LYMPHOCYTES # BLD AUTO: 28.5 % — SIGNIFICANT CHANGE UP (ref 13–44)
MCHC RBC-ENTMCNC: 22.1 PG — LOW (ref 27–34)
MCHC RBC-ENTMCNC: 31.2 GM/DL — LOW (ref 32–36)
MCV RBC AUTO: 70.7 FL — LOW (ref 80–100)
MONOCYTES # BLD AUTO: 0.68 K/UL — SIGNIFICANT CHANGE UP (ref 0–0.9)
MONOCYTES NFR BLD AUTO: 7.8 % — SIGNIFICANT CHANGE UP (ref 2–14)
NEUTROPHILS # BLD AUTO: 5.14 K/UL — SIGNIFICANT CHANGE UP (ref 1.8–7.4)
NEUTROPHILS NFR BLD AUTO: 58.7 % — SIGNIFICANT CHANGE UP (ref 43–77)
PLATELET # BLD AUTO: 337 K/UL — SIGNIFICANT CHANGE UP (ref 150–400)
POTASSIUM SERPL-MCNC: 4 MMOL/L — SIGNIFICANT CHANGE UP (ref 3.5–5.3)
POTASSIUM SERPL-SCNC: 4 MMOL/L — SIGNIFICANT CHANGE UP (ref 3.5–5.3)
PROT SERPL-MCNC: 7.8 GM/DL — SIGNIFICANT CHANGE UP (ref 6–8.3)
RBC # BLD: 5.26 M/UL — HIGH (ref 3.8–5.2)
RBC # FLD: 16.6 % — HIGH (ref 10.3–14.5)
SODIUM SERPL-SCNC: 138 MMOL/L — SIGNIFICANT CHANGE UP (ref 135–145)
WBC # BLD: 8.76 K/UL — SIGNIFICANT CHANGE UP (ref 3.8–10.5)
WBC # FLD AUTO: 8.76 K/UL — SIGNIFICANT CHANGE UP (ref 3.8–10.5)

## 2022-08-29 PROCEDURE — 85025 COMPLETE CBC W/AUTO DIFF WBC: CPT

## 2022-08-29 PROCEDURE — 36415 COLL VENOUS BLD VENIPUNCTURE: CPT

## 2022-08-29 PROCEDURE — 80053 COMPREHEN METABOLIC PANEL: CPT

## 2022-08-29 PROCEDURE — 99284 EMERGENCY DEPT VISIT MOD MDM: CPT

## 2022-08-29 PROCEDURE — 0225U NFCT DS DNA&RNA 21 SARSCOV2: CPT

## 2022-08-29 RX ADMIN — Medication 100 MILLIGRAM(S): at 13:34

## 2022-08-29 NOTE — ED PROVIDER NOTE - OBJECTIVE STATEMENT
63 y/o F w/ PMH of anemia, COPD, HTN, bowel resection w/ colostomy s/p reversal presenting w/ lumps on abdomen. Triage note stating abd pain however pt denying abd pain to me. Reports noticing over the last few weeks bulging around her umbilicus. Over the past 2 days developed additional raised area that has become reddened. No drainage from the area. Has known lower abdominal midline hernia. Denies fevers, chills, headache, dizziness, blurred vision, chest pain, cough, shortness of breath, n/v/d/c, urinary symptoms, MSK pain, rash.

## 2022-08-29 NOTE — ED PROVIDER NOTE - CLINICAL SUMMARY MEDICAL DECISION MAKING FREE TEXT BOX
63 y/o F w/ PMH as above presenting w/ concern for lumps on abdomen. Pt well appearing, no acute distress. Umbilical "lump" consistent w/ umbilical hernia, no signs of incarceration or strangulation. L abdominal "lump" most consistent w/ superficial cellulitis, possible small developing abscess, area not consistent w/ incarcerated or strangulated hernia given no pain/tenderness. No clinical signs of bowel obstruction. Will send screening labs. Start on abx. No role for imaging at this time. Suspect will be able to f/u outpatient. Pt reports no PCP, will speak w/ referral coordinator to help arrange PCP visit. Will reassess the need for additional interventions as clinically warranted.

## 2022-08-29 NOTE — ED ADULT NURSE NOTE - NSFALLRSKHARMRISK_ED_ALL_ED
"  Neck/Back Pain: General    Both neck and back pain are usually caused by injury to the muscles or ligaments of the spine. Sometimes the disks that separate each bone of the spine may cause pain by pressing on a nearby nerve. Back and neck pain may appear after a sudden twisting/bending force (such as in a car accident), or sometimes after a simple awkward movement. In either case, muscle spasm is often present and adds to the pain. Acute neck and back pain usually gets better in 1 to 2 weeks. Pain related to disk disease, arthritis in the spinal joints or spinal stenosis (narrowing of the spinal canal) can become chronic and last for months or years. Back and neck pain are common problems. Most people feel better in 1 or 2 weeks, and most of the rest in 1 to 2 months. Most people can remain active. Symptoms  People experience andÂ describe pain differently. Â· Pain can be sharp, stabbing, shooting, aching, cramping, or burning  Â· Movement, standing, bending, lifting, sitting, or walking may worsen the pain  Â· Pain can be localized to one spot or area, or it can be more generalized  Â· Pain can spread or radiate upwards, downwards, to the front, or go down your arms  Â· Muscle spasm may occur. Cause  Most of the time ""mechanical problems\"" with the muscles or spine cause the pain. it is usually caused by an injury, whether known or not, to the muscles or ligaments. While illnesses can cause back pain, it is usually not caused by a serious illness. Pain is usually related to physical activity, whether sports, exercise, work, or normal activity. Sometimes it can occur without an identifiable cause. This can happen simply by stretching or moving wrong, without noting pain at the time. Other causes include:  Â· Overexertion, lifting, pushing, pulling incorrectly or too aggressively. Â· Sudden twisting, bending or stretching from an accident (car or fall), or accidental movement.   Â· Poor posture  Â· Poor conditioning, "
lack of regular exercise  Â· Spinal disc disease or arthritis  Â· Stress  Â· Pregnancy, or illness like appendicitis, bladder or kidney infection, pelvic infections  Â Home care  Â· ForÂ neck pain:Â Use a comfortable pillow that supports the head and keeps the spine in a neutral position. The position of the head should not be tilted forward or backward. Â· When in bed, try to find a position of comfort. A firm mattress is best. Try lying flat on your back with pillows under your knees. You can also try lying on your side with your knees bent up towards your chest and a pillow between your knees. Â· At first, do not try to stretch out the sore spots. If there is a strain, it is not like the good soreness you get after exercising without an injury. In this case, stretching may make it worse. Â· Avoid prolonged sitting, long car rides or travel. This puts more stress on the lower back than standing or walking. Â· During the first 24 to 72 hours after an injury, apply an ice pack to the painful area for 20 minutes and then remove it for 20 minutes over a period of 60 to 90 minutes or several times a day. As a safety precaution, do not use a heating pad at bedtime. Sleeping with a heating pad can lead to skin burns or tissue damage. Â· Ice and heat therapies can be alternated. Talk with your health care provider about the best treatment for your back or neck pain. Â· Therapeutic massage can help relax the back and neck muscles without stretching them. Â· Be aware of safe lifting methods and do not lift anything over 15 pounds until all the pain is gone. Medications  Talk to your health care provider before using medications, especially if you have other medical problems or are taking other medicines. Â· You may use acetaminophen (such as Tylenol) or ibuprofen (such as Advil or Motrin) to control pain, unless another pain medicine was prescribed.  If you have chronic conditions like diabetes, liver or kidney disease, stomach
ulcers, Â gastrointestinal bleeding, or are taking blood thinner medications. Â· Be careful if you are given pain medicines, narcotics, or medication for muscle spasm. They can cause drowsiness, and can affect your coordination, reflexes, and judgment. Do not drive or operate heavy machinery. Follow-up care  Follow up with your health care provider if your symptoms do not start to improve after one week. Physical therapy or further tests may be needed. If X-rays were taken, they will be reviewed by a radiologist. Devyn Wesley will be notified of any new findings that may affect your care. Call 911  Seek emergency medical care if any of the following occur:  Â· Trouble breathing  Â· Confusion  Â· Very drowsy or trouble awakening  Â· Fainting or loss of consciousness  Â· Rapid or very slow heart rate  Â· Loss of bowel or bladder control  When to seek medical care  Get prompt medical attention if any of the following occur:  Â· Pain becomes worse or spreads into your arms or legs  Â· Weakness, numbness or pain in one or both arms or legs  Â· Numbness in the groin area  Â· Difficulty walking  Â· Fever of 100.4ÂºF (38ÂºC) or higher, or as directed by your healthcare provider  Â© 8391-8148 [employer] [address] [employer]. All rights reserved. This information is not intended as a substitute for professional medical care. Always follow your healthcare professional's instructions.
Statement Selected
no

## 2022-08-29 NOTE — ED ADULT TRIAGE NOTE - CHIEF COMPLAINT QUOTE
Pt arrives to ED by [facility] sent by urgent care for two lumps on abdomen. Lumps have been present for one month. denies pain. denies nausea, vomiting, diarrhea.

## 2022-08-29 NOTE — ED ADULT NURSE NOTE - OBJECTIVE STATEMENT
Pt is a 62yr old female, A&OX4, c/o two lumps on her abdomen. Pt states she thought the first one was a spider bite but now has a second one. Sent in from urgent care for further infectious evaluation. Noted nickel size ulcer to the abd. Denies pain, fevers, N/V, or any other complaints.

## 2022-08-29 NOTE — ED PROVIDER NOTE - NS ED ATTENDING STATEMENT MOD
This was a shared visit with the BERT. I reviewed and verified the documentation and independently performed the documented:

## 2022-08-29 NOTE — ED PROVIDER NOTE - ATTENDING APP SHARED VISIT CONTRIBUTION OF CARE
Attending Piper: I performed a history and physical exam of the patient and discussed their management with the BERT. I have reviewed the BERT note and agree with the documented findings and plan of care, except as noted. This was a shared visit with an BERT. I reviewed and verified the documentation and independently performed my own history/exam/medical decision making. My medical decision making and observations are found above. Please refer to any progress notes for updates on clinical course.

## 2022-08-29 NOTE — ED ADULT NURSE NOTE - CHIEF COMPLAINT QUOTE
Pt arrives to ED by [facility] sent by urgent care for two lumps on abdomen. Lumps have been present for one month. denies pain. denies nausea, vomiting, diarrhea.

## 2022-08-29 NOTE — ED PROVIDER NOTE - PATIENT PORTAL LINK FT
You can access the FollowMyHealth Patient Portal offered by [facility] by registering at the following website: [email or web address]. By joining [employer]’s FollowMyHealth portal, you will also be able to view your health information using other applications (apps) compatible with our system.

## 2022-08-29 NOTE — ED PROVIDER NOTE - PHYSICAL EXAMINATION
Gen: NAD, AOx3, able to make needs known, non-toxic  Head: NCAT  HEENT: EOMI, oral mucosa moist, normal conjunctiva  Lung: CTAB, no respiratory distress, no wheezes/rhonchi/rales B/L, speaking in full sentences  CV: RRR, no murmurs  Abd: non distended, soft, nontender, no guarding, no CVA tenderness. soft, reducible umbilical hernia. L sided of abdomen w/ approx 2 cm diameter area of erythema w/ central non draining area of mild ulceration w/o tenderness or crepitus  MSK: no visible deformities  Neuro: Appears non focal  Skin: Warm, well perfused  Psych: normal affect

## 2022-08-29 NOTE — ED PROVIDER NOTE - PROGRESS NOTE DETAILS
Attending Piper: spoke w/ referral coordinator who will help pt get PCP appointment patient with superficial abscess, to bc dc on doxy.  results, plan, follow up and return precautions reviewed with patient by ED attending, Dr. Piper Herndon PA-C

## 2022-09-07 ENCOUNTER — EMERGENCY (EMERGENCY)
Facility: HOSPITAL | Age: 62
LOS: 0 days | Discharge: HOME | End: 2022-09-07
Attending: HOSPITALIST
Payer: MEDICAID

## 2022-09-07 VITALS
WEIGHT: 220.02 LBS | HEIGHT: 67 IN | RESPIRATION RATE: 18 BRPM | TEMPERATURE: 99 F | SYSTOLIC BLOOD PRESSURE: 123 MMHG | DIASTOLIC BLOOD PRESSURE: 89 MMHG | HEART RATE: 77 BPM | OXYGEN SATURATION: 99 %

## 2022-09-07 VITALS
SYSTOLIC BLOOD PRESSURE: 130 MMHG | OXYGEN SATURATION: 96 % | RESPIRATION RATE: 18 BRPM | TEMPERATURE: 98 F | HEART RATE: 65 BPM | DIASTOLIC BLOOD PRESSURE: 66 MMHG

## 2022-09-07 DIAGNOSIS — Z88.2 ALLERGY STATUS TO SULFONAMIDES: ICD-10-CM

## 2022-09-07 DIAGNOSIS — Z88.1 ALLERGY STATUS TO OTHER ANTIBIOTIC AGENTS STATUS: ICD-10-CM

## 2022-09-07 DIAGNOSIS — K59.00 CONSTIPATION, UNSPECIFIED: ICD-10-CM

## 2022-09-07 DIAGNOSIS — Z98.890 OTHER SPECIFIED POSTPROCEDURAL STATES: Chronic | ICD-10-CM

## 2022-09-07 DIAGNOSIS — K61.1 RECTAL ABSCESS: ICD-10-CM

## 2022-09-07 DIAGNOSIS — J44.9 CHRONIC OBSTRUCTIVE PULMONARY DISEASE, UNSPECIFIED: ICD-10-CM

## 2022-09-07 DIAGNOSIS — R10.9 UNSPECIFIED ABDOMINAL PAIN: ICD-10-CM

## 2022-09-07 DIAGNOSIS — G47.00 INSOMNIA, UNSPECIFIED: ICD-10-CM

## 2022-09-07 DIAGNOSIS — Z88.8 ALLERGY STATUS TO OTHER DRUGS, MEDICAMENTS AND BIOLOGICAL SUBSTANCES: ICD-10-CM

## 2022-09-07 DIAGNOSIS — K64.9 UNSPECIFIED HEMORRHOIDS: ICD-10-CM

## 2022-09-07 DIAGNOSIS — Z93.3 COLOSTOMY STATUS: Chronic | ICD-10-CM

## 2022-09-07 DIAGNOSIS — K62.89 OTHER SPECIFIED DISEASES OF ANUS AND RECTUM: ICD-10-CM

## 2022-09-07 DIAGNOSIS — D64.9 ANEMIA, UNSPECIFIED: ICD-10-CM

## 2022-09-07 DIAGNOSIS — I10 ESSENTIAL (PRIMARY) HYPERTENSION: ICD-10-CM

## 2022-09-07 DIAGNOSIS — Z20.822 CONTACT WITH AND (SUSPECTED) EXPOSURE TO COVID-19: ICD-10-CM

## 2022-09-07 LAB
ALBUMIN SERPL ELPH-MCNC: 3.5 G/DL — SIGNIFICANT CHANGE UP (ref 3.3–5)
ALP SERPL-CCNC: 69 U/L — SIGNIFICANT CHANGE UP (ref 40–120)
ALT FLD-CCNC: 20 U/L — SIGNIFICANT CHANGE UP (ref 12–78)
ANION GAP SERPL CALC-SCNC: 6 MMOL/L — SIGNIFICANT CHANGE UP (ref 5–17)
AST SERPL-CCNC: 23 U/L — SIGNIFICANT CHANGE UP (ref 15–37)
BASOPHILS # BLD AUTO: 0.07 K/UL — SIGNIFICANT CHANGE UP (ref 0–0.2)
BASOPHILS NFR BLD AUTO: 0.6 % — SIGNIFICANT CHANGE UP (ref 0–2)
BILIRUB SERPL-MCNC: 0.5 MG/DL — SIGNIFICANT CHANGE UP (ref 0.2–1.2)
BUN SERPL-MCNC: 18 MG/DL — SIGNIFICANT CHANGE UP (ref 7–23)
CALCIUM SERPL-MCNC: 9 MG/DL — SIGNIFICANT CHANGE UP (ref 8.5–10.1)
CHLORIDE SERPL-SCNC: 109 MMOL/L — HIGH (ref 96–108)
CO2 SERPL-SCNC: 25 MMOL/L — SIGNIFICANT CHANGE UP (ref 22–31)
CREAT SERPL-MCNC: 0.73 MG/DL — SIGNIFICANT CHANGE UP (ref 0.5–1.3)
EGFR: 94 ML/MIN/1.73M2 — SIGNIFICANT CHANGE UP
EOSINOPHIL # BLD AUTO: 0.24 K/UL — SIGNIFICANT CHANGE UP (ref 0–0.5)
EOSINOPHIL NFR BLD AUTO: 2 % — SIGNIFICANT CHANGE UP (ref 0–6)
GLUCOSE SERPL-MCNC: 101 MG/DL — HIGH (ref 70–99)
HCT VFR BLD CALC: 36.8 % — SIGNIFICANT CHANGE UP (ref 34.5–45)
HGB BLD-MCNC: 11.2 G/DL — LOW (ref 11.5–15.5)
IMM GRANULOCYTES NFR BLD AUTO: 0.2 % — SIGNIFICANT CHANGE UP (ref 0–1.5)
LIDOCAIN IGE QN: 158 U/L — SIGNIFICANT CHANGE UP (ref 73–393)
LYMPHOCYTES # BLD AUTO: 2.97 K/UL — SIGNIFICANT CHANGE UP (ref 1–3.3)
LYMPHOCYTES # BLD AUTO: 25.2 % — SIGNIFICANT CHANGE UP (ref 13–44)
MCHC RBC-ENTMCNC: 21.7 PG — LOW (ref 27–34)
MCHC RBC-ENTMCNC: 30.4 GM/DL — LOW (ref 32–36)
MCV RBC AUTO: 71.3 FL — LOW (ref 80–100)
MONOCYTES # BLD AUTO: 0.97 K/UL — HIGH (ref 0–0.9)
MONOCYTES NFR BLD AUTO: 8.2 % — SIGNIFICANT CHANGE UP (ref 2–14)
NEUTROPHILS # BLD AUTO: 7.51 K/UL — HIGH (ref 1.8–7.4)
NEUTROPHILS NFR BLD AUTO: 63.8 % — SIGNIFICANT CHANGE UP (ref 43–77)
PLATELET # BLD AUTO: 334 K/UL — SIGNIFICANT CHANGE UP (ref 150–400)
POTASSIUM SERPL-MCNC: 4 MMOL/L — SIGNIFICANT CHANGE UP (ref 3.5–5.3)
POTASSIUM SERPL-SCNC: 4 MMOL/L — SIGNIFICANT CHANGE UP (ref 3.5–5.3)
PROT SERPL-MCNC: 7.7 GM/DL — SIGNIFICANT CHANGE UP (ref 6–8.3)
RBC # BLD: 5.16 M/UL — SIGNIFICANT CHANGE UP (ref 3.8–5.2)
RBC # FLD: 16.1 % — HIGH (ref 10.3–14.5)
SODIUM SERPL-SCNC: 140 MMOL/L — SIGNIFICANT CHANGE UP (ref 135–145)
WBC # BLD: 11.78 K/UL — HIGH (ref 3.8–10.5)
WBC # FLD AUTO: 11.78 K/UL — HIGH (ref 3.8–10.5)

## 2022-09-07 PROCEDURE — 85025 COMPLETE CBC W/AUTO DIFF WBC: CPT

## 2022-09-07 PROCEDURE — 86901 BLOOD TYPING SEROLOGIC RH(D): CPT

## 2022-09-07 PROCEDURE — 87040 BLOOD CULTURE FOR BACTERIA: CPT

## 2022-09-07 PROCEDURE — 86850 RBC ANTIBODY SCREEN: CPT

## 2022-09-07 PROCEDURE — 74177 CT ABD & PELVIS W/CONTRAST: CPT | Mod: 26,MA

## 2022-09-07 PROCEDURE — 96374 THER/PROPH/DIAG INJ IV PUSH: CPT | Mod: XU

## 2022-09-07 PROCEDURE — 87070 CULTURE OTHR SPECIMN AEROBIC: CPT | Mod: 59

## 2022-09-07 PROCEDURE — 87635 SARS-COV-2 COVID-19 AMP PRB: CPT

## 2022-09-07 PROCEDURE — 74177 CT ABD & PELVIS W/CONTRAST: CPT | Mod: MA

## 2022-09-07 PROCEDURE — 99284 EMERGENCY DEPT VISIT MOD MDM: CPT | Mod: 25

## 2022-09-07 PROCEDURE — 80053 COMPREHEN METABOLIC PANEL: CPT

## 2022-09-07 PROCEDURE — 96375 TX/PRO/DX INJ NEW DRUG ADDON: CPT | Mod: XU

## 2022-09-07 PROCEDURE — 99285 EMERGENCY DEPT VISIT HI MDM: CPT

## 2022-09-07 PROCEDURE — 46050 I&D PERIANAL ABSCESS SUPFC: CPT

## 2022-09-07 PROCEDURE — 36415 COLL VENOUS BLD VENIPUNCTURE: CPT

## 2022-09-07 PROCEDURE — 86900 BLOOD TYPING SEROLOGIC ABO: CPT

## 2022-09-07 PROCEDURE — 10140 I&D HMTMA SEROMA/FLUID COLLJ: CPT

## 2022-09-07 PROCEDURE — 87077 CULTURE AEROBIC IDENTIFY: CPT

## 2022-09-07 PROCEDURE — 83690 ASSAY OF LIPASE: CPT

## 2022-09-07 RX ORDER — SODIUM CHLORIDE 9 MG/ML
1000 INJECTION INTRAMUSCULAR; INTRAVENOUS; SUBCUTANEOUS ONCE
Refills: 0 | Status: COMPLETED | OUTPATIENT
Start: 2022-09-07 | End: 2022-09-07

## 2022-09-07 RX ORDER — CIPROFLOXACIN LACTATE 400MG/40ML
400 VIAL (ML) INTRAVENOUS ONCE
Refills: 0 | Status: COMPLETED | OUTPATIENT
Start: 2022-09-07 | End: 2022-09-07

## 2022-09-07 RX ORDER — LIDOCAINE HCL 20 MG/ML
20 VIAL (ML) INJECTION ONCE
Refills: 0 | Status: COMPLETED | OUTPATIENT
Start: 2022-09-07 | End: 2022-09-07

## 2022-09-07 RX ORDER — METRONIDAZOLE 500 MG
500 TABLET ORAL ONCE
Refills: 0 | Status: COMPLETED | OUTPATIENT
Start: 2022-09-07 | End: 2022-09-07

## 2022-09-07 RX ADMIN — SODIUM CHLORIDE 1000 MILLILITER(S): 9 INJECTION INTRAMUSCULAR; INTRAVENOUS; SUBCUTANEOUS at 03:32

## 2022-09-07 RX ADMIN — Medication 200 MILLIGRAM(S): at 10:17

## 2022-09-07 RX ADMIN — Medication 100 MILLIGRAM(S): at 08:24

## 2022-09-07 RX ADMIN — Medication 20 MILLILITER(S): at 08:57

## 2022-09-07 NOTE — ED PROVIDER NOTE - CLINICAL SUMMARY MEDICAL DECISION MAKING FREE TEXT BOX
62-year-old female with history of bowel resection and, colostomy and reversal presenting with abdominal pain and constipation.  No impacted stool.  Will obtain CT abdomen pelvis with oral and IV contrast to rule out bowel obstruction.  Labs and fluids and pain control.

## 2022-09-07 NOTE — ED PROVIDER NOTE - CARE PROVIDER_API CALL
Stuart Carter)  Surgery  [address] [address]  Phone: [phone]  Fax: [phone]  Follow Up Time: 4-6 Days

## 2022-09-07 NOTE — ED ADULT NURSE REASSESSMENT NOTE - NS ED NURSE REASSESS COMMENT FT1
Report received from night RN Emmanuel Maya. Pt on stretcher in room being evaluated by surgery. Pt calm and cooperative. Blood cultures obtained by ED phlebotomy, antibiotics administered as per MD orders. No complaints at this time. Safety and comfort measures maintained. WIll continue to monitor.

## 2022-09-07 NOTE — ED PROVIDER NOTE - PROGRESS NOTE DETAILS
pt s/o to me by dr conn, per colorectal pt can go home on augmentin and f/u with dr wilkerson    ED evaluation and management discussed with the patient and family (if available) in detail.  Close PMD follow up encouraged.  Strict ED return instructions discussed in detail and patient given the opportunity to ask any questions about their discharge diagnosis and instructions. Patient verbalized understanding.  pt states she is not allergic to pcn or augmentin despite computer warning

## 2022-09-07 NOTE — CONSULT NOTE ADULT - ASSESSMENT
61 yo female with perirectal fluid collection possibly from the previous surgery   WBC 11.78    Incision and drainage was done and bedside and packing placed  Patient instructed to remove the packing tomorrow or with her next bowel movement and can cover it with guaze and ABD pads  Patient instructed to maintain hygiene and wash after every bowel movement   Discharge with pain medication and Tab Augmentin 875mg BID for 7 days   Follow up in colorectal clinic with Dr Carter in 2 weeks

## 2022-09-07 NOTE — ED ADULT TRIAGE NOTE - CHIEF COMPLAINT QUOTE
patient states she has chronic neck, back and leg pain and is unable to access her pain medications tonight because her  has locked them up.  patient states [facility] stated that at 8am she is able to retrieve her medications.  patient requesting to speaking with hospital administration because she was not happy last time she was here and is refusing to be evaluated by MD until she speaking with admin.  Nursing admin notified. pt c/o constipation for several days, patient states she feels like something is blocking her rectum.  patient states multiple OTC medications

## 2022-09-07 NOTE — ED ADULT NURSE NOTE - CHIEF COMPLAINT QUOTE
pt c/o constipation for several days, patient states she feels like something is blocking her rectum.  patient states multiple OTC medications

## 2022-09-07 NOTE — ED ADULT NURSE NOTE - OBJECTIVE STATEMENT
Pt present to ED for rectum pain. Pt states decrease inn bowel movement over the last few days leading to build up of pressure at the affected area. deneis fever or vomiting. no acute distress noted at this time.

## 2022-09-07 NOTE — ED PROVIDER NOTE - OBJECTIVE STATEMENT
63 y/o F w/ PMH of anemia, COPD, HTN, bowel resection w/ colostomy s/p reversal presenting with rectal discomfort and constipation. patient states she hasn't had a BM since Saturday. no vomiting but not tolerating PO intake (solids) bc she is afraid she has a bowel obstruction. she has been tolerating liquids. no fever.

## 2022-09-07 NOTE — ED PROVIDER NOTE - RECTAL
some liquidy stool around rectum. no hard stool or stool in the rectal vault./non-tender/HEMORRHOIDS

## 2022-09-07 NOTE — CONSULT NOTE ADULT - SUBJECTIVE AND OBJECTIVE BOX
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 Her last BM was today and she denies pain, fever, chills, nausea vomiting.     All: sulfur drugs, heparin      Physical exam:   Well appearing, awake, alert, oriented to person, place, time/situation and in no apparent distress.  EOMI, PERRLA  Heart: RRR, S1+S2+0  Lungs: CTA B/L, normal resp effort  GI: abdomen soft, midline umbilical hernia with bowel loops felt, non tender  MSK: full ROM  Rectal: internal and external hemorrhoids, no blood noted on rectal exam, erythematous surface on the left perirectal area, with superficial ulceration, tender        Vital Signs Last 24 Hrs  T(C): 36.9 (07 Sep 2022 06:33), Max: 37 (07 Sep 2022 02:36)  T(F): 98.4 (07 Sep 2022 06:33), Max: 98.6 (07 Sep 2022 02:36)  HR: 70 (07 Sep 2022 06:33) (70 - 77)  BP: 130/80 (07 Sep 2022 06:33) (123/89 - 130/80)  BP(mean): 76 (07 Sep 2022 06:33) (76 - 76)  RR: 18 (07 Sep 2022 06:33) (18 - 18)  SpO2: 98% (07 Sep 2022 06:33) (98% - 99%)    Parameters below as of 07 Sep 2022 06:33  Patient On (Oxygen Delivery Method): room air        MEDICATIONS  (STANDING):  ciprofloxacin   IVPB 400 milliGRAM(s) IV Intermittent once    MEDICATIONS  (PRN):                            11.2   11.78 )-----------( 334      ( 07 Sep 2022 03:26 )             36.8       140  |  109<H>  |  18  ----------------------------<  101<H>  4.0   |  25  |  0.73    Ca    9.0      07 Sep 2022 03:26    TPro  7.7  /  Alb  3.5  /  TBili  0.5  /  DBili  x   /  AST  23  /  ALT  20  /  AlkPhos  69  09-07      < from: CT Abdomen and Pelvis w/ Oral Cont and w/ IV Cont (09.07.22 @ 06:35) >    IMPRESSION:    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 No bowel obstruction.    These results were discussed via telephone at 9/7/2022 7:01 AM by Dr. Duncan of radiology with Dr. Sparks, read-back was followed.    Postoperative changes of left colostomy reversal with subcutaneous   infiltration. Small fluid collection measuring 1.8 x 1.4 cm in midline   anterior abdominal wall (51:2), likely small postoperative hematoma or   seroma.    < end of copied text >

## 2022-09-07 NOTE — ED PROVIDER NOTE - PATIENT PORTAL LINK FT
You can access the FollowMyHealth Patient Portal offered by [facility] by registering at the following website: [email or web address]. By joining [employer]’s FollowMyHealth portal, you will also be able to view your health information using other applications (apps) compatible with our system.

## 2022-09-08 ENCOUNTER — APPOINTMENT (OUTPATIENT)
Dept: FAMILY MEDICINE | Facility: CLINIC | Age: 62
End: 2022-09-08

## 2022-09-09 LAB
CULTURE RESULTS: SIGNIFICANT CHANGE UP
SPECIMEN SOURCE: SIGNIFICANT CHANGE UP

## 2022-09-12 LAB
CULTURE RESULTS: SIGNIFICANT CHANGE UP
CULTURE RESULTS: SIGNIFICANT CHANGE UP
SPECIMEN SOURCE: SIGNIFICANT CHANGE UP
SPECIMEN SOURCE: SIGNIFICANT CHANGE UP

## 2022-09-23 ENCOUNTER — APPOINTMENT (OUTPATIENT)
Dept: COLORECTAL SURGERY | Facility: CLINIC | Age: 62
End: 2022-09-23

## 2022-09-23 VITALS
TEMPERATURE: 97.3 F | SYSTOLIC BLOOD PRESSURE: 146 MMHG | RESPIRATION RATE: 14 BRPM | HEART RATE: 87 BPM | DIASTOLIC BLOOD PRESSURE: 87 MMHG | HEIGHT: 67 IN

## 2022-09-23 DIAGNOSIS — K61.0 ANAL ABSCESS: ICD-10-CM

## 2022-09-23 PROCEDURE — 99213 OFFICE O/P EST LOW 20 MIN: CPT

## 2022-09-23 NOTE — HISTORY OF PRESENT ILLNESS
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 She was found to have a perirectal fluid collection possibly from the previous surgery which was incised and drained given concerns for infection.\par \par Patient is doing well since incision and drainage of this fluid collection. She denies any fevers and chills, denies any perianal pain. Patient only complaint is episodic constipation. Patient reports recently taking Doculax which has somewhat helped. No other complaints.

## 2022-09-23 NOTE — PHYSICAL EXAM
[Normal rectal exam] : exam was normal [Alert] : alert [Oriented to Person] : oriented to person [Oriented to Place] : oriented to place [Oriented to Time] : oriented to time [Calm] : calm [de-identified] : Incision and drainage site healing well, no drainage, no erythema, no signs of infection [de-identified] : NAD [de-identified] : Nonlabored respiration [de-identified] : Normal rate

## 2022-09-23 NOTE — ASSESSMENT
[FreeTextEntry1] : 62 year old female who presents for follow up after incision and drainage of perianal fluid collection. Patient is doing well and the wound is healing well. Patient will continue with local wound care by keeping the area clean. No need for any packing. Patient does report constipation and will add MiraLAX to assist acutely and continue a daily stool softener when bowel movements are more regular. Advised to stop MiraLAX once bowel movements are back to normal. Patient will follow up as needed.

## 2023-02-08 ENCOUNTER — EMERGENCY (EMERGENCY)
Facility: HOSPITAL | Age: 63
LOS: 0 days | Discharge: HOME | End: 2023-02-08
Attending: EMERGENCY MEDICINE
Payer: MEDICAID

## 2023-02-08 VITALS
SYSTOLIC BLOOD PRESSURE: 155 MMHG | HEART RATE: 83 BPM | HEIGHT: 67 IN | RESPIRATION RATE: 18 BRPM | TEMPERATURE: 98 F | WEIGHT: 199.96 LBS | OXYGEN SATURATION: 96 % | DIASTOLIC BLOOD PRESSURE: 83 MMHG

## 2023-02-08 VITALS
SYSTOLIC BLOOD PRESSURE: 158 MMHG | HEART RATE: 84 BPM | TEMPERATURE: 98 F | DIASTOLIC BLOOD PRESSURE: 84 MMHG | OXYGEN SATURATION: 98 % | RESPIRATION RATE: 18 BRPM

## 2023-02-08 DIAGNOSIS — K64.9 UNSPECIFIED HEMORRHOIDS: ICD-10-CM

## 2023-02-08 DIAGNOSIS — S09.90XA UNSPECIFIED INJURY OF HEAD, INITIAL ENCOUNTER: ICD-10-CM

## 2023-02-08 DIAGNOSIS — Z88.8 ALLERGY STATUS TO OTHER DRUGS, MEDICAMENTS AND BIOLOGICAL SUBSTANCES: ICD-10-CM

## 2023-02-08 DIAGNOSIS — Z88.2 ALLERGY STATUS TO SULFONAMIDES: ICD-10-CM

## 2023-02-08 DIAGNOSIS — Z88.1 ALLERGY STATUS TO OTHER ANTIBIOTIC AGENTS STATUS: ICD-10-CM

## 2023-02-08 DIAGNOSIS — Z93.3 COLOSTOMY STATUS: Chronic | ICD-10-CM

## 2023-02-08 DIAGNOSIS — Z93.3 COLOSTOMY STATUS: ICD-10-CM
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
## 2023-02-08 DIAGNOSIS — M25.561 PAIN IN RIGHT KNEE: ICD-10-CM
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
## 2023-02-08 DIAGNOSIS — Z86.2 PERSONAL HISTORY OF DISEASES OF THE BLOOD AND BLOOD-FORMING ORGANS AND CERTAIN DISORDERS INVOLVING THE IMMUNE MECHANISM: ICD-10-CM

## 2023-02-08 DIAGNOSIS — Z98.890 OTHER SPECIFIED POSTPROCEDURAL STATES: Chronic | ICD-10-CM
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
## 2023-02-08 DIAGNOSIS — S80.01XA CONTUSION OF RIGHT KNEE, INITIAL ENCOUNTER: ICD-10-CM

## 2023-02-08 DIAGNOSIS — I10 ESSENTIAL (PRIMARY) HYPERTENSION: ICD-10-CM

## 2023-02-08 DIAGNOSIS — S06.9X9A UNSPECIFIED INTRACRANIAL INJURY WITH LOSS OF CONSCIOUSNESS OF UNSPECIFIED DURATION, INITIAL ENCOUNTER: ICD-10-CM

## 2023-02-08 DIAGNOSIS — J44.9 CHRONIC OBSTRUCTIVE PULMONARY DISEASE, UNSPECIFIED: ICD-10-CM

## 2023-02-08 PROCEDURE — 99284 EMERGENCY DEPT VISIT MOD MDM: CPT

## 2023-02-08 PROCEDURE — 73562 X-RAY EXAM OF KNEE 3: CPT | Mod: RT

## 2023-02-08 PROCEDURE — 73562 X-RAY EXAM OF KNEE 3: CPT | Mod: 26,RT

## 2023-02-08 PROCEDURE — 70450 CT HEAD/BRAIN W/O DYE: CPT | Mod: MA

## 2023-02-08 PROCEDURE — 99284 EMERGENCY DEPT VISIT MOD MDM: CPT | Mod: 25

## 2023-02-08 PROCEDURE — 70450 CT HEAD/BRAIN W/O DYE: CPT | Mod: 26,MA

## 2023-02-08 PROCEDURE — 82962 GLUCOSE BLOOD TEST: CPT

## 2023-02-08 NOTE — ED PROVIDER NOTE - NSFOLLOWUPINSTRUCTIONS_ED_ALL_ED_FT
Head Injury, Adult       There are many types of head injuries. They can be as minor as a small bump. Some head injuries can be worse. Worse injuries include:  •A strong hit to the head that shakes the brain back and forth, causing damage (concussion).      •A bruise (contusion) of the brain. This means there is bleeding in the brain that can cause swelling.      •A cracked skull (skull fracture).      •Bleeding in the brain that gathers, gets thick (makes a clot), and forms a bump (hematoma).      Most problems from a head injury come in the first 24 hours. However, you may still have side effects up to 7–10 days after your injury. It is important to watch your condition for any changes. You may need to be watched in the emergency department or urgent care, or you may need to stay in the hospital.      What are the causes?    There are many possible causes of a head injury. A serious head injury may be caused by:  •A car accident.      •Bicycle or motorcycle accidents.      •Sports injuries.      •Falls.      •Being hit by an object.        What are the signs or symptoms?    Symptoms of a head injury include a bruise, bump, or bleeding where the injury happened. Other physical symptoms may include:  •Headache.      •Feeling like you may vomit (nauseous) or vomiting.      •Dizziness.      •Blurred or double vision.      •Being uncomfortable around bright lights or loud noises.      •Shaking movements that you cannot control (seizures).      •Feeling tired.      •Trouble being woken up.      •Fainting or loss of consciousness.      Mental or emotional symptoms may include:  •Feeling grumpy or cranky.      •Confusion and memory problems.      •Having trouble paying attention or concentrating.      •Changes in eating or sleeping habits.      •Feeling worried or nervous (anxious).      •Feeling sad (depressed).        How is this treated?    Treatment for this condition depends on how severe the injury is and the type of injury you have. The main goal is to prevent problems and to allow the brain time to heal.    Mild head injury     If you have a mild head injury, you may be sent home, and treatment may include:  •Being watched. A responsible adult should stay with you for 24 hours after your injury and check on you often.      •Physical rest.      •Brain rest.      •Pain medicines.      Severe head injury    If you have a severe head injury, treatment may include:  •Being watched closely. This includes staying in the hospital.    •Medicines to:  •Help with pain.      •Prevent seizures.      •Help with brain swelling.        •Protecting your airway and using a machine that helps you breathe (ventilator).      •Treatments to watch for and manage swelling inside the brain.    •Brain surgery. This may be needed to:  •Remove a collection of blood or blood clots.      •Stop the bleeding.      •Remove a part of the skull. This allows room for the brain to swell.          Follow these instructions at home:    Activity     •Rest.      •Avoid activities that are hard or tiring.      •Make sure you get enough sleep.    •Let your brain rest. Do this by limiting activities that need a lot of thought or attention, such as:  •Watching TV.      •Playing memory games and puzzles.      •Job-related work or homework.      •Working on the computer, social media, and texting.        •Avoid activities that could cause another head injury until your doctor says it is okay. This includes playing sports. Having another head injury, especially before the first one has healed, can be dangerous.      •Ask your doctor when it is safe for you to go back to your normal activities, such as work or school. Ask your doctor for a step-by-step plan for slowly going back to your normal activities.      •Ask your doctor when you can drive, ride a bicycle, or use heavy machinery. Do not do these activities if you are dizzy.        Lifestyle      • Do not drink alcohol until your doctor says it is okay.      • Do not use drugs.      •If it is harder than usual to remember things, write them down.      •If you are easily distracted, try to do one thing at a time.      •Talk with family members or close friends when making important decisions.      •Tell your friends, family, a trusted co-worker, and  about your injury, symptoms, and limits (restrictions). Have them watch for any problems that are new or getting worse.      General instructions     •Take over-the-counter and prescription medicines only as told by your doctor.      •Have someone stay with you for 24 hours after your head injury. This person should watch you for any changes in your symptoms and be ready to get help.      •Keep all follow-up visits as told by your doctor. This is important.      How is this prevented?     •Work on your balance and strength. This can help you avoid falls.      •Wear a seat belt when you are in a moving vehicle.    •Wear a helmet when you:  •Ride a bicycle.      •Ski.      •Do any other sport or activity that has a risk of injury.      •If you drink alcohol:•Limit how much you use to:  •0–1 drink a day for nonpregnant women.      •0–2 drinks a day for men.        •Be aware of how much alcohol is in your drink. In the [place] one drink equals one 12 oz bottle of beer (355 mL), one 5 oz glass of wine (148 mL), or one 1½ oz glass of hard liquor (44 mL).      •Make your home safer by:  •Getting rid of clutter from the floors and stairs. This includes things that can make you trip.      •Using grab bars in bathrooms and handrails by stairs.      •Placing non-slip mats on floors and in bathtubs.      •Putting more light in dim areas.          Where to find more information    •Centers for Disease Control and Prevention: www.cdc.gov        Get help right away if:  •You have:  •A very bad headache that is not helped by medicine.      •Trouble walking or weakness in your arms and legs.      •Clear or bloody fluid coming from your nose or ears.      •Changes in how you see (vision).      •A seizure.      •More confusion or more grumpy moods.        •Your symptoms get worse.      •You are sleepier than normal and have trouble staying awake.      •You lose your balance.      •The black centers of your eyes (pupils) change in size.      •Your speech is slurred.      •Your dizziness gets worse.      •You vomit.      These symptoms may be an emergency. Do not wait to see if the symptoms will go away. Get medical help right away. Call your local emergency services (911 in the [place] Do not drive yourself to the hospital.       Summary    •Head injuries can be as minor as a small bump. Some head injuries can be worse.      •Treatment for this condition depends on how severe the injury is and the type of injury you have.      •Have someone stay with you for 24 hours after your head injury.      •Ask your doctor when it is safe for you to go back to your normal activities, such as work or school.      •To prevent a head injury, wear a seat belt in a car, wear a helmet when you use a bicycle, limit your alcohol use, and make your home safer.      This information is not intended to replace advice given to you by your health care provider. Make sure you discuss any questions you have with your health care provider.      Contusion in Adults    WHAT YOU NEED TO KNOW:    A contusion is a bruise that appears on your skin after an injury. A bruise happens when small blood vessels tear but skin does not. When blood vessels tear, blood leaks into nearby tissue, such as soft tissue or muscle.    DISCHARGE INSTRUCTIONS:    Return to the emergency department if:     You have new trouble moving the injured area.      You have tingling or numbness in or near the injured area.      Your hand or foot below the bruise gets cold or turns pale.     Contact your healthcare provider if:     You find a new lump in the injured area.      Your symptoms do not improve with treatment after 4 to 5 days.      You have questions or concerns about your condition or care.    Medicines: You may need any of the following:     NSAIDs help decrease swelling and pain or fever. This medicine is available with or without a doctor's order. NSAIDs can cause stomach bleeding or kidney problems in certain people. If you take blood thinner medicine, always ask your healthcare provider if NSAIDs are safe for you. Always read the medicine label and follow directions.      Prescription pain medicine may be given. Do not wait until the pain is severe before you take your medicine.      Take your medicine as directed. Contact your healthcare provider if you think your medicine is not helping or if you have side effects. Tell him of her if you are allergic to any medicine. Keep a list of the medicines, vitamins, and herbs you take. Include the amounts, and when and why you take them. Bring the list or the pill bottles to follow-up visits. Carry your medicine list with you in case of an emergency.    Follow up with your healthcare provider as directed: You may need to return within a week to check your injury again. Write down your questions so you remember to ask them during your visits.    Help a contusion heal:     Rest the injured area or use it less than usual. If you bruised your leg or foot, you may need crutches or a cane to help you walk. This will help you keep weight off your injured body part.       Apply ice to decrease swelling and pain. Ice may also help prevent tissue damage. Use an ice pack, or put crushed ice in a plastic bag. Cover it with a towel and place it on your bruise for 15 to 20 minutes every hour or as directed.      Use compression to support the area and decrease swelling. Wrap an elastic bandage around the area over the bruised muscle. Make sure the bandage is not too tight. You should be able to fit 1 finger between the bandage and your skin.      Elevate (raise) your injured body part above the level of your heart to help decrease pain and swelling. Use pillows, blankets, or rolled towels to elevate the area as often as you can.      Do not drink alcohol as directed. Alcohol may slow healing.      Do not stretch injured muscles right after your injury. Ask your healthcare provider when and how you may safely stretch after your injury. Gentle stretches can help increase your flexibility.      Do not massage the area or put heating pads on the bruise right after your injury. Heat and massage may slow healing. Your healthcare provider may tell you to apply heat after several days. At that time, heat will start to help the injury heal.    Prevent another contusion:     Stretch and warm up before you play sports or exercise.      Wear protective gear when you play sports. Examples are shin guards and padding.       If you begin a new physical activity, start slowly to give your body a chance to adjust.

## 2023-02-08 NOTE — ED PROVIDER NOTE - NS ED ROS FT
Constitutional: nad, well appearing  HEENT:  no nasal congestion, eye drainage or ear pain.    CVS:  no cp  Resp:  No sob, no cough  GI:  no abdominal pain, no nausea or vomiting  :  no dysuria  MSK: +R knee pain  Skin: no rash  Neuro: no change in mental status +LOC  Heme/lymph: no bleeding

## 2023-02-08 NOTE — ED PROVIDER NOTE - PATIENT PORTAL LINK FT
You can access the FollowMyHealth Patient Portal offered by [facility] by registering at the following website: [email or web address]. By joining [facility]’s FollowMyHealth portal, you will also be able to view your health information using other applications (apps) compatible with our system.

## 2023-02-08 NOTE — ED PROVIDER NOTE - CLINICAL SUMMARY MEDICAL DECISION MAKING FREE TEXT BOX
Plan to xray knee, not suspicious for dislocation, will eval for fx. CT head given pt doesnt recall event and had direct trauma. Plan to xray knee, not suspicious for dislocation, will eval for fx. CT head given pt doesnt recall event and had blunt trauma.

## 2023-02-08 NOTE — ED ADULT NURSE NOTE - OBJECTIVE STATEMENT
Pt presented to the ER s/p trip and fall. Pt stated that she tripped over something and hit her head on the door railing. Pt is unsure if she LOC. Pt is c/o right knee pain when standing. Pt denies blood thinner use. NA called. allergies to heparin and sulfer meds

## 2023-02-08 NOTE — ED ADULT TRIAGE NOTE - CHIEF COMPLAINT QUOTE
Pt presented to the ER s/p trip and fall. Pt stated that she tripped over something and hit her head on the door railing. Pt is unsure if she LOC. Pt is c/o right knee pain when standing. Pt denies blood thinner use. NA called.

## 2023-02-08 NOTE — ED PROVIDER NOTE - PHYSICAL EXAMINATION
Constitutional: NAD, well appearing  HEENT: no rhinorrhea, PERRL, no oropharyngeal erythema or exudates, midline uvula.  TMs clear.  CVS:  RRR, no m/r/g  Resp:  CTAB  GI: soft, ntnd  MSK:  +mild ttp right knee, no evidence of trauma to scalp, able to hold extension, NVI distally  Neuro:  A&Ox3, 5/5 strength to all extremities,  SILT to all extremities  Skin: no rash  psych: clear thought content  Heme/lymph:  No LAD

## 2023-02-08 NOTE — ED PROVIDER NOTE - PROGRESS NOTE DETAILS
Imaging unremarkable.  NVI to distal RLE.  D/c home with strict return precautions and prompt outpatient f/u.

## 2023-02-20 NOTE — ED ADULT NURSE NOTE - NSFALLRSKPASTHIST_ED_ALL_ED
Detail Level: Zone Add 36539 Cpt? (Important Note: In 2017 The Use Of 82140 Is Being Tracked By Cms To Determine Future Global Period Reimbursement For Global Periods): yes unable to determine

## 2023-10-12 NOTE — H&P PST ADULT - NSANTHOSAYNRD_GEN_A_CORE
Today's date 10/12/2023  Admission date 10/9/2023  Hospital Room 01496/A   Referring Provider Janee Jensen    Reason for initial visit: Evaluation and management of Diabetes Mellitus - type II, uncontrolled hyperglycemia with micro and macrovascular complications, on insulin pump at home    History of Present Illness:  (portions of this note are brought forward from previous endocrine note; reviewed and edited by me as appropriate)       This is a 79 year old Male who is admitted for traumatic bilateral frontal and parietal lobe SAH after falling at home. Is on  Plavix for cardiac stents. Is NPO. 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 Also reports lower BP trends since being on ACEi; falls typically at night. Had left sided weakness and body tremors that stopped prior to EMS arrival. Insulin pump has been suspended.     Recent events/ROS: BG stable, one lower one after 12 units lispro. Transferred to floor. Eating well. Denies HA. L arm feels better.     Current hospital regimen for diabetes Lantus 25 units daily and Humalog 8 units with mod correction cc    In hospital in 2019, we managed pt on Lantus 18 units bid, Humalog 12 units tid cc.    Current oral intake status Consistent Carb Moderate (45-75 Gm/Meal) Diet  Patient Diet Preferences Continuous Other - Enter in Comments at Right (Softer Foods Preferred, Allowed to Order Off [employer] Diets as Desired)  Daily W Dinner; Ensure Max Protein/Max Protein Low Calorie Supplement, Chocolate Oral Nutrition Supplement     Blood sugars while hospitalized    Recent Labs   Lab 10/11/23  1712 10/11/23  2107 10/12/23  0922   GLUCOSE BEDSIDE 73 209* 146*      Patient has Type 2 DM. Patient was diagnosed 2002. Home regimen includes Novolog U-100 in Medtronic 770G with guardian sensor and another meter from wife's insurance plan. Uses [employer].  Per 5/2023 endocrine office visit and  confirmed 10/9/23  Basal Rates  0000    1.25 units/hr  1000    1.35 units/hr  2200    1.25 units/hr     Carb Ratio  0000    8.0 gms                Sensitivity  0000    40  1000    30  2200    40     Target    0000    120 - 120  1000    110 - 120  2200    120 - 120                Active Insulin Time:  4 hrs    Blood sugars at home range  fasting, 120-179 at night. Patient was not having hypoglycemic episodes at home recently, but in past was having fasting hypoglycemia. Patient does not have good hypoglycemic awareness.     Patient sees Alina Yarbrough PA-C / Dr. Saxena for diabetes management as an outpatient.    Diabetes complications include:   Retinopathy - Yes, blind in right eye.   Nephropathy- Stage II   Neuropathy- Peripheral. Patient does have a history of ulcers and/or amputations to the lower extremities. Had right D4 amputation    History:  Past medical, surgical, family and social history reviewed from initial endocrine note.    Physical Exam:    Visit Vitals  /65 (BP Location: RUE - Right upper extremity, Patient Position: Sitting)   Pulse 73   Temp 97.9 °F (36.6 °C) (Oral)   Resp 18   Ht 5' 11\" (1.803 m)   Wt 122.4 kg (269 lb 13.5 oz)   SpO2 96%   BMI 37.64 kg/m²     Constitutional: Resting, well nourished male in NAD. Wife in room.   Eyes: Anicteric and no lid lesions.  Respiratory: non-labored.     GI: obese, non-distended    Skin: no visible rashes.    Psych: Normal mood and affect.  Musculoskeletal: LYN x 4   Neuro: Awake, alert and oriented. No tremor. Follows commands. Speech normal.      Lab Review:  Hemoglobin A1C (%)   Date Value   10/09/2023 7.2 (H)     Creatinine (mg/dL)   Date Value   10/12/2023 0.97      Assessment:  Diabetes Mellitus - type II, uncontrolled hyperglycemia with micro and macrovascular complications   DM microvascular complications: nephropathy (CKD II), retinopathy, peripheral neuropathy  DM macrovascular complications: CAD  Insulin pump management - will not  resume today  Obesity Class I Body mass index is 37.64 kg/m².     Plan:  Total basal in pump per previous review is 31.2 units. Restarted diet after being intermittently NPO. OK to restart pump in AM if no other imaging is planned. They feel comfortable with this and we would need to hold Lantus in that case.  - increase Lantus to 30 units daily   - increase Humalog to 10 units TID AC +1:50 scale   - Continue to check BG TID AC, HS and PRN   - Continue hypoglycemia protocol PRN   - consistent carb diet modifier     MONSERRAT: TBD  Plan: insulin pump, home settings. Follow-up with us in office.    We will continue to follow and titrate/manage medications as appropriate.     Case discussed with and plan of care developed with attending endocrinologist    Francis Iyer NP MD ADDENDUM:  I have fully reviewed the history, pertinent labs, medications, and physical exam and agree with the full note above by the APC  I have seen and examined the patient and conducted my own physical exam as listed below.  >50% of the patient care time, including face to face contact and coordination of care was spent by myself as the primary provider.  100% of the Medical Decision Making for this patient was done by myself.     Feeling better  Good appetite  No N/V    Objective:  Vitals Reviewed  Constitutional: Sitting up in bed, well nourished and NAD.  Eyes: Anicteric and conjunctivae not pale. No exophthalmos  Head: Normocephalic, atraumatic  Respiratory: Non-labored breathing.   Skin: No visible rashes.  Psych: Normal mood and affect.  Musculoskeletal: LYN x4, no deformities  Neuro: Awake, alert. No tremor. Speech normal.      Insulin regimen changes as outlined above         No. EZEQUIEL screening performed.  STOP BANG Legend: 0-2 = LOW Risk; 3-4 = INTERMEDIATE Risk; 5-8 = HIGH Risk

## 2024-02-21 NOTE — ED PROVIDER NOTE - RESPIRATORY, MLM
Patient has a lab appt scheduled tomorrow and there are no labs in her chart. Did you want her to have anything done other than an A1C? She does have an appt 2/26 with you. Please advise   Breath sounds clear and equal bilaterally. Breath sounds clear and equal bilaterally. Decreased breath sounds

## 2024-04-03 NOTE — BRIEF OPERATIVE NOTE - VENOUS THROMBOEMBOLISM PROPHYLAXIS THERAPY
-- DO NOT REPLY / DO NOT REPLY ALL --  -- Message is from Engagement Center Operations (ECO) --    Offered Waitlist if Available for the Visit Type? No    Caller is requesting an appointment - at a sooner time than what was available.      Caller wants sooner appointment - offered other approved options    Reason for Visit: Follow up visit from appt 4/3/24    Is the patient currently scheduled? No    Preferred time to be seen: as soon as possible    Caller Information         Type Contact Phone/Fax    04/03/2024 12:15 PM CDT Phone (Incoming) CONOR MONTERROSO (Mother) [phone]            Alternative phone number: na    Can a detailed message be left? Yes    Message Turnaround: {[facility] -    Refer to site's KB page for routing instructions    Please give this turnaround time to the caller -    \"You can expect to receive a response 2-3 business days after your provider's clinical team reviews the message   SCD

## 2024-08-30 ENCOUNTER — OFFICE VISIT (OUTPATIENT)
Age: 64
End: 2024-08-30
Payer: COMMERCIAL

## 2024-08-30 VITALS
DIASTOLIC BLOOD PRESSURE: 71 MMHG | BODY MASS INDEX: 41.59 KG/M2 | OXYGEN SATURATION: 98 % | HEART RATE: 68 BPM | HEIGHT: 67 IN | WEIGHT: 265 LBS | TEMPERATURE: 97.8 F | RESPIRATION RATE: 16 BRPM | SYSTOLIC BLOOD PRESSURE: 150 MMHG

## 2024-08-30 DIAGNOSIS — N95.0 POST-MENOPAUSAL BLEEDING: Primary | ICD-10-CM

## 2024-08-30 PROCEDURE — 99203 OFFICE O/P NEW LOW 30 MIN: CPT | Performed by: OBSTETRICS & GYNECOLOGY

## 2024-08-30 RX ORDER — FLUTICASONE PROPIONATE AND SALMETEROL 50; 250 UG/1; UG/1
POWDER RESPIRATORY (INHALATION)
COMMUNITY
Start: 2024-08-02

## 2024-08-30 RX ORDER — BISACODYL 5 MG
5 TABLET, DELAYED RELEASE (ENTERIC COATED) ORAL
COMMUNITY
Start: 2024-08-02

## 2024-08-30 SDOH — ECONOMIC STABILITY: INCOME INSECURITY: HOW HARD IS IT FOR YOU TO PAY FOR THE VERY BASICS LIKE FOOD, HOUSING, MEDICAL CARE, AND HEATING?: NOT HARD AT ALL

## 2024-08-30 SDOH — ECONOMIC STABILITY: FOOD INSECURITY: WITHIN THE PAST 12 MONTHS, YOU WORRIED THAT YOUR FOOD WOULD RUN OUT BEFORE YOU GOT MONEY TO BUY MORE.: NEVER TRUE

## 2024-08-30 SDOH — ECONOMIC STABILITY: FOOD INSECURITY: WITHIN THE PAST 12 MONTHS, THE FOOD YOU BOUGHT JUST DIDN'T LAST AND YOU DIDN'T HAVE MONEY TO GET MORE.: NEVER TRUE

## 2024-08-30 ASSESSMENT — PATIENT HEALTH QUESTIONNAIRE - PHQ9
SUM OF ALL RESPONSES TO PHQ QUESTIONS 1-9: 0
SUM OF ALL RESPONSES TO PHQ QUESTIONS 1-9: 0
2. FEELING DOWN, DEPRESSED OR HOPELESS: NOT AT ALL
1. LITTLE INTEREST OR PLEASURE IN DOING THINGS: NOT AT ALL
SUM OF ALL RESPONSES TO PHQ9 QUESTIONS 1 & 2: 0
SUM OF ALL RESPONSES TO PHQ QUESTIONS 1-9: 0
SUM OF ALL RESPONSES TO PHQ QUESTIONS 1-9: 0

## 2024-08-30 NOTE — PROGRESS NOTES
Chief Complaint   Patient presents with    PMB     Patient being seen for abnormal bleeding. Relays 2-3 years ago she did have a polyp removed when she was in [place]    Relays that she has had recent bleeding (last night). Sometimes it is spotting where she doesn't need a pad and at times she relays she has a rush of blood and needs one    Ob/Gyn Hx:   -   Menopause - unsure due to spotting  Hx of STI - none  SA - yes, male    Health Maintenance:  Last Pap: 2 years ago, normal per patient     1. Have you been to the ER, urgent care clinic, or hospitalized since your last visit? Not recent    2. Have you seen or consulted any other health care providers outside of the [facility] since your last visit? no    Patient declines chaperone.    Lisa Lorenzo LPN  
Hermelinda Valverde is a 64 y.o. female presents for a problem visit.    Chief Complaint   Patient presents with    PMB       Problems:  Thickened Endometrium        No LMP recorded. (Menstrual status: Menopause).    Birth Control: post menopausal status.      Chart reviewed for the following:   Diana PICKENS RN, have reviewed the History, Physical and updated the Allergic reactions for Hermelinda Dettling     TIME OUT performed immediately prior to start of procedure:   Diana PICKENS RN, have performed the following reviews on Hermelinda Dettling prior to the start of the procedure:            * Patient was identified by name and date of birth   * Agreement on procedure being performed was verified  * Risks and Benefits explained to the patient  * Procedure site verified and marked as necessary  * Patient was positioned for comfort  * Consent was signed and verified     Time: 1205    Date of procedure: 8/30/2024    Procedure performed by:  Sirena Soni MD       Provider assisted by: Bhargav PARRA RN    Patient assisted by: partner    How tolerated by patient: tolerated the procedure well with no complications    Post Procedural Pain Scale: 2 - Hurts Little Bit    Comments: none    Examination chaperoned by Diana Durant RN.  
bleeding, bruising, pallor    Physical Exam    BP (!) 150/71   Pulse 68   Temp 97.8 °F (36.6 °C) (Oral)   Resp 16   Ht 1.702 m (5' 7\")   Wt 120.2 kg (265 lb)   SpO2 98%   BMI 41.50 kg/m²       OBGyn Exam      Constitutional  Appearance: well-nourished, well developed, alert, in no acute distress    HENT  Head and Face: appears normal    Neck  Inspection/Palpation: normal appearance, no masses or tenderness  Thyroid: gland size normal, nontender    Chest  Respiratory Effort: non-labored breathing    Cardiovascular  Extremities: no peripheral edema    Gastrointestinal  Abdominal Examination: abdomen non-distended, non-tender to palpation, no masses present, multiple well healed abdominal incisions, previous colostomy site  Liver and spleen: no hepatomegaly present, spleen not palpable  Hernias: no hernias identified    Genitourinary  External Genitalia: normal appearance for age, no discharge present, no tenderness present, no inflammatory lesions present, no masses present, no atrophy present  Vagina: normal vaginal vault without central or paravaginal defects, no discharge present, no inflammatory lesions present, no masses present  Bladder: non-tender to palpation  Urethra: appears normal  Cervix: normal   Uterus: normal size, shape and consistency  Adnexa: no adnexal tenderness present, no adnexal masses present  Perineum: perineum within normal limits, no evidence of trauma, no rashes or skin lesions present, significant scarring along the perineum with 4-5 cm margins of scar tissue noted around anus    Skin  General Inspection: no rash, no lesions identified    Neurologic/Psychiatric  Mental Status:  Orientation: grossly oriented to person, place and time  Mood and Affect: mood normal, affect appropriate      Assessment/Plan:  Hermelinda Valverde is a 64 y.o. who presents for the following problems.    1. Post-menopausal bleeding  - We discussed common causes of PMB including endometrial atrophy  - We discussed

## 2024-09-05 ENCOUNTER — TELEPHONE (OUTPATIENT)
Age: 64
End: 2024-09-05

## 2024-09-05 LAB
CPT BILLING CODE: NORMAL
DIAGNOSIS SYNOPSIS:: NORMAL
DX ICD CODE: NORMAL
PATH REPORT.FINAL DX SPEC: NORMAL
PATH REPORT.GROSS SPEC: NORMAL
PATH REPORT.RELEVANT HX SPEC: NORMAL
PATH REPORT.SITE OF ORIGIN SPEC: NORMAL
PATHOLOGIST NAME: NORMAL
PAYMENT PROCEDURE: NORMAL

## 2024-09-05 NOTE — TELEPHONE ENCOUNTER
Patient has called in regarding her most recent lab results. She has been informed that the test results are not yet back and with the past Monday being a holiday, it may take a bout a day or 2 longer. Pt has expressed understanding.

## 2024-09-09 ENCOUNTER — TELEPHONE (OUTPATIENT)
Age: 64
End: 2024-09-09

## 2024-10-08 ENCOUNTER — TELEPHONE (OUTPATIENT)
Age: 64
End: 2024-10-08

## 2024-10-08 RX ORDER — MEDROXYPROGESTERONE ACETATE 10 MG
10 TABLET ORAL DAILY
Qty: 60 TABLET | Refills: 1 | Status: SHIPPED | OUTPATIENT
Start: 2024-10-08 | End: 2024-10-09 | Stop reason: SDUPTHER

## 2024-10-08 NOTE — TELEPHONE ENCOUNTER
Patient called in, name and  verified. Patient is calling as she was seen on 2024 and the results came back benign and the next steps were for her to update us with her bleeding.    She has an upcoming appt scheduled for 10/16 to discuss surgery but she would like to move forward with the procedure as she has been bleeding heavily for a few weeks now.    She would like for the next appt to be the actual procedure (hysteroscopy). Please let me know if you would like for her to keep her appt on 10/16 prior to any procedures. She reports that she lives about an hour away.

## 2024-10-09 ENCOUNTER — TELEPHONE (OUTPATIENT)
Age: 64
End: 2024-10-09

## 2024-10-09 ENCOUNTER — PREP FOR PROCEDURE (OUTPATIENT)
Facility: HOSPITAL | Age: 64
End: 2024-10-09

## 2024-10-09 DIAGNOSIS — N95.0 POSTMENOPAUSAL BLEEDING: ICD-10-CM

## 2024-10-09 RX ORDER — MEDROXYPROGESTERONE ACETATE 10 MG
TABLET ORAL
Qty: 90 TABLET | Refills: 1 | Status: SHIPPED | OUTPATIENT
Start: 2024-10-09

## 2024-10-09 NOTE — TELEPHONE ENCOUNTER
Attempted to relay medication instructions to patient. Unable to leave a message. Call stated \"Wireless customer is unavailable at this time\".

## 2024-10-10 ENCOUNTER — PREP FOR PROCEDURE (OUTPATIENT)
Facility: HOSPITAL | Age: 64
End: 2024-10-10

## 2024-10-10 DIAGNOSIS — N95.0 POSTMENOPAUSAL BLEEDING: Primary | ICD-10-CM

## 2024-10-15 ENCOUNTER — TELEPHONE (OUTPATIENT)
Age: 64
End: 2024-10-15

## 2024-10-15 NOTE — TELEPHONE ENCOUNTER
Name and  verified. Confirmed pharmacy instructions and patient stated she picked up new prescription and verbally verified instructions. No new questions at this time.

## 2024-10-16 NOTE — PHYSICAL THERAPY INITIAL EVALUATION ADULT - REHAB POTENTIAL, PT EVAL
10/12/24 1144   Readmission Assessment   Number of Days since last admission? 8-30 days   Previous Disposition Home with Family   Who is being Interviewed Patient   What was the patient's/caregiver's perception as to why they think they needed to return back to the hospital? Other (Comment)  (Withdrawls)   Did you visit your Primary Care Physician after you left the hospital, before you returned this time? No   Why weren't you able to visit your PCP? Other (Comment)  (Needs a PCP)   Did you see a specialist, such as Cardiac, Pulmonary, Orthopedic Physician, etc. after you left the hospital? No   Who advised the patient to return to the hospital? Self-referral   Does the patient report anything that got in the way of taking their medications? No   In our efforts to provide the best possible care to you and others like you, can you think of anything that we could have done to help you after you left the hospital the first time, so that you might not have needed to return so soon? Other (Comment)  (No)     Patient Goals/Plan/Treatment Preferences: Outpatient addiction services. Home with son.     If patient is discharged prior to next notation, then this note serves as note for discharge by case management.    10/14/24, 2:27 PM EDT    DISCHARGE PLANNING EVALUATION    Received Social Work consult “For consideration of Rehab”.  Addiction/DIANA  consulted.   met with patient, following for needs.  Full Social Consult deferred.    10/15/24, 12:49 PM EDT    DISCHARGE ON GOING EVALUATION    [facility] day: 3  Location: [facility]29/029-A  Reason for admit: Prolonged Q-T interval on ECG [R94.31]  Alcohol withdrawal syndrome with complication (HCC) [F10.939]  Alcohol use with withdrawal (HCC) [F10.939]     Procedures:   10/15 EGD with Dr. Joel: planned.     Imaging since last note: None    Barriers to Discharge: Hospitalist and GI following. GI planning EGD today to eval for esophagitis and Isabel-George tear. Protonix iv bid. No Phenobarb since 10/13.     PCP: No primary care provider on file.  Readmission Risk Score: 14.2    Patient Goals/Plan/Treatment Preferences: Pt is from home with son. Monitor for needs. [facility]t [facility].      10/16/24, 12:34 PM EDT    Patient goals/plan/ treatment preferences discussed by  and .  Patient goals/plan/ treatment preferences reviewed with patient/ family.  Patient/ family verbalize understanding of discharge plan and are in agreement with goal/plan/treatment preferences.  Understanding was demonstrated using the teach back method.  AVS provided by RN at time of discharge, which includes all necessary medical information pertaining to the patients current course of illness, treatment, post-discharge goals of care, and treatment preferences.     Services At/After Discharge: None    Plans home with son. No needs. New PCP appt with [facility].     Electronically signed by Laura Finley RN on 10/16/2024 at 12:35 PM       arrangement Yes   Ability to make needs known: Good   Family able to assist with home care needs: Yes   Would you like for me to discuss the discharge plan with any other family members/significant others, and if so, who? No   Financial Resources Medicaid   Community Resources None   Social/Functional History   Active  Yes   Discharge Planning   Type of Residence House   Living Arrangements Children   Current Services Prior To Admission None   Potential Assistance Needed N/A   DME Ordered? No   Potential Assistance Purchasing Medications No   Type of Home Care Services None   Patient expects to be discharged to: House   Services At/After Discharge   Mode of Transport at Discharge  --    Confirm Follow Up Transport Self   Condition of Participation: Discharge Planning   The Plan for Transition of Care is related to the following treatment goals: \"get me better to go home\"          good, to achieve stated therapy goals

## 2024-10-22 RX ORDER — ASCORBIC ACID 500 MG
500 TABLET ORAL DAILY
COMMUNITY

## 2024-10-22 RX ORDER — ALBUTEROL SULFATE 90 UG/1
2 INHALANT RESPIRATORY (INHALATION) EVERY 6 HOURS PRN
COMMUNITY

## 2024-10-22 NOTE — PERIOP NOTE
PAT PREOP PHONE INTERVIEW COMPLETED WITH:     PATIENT ADVISED NOT TO EAT/DRINK ANYTHING PAST MIDNIGHT EVENING PRIOR TO SURGERY,  NOTHING TO EAT/DRINK MORNING OF SURGERY UNLESS SIP OF WATER TO SWALLOW MEDICATION;  LEAVE ALL VALUABLES AT HOME; DO BRING PICTURE ID, INSURANCE CARD AND ANY COPAY; WEAR COMFORTABLE CLOTHING;  NO PERFUMES, POWDERS, LOTIONS; NO ALCOHOL 24 HOURS BEFORE OR AFTER SURGERY;  WILL NEED TO BE DRIVEN HOME BY FAMILY OR FRIEND;  AVOID TAKING NSAIDS, ASPIRIN, FISH OIL, VITAMIN E OR GLUCOSAMINE/CHONDROITIN DURING THIS TIME PRIOR TO SURGERY;  MAY TAKE TYLENOL.  INSTRUCTED TO REPORT TO [facility] ADMITTING DEPARTMENT AT THE TIME GIVEN BY SURGEON'S OFFICE.  INSTRUCTION PROVIDED FOR CHG SOAP.

## 2024-10-25 ENCOUNTER — ANESTHESIA EVENT (OUTPATIENT)
Facility: HOSPITAL | Age: 64
End: 2024-10-25
Payer: COMMERCIAL

## 2024-10-25 ENCOUNTER — ANESTHESIA (OUTPATIENT)
Facility: HOSPITAL | Age: 64
End: 2024-10-25
Payer: COMMERCIAL

## 2024-10-25 ENCOUNTER — HOSPITAL ENCOUNTER (OUTPATIENT)
Facility: HOSPITAL | Age: 64
Setting detail: OUTPATIENT SURGERY
Discharge: HOME | End: 2024-10-25
Attending: OBSTETRICS & GYNECOLOGY | Admitting: OBSTETRICS & GYNECOLOGY
Payer: COMMERCIAL

## 2024-10-25 VITALS
TEMPERATURE: 97.8 F | DIASTOLIC BLOOD PRESSURE: 61 MMHG | RESPIRATION RATE: 26 BRPM | OXYGEN SATURATION: 96 % | WEIGHT: 255 LBS | HEART RATE: 82 BPM | BODY MASS INDEX: 40.02 KG/M2 | SYSTOLIC BLOOD PRESSURE: 133 MMHG | HEIGHT: 67 IN

## 2024-10-25 DIAGNOSIS — N95.0 POSTMENOPAUSAL BLEEDING: ICD-10-CM

## 2024-10-25 PROCEDURE — 3700000001 HC ADD 15 MINUTES (ANESTHESIA): Performed by: OBSTETRICS & GYNECOLOGY

## 2024-10-25 PROCEDURE — 7100000001 HC PACU RECOVERY - ADDTL 15 MIN: Performed by: OBSTETRICS & GYNECOLOGY

## 2024-10-25 PROCEDURE — 6360000002 HC RX W HCPCS: Performed by: NURSE ANESTHETIST, CERTIFIED REGISTERED

## 2024-10-25 PROCEDURE — 58558 HYSTEROSCOPY BIOPSY: CPT | Performed by: OBSTETRICS & GYNECOLOGY

## 2024-10-25 PROCEDURE — 2709999900 HC NON-CHARGEABLE SUPPLY: Performed by: OBSTETRICS & GYNECOLOGY

## 2024-10-25 PROCEDURE — 7100000010 HC PHASE II RECOVERY - FIRST 15 MIN: Performed by: OBSTETRICS & GYNECOLOGY

## 2024-10-25 PROCEDURE — 3600000014 HC SURGERY LEVEL 4 ADDTL 15MIN: Performed by: OBSTETRICS & GYNECOLOGY

## 2024-10-25 PROCEDURE — 88312 SPECIAL STAINS GROUP 1: CPT

## 2024-10-25 PROCEDURE — 7100000011 HC PHASE II RECOVERY - ADDTL 15 MIN: Performed by: OBSTETRICS & GYNECOLOGY

## 2024-10-25 PROCEDURE — 2580000003 HC RX 258: Performed by: NURSE ANESTHETIST, CERTIFIED REGISTERED

## 2024-10-25 PROCEDURE — 3700000000 HC ANESTHESIA ATTENDED CARE: Performed by: OBSTETRICS & GYNECOLOGY

## 2024-10-25 PROCEDURE — 7100000000 HC PACU RECOVERY - FIRST 15 MIN: Performed by: OBSTETRICS & GYNECOLOGY

## 2024-10-25 PROCEDURE — 2500000003 HC RX 250 WO HCPCS: Performed by: NURSE ANESTHETIST, CERTIFIED REGISTERED

## 2024-10-25 PROCEDURE — 2580000003 HC RX 258: Performed by: ANESTHESIOLOGY

## 2024-10-25 PROCEDURE — 3600000004 HC SURGERY LEVEL 4 BASE: Performed by: OBSTETRICS & GYNECOLOGY

## 2024-10-25 PROCEDURE — 6370000000 HC RX 637 (ALT 250 FOR IP): Performed by: OBSTETRICS & GYNECOLOGY

## 2024-10-25 PROCEDURE — 88305 TISSUE EXAM BY PATHOLOGIST: CPT

## 2024-10-25 PROCEDURE — 2720000010 HC SURG SUPPLY STERILE: Performed by: OBSTETRICS & GYNECOLOGY

## 2024-10-25 RX ORDER — MEPERIDINE HYDROCHLORIDE 25 MG/ML
12.5 INJECTION INTRAMUSCULAR; INTRAVENOUS; SUBCUTANEOUS EVERY 5 MIN PRN
Status: DISCONTINUED | OUTPATIENT
Start: 2024-10-25 | End: 2024-10-25 | Stop reason: HOSPADM

## 2024-10-25 RX ORDER — ACETAMINOPHEN 325 MG/1
650 TABLET ORAL ONCE
Status: DISCONTINUED | OUTPATIENT
Start: 2024-10-25 | End: 2024-10-25 | Stop reason: HOSPADM

## 2024-10-25 RX ORDER — DIPHENHYDRAMINE HYDROCHLORIDE 50 MG/ML
12.5 INJECTION INTRAMUSCULAR; INTRAVENOUS
Status: DISCONTINUED | OUTPATIENT
Start: 2024-10-25 | End: 2024-10-25 | Stop reason: HOSPADM

## 2024-10-25 RX ORDER — LIDOCAINE HYDROCHLORIDE 10 MG/ML
1 INJECTION, SOLUTION EPIDURAL; INFILTRATION; INTRACAUDAL; PERINEURAL
Status: DISCONTINUED | OUTPATIENT
Start: 2024-10-25 | End: 2024-10-25 | Stop reason: HOSPADM

## 2024-10-25 RX ORDER — MIDAZOLAM HYDROCHLORIDE 1 MG/ML
INJECTION, SOLUTION INTRAMUSCULAR; INTRAVENOUS
Status: DISCONTINUED | OUTPATIENT
Start: 2024-10-25 | End: 2024-10-25 | Stop reason: SDUPTHER

## 2024-10-25 RX ORDER — PROPOFOL 10 MG/ML
INJECTION, EMULSION INTRAVENOUS
Status: DISCONTINUED | OUTPATIENT
Start: 2024-10-25 | End: 2024-10-25 | Stop reason: SDUPTHER

## 2024-10-25 RX ORDER — SODIUM CHLORIDE 0.9 % (FLUSH) 0.9 %
5-40 SYRINGE (ML) INJECTION EVERY 12 HOURS SCHEDULED
Status: DISCONTINUED | OUTPATIENT
Start: 2024-10-25 | End: 2024-10-25 | Stop reason: HOSPADM

## 2024-10-25 RX ORDER — DEXAMETHASONE SODIUM PHOSPHATE 4 MG/ML
INJECTION, SOLUTION INTRA-ARTICULAR; INTRALESIONAL; INTRAMUSCULAR; INTRAVENOUS; SOFT TISSUE
Status: DISCONTINUED | OUTPATIENT
Start: 2024-10-25 | End: 2024-10-25 | Stop reason: SDUPTHER

## 2024-10-25 RX ORDER — OXYCODONE HYDROCHLORIDE 5 MG/1
5 TABLET ORAL
Status: DISCONTINUED | OUTPATIENT
Start: 2024-10-25 | End: 2024-10-25 | Stop reason: HOSPADM

## 2024-10-25 RX ORDER — MEPERIDINE HYDROCHLORIDE 25 MG/ML
INJECTION INTRAMUSCULAR; INTRAVENOUS; SUBCUTANEOUS
Status: DISCONTINUED | OUTPATIENT
Start: 2024-10-25 | End: 2024-10-25 | Stop reason: SDUPTHER

## 2024-10-25 RX ORDER — NALOXONE HYDROCHLORIDE 0.4 MG/ML
INJECTION, SOLUTION INTRAMUSCULAR; INTRAVENOUS; SUBCUTANEOUS PRN
Status: DISCONTINUED | OUTPATIENT
Start: 2024-10-25 | End: 2024-10-25 | Stop reason: HOSPADM

## 2024-10-25 RX ORDER — PROCHLORPERAZINE EDISYLATE 5 MG/ML
5 INJECTION INTRAMUSCULAR; INTRAVENOUS
Status: DISCONTINUED | OUTPATIENT
Start: 2024-10-25 | End: 2024-10-25 | Stop reason: HOSPADM

## 2024-10-25 RX ORDER — SODIUM CHLORIDE, SODIUM LACTATE, POTASSIUM CHLORIDE, CALCIUM CHLORIDE 600; 310; 30; 20 MG/100ML; MG/100ML; MG/100ML; MG/100ML
INJECTION, SOLUTION INTRAVENOUS CONTINUOUS
Status: DISCONTINUED | OUTPATIENT
Start: 2024-10-25 | End: 2024-10-25 | Stop reason: HOSPADM

## 2024-10-25 RX ORDER — LIDOCAINE HYDROCHLORIDE 20 MG/ML
INJECTION, SOLUTION EPIDURAL; INFILTRATION; INTRACAUDAL; PERINEURAL
Status: DISCONTINUED | OUTPATIENT
Start: 2024-10-25 | End: 2024-10-25 | Stop reason: SDUPTHER

## 2024-10-25 RX ORDER — SODIUM CHLORIDE 0.9 % (FLUSH) 0.9 %
5-40 SYRINGE (ML) INJECTION PRN
Status: DISCONTINUED | OUTPATIENT
Start: 2024-10-25 | End: 2024-10-25 | Stop reason: HOSPADM

## 2024-10-25 RX ORDER — SODIUM CHLORIDE 9 MG/ML
INJECTION, SOLUTION INTRAVENOUS PRN
Status: DISCONTINUED | OUTPATIENT
Start: 2024-10-25 | End: 2024-10-25 | Stop reason: HOSPADM

## 2024-10-25 RX ORDER — ONDANSETRON 2 MG/ML
4 INJECTION INTRAMUSCULAR; INTRAVENOUS
Status: DISCONTINUED | OUTPATIENT
Start: 2024-10-25 | End: 2024-10-25 | Stop reason: HOSPADM

## 2024-10-25 RX ORDER — PHENYLEPHRINE HCL IN 0.9% NACL 0.4MG/10ML
SYRINGE (ML) INTRAVENOUS
Status: DISCONTINUED | OUTPATIENT
Start: 2024-10-25 | End: 2024-10-25 | Stop reason: SDUPTHER

## 2024-10-25 RX ORDER — ONDANSETRON 2 MG/ML
INJECTION INTRAMUSCULAR; INTRAVENOUS
Status: DISCONTINUED | OUTPATIENT
Start: 2024-10-25 | End: 2024-10-25 | Stop reason: SDUPTHER

## 2024-10-25 RX ORDER — MIDAZOLAM HYDROCHLORIDE 2 MG/2ML
2 INJECTION, SOLUTION INTRAMUSCULAR; INTRAVENOUS
Status: DISCONTINUED | OUTPATIENT
Start: 2024-10-25 | End: 2024-10-25 | Stop reason: HOSPADM

## 2024-10-25 RX ORDER — FENTANYL CITRATE 50 UG/ML
INJECTION, SOLUTION INTRAMUSCULAR; INTRAVENOUS
Status: DISCONTINUED | OUTPATIENT
Start: 2024-10-25 | End: 2024-10-25 | Stop reason: SDUPTHER

## 2024-10-25 RX ORDER — SODIUM CHLORIDE 9 MG/ML
INJECTION, SOLUTION INTRAVENOUS CONTINUOUS
Status: DISCONTINUED | OUTPATIENT
Start: 2024-10-25 | End: 2024-10-25 | Stop reason: HOSPADM

## 2024-10-25 RX ORDER — FENTANYL CITRATE 50 UG/ML
50 INJECTION, SOLUTION INTRAMUSCULAR; INTRAVENOUS PRN
Status: DISCONTINUED | OUTPATIENT
Start: 2024-10-25 | End: 2024-10-25 | Stop reason: HOSPADM

## 2024-10-25 RX ORDER — HYDROMORPHONE HYDROCHLORIDE 1 MG/ML
0.5 INJECTION, SOLUTION INTRAMUSCULAR; INTRAVENOUS; SUBCUTANEOUS EVERY 5 MIN PRN
Status: DISCONTINUED | OUTPATIENT
Start: 2024-10-25 | End: 2024-10-25 | Stop reason: HOSPADM

## 2024-10-25 RX ORDER — FENTANYL CITRATE 50 UG/ML
25 INJECTION, SOLUTION INTRAMUSCULAR; INTRAVENOUS PRN
Status: DISCONTINUED | OUTPATIENT
Start: 2024-10-25 | End: 2024-10-25 | Stop reason: HOSPADM

## 2024-10-25 RX ORDER — FENTANYL CITRATE 50 UG/ML
25 INJECTION, SOLUTION INTRAMUSCULAR; INTRAVENOUS EVERY 5 MIN PRN
Status: DISCONTINUED | OUTPATIENT
Start: 2024-10-25 | End: 2024-10-25 | Stop reason: HOSPADM

## 2024-10-25 RX ORDER — EPHEDRINE SULFATE 50 MG/ML
INJECTION INTRAVENOUS
Status: DISCONTINUED | OUTPATIENT
Start: 2024-10-25 | End: 2024-10-25 | Stop reason: SDUPTHER

## 2024-10-25 RX ORDER — LABETALOL HYDROCHLORIDE 5 MG/ML
10 INJECTION, SOLUTION INTRAVENOUS
Status: DISCONTINUED | OUTPATIENT
Start: 2024-10-25 | End: 2024-10-25 | Stop reason: HOSPADM

## 2024-10-25 RX ORDER — MIDAZOLAM HYDROCHLORIDE 2 MG/2ML
2 INJECTION, SOLUTION INTRAMUSCULAR; INTRAVENOUS PRN
Status: DISCONTINUED | OUTPATIENT
Start: 2024-10-25 | End: 2024-10-25 | Stop reason: HOSPADM

## 2024-10-25 RX ORDER — SODIUM CHLORIDE, SODIUM LACTATE, POTASSIUM CHLORIDE, CALCIUM CHLORIDE 600; 310; 30; 20 MG/100ML; MG/100ML; MG/100ML; MG/100ML
INJECTION, SOLUTION INTRAVENOUS
Status: DISCONTINUED | OUTPATIENT
Start: 2024-10-25 | End: 2024-10-25 | Stop reason: SDUPTHER

## 2024-10-25 RX ADMIN — MEPERIDINE HYDROCHLORIDE 25 MG: 25 INJECTION INTRAMUSCULAR; INTRAVENOUS; SUBCUTANEOUS at 09:59

## 2024-10-25 RX ADMIN — DEXAMETHASONE SODIUM PHOSPHATE 4 MG: 4 INJECTION INTRA-ARTICULAR; INTRALESIONAL; INTRAMUSCULAR; INTRAVENOUS; SOFT TISSUE at 09:17

## 2024-10-25 RX ADMIN — ONDANSETRON 4 MG: 2 INJECTION INTRAMUSCULAR; INTRAVENOUS at 09:17

## 2024-10-25 RX ADMIN — EPHEDRINE SULFATE 10 MG: 50 INJECTION INTRAVENOUS at 09:37

## 2024-10-25 RX ADMIN — Medication 80 MCG: at 09:28

## 2024-10-25 RX ADMIN — MIDAZOLAM 3 MG: 1 INJECTION INTRAMUSCULAR; INTRAVENOUS at 09:00

## 2024-10-25 RX ADMIN — FENTANYL CITRATE 50 MCG: 50 INJECTION, SOLUTION INTRAMUSCULAR; INTRAVENOUS at 09:12

## 2024-10-25 RX ADMIN — MIDAZOLAM 2 MG: 1 INJECTION INTRAMUSCULAR; INTRAVENOUS at 09:05

## 2024-10-25 RX ADMIN — Medication 80 MCG: at 09:31

## 2024-10-25 RX ADMIN — SODIUM CHLORIDE, POTASSIUM CHLORIDE, SODIUM LACTATE AND CALCIUM CHLORIDE: 600; 310; 30; 20 INJECTION, SOLUTION INTRAVENOUS at 09:03

## 2024-10-25 RX ADMIN — SODIUM CHLORIDE, POTASSIUM CHLORIDE, SODIUM LACTATE AND CALCIUM CHLORIDE: 600; 310; 30; 20 INJECTION, SOLUTION INTRAVENOUS at 09:07

## 2024-10-25 RX ADMIN — PROPOFOL 200 MG: 10 INJECTION, EMULSION INTRAVENOUS at 09:12

## 2024-10-25 RX ADMIN — LIDOCAINE HYDROCHLORIDE 100 MG: 20 INJECTION, SOLUTION EPIDURAL; INFILTRATION; INTRACAUDAL; PERINEURAL at 09:12

## 2024-10-25 RX ADMIN — FENTANYL CITRATE 50 MCG: 50 INJECTION, SOLUTION INTRAMUSCULAR; INTRAVENOUS at 09:53

## 2024-10-25 ASSESSMENT — PAIN - FUNCTIONAL ASSESSMENT: PAIN_FUNCTIONAL_ASSESSMENT: NONE - DENIES PAIN

## 2024-10-25 NOTE — H&P
Gynecology History and Physical    Name: Hermelinda Valverde MRN: [id number] SSN: [id number]    YOB: 1960  Age: 64 y.o.  Sex: female       Subjective:      Chief complaint:  Postmenopausal bleeding    Hermelinda is a 64 y.o. female with a history of postmenopausal bleeding. TVUS demonstrated endometrial thickening with 15 mm lining. Endometrial biopsy was performed however results demonstrated scant fragments of atrophic endometrium. Hermelinda reports that she was continuing to have heavy bleeding. Given this, we recommended evaluation in the OR. She was sent high dose provera (20 mg TID x 7 days) then 10 mg daily to improve bleeding.    She has an extensive abdominal surgical history including a previous colostomy s/p takedown. She cannot remember the reason for all her surgeries. She also reports extensive transrectal surgery. She has multiple scare on her abdomen and along her perineum and anal area     She is admitted for Procedure(s) (LRB):  HYSTEROSCOPY, DILATATION AND CURETTAGE (N/A).    Previous ultrasound imaging performed:         OB History          4    Para        Term                AB        Living   4         SAB        IAB        Ectopic        Molar        Multiple        Live Births                  Past Medical History:   Diagnosis Date    Asthma     Fibroid, uterine 2020    Post-menopausal bleeding     Uterine cyst      Past Surgical History:   Procedure Laterality Date    ABDOMINAL HERNIA REPAIR  2023    x2    COLONOSCOPY      COLOSTOMY  2020    COLOSTOMY CLOSURE      ENDOSCOPY, COLON, DIAGNOSTIC      HERNIA REPAIR      UMBILICAL X2     Social History     Occupational History    Not on file   Tobacco Use    Smoking status: Never     Passive exposure: Never    Smokeless tobacco: Never   Vaping Use    Vaping status: Never Used   Substance and Sexual Activity    Alcohol use: Never    Drug use: Never    Sexual activity: Yes     Partners: Male     Family History   Problem  Relation Age of Onset    Dementia Mother     Cancer Father         PROSTATE    Anesth Problems Neg Hx         Allergies   Allergen Reactions    Heparin Anaphylaxis    Bee Venom Swelling    Sulfa Antibiotics Rash     Prior to Admission medications    Medication Sig Start Date End Date Taking? Authorizing Provider   albuterol sulfate HFA (VENTOLIN HFA) 108 (90 Base) MCG/ACT inhaler Inhale 2 puffs into the lungs every 6 hours as needed for Wheezing   Yes Mary Pulliam MD   vitamin C (ASCORBIC ACID) 500 MG tablet Take 1 tablet by mouth daily   Yes Mary Pulliam MD   medroxyPROGESTERone (PROVERA) 10 MG tablet Take 2 tablets (20 mg) by mouth three times daily for 7 days then take 1 tablet (10 mg) daily 10/9/24   Sirena Soni MD   BISACODYL 5 MG EC tablet 1 tablet daily as needed 8/2/24   Mary Pulliam MD   ADVAIR DISKUS 250-50 MCG/ACT AEPB diskus inhaler 2 times daily Twice daily 8/2/24   Mary Pulliam MD   Probiotic Product (PROBIOTIC PO) Take by mouth    Mary Pulliam MD   Ferrous Sulfate (IRON PO) Take by mouth    Mary Pulliam MD        Review of Systems:  A comprehensive review of systems was negative except for that written in the History of Present Illness.     Objective:     Vitals:    10/22/24 1418   Height: 1.702 m (5' 7\")       Physical Exam:  Exam deferred to the OR    Assessment:     Postmenopausal bleeding with endometrial thickening and atrophic changes noted on ultrasound    Plan:     Procedure(s) (LRB):  HYSTEROSCOPY, DILATATION AND CURETTAGE (N/A)    Discussed the risks of surgery including the risks of bleeding, infection, deep vein thrombosis, and surgical injuries to internal organs including but not limited to the bowels, bladder, rectum, and female reproductive organs. The patient understands the risks; any and all questions were answered to the patient's satisfaction.    Signed By:  Sirena Soni MD     October 24, 2024

## 2024-10-25 NOTE — ANESTHESIA POSTPROCEDURE EVALUATION
Department of Anesthesiology  Postprocedure Note    Patient: Hermelinda Valverde  MRN: [id number]  YOB: 1960  Date of evaluation: 10/25/2024    Procedure Summary       Date: 10/25/24 Room / Location: [facility] MAIN OR [address] [place] [address] MAIN OR    Anesthesia Start: 0907 Anesthesia Stop: 1019    Procedure: HYSTEROSCOPY, DILATATION AND CURETTAGE, exam under anesthesia (Uterus) Diagnosis:       Postmenopausal bleeding      (Postmenopausal bleeding [N95.0])    Providers: Sirena Soni MD Responsible Provider: Sedrick Adams MD    Anesthesia Type: general ASA Status: 3            Anesthesia Type: No value filed.    Aubrey Phase I: Aubrey Score: 10    Aubrey Phase II:      Anesthesia Post Evaluation  Post-Anesthesia Evaluation and Assessment    Patient: Hermelinda Valverde MRN: [id number]  SSN: [id number]    YOB: 1960  Age: 64 y.o.  Sex: female      I have evaluated the patient and they are stable and ready for discharge from the PACU.     Cardiovascular Function/Vital Signs  Visit Vitals  BP (!) 136/59   Pulse 79   Temp 97.7 °F (36.5 °C) (Axillary)   Resp 18   Ht 1.702 m (5' 7\")   Wt 115.7 kg (255 lb)   SpO2 99%   BMI 39.94 kg/m²       Patient is status post General anesthesia for Procedure(s):  HYSTEROSCOPY, DILATATION AND CURETTAGE, exam under anesthesia.    Nausea/Vomiting: None    Postoperative hydration reviewed and adequate.    Pain:      Managed    Neurological Status:       At baseline    Mental Status, Level of Consciousness: Alert and  oriented to person, place, and time    Pulmonary Status:       Adequate oxygenation and airway patent    Complications related to anesthesia: None    Post-anesthesia assessment completed. No concerns    Signed By: Sedrick Adams MD     October 25, 2024                No notable events documented.

## 2024-10-25 NOTE — DISCHARGE SUMMARY
Gynecology Discharge Summary     Patient ID:  Hermelinda Valverde  [id number]  64 y.o.  1960    Admit date: 10/25/2024    Discharge date: 10/25/2024     Admission Diagnoses:   Patient Active Problem List   Diagnosis    Postmenopausal bleeding       Discharge Diagnoses: No discharge information exists for this patient.  Patient Active Problem List   Diagnosis    Postmenopausal bleeding       Procedures for this admission: Procedure(s):  HYSTEROSCOPY, DILATATION AND CURETTAGE, exam under anesthesia    Hospital Course:    Disposition: Home or self care    Discharged Condition: stable            Patient Instructions:   Current Discharge Medication List        CONTINUE these medications which have NOT CHANGED    Details   albuterol sulfate HFA (VENTOLIN HFA) 108 (90 Base) MCG/ACT inhaler Inhale 2 puffs into the lungs every 6 hours as needed for Wheezing      vitamin C (ASCORBIC ACID) 500 MG tablet Take 1 tablet by mouth daily      medroxyPROGESTERone (PROVERA) 10 MG tablet Take 2 tablets (20 mg) by mouth three times daily for 7 days then take 1 tablet (10 mg) daily  Qty: 90 tablet, Refills: 1      BISACODYL 5 MG EC tablet 1 tablet daily as needed      ADVAIR DISKUS 250-50 MCG/ACT AEPB diskus inhaler 2 times daily Twice daily      Ferrous Sulfate (IRON PO) Take by mouth      Probiotic Product (PROBIOTIC PO) Take by mouth           Activity: no sex for 2 weeks  Diet: regular diet  Wound Care: keep wound clean and dry    Follow-up with Dr. Soni in 2 weeks.    Signed:  Sirena Soni MD  10/25/2024  10:27 AM

## 2024-10-25 NOTE — OP NOTE
Operative Note      Patient: Hermelinda Ulloatling  YOB: 1960  MRN: [id number]    Date of Procedure: 10/25/2024    Pre-Op Diagnosis Codes:      * Postmenopausal bleeding [N95.0]    Post-Op Diagnosis: Same       Procedure(s):  HYSTEROSCOPY, DILATATION AND CURETTAGE, exam under anesthesia    Surgeon(s):  Masha Almonte MD Peng, Katherine, MD Dr. Sharp, the assistant surgeon, was present during the procedure.  The physician's presence was necessary due to obesity and need for visualization.The assistant surgeon performed significant portions of the surgery, including incising tissue, clamping, control of bleeding with suturing and cauterization, and decision making at challenging portions of the procedure.  This assistance was necessary to accomplish the optimal outcome for the patient, above and beyond what a non-MD assistant could have provided.       Assistant:   * No surgical staff found *    Anesthesia: General    Estimated Blood Loss (mL): Minimal    Complications: None    Specimens:   ID Type Source Tests Collected by Time Destination   1 : endometrial curettings Tissue Endometrium SURGICAL PATHOLOGY Sirena Soni MD 10/25/2024 0958    2 : left perianal biopsy Tissue Anus SURGICAL PATHOLOGY Sirena Soni MD 10/25/2024 0958        Implants:  * No implants in log *      Drains: * No LDAs found *    Findings:  Infection Present At Time Of Surgery (PATOS) (choose all levels that have infection present):  No infection present  Other Findings: Exam under anesthesia revealed significant whitening of vulva and perianal area with well demarcated borders. Normal vaginal exam with dilated cervical os. Hysteroscopic findings with fibroid noted protruding from the right lower uterine segment and additional fibroid noted from the anterior fundal portion of the uterus. Only left fallopian tube ostia identified.    Detailed Description of Procedure:   Indications: Hermelinda Dettling presented with postmenopausal

## 2024-10-25 NOTE — DISCHARGE INSTRUCTIONS
I recommend that you continue to take the MedroxyProgesterone (Provera) 10 mg tablet once daily until your follow up appointment. Please call the office on Monday to schedule. We would like to see you back in 2 weeks.    After Care Instructions For Your D&C      You may resume your usual diet once the nausea resolves. Initially, try sips of warm fluids and a bland diet.    Avoid heavy lifting and straining.  Gradually increase your activity. First, try walking and doing light activity around the house.  Resume your normal habits if no significant discomfort or bleeding develops.  Most women can return to work within one to four days after this procedure.     You may take showers.  Avoid using a tub bath, swimming pool or hot tub for at least 2 weeks following your procedure.    Do not place anything in your vagina until after your postoperative visit. Do not douche, use tampons, or have intercourse because this may cause bleeding and infection for at least 2 weeks following your procedure.    You may initially experience a heavy bloody discharge.  This should not be more than your menstrual flow. Over the next several days, the flow should steadily decrease.    Typically following the procedure, there is little or no pain.  You may feel cramps in your lower abdomen. Tylenol may relieve mild cramping. If pain medication does not improve your symptoms, you should contact your physician.    Contact the office if you have excessive bleeding (saturating a pad an hour for two hours or passing large clots). It is also necessary to speak with your physician if you develop chills, a temperature greater than 100.4, difficulty voiding or burning on urination.    Your physician may want to see you in the office after your D&C. Please call for an appointment if this has not already been arranged. Our office phone number is [phone]. If appropriate, the microscopic results from your procedure will be discussed at this

## 2024-10-25 NOTE — ANESTHESIA PRE PROCEDURE
Department of Anesthesiology  Preprocedure Note       Name:  Hermelinda Valverde   Age:  64 y.o.  :  1960                                          MRN:  [id number]         Date:  10/25/2024      Surgeon: Surgeon(s):  Sirena Soni MD    Procedure: Procedure(s):  HYSTEROSCOPY, DILATATION AND CURETTAGE    Medications prior to admission:   Prior to Admission medications    Medication Sig Start Date End Date Taking? Authorizing Provider   albuterol sulfate HFA (VENTOLIN HFA) 108 (90 Base) MCG/ACT inhaler Inhale 2 puffs into the lungs every 6 hours as needed for Wheezing   Yes ProviderMary MD   vitamin C (ASCORBIC ACID) 500 MG tablet Take 1 tablet by mouth daily   Yes Mary Pulliam MD   medroxyPROGESTERone (PROVERA) 10 MG tablet Take 2 tablets (20 mg) by mouth three times daily for 7 days then take 1 tablet (10 mg) daily 10/9/24  Yes Sirena Soni MD   BISACODYL 5 MG EC tablet 1 tablet daily as needed 24  Yes Mary Pulliam MD   ADVAIR DISKUS 250-50 MCG/ACT AEPB diskus inhaler 2 times daily Twice daily 24  Yes Mary Pulliam MD   Ferrous Sulfate (IRON PO) Take by mouth   Yes Mary Pulliam MD   Probiotic Product (PROBIOTIC PO) Take by mouth    ProviderMary MD       Current medications:    Current Facility-Administered Medications   Medication Dose Route Frequency Provider Last Rate Last Admin   • lidocaine PF 1 % injection 1 mL  1 mL IntraDERmal Once PRN Sedrick Adams MD       • acetaminophen (TYLENOL) tablet 650 mg  650 mg Oral Once Sedrick Adams MD       • fentaNYL (SUBLIMAZE) injection 25 mcg  25 mcg IntraVENous PRN Sedrick Adams MD        Or   • fentaNYL (SUBLIMAZE) injection 50 mcg  50 mcg IntraVENous PRN Sedrick Adams MD       • 0.9 % sodium chloride infusion   IntraVENous Continuous Sedrick Adams MD       • lactated ringers IV soln infusion SOLN   IntraVENous Continuous Sedrick Adams MD       • sodium chloride flush 0.9 % injection

## 2024-10-28 ENCOUNTER — TELEPHONE (OUTPATIENT)
Age: 64
End: 2024-10-28

## 2024-10-28 NOTE — TELEPHONE ENCOUNTER
Received a call from the pt asking for a refill on her PROVERA. I have informed the pt that she should still have 1 more refill at her pharmacy. She is going to reach out to see if they can fill this.

## 2024-10-30 ENCOUNTER — NURSE ONLY (OUTPATIENT)
Age: 64
End: 2024-10-30

## 2024-10-30 DIAGNOSIS — R30.0 DYSURIA: Primary | ICD-10-CM

## 2024-10-30 DIAGNOSIS — R35.0 URINE FREQUENCY: Primary | ICD-10-CM

## 2024-10-30 NOTE — PROGRESS NOTES
Late entry: Per Dr. Soni patient was 1.5 hours late to gyn appointment.  Patient c/o UTI symptoms.  Was told could leave a urine sample for send out but unfortunately would not be able to be seen today in office by a provider.  Verbal order obtained from Sirena Soni MD for urine culture and a diagnosis of urinary frequency. Order read back to MD. Orders signed by this writer and sent for co-sign to MD. Yari Ruiz LPN  10/30/2024  3:45 PM

## 2024-10-31 ENCOUNTER — TELEPHONE (OUTPATIENT)
Age: 64
End: 2024-10-31

## 2024-10-31 LAB
BACTERIA SPEC CULT: ABNORMAL
CC UR VC: ABNORMAL
SERVICE CMNT-IMP: ABNORMAL

## 2024-10-31 NOTE — TELEPHONE ENCOUNTER
Discussed pathology results with patient.     1. Endometrium, curettage:   Focal benign basal endometrium, negative for hyperplasia or malignancy.   Predominantly benign smooth muscle fragments.          2. Perianal skin, left, biopsy:        Increased dermal hyalinization suggestive of lichen sclerosus.   No fungal organisms identified by special stain (appropriately reactive   control).     Discussed that we will follow up at post op appointment but would recommend topical steroid application due to diagnosis of LS.    Sirena Soni MD

## 2024-11-01 ENCOUNTER — TELEPHONE (OUTPATIENT)
Age: 64
End: 2024-11-01

## 2024-11-01 RX ORDER — NITROFURANTOIN 25; 75 MG/1; MG/1
100 CAPSULE ORAL 2 TIMES DAILY
Qty: 10 CAPSULE | Refills: 0 | Status: SHIPPED | OUTPATIENT
Start: 2024-11-01 | End: 2024-11-06

## 2024-11-01 NOTE — TELEPHONE ENCOUNTER
Name and  verified. Per Dr. Soni, Called and spoke with patient regarding recent lab results. Patient verbalized understanding and has no questions at this time.

## 2024-11-15 ENCOUNTER — OFFICE VISIT (OUTPATIENT)
Age: 64
End: 2024-11-15

## 2024-11-15 VITALS
SYSTOLIC BLOOD PRESSURE: 128 MMHG | RESPIRATION RATE: 18 BRPM | HEART RATE: 68 BPM | TEMPERATURE: 98.6 F | DIASTOLIC BLOOD PRESSURE: 79 MMHG | BODY MASS INDEX: 41.6 KG/M2 | WEIGHT: 265.6 LBS | OXYGEN SATURATION: 97 %

## 2024-11-15 DIAGNOSIS — R30.0 DYSURIA: ICD-10-CM

## 2024-11-15 DIAGNOSIS — L29.9 ITCHING: Primary | ICD-10-CM

## 2024-11-15 DIAGNOSIS — L90.0 LICHEN SCLEROSUS: ICD-10-CM

## 2024-11-15 PROCEDURE — 99024 POSTOP FOLLOW-UP VISIT: CPT | Performed by: OBSTETRICS & GYNECOLOGY

## 2024-11-15 RX ORDER — NYSTATIN 100000 [USP'U]/G
POWDER TOPICAL
Qty: 60 G | Refills: 2 | Status: SHIPPED | OUTPATIENT
Start: 2024-11-15

## 2024-11-15 RX ORDER — TRIAMCINOLONE ACETONIDE 1 MG/G
OINTMENT TOPICAL
Qty: 80 G | Refills: 2 | Status: SHIPPED | OUTPATIENT
Start: 2024-11-15

## 2024-11-15 NOTE — PROGRESS NOTES
Hermelinda Valverde is a 64 y.o. female  presents for a postop visit.    Chief Complaint   Patient presents with    Post-Op Check         OB/GYN History   -  x 4  Hx of STI - No  SA - Yes, Male      PMB - Spotting  Menopausal symptoms - none  Onset Age - 60      No LMP recorded. (Menstrual status: Menopause).  Menses: Absent  Birth Control: Post Menopausal Status      The patient is here for a postop visit following a Hysteroscopy and Dilation and Curretage on 10/25/24.      1. Have you been to the ER, urgent care clinic, or hospitalized since your last visit? No  2. Have you seen or consulted any other health care providers outside of the [facility] since your last visit? No      She declines  a chaperone during the gynecologic exam today.  
thinning, and distinctive dermal changes accompanied by symptoms of pruritus and pain. We counseled on the cyclic/flaring pattern of the disease. She denies any symptoms related to LS.     Although this condition in itself is benign, it is known that older women with LS are predisposed to squamous cell carcinoma. As such annual surveillance may be necessary, especially in patients with frequent flares. Treatment of LS is recommended as this is progressive and can lead to significant scarring and disfiguration of the vulva. High dose topical steroids in the form of an ointment has been associated with 95% resolution of symptoms.     Plan  - Triamcinolone 0.1% to external area, nightly.  - Rx nystatin powder sent for itching under pannus. Use up to TID  - Repeat urine culture given history of positive, now treated    Sirena Soni MD

## 2024-12-20 ENCOUNTER — TELEPHONE (OUTPATIENT)
Age: 64
End: 2024-12-20

## 2024-12-20 NOTE — TELEPHONE ENCOUNTER
Patient called in, name and  verified. Patient is calling as she recently underwent surgery on 10/25/2024 of a D&C. She reports that she initially went on Progesterone to stop her bleeding. She stopped it prior to her surgery as that \"was supposed to be the fix\".     She has recently had to restart the Progesterone about a week ago due to the bleeding starting back up. She is concerned and wants to know if this will be the process moving forward as she thought the surgery would end all of this.    Please let me know if you would like for me to relay any messages.

## 2024-12-23 ENCOUNTER — TELEPHONE (OUTPATIENT)
Age: 64
End: 2024-12-23

## 2024-12-23 NOTE — TELEPHONE ENCOUNTER
Patient called in, name and  verified. Patient is calling as she was offered an appt to discuss next steps moving forward since she has still experienced some bleeding post her surgery.    Pt does not want to come into the office as she lives over an hour away and is quite upset about the outcome. I have reinforced the fact that DILCIA Soni would like to see her in the office. The pt has asked that I send a message asking for a phone call.    I have spoken to Dr. Soni in person and she truly desires an office visit for this conversation. I have called the pt to reiterate this request and even offered a virtual visit to help the issue with distance. She does not have an email or [employer] access. She will work on getting both of these and call our office once this is set up so that we can send her the link to sign up for [facility]. That way we can complete a virtual visit.

## 2024-12-30 NOTE — PHYSICAL THERAPY INITIAL EVALUATION ADULT - ORIENTATION, REHAB EVAL
New patient request screening questions:    Who is PCP? Are they within [facility] or is this an outside referral?  Jessica Bryant MD     Do we have a referral currently in the chart? If not please ask them to have one sent to us.  yes    What is the reason for referral to pain management? Ask if they have seen their PCP for this  Headaches, abdomen, legs, arm pain.   Chronic pain syndrome     How long has the problem existed?  years    Ever seen pain management before? (If yes, name of provider and phone number / address. Please ask pt to have records sent to us.)  No      Previously tried treatment (medications, PT, Chiro, injections, surgeries)  Sx 9/24/24-[place] LAPAROSCOPY RADICAL NEPHRECTOMY    tylenol    Any related imaging (X-Ray, CT, MRI)  Yes- in chart    Confirm if records are in patients' chart.  Any records outside of [facility] will need to be requested: Obtain all information to where records are to be requested  Patient will be responsible for obtaining any CD's or images from outside providers  If a release of records is needed, the patient will be informed to obtain the form. Document the request in the encounter.    After all information has been received, route encounter to Dr. Domingo, Sera Ta and Cony    Update the New Patient Spreadsheet.    Providers will review requests within 48 hours (about 2 days) and decide.  
oriented to person, place, time and situation

## 2025-01-02 NOTE — PHARMACOTHERAPY INTERVENTION NOTE - OUTCOME
Nutrition Assessment   Assessment Type: Initial assessment    Demographic/Anthropometrics Information  Gender: female   Patient Age: 83 year old  Height:    Ht Readings from Last 1 Encounters:   12/12/24 5' 2\" (1.575 m)      Weight:   Wt Readings from Last 1 Encounters:   12/12/24 47.3 kg      BMI:   BMI Readings from Last 1 Encounters:   12/12/24 19.05 kg/m²     Weight Classification: Normal weight (BMI 18.5-24.9)    Estimated Nutritional Needs  Assessment Weight: 47  kg  Energy Needs: 35-40 kcal/kg   1296-5217 kcal/day  Protein Needs: 1-1.2 g/kg  47-56 grams/day    Nutrition Diagnosis (PES)  Unintentional weight loss related to Inadequate energy intake as evidenced by Weight loss over time    Dietitian Notes/Impressions/Recommendations:  -Pt reported that she has had unintentional weight loss of 10 lbs over the past year  -Pt reported that she has always been a small frame  -Pt reported not having a great appetite and has had decreased PO intake  -Discussed high calorie/protein foods to try  -Also discussed trying [employer] shakes and mom's meal delivery  -Provided pt with high calorie/protein handout    Diet Recall:  B: 1 bowl of oatmeal (most often than cereal) or cereal (raisin bran or shredded wheat), banana, 1 slice of toast (jelly and butter), 2 pieces of turkey zambrano  L: skips or will sometimes have unsalted crackers with peanut butter with cranberry juice  D: cabbage, baked sweet potato, corn bread, chicken   Snack: orange sherbet, cake with ice cream  Beverages: water, juice sometimes    Goals & Interventions:  1. Try Remind and have 1 daily  2. Try mom's meals delivery service  3. Adding high calorie foods, such as peanut butter or avocado, to meals and snacks  4. Recommend follow up with RD in 2-3 months.   Encouraged patient to contact RD with any questions.     Thank you for your consult!      accepted  used

## 2025-01-08 RX ORDER — NYSTATIN 100000 [USP'U]/G
POWDER TOPICAL
Qty: 60 G | Refills: 2 | Status: SHIPPED | OUTPATIENT
Start: 2025-01-08

## 2025-01-08 NOTE — TELEPHONE ENCOUNTER
Hi Team!  Patient needing refill on her Nystatin. Patient was last seen in the office on 11/15/24. Medication pended for review and signing. Thank you! :)

## 2025-02-05 RX ORDER — TRIAMCINOLONE ACETONIDE 1 MG/G
OINTMENT TOPICAL
Qty: 80 G | Refills: 2 | Status: SHIPPED | OUTPATIENT
Start: 2025-02-05

## 2025-02-05 RX ORDER — MEDROXYPROGESTERONE ACETATE 10 MG
TABLET ORAL
Qty: 90 TABLET | Refills: 1 | OUTPATIENT
Start: 2025-02-05

## 2025-02-26 ENCOUNTER — OFFICE VISIT (OUTPATIENT)
Age: 65
End: 2025-02-26
Payer: COMMERCIAL

## 2025-02-26 VITALS
OXYGEN SATURATION: 98 % | TEMPERATURE: 98.6 F | BODY MASS INDEX: 40.22 KG/M2 | HEART RATE: 69 BPM | WEIGHT: 256.8 LBS | SYSTOLIC BLOOD PRESSURE: 137 MMHG | DIASTOLIC BLOOD PRESSURE: 79 MMHG | RESPIRATION RATE: 17 BRPM

## 2025-02-26 DIAGNOSIS — N95.0 POST-MENOPAUSAL BLEEDING: Primary | ICD-10-CM

## 2025-02-26 PROCEDURE — 99213 OFFICE O/P EST LOW 20 MIN: CPT | Performed by: OBSTETRICS & GYNECOLOGY

## 2025-02-26 RX ORDER — MEDROXYPROGESTERONE ACETATE 10 MG
TABLET ORAL
Qty: 90 TABLET | Refills: 1 | OUTPATIENT
Start: 2025-02-26

## 2025-02-26 SDOH — ECONOMIC STABILITY: FOOD INSECURITY: WITHIN THE PAST 12 MONTHS, YOU WORRIED THAT YOUR FOOD WOULD RUN OUT BEFORE YOU GOT MONEY TO BUY MORE.: NEVER TRUE

## 2025-02-26 SDOH — ECONOMIC STABILITY: FOOD INSECURITY: WITHIN THE PAST 12 MONTHS, THE FOOD YOU BOUGHT JUST DIDN'T LAST AND YOU DIDN'T HAVE MONEY TO GET MORE.: NEVER TRUE

## 2025-02-26 ASSESSMENT — PATIENT HEALTH QUESTIONNAIRE - PHQ9
SUM OF ALL RESPONSES TO PHQ QUESTIONS 1-9: 0
2. FEELING DOWN, DEPRESSED OR HOPELESS: NOT AT ALL
SUM OF ALL RESPONSES TO PHQ9 QUESTIONS 1 & 2: 0
SUM OF ALL RESPONSES TO PHQ QUESTIONS 1-9: 0
1. LITTLE INTEREST OR PLEASURE IN DOING THINGS: NOT AT ALL

## 2025-02-26 NOTE — PROGRESS NOTES
Problem Visit  Chief Complaint   Patient presents with    Discuss Medications       Hermelinda Valverde is a 64 y.o.  presenting for problem visit. Her main concern today is continued heavy bleeding. She is a poor historian. She has a difficult time answering questions. She was last seen in November 2024 as a postoperative visit. She underwent hysteroscopy D&C on 10/25/2024 due to continued post(?) menopausal bleeding with atrophic endometrial biopsy.      Her pathology report showed:   1. Endometrium, curettage: Focal benign basal endometrium, negative for hyperplasia or malignancy. Predominantly benign smooth muscle fragments.   2. Perianal skin, left, biopsy:      Increased dermal hyalinization suggestive of lichen sclerosus. No fungal organisms identified by special stain (appropriately reactive control)    She continued to have significant bleeding and so was started on Provera. I tried to elicit how long she took the provera for but was unable to get a clear answer. She reports that today she is wearing two tampons as she has continued to have heavy menstrual bleeding. Hermelinda has a significant past surgical history including ex-lap for colostomy, colostomy reversal (2022). She has also had abdominal hernia repair x 2. She has a history of wound infection and seepage. She is unable to explain why she underwent colostomy surgery. She believes it was due to the fact that she stopped eating in response to stress caused by loss of a loved one. She also has extensive scarring of her genital area- she reports she might have had surgery but cannot remember (thinks she might have had necrotizing fascitis). She reports that she has never stopped bleeding. She reports that her mom went through menopause later in life however is unable to tell when she went through menopause. She reports she has never had hot flashes or night sweats. She reports that she continues to pass large clots.     Ultrasound June 2024 (see records in

## 2025-02-26 NOTE — PROGRESS NOTES
Hermelinda Valverde is a 64 y.o. female  presents for a problem visit.    Chief Complaint   Patient presents with    Discuss Medications       OB/GYN History   -  x 4  Hx of STI - No  SA - Yes, Male      No LMP recorded. (Menstrual status: Menopause).      PMB - Yes  Menopausal symptoms - none  HRT - Yes  Onset Age - 60        The patient is here for follow-up regarding provera medication. Patient stated she started bleeding heavy this morning.       1. Have you been to the ER, urgent care clinic, or hospitalized since your last visit? Yes- When: 2025. Where: [facility].  Reason for visit: Fall  2. Have you seen or consulted any other health care providers outside of the [facility] since your last visit? Yes      She declines  a chaperone during the gynecologic exam today.

## 2025-02-27 LAB
ERYTHROCYTE [DISTWIDTH] IN BLOOD BY AUTOMATED COUNT: 15.9 % (ref 11.5–14.5)
ESTRADIOL SERPL-MCNC: <11 PG/ML
FSH SERPL-ACNC: 36.5 MIU/ML
HCT VFR BLD AUTO: 40.1 % (ref 35–47)
HGB BLD-MCNC: 11.9 G/DL (ref 11.5–16)
LH SERPL-ACNC: 19.7 MIU/ML
MCH RBC QN AUTO: 22 PG (ref 26–34)
MCHC RBC AUTO-ENTMCNC: 29.7 G/DL (ref 30–36.5)
MCV RBC AUTO: 74.1 FL (ref 80–99)
NRBC # BLD: 0 K/UL (ref 0–0.01)
NRBC BLD-RTO: 0 PER 100 WBC
PLATELET # BLD AUTO: 382 K/UL (ref 150–400)
PMV BLD AUTO: 10.5 FL (ref 8.9–12.9)
RBC # BLD AUTO: 5.41 M/UL (ref 3.8–5.2)
WBC # BLD AUTO: 8.5 K/UL (ref 3.6–11)

## 2025-03-03 RX ORDER — MEDROXYPROGESTERONE ACETATE 10 MG
TABLET ORAL
Qty: 90 TABLET | Refills: 1 | Status: SHIPPED | OUTPATIENT
Start: 2025-03-03

## 2025-03-03 NOTE — TELEPHONE ENCOUNTER
Patient called in, name and  verified. Patient is currently calling as she was seen in the office on 2025 and reports that she is still bleeding and is requesting a refill on her Provera.    Please advise. Medication has been pended and pharmacy on file has been verified.

## 2025-03-17 ENCOUNTER — OFFICE VISIT (OUTPATIENT)
Age: 65
End: 2025-03-17
Payer: COMMERCIAL

## 2025-03-17 VITALS
WEIGHT: 256 LBS | SYSTOLIC BLOOD PRESSURE: 137 MMHG | DIASTOLIC BLOOD PRESSURE: 89 MMHG | HEIGHT: 67 IN | BODY MASS INDEX: 40.18 KG/M2

## 2025-03-17 DIAGNOSIS — R35.0 FREQUENT URINATION: ICD-10-CM

## 2025-03-17 DIAGNOSIS — N95.0 POSTMENOPAUSAL BLEEDING: Primary | ICD-10-CM

## 2025-03-17 DIAGNOSIS — D25.9 LEIOMYOMA OF BODY OF UTERUS: ICD-10-CM

## 2025-03-17 DIAGNOSIS — K43.9 VENTRAL HERNIA WITHOUT OBSTRUCTION OR GANGRENE: ICD-10-CM

## 2025-03-17 PROCEDURE — 99203 OFFICE O/P NEW LOW 30 MIN: CPT | Performed by: OBSTETRICS & GYNECOLOGY

## 2025-03-17 ASSESSMENT — PATIENT HEALTH QUESTIONNAIRE - PHQ9
SUM OF ALL RESPONSES TO PHQ QUESTIONS 1-9: 0
2. FEELING DOWN, DEPRESSED OR HOPELESS: NOT AT ALL
1. LITTLE INTEREST OR PLEASURE IN DOING THINGS: NOT AT ALL
SUM OF ALL RESPONSES TO PHQ QUESTIONS 1-9: 0

## 2025-03-17 NOTE — PROGRESS NOTES
[facility] GYNECOLOGIC ONCOLOGY  [address] [place] [address], [address]  [place] [address]  P [phone]  F [phone]    Office Note  Patient ID:  Name:  Hermelinda Valverde  MRN:  [id number]  :  1960/64 y.o.  Date:  3/17/2025      HISTORY OF PRESENT ILLNESS:  Hermelinda Valverde is a 64 y.o. morbidly obese  postmenopausal female who is a new patient being seen for postmenopausal bleeding.  She is referred by Dr. Sirena Soni.  Per Dr. Soni, she is a poor historian.  In 2024 she underwent hysteroscopy/D&C to evaluate bleeding.  Per her records, pathology demonstrated focal benign basal endometrium, negative for hyperplasia or malignancy.  She has a complex past surgical history, including an X-lap for colostomy and subsequent reversal in .  She has also had an abdominal hernia repair x 2.  She has chronic wound drainage in her abdominal wall at two separate locations.  Her bleeding has been managed with PO Provera but with no improvement.  Due to the persistent bleeding, I have been asked to see her in consultation for further evaluation and management.          ROS:   and GI review:  Negative  Cardiopulmonary review:  Negative   Musculoskeletal:  Negative    A comprehensive review of systems was negative except for that written in the History of Present Illness., 10 point ROS    OB/GYN Hx:        Problem List:  Patient Active Problem List    Diagnosis Date Noted    Postmenopausal bleeding 10/09/2024     PMH:  Past Medical History:   Diagnosis Date    Asthma     Fibroid, uterine 2020    Post-menopausal bleeding     Uterine cyst       PSH:  Past Surgical History:   Procedure Laterality Date    ABDOMINAL HERNIA REPAIR  2023    x2    COLONOSCOPY      COLOSTOMY  2020    COLOSTOMY CLOSURE      ENDOSCOPY, COLON, DIAGNOSTIC      HERNIA REPAIR      UMBILICAL X2    HYSTEROSCOPY N/A 10/25/2024    HYSTEROSCOPY, DILATATION AND CURETTAGE, exam under anesthesia performed by Sirena Soni,

## 2025-03-20 ENCOUNTER — TELEPHONE (OUTPATIENT)
Age: 65
End: 2025-03-20

## 2025-03-20 NOTE — TELEPHONE ENCOUNTER
Patient returned our call to the office to go over her most recent labs. Message from the provider has been relayed and pt has expressed understanding.

## 2025-03-24 LAB
APPEARANCE UR: CLEAR
BACTERIA #/AREA URNS HPF: ABNORMAL /[HPF]
BACTERIA UR CULT: NORMAL
BILIRUB UR QL STRIP: NEGATIVE
CASTS URNS QL MICRO: ABNORMAL /LPF
COLOR UR: YELLOW
CRYSTALS URNS MICRO: ABNORMAL
EPI CELLS #/AREA URNS HPF: ABNORMAL /HPF (ref 0–10)
GLUCOSE UR QL STRIP: NEGATIVE
HGB UR QL STRIP: NEGATIVE
KETONES UR QL STRIP: NEGATIVE
LEUKOCYTE ESTERASE UR QL STRIP: ABNORMAL
MICRO URNS: ABNORMAL
NITRITE UR QL STRIP: NEGATIVE
PH UR STRIP: 5 [PH] (ref 5–7.5)
PROT UR QL STRIP: ABNORMAL
RBC #/AREA URNS HPF: ABNORMAL /HPF (ref 0–2)
SP GR UR STRIP: 1.03 (ref 1–1.03)
UNIDENT CRYS URNS QL MICRO: PRESENT
URINALYSIS REFLEX: ABNORMAL
UROBILINOGEN UR STRIP-MCNC: 0.2 MG/DL (ref 0.2–1)
WBC #/AREA URNS HPF: >30 /HPF (ref 0–5)

## 2025-03-26 ENCOUNTER — TELEPHONE (OUTPATIENT)
Age: 65
End: 2025-03-26

## 2025-03-26 DIAGNOSIS — N30.00 ACUTE CYSTITIS WITHOUT HEMATURIA: Primary | ICD-10-CM

## 2025-03-26 RX ORDER — CIPROFLOXACIN 500 MG/1
500 TABLET, FILM COATED ORAL 2 TIMES DAILY
Qty: 14 TABLET | Refills: 0 | Status: SHIPPED | OUTPATIENT
Start: 2025-03-26 | End: 2025-04-02

## 2025-03-26 NOTE — TELEPHONE ENCOUNTER
Patient called for her urinalysis results. She continues  with the pressure feeling. Please advise. She is allergic to Sulfa.

## 2025-03-26 NOTE — TELEPHONE ENCOUNTER
Called the patient and advised that per VORB from  that a rx has sent as below.   Requested Prescriptions     Signed Prescriptions Disp Refills    ciprofloxacin (CIPRO) 500 MG tablet 14 tablet 0     Sig: Take 1 tablet by mouth 2 times daily for 7 days     Authorizing Provider: IMANI WALKER JR     Ordering User: SYLVIA PIEDRA

## 2025-03-31 ENCOUNTER — HOSPITAL ENCOUNTER (OUTPATIENT)
Facility: HOSPITAL | Age: 65
Discharge: HOME | End: 2025-04-03
Attending: OBSTETRICS & GYNECOLOGY
Payer: COMMERCIAL

## 2025-03-31 DIAGNOSIS — N95.0 POSTMENOPAUSAL BLEEDING: ICD-10-CM

## 2025-03-31 DIAGNOSIS — D25.9 LEIOMYOMA OF BODY OF UTERUS: ICD-10-CM

## 2025-03-31 DIAGNOSIS — K43.9 VENTRAL HERNIA WITHOUT OBSTRUCTION OR GANGRENE: ICD-10-CM

## 2025-03-31 PROCEDURE — 74177 CT ABD & PELVIS W/CONTRAST: CPT

## 2025-03-31 PROCEDURE — 6360000004 HC RX CONTRAST MEDICATION: Performed by: RADIOLOGY

## 2025-03-31 RX ORDER — IOPAMIDOL 755 MG/ML
100 INJECTION, SOLUTION INTRAVASCULAR
Status: COMPLETED | OUTPATIENT
Start: 2025-03-31 | End: 2025-03-31

## 2025-03-31 RX ADMIN — IOPAMIDOL 100 ML: 755 INJECTION, SOLUTION INTRAVENOUS at 17:21

## 2025-04-10 ENCOUNTER — OFFICE VISIT (OUTPATIENT)
Age: 65
End: 2025-04-10
Payer: COMMERCIAL

## 2025-04-10 DIAGNOSIS — D25.9 LEIOMYOMA OF BODY OF UTERUS: ICD-10-CM

## 2025-04-10 DIAGNOSIS — K43.9 VENTRAL HERNIA WITHOUT OBSTRUCTION OR GANGRENE: ICD-10-CM

## 2025-04-10 DIAGNOSIS — N95.0 POSTMENOPAUSAL BLEEDING: Primary | ICD-10-CM

## 2025-04-10 PROCEDURE — 99214 OFFICE O/P EST MOD 30 MIN: CPT | Performed by: OBSTETRICS & GYNECOLOGY

## 2025-04-10 NOTE — PROGRESS NOTES
Count includes the [facility] GYNECOLOGIC ONCOLOGY  [address] [place] [address], [address]  [place] [address]  P [phone]  F [phone]    Office Note  Patient ID:  Name:  Hermelinda Valverde  MRN:  [id number]  :  1960/65 y.o.  Date:  4/10/2025      HISTORY OF PRESENT ILLNESS:  Hermelinda Valverde is a 65 y.o. morbidly obese  postmenopausal female who is a newly established patient referred for evaluation of postmenopausal bleeding by Dr. Sirena Soni.  She is a poor historian.  In 2024 she underwent hysteroscopy/D&C to evaluate bleeding.  Per her records, pathology demonstrated focal benign basal endometrium, negative for hyperplasia or malignancy.  She has a complex past surgical history, including an X-lap for colostomy and subsequent reversal in .  She has also had an abdominal hernia repair x 2.  She has chronic wound drainage in her abdominal wall at two separate locations.  Her bleeding has been managed with PO Provera but with no improvement.  Due to the persistent bleeding, I was asked to see her in consultation for further evaluation and management.       Her pelvic exam was limited by obesity.  She is a very poor candidate for hysterectomy due to her complex surgical history.  I suggested that before we discuss any kind of surgery that we obtain CT imaging to evaluate her abdomen and pelvis.  Surgery would likely require coordination with general surgery to repair her hernia.  I suppose I could attempt a robotic hysterectomy without repairing the hernia.  Another option would be a repeat hysteroscopy/D&C with Mirena IUD placement.     She presents today to review her CT results.         ROS:   and GI review:  Negative  Cardiopulmonary review:  Negative   Musculoskeletal:  Negative    A comprehensive review of systems was negative except for that written in the History of Present Illness., 10 point ROS    OB/GYN Hx:        Problem List:  Patient Active Problem List    Diagnosis Date Noted

## 2025-04-17 ENCOUNTER — HOSPITAL ENCOUNTER (OUTPATIENT)
Facility: HOSPITAL | Age: 65
Discharge: HOME | End: 2025-04-20
Payer: COMMERCIAL

## 2025-04-17 VITALS
HEIGHT: 67 IN | SYSTOLIC BLOOD PRESSURE: 120 MMHG | WEIGHT: 250.4 LBS | DIASTOLIC BLOOD PRESSURE: 76 MMHG | BODY MASS INDEX: 39.3 KG/M2 | HEART RATE: 73 BPM | OXYGEN SATURATION: 96 % | TEMPERATURE: 98.7 F

## 2025-04-17 LAB
ALBUMIN SERPL-MCNC: 3.8 G/DL (ref 3.5–5)
ALBUMIN/GLOB SERPL: 1.1 (ref 1.1–2.2)
ALP SERPL-CCNC: 74 U/L (ref 45–117)
ALT SERPL-CCNC: 16 U/L (ref 12–78)
ANION GAP SERPL CALC-SCNC: 4 MMOL/L (ref 2–12)
AST SERPL-CCNC: 16 U/L (ref 15–37)
BASOPHILS # BLD: 0.07 K/UL (ref 0–0.1)
BASOPHILS NFR BLD: 0.9 % (ref 0–1)
BILIRUB SERPL-MCNC: 0.5 MG/DL (ref 0.2–1)
BUN SERPL-MCNC: 13 MG/DL (ref 6–20)
BUN/CREAT SERPL: 18 (ref 12–20)
CALCIUM SERPL-MCNC: 9.6 MG/DL (ref 8.5–10.1)
CHLORIDE SERPL-SCNC: 108 MMOL/L (ref 97–108)
CO2 SERPL-SCNC: 26 MMOL/L (ref 21–32)
CREAT SERPL-MCNC: 0.73 MG/DL (ref 0.55–1.02)
DIFFERENTIAL METHOD BLD: ABNORMAL
EKG ATRIAL RATE: 66 BPM
EKG DIAGNOSIS: NORMAL
EKG P AXIS: 20 DEGREES
EKG P-R INTERVAL: 160 MS
EKG Q-T INTERVAL: 424 MS
EKG QRS DURATION: 98 MS
EKG QTC CALCULATION (BAZETT): 444 MS
EKG R AXIS: -27 DEGREES
EKG T AXIS: 31 DEGREES
EKG VENTRICULAR RATE: 66 BPM
EOSINOPHIL # BLD: 0.44 K/UL (ref 0–0.4)
EOSINOPHIL NFR BLD: 5.6 % (ref 0–7)
ERYTHROCYTE [DISTWIDTH] IN BLOOD BY AUTOMATED COUNT: 15.9 % (ref 11.5–14.5)
GLOBULIN SER CALC-MCNC: 3.4 G/DL (ref 2–4)
GLUCOSE SERPL-MCNC: 92 MG/DL (ref 65–100)
HCT VFR BLD AUTO: 39.1 % (ref 35–47)
HGB BLD-MCNC: 12.1 G/DL (ref 11.5–16)
IMM GRANULOCYTES # BLD AUTO: 0.01 K/UL (ref 0–0.04)
IMM GRANULOCYTES NFR BLD AUTO: 0.1 % (ref 0–0.5)
LYMPHOCYTES # BLD: 2.15 K/UL (ref 0.8–3.5)
LYMPHOCYTES NFR BLD: 27.3 % (ref 12–49)
MCH RBC QN AUTO: 22.6 PG (ref 26–34)
MCHC RBC AUTO-ENTMCNC: 30.9 G/DL (ref 30–36.5)
MCV RBC AUTO: 73.1 FL (ref 80–99)
MONOCYTES # BLD: 0.58 K/UL (ref 0–1)
MONOCYTES NFR BLD: 7.4 % (ref 5–13)
NEUTS SEG # BLD: 4.63 K/UL (ref 1.8–8)
NEUTS SEG NFR BLD: 58.7 % (ref 32–75)
NRBC # BLD: 0 K/UL (ref 0–0.01)
NRBC BLD-RTO: 0 PER 100 WBC
PLATELET # BLD AUTO: 337 K/UL (ref 150–400)
PMV BLD AUTO: 10.5 FL (ref 8.9–12.9)
POTASSIUM SERPL-SCNC: 3.7 MMOL/L (ref 3.5–5.1)
PROT SERPL-MCNC: 7.2 G/DL (ref 6.4–8.2)
RBC # BLD AUTO: 5.35 M/UL (ref 3.8–5.2)
SODIUM SERPL-SCNC: 138 MMOL/L (ref 136–145)
WBC # BLD AUTO: 7.9 K/UL (ref 3.6–11)

## 2025-04-17 PROCEDURE — 93005 ELECTROCARDIOGRAM TRACING: CPT | Performed by: OBSTETRICS & GYNECOLOGY

## 2025-04-17 PROCEDURE — 80053 COMPREHEN METABOLIC PANEL: CPT

## 2025-04-17 PROCEDURE — 85025 COMPLETE CBC W/AUTO DIFF WBC: CPT

## 2025-04-17 PROCEDURE — 93010 ELECTROCARDIOGRAM REPORT: CPT | Performed by: INTERNAL MEDICINE

## 2025-04-17 RX ORDER — MECLIZINE HCL 12.5 MG 12.5 MG/1
12.5 TABLET ORAL 3 TIMES DAILY PRN
COMMUNITY

## 2025-04-17 NOTE — PERIOP NOTE
[address] [address]   MAIN OR                                  [phone]    MAIN PRE OP             [phone]                                                                                AMBULATORY PRE OP          [phone]  PRE-ADMISSION TESTING    [phone]     Surgery Date:  4/25/25       Is surgery arrival time given by surgeon?  YES     If “NO”, [facility] staff will call you between 4 and 7pm the day before your surgery with your arrival time. (If your surgery is on a Monday, we will call you the Friday before.)    Call [phone] after 7pm Monday-Friday if you did not receive this call.    INSTRUCTIONS BEFORE YOUR SURGERY   When You  Arrive Arrive at [facility] Patient Access on 1st floor the day of your surgery.   Medications to   TAKE   Morning of Surgery MEDICATIONS TO TAKE THE MORNING OF SURGERY WITH A SIP OF WATER: ADVAIR DISK    You may take these medications, IF NEEDED, the morning of surgery: NONE  Ask your surgeon/prescribing doctor for instructions on taking or stopping these medications prior to surgery: MEDROXYPROGESTERONE   Medications to STOP  before surgery Non-Steroidal anti-inflammatory Drugs (NSAID's): for example, Diclofenac (Voltaren), Ibuprofen (Advil, Motrin), Naproxen (Aleve) 3 days  STOP all herbal supplements and vitamins(unless prescribed by your doctor), and fish oil for 7 days  Other: NONE  (Pain medications not listed above, including Tylenol may be taken up until 4 hours prior to arrival time)   Blood  Thinners If you take Aspirin, Eliquis, Plavix, Coumadin, or any blood-thinning or anti-blood clot medicine, talk to the doctor who prescribed the medications for pre-operative instructions.  If you take aspirin or aspirin containing products for pain, stop 7 days prior to surgery   Going Home - or Spending the Night OUTPATIENT SURGERY: You must have a responsible adult drive you home and stay  Chlorhexidine Gluconate (CHG) -- please follow the instructions included with the soap to shower the night before and the morning of surgery.    If you were not given CHG, please shower or bathe the night before and morning of surgery using an antibacterial soap. Dress in clean clothing after showering.    If you are allergic to CHG use antibacterial soap instead.     General information about CHG   It should not be used on hair, face, ears, genital area or skin that is not intact.   It should not be used if breastfeeding.   See the bottle for additional information.   CHG may cause dry skin, but should not cause redness, rash or intense itching. If you suspect an allergic reaction, use your own soap for the morning of surgery and report reaction to the        pre-operative nurse.      Shower instructions  Shower the night before and the morning of surgery using these instructions:  1. Wash your hair using your normal shampoo and rinse.  2. Wash your face and genital area using your own soap and rinse.  3. Turn off the shower water and pour half of the bottle of CHG onto a clean washcloth.  4. Wash your body from neck down to toes. Pay special attention to your surgical site.  5. Do not shave near the surgical site.  6. Turn shower water back on and rinse off.  7. Pat dry with a clean towel, sleep in clean clothes and clean sheets.  8. Repeat these steps the morning of surgery.    Do NOT use any powder, deodorant, lotion/cream/oil, perfume/aftershave, cosmetics or alcohol-based skin or hair products after showering.    Do NOT shave your surgical site less than four days prior to surgery.    Brush your teeth and rinse with water before coming to the hospital.       Tips To Help Prevent Infections After Your Surgery   Wash your hands frequently.   Keep your bandage clean and dry.   Do not touch your surgical site.   Use clean towels and washcloths.   Wear clean clothes and use clean bed linens.   Do not allow pets  done

## 2025-04-24 NOTE — H&P
[facility] GYNECOLOGIC ONCOLOGY  [address] [place] [address], [address]  [place] [address]  P [phone]  F [phone]        Patient ID:  Name:  Hermelinda Valverde  MRN:  [id number]  :  1960/65 y.o.  Date:  2025      HISTORY OF PRESENT ILLNESS:  Hermelinda Valverde is a 65 y.o. morbidly obese  postmenopausal female who is a newly established patient referred for evaluation of postmenopausal bleeding by Dr. Sirena Soni.  She is a poor historian.  In 2024 she underwent hysteroscopy/D&C to evaluate bleeding.  Per her records, pathology demonstrated focal benign basal endometrium, negative for hyperplasia or malignancy.  She has a complex past surgical history, including an X-lap for colostomy and subsequent reversal in .  She has also had an abdominal hernia repair x 2.  She has chronic wound drainage in her abdominal wall at two separate locations.  Her bleeding has been managed with PO Provera but with no improvement.  Due to the persistent bleeding, I was asked to see her in consultation for further evaluation and management.       Her pelvic exam was limited by obesity.  She is a very poor candidate for hysterectomy due to her complex surgical history.  I suggested that before we discuss any kind of surgery that we obtain CT imaging to evaluate her abdomen and pelvis.  Surgery would likely require coordination with general surgery to repair her hernia.  I suppose I could attempt a robotic hysterectomy without repairing the hernia.  Another option would be a repeat hysteroscopy/D&C with Mirena IUD placement.     She presented to review her CT results and discuss a plan.         ROS:   and GI review:  Negative  Cardiopulmonary review:  Negative   Musculoskeletal:  Negative    A comprehensive review of systems was negative except for that written in the History of Present Illness., 10 point ROS    OB/GYN Hx:        Problem List:  Patient Active Problem List    Diagnosis Date Noted

## 2025-04-25 ENCOUNTER — HOSPITAL ENCOUNTER (OUTPATIENT)
Facility: HOSPITAL | Age: 65
Setting detail: OUTPATIENT SURGERY
Discharge: HOME | End: 2025-04-25
Attending: OBSTETRICS & GYNECOLOGY | Admitting: OBSTETRICS & GYNECOLOGY
Payer: COMMERCIAL

## 2025-04-25 ENCOUNTER — ANESTHESIA (OUTPATIENT)
Facility: HOSPITAL | Age: 65
End: 2025-04-25
Payer: COMMERCIAL

## 2025-04-25 ENCOUNTER — ANCILLARY PROCEDURE (OUTPATIENT)
Facility: HOSPITAL | Age: 65
End: 2025-04-25
Attending: OBSTETRICS & GYNECOLOGY
Payer: COMMERCIAL

## 2025-04-25 ENCOUNTER — ANESTHESIA EVENT (OUTPATIENT)
Facility: HOSPITAL | Age: 65
End: 2025-04-25
Payer: COMMERCIAL

## 2025-04-25 VITALS
SYSTOLIC BLOOD PRESSURE: 105 MMHG | HEIGHT: 67 IN | DIASTOLIC BLOOD PRESSURE: 83 MMHG | OXYGEN SATURATION: 98 % | BODY MASS INDEX: 39.31 KG/M2 | WEIGHT: 250.44 LBS | HEART RATE: 71 BPM | TEMPERATURE: 97.5 F | RESPIRATION RATE: 19 BRPM

## 2025-04-25 DIAGNOSIS — G89.18 POST-OP PAIN: ICD-10-CM

## 2025-04-25 DIAGNOSIS — N95.0 POSTMENOPAUSAL BLEEDING: Primary | ICD-10-CM

## 2025-04-25 PROCEDURE — 2709999900 HC NON-CHARGEABLE SUPPLY: Performed by: OBSTETRICS & GYNECOLOGY

## 2025-04-25 PROCEDURE — 3600000004 HC SURGERY LEVEL 4 BASE: Performed by: OBSTETRICS & GYNECOLOGY

## 2025-04-25 PROCEDURE — 2580000003 HC RX 258: Performed by: PHYSICIAN ASSISTANT

## 2025-04-25 PROCEDURE — 7100000000 HC PACU RECOVERY - FIRST 15 MIN: Performed by: OBSTETRICS & GYNECOLOGY

## 2025-04-25 PROCEDURE — 88305 TISSUE EXAM BY PATHOLOGIST: CPT

## 2025-04-25 PROCEDURE — 6370000000 HC RX 637 (ALT 250 FOR IP): Performed by: PHYSICIAN ASSISTANT

## 2025-04-25 PROCEDURE — 7100000010 HC PHASE II RECOVERY - FIRST 15 MIN: Performed by: OBSTETRICS & GYNECOLOGY

## 2025-04-25 PROCEDURE — 2500000003 HC RX 250 WO HCPCS: Performed by: NURSE ANESTHETIST, CERTIFIED REGISTERED

## 2025-04-25 PROCEDURE — 2720000010 HC SURG SUPPLY STERILE: Performed by: OBSTETRICS & GYNECOLOGY

## 2025-04-25 PROCEDURE — 2500000003 HC RX 250 WO HCPCS: Performed by: PHYSICIAN ASSISTANT

## 2025-04-25 PROCEDURE — 7100000001 HC PACU RECOVERY - ADDTL 15 MIN: Performed by: OBSTETRICS & GYNECOLOGY

## 2025-04-25 PROCEDURE — 6360000002 HC RX W HCPCS: Performed by: NURSE ANESTHETIST, CERTIFIED REGISTERED

## 2025-04-25 PROCEDURE — 6360000002 HC RX W HCPCS: Performed by: PHYSICIAN ASSISTANT

## 2025-04-25 PROCEDURE — 3700000001 HC ADD 15 MINUTES (ANESTHESIA): Performed by: OBSTETRICS & GYNECOLOGY

## 2025-04-25 PROCEDURE — 2580000003 HC RX 258: Performed by: NURSE ANESTHETIST, CERTIFIED REGISTERED

## 2025-04-25 PROCEDURE — 3600000014 HC SURGERY LEVEL 4 ADDTL 15MIN: Performed by: OBSTETRICS & GYNECOLOGY

## 2025-04-25 PROCEDURE — 7100000011 HC PHASE II RECOVERY - ADDTL 15 MIN: Performed by: OBSTETRICS & GYNECOLOGY

## 2025-04-25 PROCEDURE — 3700000000 HC ANESTHESIA ATTENDED CARE: Performed by: OBSTETRICS & GYNECOLOGY

## 2025-04-25 RX ORDER — TRAMADOL HYDROCHLORIDE 50 MG/1
50 TABLET ORAL EVERY 6 HOURS PRN
Qty: 20 TABLET | Refills: 0 | Status: SHIPPED | OUTPATIENT
Start: 2025-04-25 | End: 2025-04-30

## 2025-04-25 RX ORDER — DEXAMETHASONE SODIUM PHOSPHATE 4 MG/ML
INJECTION, SOLUTION INTRA-ARTICULAR; INTRALESIONAL; INTRAMUSCULAR; INTRAVENOUS; SOFT TISSUE
Status: DISCONTINUED | OUTPATIENT
Start: 2025-04-25 | End: 2025-04-25 | Stop reason: SDUPTHER

## 2025-04-25 RX ORDER — NALOXONE HYDROCHLORIDE 0.4 MG/ML
INJECTION, SOLUTION INTRAMUSCULAR; INTRAVENOUS; SUBCUTANEOUS PRN
Status: CANCELLED | OUTPATIENT
Start: 2025-04-25

## 2025-04-25 RX ORDER — SODIUM CHLORIDE 0.9 % (FLUSH) 0.9 %
5-40 SYRINGE (ML) INJECTION EVERY 12 HOURS SCHEDULED
Status: CANCELLED | OUTPATIENT
Start: 2025-04-25

## 2025-04-25 RX ORDER — SODIUM CHLORIDE 9 MG/ML
INJECTION, SOLUTION INTRAVENOUS PRN
Status: CANCELLED | OUTPATIENT
Start: 2025-04-25

## 2025-04-25 RX ORDER — SODIUM CHLORIDE 0.9 % (FLUSH) 0.9 %
5-40 SYRINGE (ML) INJECTION PRN
Status: CANCELLED | OUTPATIENT
Start: 2025-04-25

## 2025-04-25 RX ORDER — ONDANSETRON 2 MG/ML
4 INJECTION INTRAMUSCULAR; INTRAVENOUS ONCE
Status: DISCONTINUED | OUTPATIENT
Start: 2025-04-25 | End: 2025-04-25 | Stop reason: HOSPADM

## 2025-04-25 RX ORDER — SODIUM CHLORIDE 9 MG/ML
INJECTION, SOLUTION INTRAVENOUS CONTINUOUS
Status: DISCONTINUED | OUTPATIENT
Start: 2025-04-25 | End: 2025-04-25 | Stop reason: HOSPADM

## 2025-04-25 RX ORDER — SODIUM CHLORIDE, SODIUM LACTATE, POTASSIUM CHLORIDE, CALCIUM CHLORIDE 600; 310; 30; 20 MG/100ML; MG/100ML; MG/100ML; MG/100ML
INJECTION, SOLUTION INTRAVENOUS CONTINUOUS
Status: DISCONTINUED | OUTPATIENT
Start: 2025-04-25 | End: 2025-04-25 | Stop reason: HOSPADM

## 2025-04-25 RX ORDER — FENTANYL CITRATE 50 UG/ML
100 INJECTION, SOLUTION INTRAMUSCULAR; INTRAVENOUS
Status: DISCONTINUED | OUTPATIENT
Start: 2025-04-25 | End: 2025-04-25 | Stop reason: HOSPADM

## 2025-04-25 RX ORDER — FENTANYL CITRATE 50 UG/ML
INJECTION, SOLUTION INTRAMUSCULAR; INTRAVENOUS
Status: DISCONTINUED | OUTPATIENT
Start: 2025-04-25 | End: 2025-04-25 | Stop reason: SDUPTHER

## 2025-04-25 RX ORDER — PROPOFOL 10 MG/ML
INJECTION, EMULSION INTRAVENOUS
Status: DISCONTINUED | OUTPATIENT
Start: 2025-04-25 | End: 2025-04-25 | Stop reason: SDUPTHER

## 2025-04-25 RX ORDER — SODIUM CHLORIDE 0.9 % (FLUSH) 0.9 %
5-40 SYRINGE (ML) INJECTION EVERY 12 HOURS SCHEDULED
Status: DISCONTINUED | OUTPATIENT
Start: 2025-04-25 | End: 2025-04-25 | Stop reason: HOSPADM

## 2025-04-25 RX ORDER — ONDANSETRON 2 MG/ML
4 INJECTION INTRAMUSCULAR; INTRAVENOUS
Status: CANCELLED | OUTPATIENT
Start: 2025-04-25

## 2025-04-25 RX ORDER — LIDOCAINE HYDROCHLORIDE 20 MG/ML
INJECTION, SOLUTION EPIDURAL; INFILTRATION; INTRACAUDAL; PERINEURAL
Status: DISCONTINUED | OUTPATIENT
Start: 2025-04-25 | End: 2025-04-25 | Stop reason: SDUPTHER

## 2025-04-25 RX ORDER — SODIUM CHLORIDE 0.9 % (FLUSH) 0.9 %
5-40 SYRINGE (ML) INJECTION PRN
Status: DISCONTINUED | OUTPATIENT
Start: 2025-04-25 | End: 2025-04-25 | Stop reason: HOSPADM

## 2025-04-25 RX ORDER — ONDANSETRON 2 MG/ML
INJECTION INTRAMUSCULAR; INTRAVENOUS
Status: DISCONTINUED | OUTPATIENT
Start: 2025-04-25 | End: 2025-04-25 | Stop reason: SDUPTHER

## 2025-04-25 RX ORDER — HYDROMORPHONE HYDROCHLORIDE 1 MG/ML
0.5 INJECTION, SOLUTION INTRAMUSCULAR; INTRAVENOUS; SUBCUTANEOUS EVERY 5 MIN PRN
Refills: 0 | Status: CANCELLED | OUTPATIENT
Start: 2025-04-25

## 2025-04-25 RX ORDER — SODIUM CHLORIDE 9 MG/ML
INJECTION, SOLUTION INTRAVENOUS PRN
Status: DISCONTINUED | OUTPATIENT
Start: 2025-04-25 | End: 2025-04-25 | Stop reason: HOSPADM

## 2025-04-25 RX ORDER — KETOROLAC TROMETHAMINE 30 MG/ML
INJECTION, SOLUTION INTRAMUSCULAR; INTRAVENOUS
Status: DISCONTINUED | OUTPATIENT
Start: 2025-04-25 | End: 2025-04-25 | Stop reason: SDUPTHER

## 2025-04-25 RX ORDER — DIPHENHYDRAMINE HYDROCHLORIDE 50 MG/ML
12.5 INJECTION, SOLUTION INTRAMUSCULAR; INTRAVENOUS
Status: CANCELLED | OUTPATIENT
Start: 2025-04-25

## 2025-04-25 RX ORDER — HYDRALAZINE HYDROCHLORIDE 20 MG/ML
10 INJECTION INTRAMUSCULAR; INTRAVENOUS
Status: CANCELLED | OUTPATIENT
Start: 2025-04-25

## 2025-04-25 RX ORDER — PROCHLORPERAZINE EDISYLATE 5 MG/ML
5 INJECTION INTRAMUSCULAR; INTRAVENOUS
Status: CANCELLED | OUTPATIENT
Start: 2025-04-25

## 2025-04-25 RX ORDER — MIDAZOLAM HYDROCHLORIDE 2 MG/2ML
2 INJECTION, SOLUTION INTRAMUSCULAR; INTRAVENOUS
Status: CANCELLED | OUTPATIENT
Start: 2025-04-25

## 2025-04-25 RX ORDER — MEPERIDINE HYDROCHLORIDE 25 MG/ML
12.5 INJECTION INTRAMUSCULAR; INTRAVENOUS; SUBCUTANEOUS EVERY 5 MIN PRN
Refills: 0 | Status: CANCELLED | OUTPATIENT
Start: 2025-04-25

## 2025-04-25 RX ORDER — MIDAZOLAM HYDROCHLORIDE 5 MG/5ML
5 INJECTION, SOLUTION INTRAMUSCULAR; INTRAVENOUS
Status: DISCONTINUED | OUTPATIENT
Start: 2025-04-25 | End: 2025-04-25 | Stop reason: HOSPADM

## 2025-04-25 RX ORDER — PHENYLEPHRINE HCL IN 0.9% NACL 0.4MG/10ML
SYRINGE (ML) INTRAVENOUS
Status: DISCONTINUED | OUTPATIENT
Start: 2025-04-25 | End: 2025-04-25 | Stop reason: SDUPTHER

## 2025-04-25 RX ORDER — DEXMEDETOMIDINE HYDROCHLORIDE 100 UG/ML
INJECTION, SOLUTION INTRAVENOUS
Status: DISCONTINUED | OUTPATIENT
Start: 2025-04-25 | End: 2025-04-25 | Stop reason: SDUPTHER

## 2025-04-25 RX ORDER — SODIUM CHLORIDE, SODIUM LACTATE, POTASSIUM CHLORIDE, CALCIUM CHLORIDE 600; 310; 30; 20 MG/100ML; MG/100ML; MG/100ML; MG/100ML
INJECTION, SOLUTION INTRAVENOUS CONTINUOUS
Status: CANCELLED | OUTPATIENT
Start: 2025-04-25

## 2025-04-25 RX ORDER — ACETAMINOPHEN 500 MG
1000 TABLET ORAL ONCE
Status: COMPLETED | OUTPATIENT
Start: 2025-04-25 | End: 2025-04-25

## 2025-04-25 RX ORDER — FENTANYL CITRATE 50 UG/ML
50 INJECTION, SOLUTION INTRAMUSCULAR; INTRAVENOUS EVERY 5 MIN PRN
Refills: 0 | Status: CANCELLED | OUTPATIENT
Start: 2025-04-25

## 2025-04-25 RX ORDER — MIDAZOLAM HYDROCHLORIDE 1 MG/ML
INJECTION, SOLUTION INTRAMUSCULAR; INTRAVENOUS
Status: DISCONTINUED | OUTPATIENT
Start: 2025-04-25 | End: 2025-04-25 | Stop reason: SDUPTHER

## 2025-04-25 RX ORDER — SODIUM CHLORIDE, SODIUM LACTATE, POTASSIUM CHLORIDE, CALCIUM CHLORIDE 600; 310; 30; 20 MG/100ML; MG/100ML; MG/100ML; MG/100ML
INJECTION, SOLUTION INTRAVENOUS
Status: DISCONTINUED | OUTPATIENT
Start: 2025-04-25 | End: 2025-04-25 | Stop reason: SDUPTHER

## 2025-04-25 RX ADMIN — DEXMEDETOMIDINE 4 MCG: 100 INJECTION, SOLUTION, CONCENTRATE INTRAVENOUS at 08:32

## 2025-04-25 RX ADMIN — MIDAZOLAM 2 MG: 1 INJECTION INTRAMUSCULAR; INTRAVENOUS at 08:26

## 2025-04-25 RX ADMIN — PROPOFOL 200 MG: 10 INJECTION, EMULSION INTRAVENOUS at 08:35

## 2025-04-25 RX ADMIN — LIDOCAINE HYDROCHLORIDE 100 MG: 20 INJECTION, SOLUTION EPIDURAL; INFILTRATION; INTRACAUDAL; PERINEURAL at 08:35

## 2025-04-25 RX ADMIN — KETOROLAC TROMETHAMINE 30 MG: 30 INJECTION, SOLUTION INTRAMUSCULAR at 09:04

## 2025-04-25 RX ADMIN — FENTANYL CITRATE 100 MCG: 50 INJECTION INTRAMUSCULAR; INTRAVENOUS at 08:35

## 2025-04-25 RX ADMIN — Medication 40 MCG: at 08:59

## 2025-04-25 RX ADMIN — SODIUM CHLORIDE, POTASSIUM CHLORIDE, SODIUM LACTATE AND CALCIUM CHLORIDE: 600; 310; 30; 20 INJECTION, SOLUTION INTRAVENOUS at 08:04

## 2025-04-25 RX ADMIN — SODIUM CHLORIDE, SODIUM LACTATE, POTASSIUM CHLORIDE, AND CALCIUM CHLORIDE: .6; .31; .03; .02 INJECTION, SOLUTION INTRAVENOUS at 07:38

## 2025-04-25 RX ADMIN — DEXMEDETOMIDINE 4 MCG: 100 INJECTION, SOLUTION, CONCENTRATE INTRAVENOUS at 08:26

## 2025-04-25 RX ADMIN — WATER 2000 MG: 1 INJECTION INTRAMUSCULAR; INTRAVENOUS; SUBCUTANEOUS at 08:41

## 2025-04-25 RX ADMIN — DEXMEDETOMIDINE 4 MCG: 100 INJECTION, SOLUTION, CONCENTRATE INTRAVENOUS at 08:29

## 2025-04-25 RX ADMIN — ACETAMINOPHEN 1000 MG: 500 TABLET ORAL at 07:43

## 2025-04-25 RX ADMIN — DEXMEDETOMIDINE 4 MCG: 100 INJECTION, SOLUTION, CONCENTRATE INTRAVENOUS at 08:35

## 2025-04-25 RX ADMIN — DEXAMETHASONE SODIUM PHOSPHATE 4 MG: 4 INJECTION INTRA-ARTICULAR; INTRALESIONAL; INTRAMUSCULAR; INTRAVENOUS; SOFT TISSUE at 08:41

## 2025-04-25 RX ADMIN — ONDANSETRON 4 MG: 2 INJECTION, SOLUTION INTRAMUSCULAR; INTRAVENOUS at 08:35

## 2025-04-25 ASSESSMENT — PAIN - FUNCTIONAL ASSESSMENT: PAIN_FUNCTIONAL_ASSESSMENT: 0-10

## 2025-04-25 ASSESSMENT — PAIN SCALES - GENERAL: PAINLEVEL_OUTOF10: 0

## 2025-04-25 NOTE — BRIEF OP NOTE
Brief Postoperative Note      Patient: Hermelinda Valverde  YOB: 1960  MRN: [id number]    Date of Procedure: 4/25/2025    Pre-Op Diagnosis Codes:      * Postmenopausal bleeding [N95.0]    Post-Op Diagnosis: Post-Op Diagnosis Codes:     * Postmenopausal bleeding [N95.0]       Procedure(s):  HYSTEROSCOPY, DILATION AND CURETTAGE    Surgeon(s):  Rao Lopez Jr., MD    Assistant:  * No surgical staff found *    Anesthesia: General    Estimated Blood Loss (mL): less than 50     Complications: None    Specimens:   ID Type Source Tests Collected by Time Destination   1 : Endometrial curettings Tissue Tissue SURGICAL PATHOLOGY Rao Lopez Jr., MD 4/25/2025 0859        Implants:  * No implants in log *      Drains: * No LDAs found *    Findings:  Infection Present At Time Of Surgery (PATOS) (choose all levels that have infection present):  No infection present  Other Findings:  Two large submucosal fibroids filling the endometrial cavity.  The endometrium was otherwise smooth and atrophic.  No obvious carcinoma.  Both tubal os noted.  Sounded to 8 cm.    Electronically signed by Rao Lopez MD on 4/25/2025 at 9:11 AM

## 2025-04-25 NOTE — PERIOP NOTE
Patient has moderate amount of serosanguinous drainage when position changed to standing, also two clots were passed, one grape sized and on slightly larger.  Patient denies pain, no other complaints, VS stable.  Patient ambulated to BR and was able to void.  Status updated clled to Dr. Lopez, fluids and small clots expected, patient clear for discharge home.

## 2025-04-25 NOTE — ANESTHESIA POSTPROCEDURE EVALUATION
Department of Anesthesiology  Postprocedure Note    Patient: Hermelinda Valverde  MRN: [id number]  YOB: 1960  Date of evaluation: 4/25/2025    Procedure Summary       Date: 04/25/25 Room / Location: [facility] MAIN OR [address] [place] [address] MAIN OR    Anesthesia Start: 0826 Anesthesia Stop: 0916    Procedure: HYSTEROSCOPY, DILATION AND CURETTAGE (Uterus) Diagnosis:       Postmenopausal bleeding      (Postmenopausal bleeding [N95.0])    Providers: Rao Lopez Jr., MD Responsible Provider: Dileep Maojr DO    Anesthesia Type: General ASA Status: 3            Anesthesia Type: General    Aubrey Phase I: Aubrey Score: 10    Aubrey Phase II: Aubrey Score: 10    Anesthesia Post Evaluation    Patient location during evaluation: PACU  Patient participation: complete - patient participated  Level of consciousness: awake and alert  Pain score: 0  Airway patency: patent  Nausea & Vomiting: no nausea  Cardiovascular status: hemodynamically stable  Respiratory status: acceptable  Hydration status: euvolemic  Comments: Seen, no complaints   Pain management: adequate    No notable events documented.

## 2025-04-25 NOTE — OP NOTE
Gynecologic Oncology Operative Report    Hermelinda Valverde  4/25/2025    Pre-operative dx:  Postmenopausal bleeding    Post-operative dx:  Postmenopausal bleeding    Procedure:  Hysteroscopy, dilatation and curettage    Surgeon:  Rao Lopez MD    Assistant:  None     Anesthesia:  GETA    EBL:  50    Fluid deficit:  1500    Complications:  None    Implants:  None    Specimens:    ID Type Source Tests Collected by Time Destination   1 : Endometrial curettings Tissue Tissue SURGICAL PATHOLOGY Rao Lopez Jr., MD 4/25/2025 0859      Operative indications:  66 yo obese WF with postmenopausal bleeding.  She is a poor candidate for hysterectomy.  I recommended hysteroscopy/D&C.       Operative findings:   Two large submucosal fibroids filling the endometrial cavity.  The endometrium was otherwise smooth and atrophic.  No obvious carcinoma.  Both tubal os noted.  Sounded to 8 cm.      Procedure in detail:  After the risks, benefits, indications and alternatives of the procedure were discussed with the patient and informed consent was obtained, the patient was taken to the operating room. She was identified and then administered general anesthesia and placed in the dorsal lithotomy position in Earl stirrups and prepped and draped in the usual fashion. An open-sided Graves speculum was placed into the vagina to visualize the cervix. The cervix was then grasped with a single-tooth tenaculum. The uterus was sounded to determine the size of the uterine cavity. It was then progressively dilated with Hanks dilators to accommodate the Myosure hysteroscope. The hysteroscope was then inserted, and the uterine cavity was insufflated with normal saline. The above mentioned findings were noted.  A curettage under direct visualization of the endometrial cavity was then performed.  I removed a large portion of the two fibroids, essentially taking them down to the base.  Minimal bleeding was noted at this time. The single-tooth tenaculum  was removed and the Graves speculum was removed. The patient was then taken out of [facility], awakened from anesthesia, and taken to the recovery room in stable condition. All sponge, lap, needle counts were correct.       Rao Lopez MD  4/25/2025  9:13 AM

## 2025-04-25 NOTE — DISCHARGE INSTRUCTIONS
______________________________________________________________________    Anesthesia Discharge Instructions    After general anesthesia or intervenous sedation, for 24 hours or while taking prescription Narcotics:  Limit your activities  Do not drive or operate hazardous machinery  If you have not urinated within 8 hours after discharge, please contact your surgeon on call.  Do not make important personal or business decisions  Do not drink alcoholic beverages    Report the following to your surgeon:  Excessive pain, swelling, redness or odor of or around the surgical area  Temperature over 100.5 degrees  Nausea and vomiting lasting longer than 4 hours or if unable to take medication  Any signs of decreased circulation or nerve impairment to extremity:  Change in color, persistent numbness, tingling, coldness or increased pain.  Any questions        **Toradol given at 9:04 AM, dp not take Ibuprofen for 6 hours (3:04 PM)

## 2025-04-25 NOTE — ANESTHESIA PRE PROCEDURE
Department of Anesthesiology  Preprocedure Note       Name:  Hermelinda Valverde   Age:  65 y.o.  :  1960                                          MRN:  [id number]         Date:  2025      Surgeon: Surgeon(s):  Rao Lopez Jr., MD    Procedure: Procedure(s):  HYSTEROSCOPY, DILATION AND CURETTAGE, POSSIBLE PLACEMENT OF INTRAUTERINE DEVICE    Medications prior to admission:   Prior to Admission medications    Medication Sig Start Date End Date Taking? Authorizing Provider   IRON-VITAMIN C PO Take by mouth   Yes Mary Pulliam MD   medroxyPROGESTERone (PROVERA) 10 MG tablet Take 2 tablets (20 mg) by mouth three times daily for 7 days then take 1 tablet (10 mg) daily 3/3/25  Yes Sirena Soni MD   triamcinolone (KENALOG) 0.1 % ointment APPLY TO PERINEUM DAILY AT NIGHTTIME 25  Yes Sirena Soni MD   nystatin 994864 UNIT/GM powder APPLY TO AFECTED AREA UNDER BELLY THREE (3) TIMES DAILY 25  Yes Sirena Soni MD   albuterol sulfate HFA (VENTOLIN HFA) 108 (90 Base) MCG/ACT inhaler Inhale 2 puffs into the lungs every 6 hours as needed for Wheezing   Yes Mary Pulliam MD   ADVAIR DISKUS 250-50 MCG/ACT AEPB diskus inhaler 2 times daily Twice daily 24  Yes Mary Pulliam MD   meclizine (ANTIVERT) 12.5 MG tablet Take 1 tablet by mouth 3 times daily as needed  Patient not taking: Reported on 2025    Mary Pulliam MD   diphenhydrAMINE HCl (ALLERGY MED PO) Take by mouth  Patient not taking: Reported on 2025    Mary Pulliam MD   BISACODYL 5 MG EC tablet 1 tablet daily as needed  Patient not taking: Reported on 2025   Mary Pulliam MD       Current medications:    Current Facility-Administered Medications   Medication Dose Route Frequency Provider Last Rate Last Admin   • lactated ringers infusion   IntraVENous Continuous Chapis Rose PA-C 125 mL/hr at 25 0738 New Bag at 25 0738   • sodium chloride flush 0.9 % injection  Please notify the patient that the ultrasound shows that her fibroids have returned.  She also has a small cyst on the right ovary that should resolve on its own.  The birth control pills should help prevent new cysts from forming.  Please advise her to take the birth control pills as prescribed.  I'd like her to repeat a pelvic ultrasound in 3 months to confirm resolution of the cyst, and I'd like to see her for a visit afterwards to see how her bleeding is doing.

## 2025-05-05 ENCOUNTER — TELEPHONE (OUTPATIENT)
Age: 65
End: 2025-05-05

## 2025-05-05 NOTE — TELEPHONE ENCOUNTER
I called the patient, she continues to have spotting and last night she had an episode of heavy bleeding with clots that slowed down today. Please advise.

## 2025-05-05 NOTE — TELEPHONE ENCOUNTER
Advised the patient per  that it is nothing to worry about and he will follow up at her regular post operative appointment. Bleeding precautions reviewed with the patient.

## 2025-05-21 NOTE — PROGRESS NOTES
[facility]  [address] [place] [id number]  P [phone]  F [phone]    Postoperative Office Note  Patient ID:  Name: Hermelinda Valverde  MRM: [id number]  : 1960/65 y.o.  Date: 2025    Diagnosis:    Diagnosis Orders   1. Postmenopausal bleeding        2. Leiomyoma of body of uterus            Problem List:   Patient Active Problem List   Diagnosis    Postmenopausal bleeding       SUBJECTIVE:    Hermelinda Valverde is a 65 y.o. female who presents for followup postoperative care following hysteroscopy/D&C.     Operative findings:   Two large submucosal fibroids filling the endometrial cavity.  The endometrium was otherwise smooth and atrophic.  No obvious carcinoma.  Both tubal os noted.  Sounded to 8 cm.      The patient's pathology revealed:    FINAL PATHOLOGIC DIAGNOSIS   Endometrium, curettage:   Fragments of endometrial polyp.   Multiple fragments of benign smooth muscle tissue.   No evidence for hyperplasia or malignancy.     Currently she has no problems with eating, bowel movements, voiding, or their wound.  Appetite is good. Eating a regular diet without difficulty.   Bowel movements are regular.  The patient is not having any pain.    Medications:     Current Outpatient Medications on File Prior to Visit   Medication Sig Dispense Refill    IRON-VITAMIN C PO Take by mouth      medroxyPROGESTERone (PROVERA) 10 MG tablet Take 2 tablets (20 mg) by mouth three times daily for 7 days then take 1 tablet (10 mg) daily 90 tablet 1    triamcinolone (KENALOG) 0.1 % ointment APPLY TO PERINEUM DAILY AT NIGHTTIME 80 g 2    nystatin [id number] UNIT/GM powder APPLY TO AFECTED AREA UNDER BELLY THREE (3) TIMES DAILY 60 g 2    albuterol sulfate HFA (VENTOLIN HFA) 108 (90 Base) MCG/ACT inhaler Inhale 2 puffs into the lungs every 6 hours as needed for Wheezing      ADVAIR DISKUS 250-50 MCG/ACT AEPB diskus inhaler 2 times daily Twice daily      meclizine (ANTIVERT) 12.5 MG tablet

## 2025-05-22 ENCOUNTER — OFFICE VISIT (OUTPATIENT)
Age: 65
End: 2025-05-22

## 2025-05-22 VITALS
HEART RATE: 66 BPM | WEIGHT: 254 LBS | SYSTOLIC BLOOD PRESSURE: 119 MMHG | HEIGHT: 67 IN | DIASTOLIC BLOOD PRESSURE: 85 MMHG | BODY MASS INDEX: 39.87 KG/M2

## 2025-05-22 DIAGNOSIS — D25.9 LEIOMYOMA OF BODY OF UTERUS: ICD-10-CM

## 2025-05-22 DIAGNOSIS — N95.0 POSTMENOPAUSAL BLEEDING: Primary | ICD-10-CM

## 2025-05-22 PROCEDURE — 99024 POSTOP FOLLOW-UP VISIT: CPT | Performed by: OBSTETRICS & GYNECOLOGY

## 2025-07-03 RX ORDER — TRIAMCINOLONE ACETONIDE 1 MG/G
OINTMENT TOPICAL
Qty: 80 G | Refills: 2 | Status: SHIPPED | OUTPATIENT
Start: 2025-07-03

## 2025-08-06 ENCOUNTER — HOSPITAL ENCOUNTER (OUTPATIENT)
Facility: HOSPITAL | Age: 65
Setting detail: SPECIMEN
Discharge: HOME | End: 2025-08-09

## 2025-08-06 ENCOUNTER — OFFICE VISIT (OUTPATIENT)
Age: 65
End: 2025-08-06
Payer: COMMERCIAL

## 2025-08-06 VITALS
BODY MASS INDEX: 39.77 KG/M2 | HEART RATE: 65 BPM | RESPIRATION RATE: 16 BRPM | OXYGEN SATURATION: 98 % | DIASTOLIC BLOOD PRESSURE: 87 MMHG | WEIGHT: 254 LBS | SYSTOLIC BLOOD PRESSURE: 144 MMHG | TEMPERATURE: 98.3 F

## 2025-08-06 DIAGNOSIS — R35.0 URINARY FREQUENCY: ICD-10-CM

## 2025-08-06 DIAGNOSIS — Z01.419 ENCOUNTER FOR GYNECOLOGICAL EXAMINATION: ICD-10-CM

## 2025-08-06 DIAGNOSIS — Z12.4 CERVICAL CANCER SCREENING: ICD-10-CM

## 2025-08-06 DIAGNOSIS — Z12.31 BREAST CANCER SCREENING BY MAMMOGRAM: Primary | ICD-10-CM

## 2025-08-06 LAB
BILIRUBIN, URINE, POC: NEGATIVE
BLOOD URINE, POC: ABNORMAL
GLUCOSE URINE, POC: NEGATIVE
KETONES, URINE, POC: NEGATIVE
LEUKOCYTE ESTERASE, URINE, POC: NEGATIVE
NITRITE, URINE, POC: NEGATIVE
PH, URINE, POC: 6 (ref 4.6–8)
PROTEIN,URINE, POC: NEGATIVE
SPECIFIC GRAVITY, URINE, POC: 1.02 (ref 1–1.03)
URINALYSIS CLARITY, POC: CLEAR
URINALYSIS COLOR, POC: YELLOW
UROBILINOGEN, POC: NORMAL

## 2025-08-06 PROCEDURE — 81003 URINALYSIS AUTO W/O SCOPE: CPT | Performed by: OBSTETRICS & GYNECOLOGY

## 2025-08-06 PROCEDURE — 99396 PREV VISIT EST AGE 40-64: CPT | Performed by: OBSTETRICS & GYNECOLOGY

## 2025-08-12 LAB — HPV I/H RISK 1 DNA CVX QL PROBE+SIG AMP: NEGATIVE

## 2025-08-13 ENCOUNTER — HOSPITAL ENCOUNTER (OUTPATIENT)
Facility: HOSPITAL | Age: 65
Discharge: HOME | End: 2025-08-16
Attending: OBSTETRICS & GYNECOLOGY
Payer: COMMERCIAL

## 2025-08-13 DIAGNOSIS — Z12.31 BREAST CANCER SCREENING BY MAMMOGRAM: ICD-10-CM

## 2025-08-13 PROCEDURE — 77063 BREAST TOMOSYNTHESIS BI: CPT

## 2025-08-22 ENCOUNTER — TELEPHONE (OUTPATIENT)
Age: 65
End: 2025-08-22

## (undated) DEVICE — GARMENT,MEDLINE,DVT,INT,CALF,MED, GEN2: Brand: MEDLINE

## (undated) DEVICE — DRAPE,LITHOTOMY,STERILE: Brand: MEDLINE

## (undated) DEVICE — PAD,SANITARY,11 IN,MAXI,N-STRL,IND WRAP: Brand: MEDLINE

## (undated) DEVICE — GOWN,SIRUS,NONRNF,SETINSLV,XL,20/CS: Brand: MEDLINE

## (undated) DEVICE — COVER LT HNDL PLAS RIG 1 PER PK

## (undated) DEVICE — SHEET,DRAPE,UNDERBUTTOCK,GRAD POUCH,PORT: Brand: MEDLINE

## (undated) DEVICE — GLOVE SURG SZ 75 L1212IN FNGR THK138MIL BRN LTX FREE

## (undated) DEVICE — COVER,TABLE,60X90,STERILE: Brand: MEDLINE

## (undated) DEVICE — BASIC SINGLE BASIN BTC-LF: Brand: MEDLINE INDUSTRIES, INC.

## (undated) DEVICE — MYOSURE XL

## (undated) DEVICE — DEVICE TSSUE RMVL D3MM L25.25N ENDSCPC INTRTRNE PLPS FBRDS M

## (undated) DEVICE — SEAL CERV SPRING-LOADED SL EXTENDIBLE CLLR SIL TIP OMNI LOK

## (undated) DEVICE — SOL IRR SOD CHL 0.9% TITAN XL CNTNR 3000ML

## (undated) DEVICE — TOWEL,OR,DSP,ST,BLUE,STD,4/PK,20PK/CS: Brand: MEDLINE

## (undated) DEVICE — SKIN PREP TRAY 4 COMPARTM TRAY: Brand: MEDLINE INDUSTRIES, INC.

## (undated) DEVICE — FLUID MGMT SYS FLUENT KIT 6/PK

## (undated) DEVICE — SOLUTION IRRIG 1000ML 0.9% SOD CHL USP POUR PLAS BTL

## (undated) DEVICE — MARKER,SKIN,WI/RULER AND LABELS: Brand: MEDLINE

## (undated) DEVICE — CATHETER,URETHRAL,REDRUBBER,STRL,16FR: Brand: MEDLINE

## (undated) DEVICE — SET ENDOSCP SEAL HYSTEROSCOPE RIG OUTFLO CHN DISP MYOSURE

## (undated) DEVICE — SOLUTION IRRIG 3000ML 0.9% SOD CHL USP UROMATIC PLAS CONT

## (undated) DEVICE — DEVICE TISS REM DIA3MM L25.25IN ENDOSCP F/ IU POLYPS